# Patient Record
Sex: MALE
[De-identification: names, ages, dates, MRNs, and addresses within clinical notes are randomized per-mention and may not be internally consistent; named-entity substitution may affect disease eponyms.]

---

## 2021-06-11 PROBLEM — Z00.00 ENCOUNTER FOR PREVENTIVE HEALTH EXAMINATION: Status: ACTIVE | Noted: 2021-06-11

## 2021-06-14 ENCOUNTER — APPOINTMENT (OUTPATIENT)
Dept: ORTHOPEDIC SURGERY | Facility: CLINIC | Age: 76
End: 2021-06-14
Payer: COMMERCIAL

## 2021-06-14 VITALS — WEIGHT: 252 LBS | HEIGHT: 74 IN | BODY MASS INDEX: 32.34 KG/M2

## 2021-06-14 DIAGNOSIS — Z78.9 OTHER SPECIFIED HEALTH STATUS: ICD-10-CM

## 2021-06-14 DIAGNOSIS — M19.072 PRIMARY OSTEOARTHRITIS, LEFT ANKLE AND FOOT: ICD-10-CM

## 2021-06-14 DIAGNOSIS — M21.962 UNSPECIFIED ACQUIRED DEFORMITY OF LEFT LOWER LEG: ICD-10-CM

## 2021-06-14 DIAGNOSIS — I63.9 CEREBRAL INFARCTION, UNSPECIFIED: ICD-10-CM

## 2021-06-14 DIAGNOSIS — I10 ESSENTIAL (PRIMARY) HYPERTENSION: ICD-10-CM

## 2021-06-14 PROCEDURE — 73610 X-RAY EXAM OF ANKLE: CPT | Mod: LT

## 2021-06-14 PROCEDURE — 73620 X-RAY EXAM OF FOOT: CPT | Mod: LT

## 2021-06-14 PROCEDURE — 99072 ADDL SUPL MATRL&STAF TM PHE: CPT

## 2021-06-14 PROCEDURE — 99204 OFFICE O/P NEW MOD 45 MIN: CPT

## 2021-06-14 RX ORDER — CAPTOPRIL 100 MG/1
100 TABLET ORAL
Qty: 90 | Refills: 0 | Status: ACTIVE | COMMUNITY
Start: 2021-01-20

## 2021-06-14 RX ORDER — APIXABAN 5 MG/1
5 TABLET, FILM COATED ORAL
Qty: 60 | Refills: 0 | Status: ACTIVE | COMMUNITY
Start: 2021-01-20

## 2021-06-14 RX ORDER — AMLODIPINE BESYLATE 10 MG/1
10 TABLET ORAL
Qty: 30 | Refills: 0 | Status: COMPLETED | COMMUNITY
Start: 2020-02-21

## 2021-06-14 RX ORDER — ATORVASTATIN CALCIUM 80 MG/1
80 TABLET, FILM COATED ORAL
Qty: 30 | Refills: 0 | Status: ACTIVE | COMMUNITY
Start: 2021-01-20

## 2021-06-14 RX ORDER — AMLODIPINE BESYLATE 5 MG/1
5 TABLET ORAL
Qty: 30 | Refills: 0 | Status: ACTIVE | COMMUNITY
Start: 2021-02-18

## 2021-06-14 RX ORDER — HYDROCHLOROTHIAZIDE 25 MG/1
25 TABLET ORAL
Qty: 30 | Refills: 0 | Status: COMPLETED | COMMUNITY
Start: 2021-01-20

## 2021-06-14 RX ORDER — CARVEDILOL 6.25 MG/1
6.25 TABLET, FILM COATED ORAL
Qty: 60 | Refills: 0 | Status: ACTIVE | COMMUNITY
Start: 2021-01-19

## 2021-06-14 NOTE — HISTORY OF PRESENT ILLNESS
[de-identified] : 76 yo gentleman presents for in his left ankle that started over 10 years ago.  He was very active in the past and ran for [employer], the last one over about 30 years ago.  He got progressively worse pain and deformity in the left hindfoot and ankle over the years.  He tried orthotics which did not help.  It continued to progress and he got new orthotics and tried some braces and then started walking with a cane.  He does not take any medication for pain which is not severe but can have pain that 6 out of 10 intermittently and typically is dull.  It is better with rest and worse walking.  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  He states that he is not convinced he had a stroke and had no residual impairments.

## 2021-06-14 NOTE — ASSESSMENT
[FreeTextEntry1] : 75-year-old gentleman with severe deformity and degeneration subtalar and transverse tarsal joints.\par There is also  mild arthritis of the ankle joint but there is good motion.\par I think he likely needs a triple arthrodesis to reduce and fuse his subtalar and transverse tarsal joints.  I think this would give him a good plantigrade foot.  The ankle joint and other joints need to be further assessed with CT and MRI before proceeding with surgery.\par I am referring him also to one of the foot and ankle specialists for further evaluation.\par Nonsurgical treatment would involve bracing using an [place] or Shaggy brace to try to stabilize the ankle but given the degree of deformity and collapse I think it is likely to still progress and may get to the point it is very difficult for him to ambulate.\par Surgery certainly has its risks and the healing will take several months at least.  He should speak to his primary care physician since he is on Eliquis and does have some other medical issues and would need to be cleared for surgery.\par Should follow-up with Dr. Guzman after he has the CT and MRI performed.

## 2021-06-14 NOTE — PHYSICAL EXAM
[Cane] : ambulates with cane [LE] : Sensory: Intact in bilateral lower extremities [DP] : dorsalis pedis 2+ and symmetric bilaterally [Normal RLE] : Right Lower Extremity: No scars, rashes, lesions, ulcers, skin intact [Normal LLE] : Left Lower Extremity: No scars, rashes, lesions, ulcers, skin intact [Normal Touch] : sensation intact for touch [Normal] : Alert and in no acute distress [de-identified] : Left foot and ankle\par There is obvious deformity with l medial subluxation of the talus on the navicular and subluxation at the talonavicular joint with prominence of the medial malleolus.\par Moderate edema.  No ecchymoses or erythema.  Skin is intact.  There is mild generalized tenderness around the hindfoot.\par Ankle motion is good with about 5 degrees dorsiflexion and 20 to 25 degrees plantarflexion.  Limited subtalar motion with pain.\par No significant forefoot deformity.\par Anterior tibial tendon, gastrocsoleus, peroneals and posterior tibial tendon, EHL are all intact 5/5 strength.\par Incision is intact.  Dorsalis pedis pulse 2+.\par Moderately antalgic gait. [de-identified] : Overweight [de-identified] : \par X-rays of the left foot and ankle weightbearing 5 views taken today show narrowing of the tibiotalar joint.  Evidence of prior avulsion deltoid.\par Subluxation talocalcaneal joint with joint space narrowing.  Severe uncovering of the talus on the AP view from subluxation medially.  Severe talonavicular and calcaneocuboid degeneration.

## 2022-03-10 ENCOUNTER — INPATIENT (INPATIENT)
Facility: HOSPITAL | Age: 77
LOS: 28 days | Discharge: NURSING, REHAB OR LONG-TERM CARE | DRG: 299 | End: 2022-04-08
Attending: NEUROLOGICAL SURGERY | Admitting: NEUROLOGICAL SURGERY
Payer: COMMERCIAL

## 2022-03-10 VITALS
DIASTOLIC BLOOD PRESSURE: 101 MMHG | SYSTOLIC BLOOD PRESSURE: 171 MMHG | OXYGEN SATURATION: 96 % | HEART RATE: 74 BPM | RESPIRATION RATE: 16 BRPM

## 2022-03-10 LAB
ALBUMIN SERPL ELPH-MCNC: 4.3 G/DL — SIGNIFICANT CHANGE UP (ref 3.3–5)
ALP SERPL-CCNC: 61 U/L — SIGNIFICANT CHANGE UP (ref 40–120)
ALT FLD-CCNC: 17 U/L — SIGNIFICANT CHANGE UP (ref 10–45)
ANION GAP SERPL CALC-SCNC: 12 MMOL/L — SIGNIFICANT CHANGE UP (ref 5–17)
ANISOCYTOSIS BLD QL: SLIGHT — SIGNIFICANT CHANGE UP
APTT BLD: 31.4 SEC — SIGNIFICANT CHANGE UP (ref 27.5–35.5)
AST SERPL-CCNC: 16 U/L — SIGNIFICANT CHANGE UP (ref 10–40)
BASE EXCESS BLDV CALC-SCNC: 2.7 MMOL/L — SIGNIFICANT CHANGE UP (ref -2–3)
BASOPHILS # BLD AUTO: 0 K/UL — SIGNIFICANT CHANGE UP (ref 0–0.2)
BASOPHILS NFR BLD AUTO: 0 % — SIGNIFICANT CHANGE UP (ref 0–2)
BILIRUB SERPL-MCNC: 0.7 MG/DL — SIGNIFICANT CHANGE UP (ref 0.2–1.2)
BUN SERPL-MCNC: 30 MG/DL — HIGH (ref 7–23)
BURR CELLS BLD QL SMEAR: PRESENT — SIGNIFICANT CHANGE UP
CA-I SERPL-SCNC: 1.19 MMOL/L — SIGNIFICANT CHANGE UP (ref 1.15–1.33)
CALCIUM SERPL-MCNC: 9.8 MG/DL — SIGNIFICANT CHANGE UP (ref 8.4–10.5)
CHLORIDE SERPL-SCNC: 102 MMOL/L — SIGNIFICANT CHANGE UP (ref 96–108)
CO2 BLDV-SCNC: 29.3 MMOL/L — HIGH (ref 22–26)
CO2 SERPL-SCNC: 26 MMOL/L — SIGNIFICANT CHANGE UP (ref 22–31)
CREAT SERPL-MCNC: 1.13 MG/DL — SIGNIFICANT CHANGE UP (ref 0.5–1.3)
EGFR: 67 ML/MIN/1.73M2 — SIGNIFICANT CHANGE UP
EOSINOPHIL # BLD AUTO: 0 K/UL — SIGNIFICANT CHANGE UP (ref 0–0.5)
EOSINOPHIL NFR BLD AUTO: 0 % — SIGNIFICANT CHANGE UP (ref 0–6)
GAS PNL BLDV: 137 MMOL/L — SIGNIFICANT CHANGE UP (ref 136–145)
GAS PNL BLDV: SIGNIFICANT CHANGE UP
GLUCOSE SERPL-MCNC: 185 MG/DL — HIGH (ref 70–99)
HCO3 BLDV-SCNC: 28 MMOL/L — SIGNIFICANT CHANGE UP (ref 22–29)
HCT VFR BLD CALC: 47.4 % — SIGNIFICANT CHANGE UP (ref 39–50)
HGB BLD-MCNC: 16.5 G/DL — SIGNIFICANT CHANGE UP (ref 13–17)
INR BLD: 1.17 — HIGH (ref 0.88–1.16)
LACTATE SERPL-SCNC: 2 MMOL/L — SIGNIFICANT CHANGE UP (ref 0.5–2)
LYMPHOCYTES # BLD AUTO: 0.22 K/UL — LOW (ref 1–3.3)
LYMPHOCYTES # BLD AUTO: 3.5 % — LOW (ref 13–44)
MANUAL SMEAR VERIFICATION: SIGNIFICANT CHANGE UP
MCHC RBC-ENTMCNC: 32 PG — SIGNIFICANT CHANGE UP (ref 27–34)
MCHC RBC-ENTMCNC: 34.8 GM/DL — SIGNIFICANT CHANGE UP (ref 32–36)
MCV RBC AUTO: 92 FL — SIGNIFICANT CHANGE UP (ref 80–100)
MICROCYTES BLD QL: SLIGHT — SIGNIFICANT CHANGE UP
MONOCYTES # BLD AUTO: 0.11 K/UL — SIGNIFICANT CHANGE UP (ref 0–0.9)
MONOCYTES NFR BLD AUTO: 1.7 % — LOW (ref 2–14)
NEUTROPHILS # BLD AUTO: 5.83 K/UL — SIGNIFICANT CHANGE UP (ref 1.8–7.4)
NEUTROPHILS NFR BLD AUTO: 91.3 % — HIGH (ref 43–77)
PCO2 BLDV: 44 MMHG — SIGNIFICANT CHANGE UP (ref 42–55)
PH BLDV: 7.41 — SIGNIFICANT CHANGE UP (ref 7.32–7.43)
PLAT MORPH BLD: NORMAL — SIGNIFICANT CHANGE UP
PLATELET # BLD AUTO: 203 K/UL — SIGNIFICANT CHANGE UP (ref 150–400)
PO2 BLDV: 51 MMHG — HIGH (ref 25–45)
POIKILOCYTOSIS BLD QL AUTO: SLIGHT — SIGNIFICANT CHANGE UP
POLYCHROMASIA BLD QL SMEAR: SLIGHT — SIGNIFICANT CHANGE UP
POTASSIUM BLDV-SCNC: 4 MMOL/L — SIGNIFICANT CHANGE UP (ref 3.5–5.1)
POTASSIUM SERPL-MCNC: 4 MMOL/L — SIGNIFICANT CHANGE UP (ref 3.5–5.3)
POTASSIUM SERPL-SCNC: 4 MMOL/L — SIGNIFICANT CHANGE UP (ref 3.5–5.3)
PROT SERPL-MCNC: 6.8 G/DL — SIGNIFICANT CHANGE UP (ref 6–8.3)
PROTHROM AB SERPL-ACNC: 13.9 SEC — HIGH (ref 10.5–13.4)
RBC # BLD: 5.15 M/UL — SIGNIFICANT CHANGE UP (ref 4.2–5.8)
RBC # FLD: 12.5 % — SIGNIFICANT CHANGE UP (ref 10.3–14.5)
RBC BLD AUTO: ABNORMAL
SAO2 % BLDV: 82.6 % — SIGNIFICANT CHANGE UP (ref 67–88)
SARS-COV-2 RNA SPEC QL NAA+PROBE: NEGATIVE — SIGNIFICANT CHANGE UP
SCHISTOCYTES BLD QL AUTO: SLIGHT — SIGNIFICANT CHANGE UP
SODIUM SERPL-SCNC: 140 MMOL/L — SIGNIFICANT CHANGE UP (ref 135–145)
TROPONIN T SERPL-MCNC: 0.01 NG/ML — SIGNIFICANT CHANGE UP (ref 0–0.01)
VARIANT LYMPHS # BLD: 3.5 % — SIGNIFICANT CHANGE UP (ref 0–6)
WBC # BLD: 6.39 K/UL — SIGNIFICANT CHANGE UP (ref 3.8–10.5)
WBC # FLD AUTO: 6.39 K/UL — SIGNIFICANT CHANGE UP (ref 3.8–10.5)

## 2022-03-10 PROCEDURE — 71045 X-RAY EXAM CHEST 1 VIEW: CPT | Mod: 26

## 2022-03-10 PROCEDURE — 70496 CT ANGIOGRAPHY HEAD: CPT | Mod: 26,MA

## 2022-03-10 PROCEDURE — 99291 CRITICAL CARE FIRST HOUR: CPT

## 2022-03-10 PROCEDURE — 70498 CT ANGIOGRAPHY NECK: CPT | Mod: 26,MA

## 2022-03-10 PROCEDURE — 0042T: CPT

## 2022-03-10 RX ORDER — SODIUM CHLORIDE 9 MG/ML
1000 INJECTION, SOLUTION INTRAVENOUS
Refills: 0 | Status: DISCONTINUED | OUTPATIENT
Start: 2022-03-10 | End: 2022-03-12

## 2022-03-10 RX ORDER — GLUCAGON INJECTION, SOLUTION 0.5 MG/.1ML
1 INJECTION, SOLUTION SUBCUTANEOUS ONCE
Refills: 0 | Status: DISCONTINUED | OUTPATIENT
Start: 2022-03-10 | End: 2022-04-08

## 2022-03-10 RX ORDER — NICARDIPINE HYDROCHLORIDE 30 MG/1
5 CAPSULE, EXTENDED RELEASE ORAL
Qty: 40 | Refills: 0 | Status: DISCONTINUED | OUTPATIENT
Start: 2022-03-10 | End: 2022-03-10

## 2022-03-10 RX ORDER — DEXTROSE 50 % IN WATER 50 %
25 SYRINGE (ML) INTRAVENOUS ONCE
Refills: 0 | Status: DISCONTINUED | OUTPATIENT
Start: 2022-03-10 | End: 2022-03-12

## 2022-03-10 RX ORDER — METOCLOPRAMIDE HCL 10 MG
10 TABLET ORAL ONCE
Refills: 0 | Status: COMPLETED | OUTPATIENT
Start: 2022-03-10 | End: 2022-03-10

## 2022-03-10 RX ORDER — NICARDIPINE HYDROCHLORIDE 30 MG/1
5 CAPSULE, EXTENDED RELEASE ORAL
Qty: 40 | Refills: 0 | Status: DISCONTINUED | OUTPATIENT
Start: 2022-03-10 | End: 2022-03-11

## 2022-03-10 RX ORDER — DEXTROSE 50 % IN WATER 50 %
12.5 SYRINGE (ML) INTRAVENOUS ONCE
Refills: 0 | Status: DISCONTINUED | OUTPATIENT
Start: 2022-03-10 | End: 2022-03-12

## 2022-03-10 RX ORDER — DEXTROSE 50 % IN WATER 50 %
15 SYRINGE (ML) INTRAVENOUS ONCE
Refills: 0 | Status: DISCONTINUED | OUTPATIENT
Start: 2022-03-10 | End: 2022-03-12

## 2022-03-10 RX ORDER — INSULIN LISPRO 100/ML
VIAL (ML) SUBCUTANEOUS EVERY 6 HOURS
Refills: 0 | Status: DISCONTINUED | OUTPATIENT
Start: 2022-03-10 | End: 2022-03-12

## 2022-03-10 RX ORDER — SODIUM CHLORIDE 9 MG/ML
1000 INJECTION INTRAMUSCULAR; INTRAVENOUS; SUBCUTANEOUS
Refills: 0 | Status: DISCONTINUED | OUTPATIENT
Start: 2022-03-10 | End: 2022-03-11

## 2022-03-10 RX ADMIN — NICARDIPINE HYDROCHLORIDE 25 MG/HR: 30 CAPSULE, EXTENDED RELEASE ORAL at 21:51

## 2022-03-10 RX ADMIN — Medication 104 MILLIGRAM(S): at 22:37

## 2022-03-10 RX ADMIN — NICARDIPINE HYDROCHLORIDE 25 MG/HR: 30 CAPSULE, EXTENDED RELEASE ORAL at 22:47

## 2022-03-10 RX ADMIN — SODIUM CHLORIDE 85 MILLILITER(S): 9 INJECTION INTRAMUSCULAR; INTRAVENOUS; SUBCUTANEOUS at 22:46

## 2022-03-10 NOTE — ED PROVIDER NOTE - OBJECTIVE STATEMENT
Pt is a 76M who presents to ED for left sided weakness, left sided facial droop noted today. pt was last seen normal earlier this evening, was found by family member slumped over in his office. Pt brought to ED by EMS, Code Stroke activated.   pt is awake, dysarthric in ED. Seen immediately upon arrival secondary to critical condition.

## 2022-03-10 NOTE — ED PROVIDER NOTE - CRITICAL CARE ATTENDING CONTRIBUTION TO CARE
Pt seen immediately upon arrival secondary to critical condition. Code stroke activated.   IV Cardene placed secondary to elevated BP.

## 2022-03-10 NOTE — CONSULT NOTE ADULT - SUBJECTIVE AND OBJECTIVE BOX
**STROKE CODE CONSULT NOTE**    Last known well time: 1500    HPI: 76y Male with PMHx of HTN, HLD, basilar artery aneurysm, ? cardiac hx (on Eliquis 5) BIBEMS due to being found slumped over in his office. LWK 3pm. 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 Per girlfriend, he was supposed to come straight home afterwards, when she did not see him home she had a colleague check on him in his office, was found slumped over. EMS called at that point, found to have left sided weakness and left facial droop, aphasic. /90, was satting 90% on RA, had x1 episode of emesis on the field, was brought to ED for stroke evaluation. BP at [facility] was 171/108mmHg, NIHSS 20. Stat CT imaging obtained. HCT/CTA head shows partially thrombosed 3.1 x 2.6cm basilar artery aneurysm with posterior displacement of brainstem. NSGY consulted immediately. Pt is not a tpa candidate as pt is out of window, not a thrombectomy candidate due to no LVO. Cardene gtt started in ED for BP management.    Of note, pt was having workup done for basilar artery aneurysm. At some point, was supposed to have intervention done for it, however for unclear reasons they decided to not pursue intervention. Per girlfriend, pt was also scared of procedure. Unclear if serial imaging was obtained and if patient was compliant with his visits. Independent with ADLs at home.     T(C): 36.9 (03-10-22 @ 21:44), Max: 36.9 (03-10-22 @ 21:44)  HR: 80 (03-10-22 @ 22:11) (74 - 80)  BP: 152/71 (03-10-22 @ 22:11) (152/71 - 171/101)  RR: 16 (03-10-22 @ 22:11) (16 - 16)  SpO2: 93% (03-10-22 @ 22:11) (93% - 97%)    PAST MEDICAL & SURGICAL HISTORY:    FAMILY HISTORY:    SOCIAL HISTORY:   Patient lives with girlfriend in apartment  Smoking status: unknown  Drinking: rarely  Drug Use: denies    ROS:   Unable to obtain    MEDICATIONS  (STANDING):  niCARdipine Infusion 5 mG/Hr (25 mL/Hr) IV Continuous <Continuous>    MEDICATIONS  (PRN):    Allergies    No Known Allergies    Intolerances    Vital Signs Last 24 Hrs  T(C): 36.9 (10 Mar 2022 21:44), Max: 36.9 (10 Mar 2022 21:44)  T(F): 98.5 (10 Mar 2022 21:44), Max: 98.5 (10 Mar 2022 21:44)  HR: 80 (10 Mar 2022 22:11) (74 - 80)  BP: 152/71 (10 Mar 2022 22:11) (152/71 - 171/101)  BP(mean): --  RR: 16 (10 Mar 2022 22:11) (16 - 16)  SpO2: 93% (10 Mar 2022 22:11) (93% - 97%)    Physical exam:  Constitutional: Lethargic, emesis on clothes  Eyes: Anicteric sclerae, moist conjunctivae, see below for CNs  Neck: trachea midline  Cardiovascular: Regular rate and rhythm, no murmurs, rubs, or gallops.   Pulmonary: Anterior breath sounds clear bilaterally, no crackles or wheezing.     Neurologic:  -Mental status: Lethargic, oriented to person, place, and time with choices, expressive aphasia, may have component of some mild dysarthria. Follows commands. Remote memory intact (nodded yes when told he has a basilar artery aneurysm)  -Cranial nerves:   II: BTT intact  III, IV, VI: Extraocular movements are intact without nystagmus. Pupils equally round and reactive to light  V:  Unable to assess  VII: left facial droop  VIII: Hearing is grossly intact  XII: Tongue protrudes midline  Motor: Normal bulk and tone. RUE 5/5, RLE 3/5 had some effort against gravity, LUE/LLE 1/5 with trace movement.  Sensation: No withdrawal to noxious in LUE, triple flexion to noxious in b/l LE, withdrawal to noxious in RUE. Likely has left sided neglect.   Coordination: Unable to assess  Reflexes: Downgoing toes bilaterally   Gait: deferred    NIHSS: 20     Fingerstick Blood Glucose: CAPILLARY BLOOD GLUCOSE      POCT Blood Glucose.: 173 mg/dL (10 Mar 2022 21:15)    LABS:                        16.5   6.39  )-----------( 203      ( 10 Mar 2022 21:23 )             47.4     03-10    140  |  102  |  30<H>  ----------------------------<  185<H>  4.0   |  26  |  1.13    Ca    9.8      10 Mar 2022 21:23    TPro  6.8  /  Alb  4.3  /  TBili  0.7  /  DBili  x   /  AST  16  /  ALT  17  /  AlkPhos  61  03-10    PT/INR - ( 10 Mar 2022 21:23 )   PT: 13.9 sec;   INR: 1.17          PTT - ( 10 Mar 2022 21:23 )  PTT:31.4 sec  CARDIAC MARKERS ( 10 Mar 2022 21:23 )  x     / 0.01 ng/mL / x     / x     / x          RADIOLOGY & ADDITIONAL STUDIES:  < from: CT Brain Stroke Protocol (03.10.22 @ 21:35) >  IMPRESSION:  Large well-circumscribed hyperdensity within the posterior fossa with   posterior displacement of the brainstem, consistent with partially   thrombosed basilar artery aneurysm demonstrated on CTA head performed   concurrently.    < from: CT Perfusion w/ Maps w/ IV Cont (03.10.22 @ 21:36) >  IMPRESSION: Elevated Tmax greater than 6 seconds with a volume of 83 mL,   likely artifactual.    < from: CT Angio Head w/ IV Cont (03.10.22 @ 21:38) >  IMPRESSION:  3.1 x 2.6 cm basilar artery aneurysm with fusiform dilation of the more   distal portions of the basilar artery, measuring up to 7 mm in diameter.      < from: CT Angio Neck w/ IV Cont (03.10.22 @ 21:38) >  IMPRESSION:  No stenosis or occlusion.    -----------------------------------------------------------------------------------------------------------------  IV-tPA (Y/N):   N                             Bolus time:    Alteplase Dose Verification w/ RN:  Reason IV-tPA not given: out of window    **STROKE CODE CONSULT NOTE**    Last known well time: 1500    HPI: 76y Male with PMHx of HTN, HLD, basilar artery aneurysm, ? cardiac hx (on Eliquis 5) BIBEMS due to being found slumped over in his office. LWK 3pm. 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 Per girlfriend, he was supposed to come straight home afterwards, when she did not see him home she had a colleague check on him in his office, was found slumped over. EMS called at that point, found to have left sided weakness and left facial droop, aphasic. /90, was satting 90% on RA, had x1 episode of emesis on the field, was brought to ED for stroke evaluation. BP at [facility] was 171/108mmHg, NIHSS 20. Stat CT imaging obtained. HCT/CTA head shows partially thrombosed 3.1 x 2.6cm basilar artery aneurysm with posterior displacement of brainstem. NSGY consulted immediately. Pt is not a tpa candidate as pt is out of window, not a thrombectomy candidate due to no LVO. Cardene gtt started in ED for BP management.    Per girlfriend basilar artery aneurysm is known and was diagnosed in 2020 at [facility] when he suffered from a CVA. At that time, he underwent a "treatment for the aneurysm," which the girlfriend reports was too risky and was aborted. Patient never attended his follow-up appointments due to fear per girlfriend. Independent with ADLs at home.     T(C): 36.9 (03-10-22 @ 21:44), Max: 36.9 (03-10-22 @ 21:44)  HR: 80 (03-10-22 @ 22:11) (74 - 80)  BP: 152/71 (03-10-22 @ 22:11) (152/71 - 171/101)  RR: 16 (03-10-22 @ 22:11) (16 - 16)  SpO2: 93% (03-10-22 @ 22:11) (93% - 97%)    PAST MEDICAL & SURGICAL HISTORY:    FAMILY HISTORY:    SOCIAL HISTORY:   Patient lives with girlfriend in apartment  Smoking status: unknown  Drinking: rarely  Drug Use: denies    ROS:   Unable to obtain    MEDICATIONS  (STANDING):  niCARdipine Infusion 5 mG/Hr (25 mL/Hr) IV Continuous <Continuous>    MEDICATIONS  (PRN):    Allergies    No Known Allergies    Intolerances    Vital Signs Last 24 Hrs  T(C): 36.9 (10 Mar 2022 21:44), Max: 36.9 (10 Mar 2022 21:44)  T(F): 98.5 (10 Mar 2022 21:44), Max: 98.5 (10 Mar 2022 21:44)  HR: 80 (10 Mar 2022 22:11) (74 - 80)  BP: 152/71 (10 Mar 2022 22:11) (152/71 - 171/101)  BP(mean): --  RR: 16 (10 Mar 2022 22:11) (16 - 16)  SpO2: 93% (10 Mar 2022 22:11) (93% - 97%)    Physical exam:  Constitutional: Lethargic, emesis on clothes  Eyes: Anicteric sclerae, moist conjunctivae, see below for CNs  Neck: trachea midline  Cardiovascular: Regular rate and rhythm, no murmurs, rubs, or gallops.   Pulmonary: Anterior breath sounds clear bilaterally, no crackles or wheezing.     Neurologic:  -Mental status: Lethargic, oriented to person, place, and time with choices, expressive aphasia, may have component of some mild dysarthria. Follows commands. Remote memory intact (nodded yes when told he has a basilar artery aneurysm)  -Cranial nerves:   II: BTT intact  III, IV, VI: Extraocular movements are intact without nystagmus. Pupils equally round and reactive to light  V:  Unable to assess  VII: left facial droop  VIII: Hearing is grossly intact  XII: Tongue protrudes midline  Motor: Normal bulk and tone. RUE 5/5, RLE 3/5 had some effort against gravity, LUE/LLE 1/5 with trace movement.  Sensation: No withdrawal to noxious in LUE, triple flexion to noxious in b/l LE, withdrawal to noxious in RUE. Likely has left sided neglect.   Coordination: Unable to assess  Reflexes: Downgoing toes bilaterally   Gait: deferred    NIHSS: 20     Fingerstick Blood Glucose: CAPILLARY BLOOD GLUCOSE      POCT Blood Glucose.: 173 mg/dL (10 Mar 2022 21:15)    LABS:                        16.5   6.39  )-----------( 203      ( 10 Mar 2022 21:23 )             47.4     03-10    140  |  102  |  30<H>  ----------------------------<  185<H>  4.0   |  26  |  1.13    Ca    9.8      10 Mar 2022 21:23    TPro  6.8  /  Alb  4.3  /  TBili  0.7  /  DBili  x   /  AST  16  /  ALT  17  /  AlkPhos  61  03-10    PT/INR - ( 10 Mar 2022 21:23 )   PT: 13.9 sec;   INR: 1.17          PTT - ( 10 Mar 2022 21:23 )  PTT:31.4 sec  CARDIAC MARKERS ( 10 Mar 2022 21:23 )  x     / 0.01 ng/mL / x     / x     / x          RADIOLOGY & ADDITIONAL STUDIES:  < from: CT Brain Stroke Protocol (03.10.22 @ 21:35) >  IMPRESSION:  Large well-circumscribed hyperdensity within the posterior fossa with   posterior displacement of the brainstem, consistent with partially   thrombosed basilar artery aneurysm demonstrated on CTA head performed   concurrently.    < from: CT Perfusion w/ Maps w/ IV Cont (03.10.22 @ 21:36) >  IMPRESSION: Elevated Tmax greater than 6 seconds with a volume of 83 mL,   likely artifactual.    < from: CT Angio Head w/ IV Cont (03.10.22 @ 21:38) >  IMPRESSION:  3.1 x 2.6 cm basilar artery aneurysm with fusiform dilation of the more   distal portions of the basilar artery, measuring up to 7 mm in diameter.      < from: CT Angio Neck w/ IV Cont (03.10.22 @ 21:38) >  IMPRESSION:  No stenosis or occlusion.    -----------------------------------------------------------------------------------------------------------------  IV-tPA (Y/N):   N                             Bolus time:    Alteplase Dose Verification w/ RN:  Reason IV-tPA not given: out of window    **STROKE CODE CONSULT NOTE**    Last known well time: 1500    HPI: 76y Male with PMHx of HTN, HLD, basilar artery aneurysm, ? cardiac hx (on Eliquis 5) BIBEMS due to being found slumped over in his office. LWK 3pm. 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 Per girlfriend, he was supposed to come straight home afterwards, when she did not see him home she had a colleague check on him in his office, was found slumped over. EMS called at that point, found to have left sided weakness and left facial droop, aphasic. /90, was satting 90% on RA, had x1 episode of emesis on the field, was brought to ED for stroke evaluation. BP at [facility] was 171/108mmHg, NIHSS 19. Stat CT imaging obtained. HCT/CTA head shows partially thrombosed 3.1 x 2.6cm basilar artery aneurysm with posterior displacement of brainstem. NSGY consulted immediately. Pt is not a tpa candidate as pt is out of window, not a thrombectomy candidate due to no LVO. Cardene gtt started in ED for BP management.    Per girlfriend basilar artery aneurysm is known and was diagnosed in 2020 at [facility] when he suffered from a CVA. At that time, he underwent a "treatment for the aneurysm," which the girlfriend reports was too risky and was aborted. Patient never attended his follow-up appointments due to fear per girlfriend. Independent with ADLs at home.     T(C): 36.9 (03-10-22 @ 21:44), Max: 36.9 (03-10-22 @ 21:44)  HR: 80 (03-10-22 @ 22:11) (74 - 80)  BP: 152/71 (03-10-22 @ 22:11) (152/71 - 171/101)  RR: 16 (03-10-22 @ 22:11) (16 - 16)  SpO2: 93% (03-10-22 @ 22:11) (93% - 97%)    PAST MEDICAL & SURGICAL HISTORY:    FAMILY HISTORY:    SOCIAL HISTORY:   Patient lives with girlfriend in apartment  Smoking status: unknown  Drinking: rarely  Drug Use: denies    ROS:   Unable to obtain    MEDICATIONS  (STANDING):  niCARdipine Infusion 5 mG/Hr (25 mL/Hr) IV Continuous <Continuous>    MEDICATIONS  (PRN):    Allergies    No Known Allergies    Intolerances    Vital Signs Last 24 Hrs  T(C): 36.9 (10 Mar 2022 21:44), Max: 36.9 (10 Mar 2022 21:44)  T(F): 98.5 (10 Mar 2022 21:44), Max: 98.5 (10 Mar 2022 21:44)  HR: 80 (10 Mar 2022 22:11) (74 - 80)  BP: 152/71 (10 Mar 2022 22:11) (152/71 - 171/101)  BP(mean): --  RR: 16 (10 Mar 2022 22:11) (16 - 16)  SpO2: 93% (10 Mar 2022 22:11) (93% - 97%)    Physical exam:  Constitutional: Lethargic, emesis on clothes  Eyes: Anicteric sclerae, moist conjunctivae, see below for CNs  Neck: trachea midline  Cardiovascular: Regular rate and rhythm, no murmurs, rubs, or gallops.   Pulmonary: Anterior breath sounds clear bilaterally, no crackles or wheezing.     Neurologic:  -Mental status: Lethargic, oriented to person, place, and time with choices, expressive aphasia, may have component of some mild dysarthria. Follows commands. Remote memory intact (nodded yes when told he has a basilar artery aneurysm)  -Cranial nerves:   II: BTT intact  III, IV, VI: Extraocular movements are intact without nystagmus. Pupils equally round and reactive to light  V:  Unable to assess  VII: left facial droop  VIII: Hearing is grossly intact  XII: Tongue protrudes midline  Motor: Normal bulk and tone. RUE 5/5, RLE 3/5 had some effort against gravity, LUE/LLE 1/5 with trace movement.  Sensation: No withdrawal to noxious in LUE, triple flexion to noxious in b/l LE, withdrawal to noxious in RUE. Likely has left sided neglect.   Coordination: Unable to assess  Reflexes: Downgoing toes bilaterally   Gait: deferred    NIHSS: 19     Fingerstick Blood Glucose: CAPILLARY BLOOD GLUCOSE      POCT Blood Glucose.: 173 mg/dL (10 Mar 2022 21:15)    LABS:                        16.5   6.39  )-----------( 203      ( 10 Mar 2022 21:23 )             47.4     03-10    140  |  102  |  30<H>  ----------------------------<  185<H>  4.0   |  26  |  1.13    Ca    9.8      10 Mar 2022 21:23    TPro  6.8  /  Alb  4.3  /  TBili  0.7  /  DBili  x   /  AST  16  /  ALT  17  /  AlkPhos  61  03-10    PT/INR - ( 10 Mar 2022 21:23 )   PT: 13.9 sec;   INR: 1.17          PTT - ( 10 Mar 2022 21:23 )  PTT:31.4 sec  CARDIAC MARKERS ( 10 Mar 2022 21:23 )  x     / 0.01 ng/mL / x     / x     / x          RADIOLOGY & ADDITIONAL STUDIES:  < from: CT Brain Stroke Protocol (03.10.22 @ 21:35) >  IMPRESSION:  Large well-circumscribed hyperdensity within the posterior fossa with   posterior displacement of the brainstem, consistent with partially   thrombosed basilar artery aneurysm demonstrated on CTA head performed   concurrently.    < from: CT Perfusion w/ Maps w/ IV Cont (03.10.22 @ 21:36) >  IMPRESSION: Elevated Tmax greater than 6 seconds with a volume of 83 mL,   likely artifactual.    < from: CT Angio Head w/ IV Cont (03.10.22 @ 21:38) >  IMPRESSION:  3.1 x 2.6 cm basilar artery aneurysm with fusiform dilation of the more   distal portions of the basilar artery, measuring up to 7 mm in diameter.      < from: CT Angio Neck w/ IV Cont (03.10.22 @ 21:38) >  IMPRESSION:  No stenosis or occlusion.    -----------------------------------------------------------------------------------------------------------------  IV-tPA (Y/N):   N                             Bolus time:    Alteplase Dose Verification w/ RN:  Reason IV-tPA not given: out of window

## 2022-03-10 NOTE — H&P ADULT - ATTENDING COMMENTS
Patient seen and examined by me 3/11/2022. He presents with acute onset left side weakness and dysarthria. CTA shows a giant vertebro-basilar aneurysm with partial thrombosis compressing the brainstem. CT does not show hemorrhage. MRI does not show acute stroke. Plan to admit to ICU, 3% saline infusion for Na>140, will need catheter angiogram to determine treatment options.    Vincent Peterson M.D.

## 2022-03-10 NOTE — H&P ADULT - HISTORY OF PRESENT ILLNESS
75y/o M with PMHx sig for HTN, HLD, afib (on eliquis, unknown if last taken today,) CVA in 2020 (with minimal residual right-sided weakness per girlfriend,) last known normal at 3pm when girlfriend spoke to him. 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 Upon arrival to [facility], BP noted to be 171/101. Stroke code was called. CTH without findings of hemorrhage. 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 NIHSS 22. Per patient's girlfriend, the basilar artery aneurysm is known and was diagnosed in 2020 at [facility] when he suffered from a CVA. At that time, he underwent a "treatment for the aneurysm," which the girlfriend reports was too risky and was aborted. Patient never attended his follow-up appointments.  75y/o M with PMHx sig for HTN, HLD, afib (on eliquis, unknown if last taken today,) CVA in 2020 (with minimal residual right-sided weakness per girlfriend,) last known normal at 3pm when girlfriend spoke to him. 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 Upon arrival to [facility], BP noted to be 171/101. Stroke code was called. CTH without findings of hemorrhage. 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 NIHSS 20. Per patient's girlfriend, the basilar artery aneurysm is known and was diagnosed in 2020 at [facility] when he suffered from a CVA. At that time, he underwent a "treatment for the aneurysm," which the girlfriend reports was too risky and was aborted. Patient never attended his follow-up appointments.  75y/o M with PMHx sig for HTN, HLD, afib (on eliquis, unknown if last taken today,) CVA in 2020 (with minimal residual right-sided weakness per girlfriend,) last known normal at 3pm when girlfriend spoke to him. 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 Upon arrival to [facility], BP noted to be 171/101. Stroke code was called. CTH without findings of hemorrhage. 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 NIHSS 20. No TPA was administered as Pt is out of window. Per patient's girlfriend, the basilar artery aneurysm is known and was diagnosed in 2020 at [facility] when he suffered from a CVA. At that time, he underwent a "treatment for the aneurysm," which the girlfriend reports was too risky and was aborted. Patient never attended his follow-up appointments due to fear per girlfriend.

## 2022-03-10 NOTE — H&P ADULT - NSHPPHYSICALEXAM_GEN_ALL_CORE
GENERAL: In moderate distress, well-groomed, well-developed  HEAD:  Atraumatic, Normocephalic  EYES: EOMI, PERRLA, conjunctiva and sclera clear  ENMT: No tonsillar erythema, exudates, or enlargement; Moist mucous membranes, Good dentition, No lesions  NECK: Supple, No JVD, Normal thyroid  NERVOUS SYSTEM: Lethargic but arousable, AAOx3 (with choices,) expressive aphasia, dysarthria.   Motor: RUE 5/5 throughout, RLE HF/KE/KF 3/5, DF/PF 5/5, LUE proximally 2/5, biceps/triceps/HG 4-/5, LLE TF.   CHEST/LUNG: Clear to percussion bilaterally; No rales, rhonchi, wheezing, or rubs  HEART: Irregular rate and rhythm; No murmurs, rubs, or gallops  ABDOMEN: Soft, Nontender, Slightly distended; Bowel sounds present  EXTREMITIES:  2+ Peripheral Pulses, No clubbing, cyanosis, or edema. B/L ankles deformed-appearing  LYMPH: No lymphadenopathy noted  SKIN: No rashes or lesions

## 2022-03-10 NOTE — ED ADULT NURSE NOTE - OBJECTIVE STATEMENT
Pt presented to the ED via EMS as a stroke code. As per pt's significant other, pt's last known well was around 3pm. Pt presented to the ED via EMS as a stroke code. As per pt's significant other, pt's last known well was around 3pm. EMS states that on arrival pt had right facial droop with left sided weakness. Pt has a history of stroke and on eliquis.

## 2022-03-10 NOTE — ED PROVIDER NOTE - CLINICAL SUMMARY MEDICAL DECISION MAKING FREE TEXT BOX
Pt is a 76M with basilar artery aneurysm with partial thrombosis. Code stroke called for patient immediately upon arrival.   Stroke team in ED to evaluate pt.   Neurosurgery contacted and case discussed- in ED to evaluate pt.  Cardene drip placed for BP control.  MRI per neurosurgery team  Admit.

## 2022-03-10 NOTE — H&P ADULT - ASSESSMENT
75y/o M with PMHx sig for HTN, HLD, afib (on eliquis, unknown if last taken today,) CVA in 2020 (with minimal residual right-sided weakness per girlfriend) p/w dysarthria, left facial droop, and left-sided weakness. CTH without findings of hemorrhage. 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 NIHSS 20.     PLAN:  Neuro:  -neuro/vital checks q1h  -obtain MR brain stroke protocol  -stroke core measures    Cardiac:  -SBP goal 100-140  -f/u echo  -h/o afib (f/u EKG), eliquis held    Pulm:  -room air    Renal:  -IVF while NPO  -marte in place    GI:  -NPO for now  -bowel regimen    Endo:  -ISS while NPO  -f/u HgA1C    Heme;  -H/H stable  -home eliquis held    ID:  -no issues, afebrile    Dispo:  ICU status, full code    D/w Dr. Peterson and Dr. Gay

## 2022-03-10 NOTE — STROKE CODE NOTE - NIH STROKE SCALE: 9. BEST LANGUAGE, QM
(3) Mute, global aphasia; no usable speech or auditory comprehension (2) Severe aphasia; all communication is through fragmentary expression; great need for inference, questioning, and guessing by the listener. Range of information that can be exchanged is limited; listener carries burden of communication. Examiner cannot identify materials provided from patient response.

## 2022-03-10 NOTE — CONSULT NOTE ADULT - ASSESSMENT
76y Male with PMHx of HTN, HLD, basilar artery aneurysm, ? cardiac hx (on Eliquis 5) BIBEMS due to being found slumped over in his office. LWK 3pm. Stroke code called, NIHSS 20, HCT/CTA head shows partially thrombosed 3.1 x 2.6cm basilar artery aneurysm with posterior displacement of brainstem. NSGY consulted immediately. Cardene gtt started in ED for BP management.  Pt is not a tpa candidate as pt is out of window, not a thrombectomy candidate due to no LVO.     Recommend:   - maintain blood pressure <140mmHg  - f/u NSGY recs - will require neuro ICU admission   - q1 stroke neuro checks and vitals  - MRI short stroke protocol to assess for strokes   - Please notify stroke team if MRI demonstrates acute stroke    Discussed case with Neurology attending Dr. Gay 76y Male with PMHx of HTN, HLD, basilar artery aneurysm, ? cardiac hx (on Eliquis 5) BIBEMS due to being found slumped over in his office. LWK 3pm. Stroke code called, NIHSS 19, HCT/CTA head shows partially thrombosed 3.1 x 2.6cm basilar artery aneurysm with posterior displacement of brainstem. NSGY consulted immediately. Cardene gtt started in ED for BP management.  Pt is not a tpa candidate as pt is out of window, not a thrombectomy candidate due to no LVO.     Recommend:   - maintain blood pressure <140mmHg  - f/u NSGY recs - will require neuro ICU admission   - q1 stroke neuro checks and vitals  - MRI short stroke protocol to assess for strokes   - Please notify stroke team if MRI demonstrates acute stroke    Discussed case with Neurology attending Dr. Gay

## 2022-03-11 ENCOUNTER — APPOINTMENT (OUTPATIENT)
Dept: NEUROSURGERY | Facility: HOSPITAL | Age: 77
End: 2022-03-11

## 2022-03-11 PROBLEM — Z00.00 ENCOUNTER FOR PREVENTIVE HEALTH EXAMINATION: Noted: 2022-03-11

## 2022-03-11 LAB
A1C WITH ESTIMATED AVERAGE GLUCOSE RESULT: 5.4 % — SIGNIFICANT CHANGE UP (ref 4–5.6)
ANION GAP SERPL CALC-SCNC: 11 MMOL/L — SIGNIFICANT CHANGE UP (ref 5–17)
ANION GAP SERPL CALC-SCNC: 8 MMOL/L — SIGNIFICANT CHANGE UP (ref 5–17)
ANION GAP SERPL CALC-SCNC: 8 MMOL/L — SIGNIFICANT CHANGE UP (ref 5–17)
ANION GAP SERPL CALC-SCNC: 9 MMOL/L — SIGNIFICANT CHANGE UP (ref 5–17)
BASOPHILS # BLD AUTO: 0.02 K/UL — SIGNIFICANT CHANGE UP (ref 0–0.2)
BASOPHILS NFR BLD AUTO: 0.2 % — SIGNIFICANT CHANGE UP (ref 0–2)
BLD GP AB SCN SERPL QL: NEGATIVE — SIGNIFICANT CHANGE UP
BLD GP AB SCN SERPL QL: NEGATIVE — SIGNIFICANT CHANGE UP
BUN SERPL-MCNC: 29 MG/DL — HIGH (ref 7–23)
BUN SERPL-MCNC: 29 MG/DL — HIGH (ref 7–23)
BUN SERPL-MCNC: 30 MG/DL — HIGH (ref 7–23)
BUN SERPL-MCNC: 31 MG/DL — HIGH (ref 7–23)
CALCIUM SERPL-MCNC: 8.1 MG/DL — LOW (ref 8.4–10.5)
CALCIUM SERPL-MCNC: 8.3 MG/DL — LOW (ref 8.4–10.5)
CALCIUM SERPL-MCNC: 8.5 MG/DL — SIGNIFICANT CHANGE UP (ref 8.4–10.5)
CALCIUM SERPL-MCNC: 9.2 MG/DL — SIGNIFICANT CHANGE UP (ref 8.4–10.5)
CHLORIDE SERPL-SCNC: 107 MMOL/L — SIGNIFICANT CHANGE UP (ref 96–108)
CHLORIDE SERPL-SCNC: 111 MMOL/L — HIGH (ref 96–108)
CHLORIDE SERPL-SCNC: 112 MMOL/L — HIGH (ref 96–108)
CHLORIDE SERPL-SCNC: 113 MMOL/L — HIGH (ref 96–108)
CHOLEST SERPL-MCNC: 163 MG/DL — SIGNIFICANT CHANGE UP
CO2 SERPL-SCNC: 23 MMOL/L — SIGNIFICANT CHANGE UP (ref 22–31)
CO2 SERPL-SCNC: 23 MMOL/L — SIGNIFICANT CHANGE UP (ref 22–31)
CO2 SERPL-SCNC: 24 MMOL/L — SIGNIFICANT CHANGE UP (ref 22–31)
CO2 SERPL-SCNC: 25 MMOL/L — SIGNIFICANT CHANGE UP (ref 22–31)
CREAT SERPL-MCNC: 0.96 MG/DL — SIGNIFICANT CHANGE UP (ref 0.5–1.3)
CREAT SERPL-MCNC: 1 MG/DL — SIGNIFICANT CHANGE UP (ref 0.5–1.3)
CREAT SERPL-MCNC: 1.03 MG/DL — SIGNIFICANT CHANGE UP (ref 0.5–1.3)
CREAT SERPL-MCNC: 1.05 MG/DL — SIGNIFICANT CHANGE UP (ref 0.5–1.3)
EGFR: 73 ML/MIN/1.73M2 — SIGNIFICANT CHANGE UP
EGFR: 74 ML/MIN/1.73M2 — SIGNIFICANT CHANGE UP
EGFR: 77 ML/MIN/1.73M2 — SIGNIFICANT CHANGE UP
EGFR: 81 ML/MIN/1.73M2 — SIGNIFICANT CHANGE UP
EOSINOPHIL # BLD AUTO: 0.02 K/UL — SIGNIFICANT CHANGE UP (ref 0–0.5)
EOSINOPHIL NFR BLD AUTO: 0.2 % — SIGNIFICANT CHANGE UP (ref 0–6)
ESTIMATED AVERAGE GLUCOSE: 108 MG/DL — SIGNIFICANT CHANGE UP (ref 68–114)
GLUCOSE SERPL-MCNC: 106 MG/DL — HIGH (ref 70–99)
GLUCOSE SERPL-MCNC: 131 MG/DL — HIGH (ref 70–99)
GLUCOSE SERPL-MCNC: 131 MG/DL — HIGH (ref 70–99)
GLUCOSE SERPL-MCNC: 140 MG/DL — HIGH (ref 70–99)
HCT VFR BLD CALC: 41.3 % — SIGNIFICANT CHANGE UP (ref 39–50)
HCT VFR BLD CALC: 42.9 % — SIGNIFICANT CHANGE UP (ref 39–50)
HDLC SERPL-MCNC: 43 MG/DL — SIGNIFICANT CHANGE UP
HGB BLD-MCNC: 14.2 G/DL — SIGNIFICANT CHANGE UP (ref 13–17)
HGB BLD-MCNC: 15.2 G/DL — SIGNIFICANT CHANGE UP (ref 13–17)
IMM GRANULOCYTES NFR BLD AUTO: 0.3 % — SIGNIFICANT CHANGE UP (ref 0–1.5)
LIPID PNL WITH DIRECT LDL SERPL: 108 MG/DL — HIGH
LYMPHOCYTES # BLD AUTO: 0.83 K/UL — LOW (ref 1–3.3)
LYMPHOCYTES # BLD AUTO: 9.3 % — LOW (ref 13–44)
MAGNESIUM SERPL-MCNC: 2 MG/DL — SIGNIFICANT CHANGE UP (ref 1.6–2.6)
MCHC RBC-ENTMCNC: 32.3 PG — SIGNIFICANT CHANGE UP (ref 27–34)
MCHC RBC-ENTMCNC: 32.5 PG — SIGNIFICANT CHANGE UP (ref 27–34)
MCHC RBC-ENTMCNC: 34.4 GM/DL — SIGNIFICANT CHANGE UP (ref 32–36)
MCHC RBC-ENTMCNC: 35.4 GM/DL — SIGNIFICANT CHANGE UP (ref 32–36)
MCV RBC AUTO: 91.7 FL — SIGNIFICANT CHANGE UP (ref 80–100)
MCV RBC AUTO: 94.1 FL — SIGNIFICANT CHANGE UP (ref 80–100)
MONOCYTES # BLD AUTO: 0.88 K/UL — SIGNIFICANT CHANGE UP (ref 0–0.9)
MONOCYTES NFR BLD AUTO: 9.8 % — SIGNIFICANT CHANGE UP (ref 2–14)
NEUTROPHILS # BLD AUTO: 7.19 K/UL — SIGNIFICANT CHANGE UP (ref 1.8–7.4)
NEUTROPHILS NFR BLD AUTO: 80.2 % — HIGH (ref 43–77)
NON HDL CHOLESTEROL: 120 MG/DL — SIGNIFICANT CHANGE UP
NRBC # BLD: 0 /100 WBCS — SIGNIFICANT CHANGE UP (ref 0–0)
NRBC # BLD: 0 /100 WBCS — SIGNIFICANT CHANGE UP (ref 0–0)
PHOSPHATE SERPL-MCNC: 2.6 MG/DL — SIGNIFICANT CHANGE UP (ref 2.5–4.5)
PLATELET # BLD AUTO: 169 K/UL — SIGNIFICANT CHANGE UP (ref 150–400)
PLATELET # BLD AUTO: 184 K/UL — SIGNIFICANT CHANGE UP (ref 150–400)
POTASSIUM SERPL-MCNC: 3.5 MMOL/L — SIGNIFICANT CHANGE UP (ref 3.5–5.3)
POTASSIUM SERPL-MCNC: 3.8 MMOL/L — SIGNIFICANT CHANGE UP (ref 3.5–5.3)
POTASSIUM SERPL-MCNC: 3.9 MMOL/L — SIGNIFICANT CHANGE UP (ref 3.5–5.3)
POTASSIUM SERPL-MCNC: 3.9 MMOL/L — SIGNIFICANT CHANGE UP (ref 3.5–5.3)
POTASSIUM SERPL-SCNC: 3.5 MMOL/L — SIGNIFICANT CHANGE UP (ref 3.5–5.3)
POTASSIUM SERPL-SCNC: 3.8 MMOL/L — SIGNIFICANT CHANGE UP (ref 3.5–5.3)
POTASSIUM SERPL-SCNC: 3.9 MMOL/L — SIGNIFICANT CHANGE UP (ref 3.5–5.3)
POTASSIUM SERPL-SCNC: 3.9 MMOL/L — SIGNIFICANT CHANGE UP (ref 3.5–5.3)
RBC # BLD: 4.39 M/UL — SIGNIFICANT CHANGE UP (ref 4.2–5.8)
RBC # BLD: 4.68 M/UL — SIGNIFICANT CHANGE UP (ref 4.2–5.8)
RBC # FLD: 12.8 % — SIGNIFICANT CHANGE UP (ref 10.3–14.5)
RBC # FLD: 12.8 % — SIGNIFICANT CHANGE UP (ref 10.3–14.5)
RH IG SCN BLD-IMP: NEGATIVE — SIGNIFICANT CHANGE UP
RH IG SCN BLD-IMP: NEGATIVE — SIGNIFICANT CHANGE UP
SODIUM SERPL-SCNC: 142 MMOL/L — SIGNIFICANT CHANGE UP (ref 135–145)
SODIUM SERPL-SCNC: 143 MMOL/L — SIGNIFICANT CHANGE UP (ref 135–145)
SODIUM SERPL-SCNC: 144 MMOL/L — SIGNIFICANT CHANGE UP (ref 135–145)
SODIUM SERPL-SCNC: 145 MMOL/L — SIGNIFICANT CHANGE UP (ref 135–145)
TRIGL SERPL-MCNC: 58 MG/DL — SIGNIFICANT CHANGE UP
TSH SERPL-MCNC: 3.05 UIU/ML — SIGNIFICANT CHANGE UP (ref 0.27–4.2)
WBC # BLD: 11.4 K/UL — HIGH (ref 3.8–10.5)
WBC # BLD: 8.97 K/UL — SIGNIFICANT CHANGE UP (ref 3.8–10.5)
WBC # FLD AUTO: 11.4 K/UL — HIGH (ref 3.8–10.5)
WBC # FLD AUTO: 8.97 K/UL — SIGNIFICANT CHANGE UP (ref 3.8–10.5)

## 2022-03-11 PROCEDURE — 93970 EXTREMITY STUDY: CPT | Mod: 26

## 2022-03-11 PROCEDURE — 36223 PLACE CATH CAROTID/INOM ART: CPT | Mod: 59,RT

## 2022-03-11 PROCEDURE — 95718 EEG PHYS/QHP 2-12 HR W/VEEG: CPT

## 2022-03-11 PROCEDURE — 36225 PLACE CATH SUBCLAVIAN ART: CPT | Mod: LT

## 2022-03-11 PROCEDURE — 36227 PLACE CATH XTRNL CAROTID: CPT | Mod: LT

## 2022-03-11 PROCEDURE — 99223 1ST HOSP IP/OBS HIGH 75: CPT

## 2022-03-11 PROCEDURE — 99223 1ST HOSP IP/OBS HIGH 75: CPT | Mod: 25,57

## 2022-03-11 PROCEDURE — 99291 CRITICAL CARE FIRST HOUR: CPT

## 2022-03-11 PROCEDURE — 36224 PLACE CATH CAROTD ART: CPT | Mod: LT

## 2022-03-11 PROCEDURE — 70551 MRI BRAIN STEM W/O DYE: CPT | Mod: 26

## 2022-03-11 PROCEDURE — 93306 TTE W/DOPPLER COMPLETE: CPT | Mod: 26

## 2022-03-11 RX ORDER — POTASSIUM CHLORIDE 20 MEQ
20 PACKET (EA) ORAL
Refills: 0 | Status: COMPLETED | OUTPATIENT
Start: 2022-03-11 | End: 2022-03-11

## 2022-03-11 RX ORDER — POLYETHYLENE GLYCOL 3350 17 G/17G
17 POWDER, FOR SOLUTION ORAL DAILY
Refills: 0 | Status: DISCONTINUED | OUTPATIENT
Start: 2022-03-11 | End: 2022-03-12

## 2022-03-11 RX ORDER — SODIUM CHLORIDE 5 G/100ML
500 INJECTION, SOLUTION INTRAVENOUS
Refills: 0 | Status: DISCONTINUED | OUTPATIENT
Start: 2022-03-11 | End: 2022-03-11

## 2022-03-11 RX ORDER — SODIUM CHLORIDE 5 G/100ML
500 INJECTION, SOLUTION INTRAVENOUS
Refills: 0 | Status: DISCONTINUED | OUTPATIENT
Start: 2022-03-11 | End: 2022-03-12

## 2022-03-11 RX ORDER — HYDRALAZINE HCL 50 MG
10 TABLET ORAL EVERY 4 HOURS
Refills: 0 | Status: DISCONTINUED | OUTPATIENT
Start: 2022-03-11 | End: 2022-04-02

## 2022-03-11 RX ORDER — SODIUM CHLORIDE 9 MG/ML
1000 INJECTION INTRAMUSCULAR; INTRAVENOUS; SUBCUTANEOUS
Refills: 0 | Status: DISCONTINUED | OUTPATIENT
Start: 2022-03-11 | End: 2022-03-11

## 2022-03-11 RX ORDER — SODIUM CHLORIDE 9 MG/ML
1000 INJECTION INTRAMUSCULAR; INTRAVENOUS; SUBCUTANEOUS
Refills: 0 | Status: DISCONTINUED | OUTPATIENT
Start: 2022-03-11 | End: 2022-03-12

## 2022-03-11 RX ORDER — ACETAMINOPHEN 500 MG
1000 TABLET ORAL ONCE
Refills: 0 | Status: COMPLETED | OUTPATIENT
Start: 2022-03-11 | End: 2022-03-11

## 2022-03-11 RX ADMIN — NICARDIPINE HYDROCHLORIDE 25 MG/HR: 30 CAPSULE, EXTENDED RELEASE ORAL at 05:51

## 2022-03-11 RX ADMIN — Medication 400 MILLIGRAM(S): at 07:15

## 2022-03-11 RX ADMIN — Medication 85 MILLIMOLE(S): at 08:31

## 2022-03-11 RX ADMIN — Medication 50 MILLIEQUIVALENT(S): at 19:50

## 2022-03-11 RX ADMIN — Medication 2: at 01:27

## 2022-03-11 RX ADMIN — Medication 1000 MILLIGRAM(S): at 08:00

## 2022-03-11 RX ADMIN — Medication 50 MILLIEQUIVALENT(S): at 18:00

## 2022-03-11 NOTE — PROGRESS NOTE ADULT - SUBJECTIVE AND OBJECTIVE BOX
HPI:  75y/o M with PMHx sig for HTN, HLD, afib (on eliquis, unknown if last taken today,) CVA in 2020 (with minimal residual right-sided weakness per girlfriend,) last known normal at 3pm when girlfriend spoke to him. 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 Upon arrival to [facility], BP noted to be 171/101. Stroke code was called. CTH without findings of hemorrhage. 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 NIHSS 20. No TPA was administered as Pt is out of window. Per patient's girlfriend, the basilar artery aneurysm is known and was diagnosed in 2020 at [facility] when he suffered from a CVA. At that time, he underwent a "treatment for the aneurysm," which the girlfriend reports was too risky and was aborted. Patient never attended his follow-up appointments due to fear per girlfriend.  (10 Mar 2022 22:52)    Hospital Course:  3/10: Admitted for further workup of stroke symptoms and basilar artery aneurysm.  3/11: [place] placed overnight. 3% hypertonic saline started. Neuro exam stable.     Vital Signs Last 24 Hrs  T(C): 37.6 (11 Mar 2022 05:07), Max: 37.6 (11 Mar 2022 01:17)  T(F): 99.7 (11 Mar 2022 05:07), Max: 99.7 (11 Mar 2022 01:17)  HR: 75 (11 Mar 2022 05:00) (74 - 91)  BP: 127/61 (11 Mar 2022 05:00) (127/60 - 171/101)  BP(mean): 88 (11 Mar 2022 05:00) (85 - 94)  RR: 16 (11 Mar 2022 05:00) (16 - 17)  SpO2: 97% (11 Mar 2022 05:00) (93% - 98%)    I&O's Summary    10 Mar 2022 07:01  -  11 Mar 2022 05:23  --------------------------------------------------------  IN: 357 mL / OUT: 400 mL / NET: -43 mL      GENERAL: In moderate distress, well-groomed, well-developed  HEAD:  Atraumatic, Normocephalic  EYES: EOMI, PERRLA, conjunctiva and sclera clear  ENMT: No tonsillar erythema, exudates, or enlargement; Moist mucous membranes, Good dentition, No lesions  NECK: Supple, No JVD, Normal thyroid  NERVOUS SYSTEM: Lethargic but arousable, AAOx3 (with choices,) expressive aphasia, dysarthria.   Motor: RUE 5/5 throughout, RLE HF/KE/KF 3/5, DF/PF 5/5, LUE proximally 2/5, biceps/triceps/HG 4-/5, LLE TF.   CHEST/LUNG: Clear to percussion bilaterally; No rales, rhonchi, wheezing, or rubs  HEART: Irregular rate and rhythm; No murmurs, rubs, or gallops  ABDOMEN: Soft, Nontender, Slightly distended; Bowel sounds present  EXTREMITIES:  2+ Peripheral Pulses, No clubbing, cyanosis, or edema. B/L ankles deformed-appearing  LYMPH: No lymphadenopathy noted  SKIN: No rashes or lesions    TUBES/LINES:  [x] Marte  [] Lumbar Drain  [] Wound Drains  [x] Others: allison    DIET:  [x] NPO  [] Mechanical  [] Tube feeds    LABS:                        16.5   6.39  )-----------( 203      ( 10 Mar 2022 21:23 )             47.4     03-10    140  |  102  |  30<H>  ----------------------------<  185<H>  4.0   |  26  |  1.13    Ca    9.8      10 Mar 2022 21:23    TPro  6.8  /  Alb  4.3  /  TBili  0.7  /  DBili  x   /  AST  16  /  ALT  17  /  AlkPhos  61  03-10    PT/INR - ( 10 Mar 2022 21:23 )   PT: 13.9 sec;   INR: 1.17          PTT - ( 10 Mar 2022 21:23 )  PTT:31.4 sec    CARDIAC MARKERS ( 10 Mar 2022 21:23 )  x     / 0.01 ng/mL / x     / x     / x          CAPILLARY BLOOD GLUCOSE      POCT Blood Glucose.: 187 mg/dL (11 Mar 2022 01:24)  POCT Blood Glucose.: 173 mg/dL (10 Mar 2022 21:15)      Drug Levels: [] N/A    CSF Analysis: [] N/A      Allergies    No Known Allergies    Intolerances      MEDICATIONS:  Antibiotics:    Neuro:    Anticoagulation:    OTHER:  bisacodyl Suppository 10 milliGRAM(s) Rectal daily  dextrose 40% Gel 15 Gram(s) Oral once  dextrose 50% Injectable 25 Gram(s) IV Push once  dextrose 50% Injectable 12.5 Gram(s) IV Push once  dextrose 50% Injectable 25 Gram(s) IV Push once  glucagon  Injectable 1 milliGRAM(s) IntraMuscular once  insulin lispro (ADMELOG) corrective regimen sliding scale   SubCutaneous every 6 hours  niCARdipine Infusion 5 mG/Hr IV Continuous <Continuous>    IVF:  dextrose 5%. 1000 milliLiter(s) IV Continuous <Continuous>  dextrose 5%. 1000 milliLiter(s) IV Continuous <Continuous>  sodium chloride 0.9%. 1000 milliLiter(s) IV Continuous <Continuous>  sodium chloride 3%. 500 milliLiter(s) IV Continuous <Continuous>    CULTURES:    RADIOLOGY & ADDITIONAL TESTS:      ASSESSMENT:  75y/o M with PMHx sig for HTN, HLD, afib (on eliquis, unknown if last taken today,) CVA in 2020 (with minimal residual right-sided weakness per girlfriend) p/w dysarthria, left facial droop, and left-sided weakness. CTH without findings of hemorrhage. 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 NIHSS 20. Pt is not a tpa candidate as Pt is out of window.      BASILAR ARTERY ANEURYSM    Handoff    MEWS Score    Basilar artery aneurysm    STROKE    [email or web address]      PLAN:  Neuro:  -neuro/vital checks q1h  -obtain MR brain stroke protocol  -stroke core measures  -angio in AM    Cardiac:  -SBP goal 100-140, cardene  -home norvasc, captopril, carvedilol, and HCTZ held  -f/u echo  -h/o afib (f/u EKG), eliquis held    Pulm:  -room air    Renal:  -IVF while NPO  -marte in place  - Na goal 145-155  - 3% at 50, NS at 50    GI:  -NPO for now  -bowel regimen    Endo:  -ISS while NPO  -f/u HgA1C    Heme;  -H/H stable  -home eliquis held    ID:  -no issues, afebrile    Dispo:  ICU status, full code    D/w Dr. Peterson and Dr. Gay

## 2022-03-11 NOTE — DIETITIAN INITIAL EVALUATION ADULT. - CHIEF COMPLAINT
Medication(s) Requested: Vyvanse 30mg  Last office visit: 10/15/19  Last refill: 10/22/19  Is the patient due for refill of this medication(s): Yes  PDMP review: Criteria met. Forwarded to Physician/AVRIL for signature.    Routed to Dr. Paul for review.        The patient is a 76y Male complaining of code: stroke.

## 2022-03-11 NOTE — PROGRESS NOTE ADULT - SUBJECTIVE AND OBJECTIVE BOX
=================================  NEUROCRITICAL CARE ATTENDING NOTE  =================================    BREANNA MCCORMACK   MRN-[id number]  Summary:  76y/M with HTN, HLD, afib (on eliquis, unknown if last taken today,) CVA in 2020 (with minimal residual right-sided weakness per girlfriend,) last known normal at 3pm when girlfriend spoke to him. 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 Upon arrival to [facility], BP noted to be 171/101. Stroke code was called. CTH without findings of hemorrhage. 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 NIHSS 20. No TPA was administered as Pt is out of window. Per patient's girlfriend, the basilar artery aneurysm is known and was diagnosed in 2020 at [facility] when he suffered from a CVA. At that time, he underwent a "treatment for the aneurysm," which the girlfriend reports was too risky and was aborted. Patient never attended his follow-up appointments due to fear per girlfriend.  (10 Mar 2022 22:52)    COURSE IN THE HOSPITAL:  03/10 admitted to [facility]    Past Medical History:   Allergies:  No Known Allergies  Home meds:   ·	amLODIPine 5 mg oral tablet: 1 tab(s) orally once a day  ·	captopril 100 mg oral tablet:   ·	carvedilol 6.25 mg oral tablet:   ·	Eliquis 5 mg oral tablet:   ·	hydroCHLOROthiazide 25 mg oral tablet:     PHYSICAL EXAMINATION  T(C): 37 (03-11 @ 06:00), Max: 37.6 (03-11 @ 01:17) HR: 83 (03-11 @ 06:00) (74 - 91) BP: 140/61 (03-11 @ 06:00) (127/60 - 171/101) RR: 18 (03-11 @ 06:00) (16 - 18) SpO2: 97% (03-11 @ 06:00) (93% - 98%)  NEUROLOGIC EXAMINATION:  Patient is    GENERAL: not intubated, not in cardiorespiratory distress  EENT:  anicteric  CARDIOVASCULAR: (+) S1 S2, normal rate and regular rhythm  PULMONARY: clear to auscultation bilaterally  ABDOMEN: soft, nontender with normoactive bowel sounds  EXTREMITIES: no edema  SKIN: no rash    LABS:  CAPILLARY BLOOD GLUCOSE 118 187 173                15.2   11.40 )-----------( 184      ( 11 Mar 2022 05:34 )             42.9     142  |  107  |  31<H>  ----------------------------<  131<H>  3.9   |  24  |  0.96    Ca    9.2      11 Mar 2022 05:34  Phos  2.6     03-11  Mg     2.0     03-11    TPro  6.8  /  Alb  4.3  /  TBili  0.7  /  DBili  x   /  AST  16  /  ALT  17  /  AlkPhos  61  03-10    03-10 @ 07:01  -  03-11 @ 07:00  IN: 702 mL / OUT: 475 mL / NET: 227 mL    Bacteriology:  CSF studies:  EEG:  Neuroimaging:  Other imaging:    MEDICATIONS:  ·	bisacodyl Suppository 10 milliGRAM(s) Rectal daily  ·	insulin lispro (ADMELOG) corrective regimen sliding scale   SubCutaneous every 6 hours    IV FLUIDS: 3%  DRIPS:  ·	niCARdipine Infusion 5 mG/Hr IV Continuous <Continuous>  DIET:  Lines:  Drains:    03-10-22 @ 07:01  -  03-11-22 @ 07:00  --------------------------------------------------------  OUT:    Indwelling Catheter - Urethral (mL): 475 mL  Total OUT: 475 mL        Wounds:    CODE STATUS:  Full Code                       GOALS OF CARE:  aggressive                      DISPOSITION:  ICU =================================  NEUROCRITICAL CARE ATTENDING NOTE  =================================    BREANNA MCCORMACK   MRN-[id number]  Summary:  76y/M with HTN, HLD, afib (on eliquis, unknown if last taken today,) CVA in 2020 (with minimal residual right-sided weakness per girlfriend,) last known normal at 3pm when girlfriend spoke to him. 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 Upon arrival to [facility], BP noted to be 171/101. Stroke code was called. CTH without findings of hemorrhage. 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 NIHSS 20. No TPA was administered as Pt is out of window. Per patient's girlfriend, the basilar artery aneurysm is known and was diagnosed in 2020 at [facility] when he suffered from a CVA. At that time, he underwent a "treatment for the aneurysm," which the girlfriend reports was too risky and was aborted. Patient never attended his follow-up appointments due to fear per girlfriend.  (10 Mar 2022 22:52)    COURSE IN THE HOSPITAL:  03/10 admitted to [facility]  03/11 Tmax 38.3    Past Medical History:   Allergies:  No Known Allergies  Home meds:   ·	amLODIPine 5 mg oral tablet: 1 tab(s) orally once a day  ·	captopril 100 mg oral tablet:   ·	carvedilol 6.25 mg oral tablet:   ·	Eliquis 5 mg oral tablet:   ·	hydroCHLOROthiazide 25 mg oral tablet:     PHYSICAL EXAMINATION  T(C): 37 (03-11 @ 06:00), Max: 37.6 (03-11 @ 01:17) HR: 83 (03-11 @ 06:00) (74 - 91) BP: 140/61 (03-11 @ 06:00) (127/60 - 171/101) RR: 18 (03-11 @ 06:00) (16 - 18) SpO2: 97% (03-11 @ 06:00) (93% - 98%)  NEUROLOGIC EXAMINATION:  Patient is awake, alert, oriented, following commands, CARISSA, L facial droop, L UE extensor posturing to noxious; L LE TF, R UE/LE 5/5, expressive aphasia  GENERAL: not intubated, not in cardiorespiratory distress  EENT:  anicteric  CARDIOVASCULAR: (+) S1 S2, normal rate and regular rhythm  PULMONARY: clear to auscultation bilaterally  ABDOMEN: soft, nontender with normoactive bowel sounds  EXTREMITIES: no edema  SKIN: no rash    LABS:  CAPILLARY BLOOD GLUCOSE 118 187 173                15.2   11.40 )-----------( 184      ( 11 Mar 2022 05:34 )             42.9     142  |  107  |  31<H>  ----------------------------<  131<H>  3.9   |  24  |  0.96    Ca    9.2      11 Mar 2022 05:34  Phos  2.6     03-11  Mg     2.0     03-11    TPro  6.8  /  Alb  4.3  /  TBili  0.7  /  DBili  x   /  AST  16  /  ALT  17  /  AlkPhos  61  03-10    03-10 @ 07:01  -  03-11 @ 07:00  IN: 702 mL / OUT: 475 mL / NET: 227 mL    Bacteriology:  CSF studies:  EEG:  Neuroimaging:  MRI no acute infarcts  Other imaging:    MEDICATIONS:  ·	bisacodyl Suppository 10 milliGRAM(s) Rectal daily  ·	insulin lispro (ADMELOG) corrective regimen sliding scale   SubCutaneous every 6 hours    IV FLUIDS: 3%@50cc/hr NS@50  DRIPS:  ·	niCARdipine Infusion 5 mG/Hr IV Continuous <Continuous> - 8mg/hr  DIET: NPO  Lines: Karen  Arredondo  Drains:      CODE STATUS:  Full Code                       GOALS OF CARE:  aggressive                      DISPOSITION:  ICU =================================  NEUROCRITICAL CARE ATTENDING NOTE  =================================    BREANNA MCCORMACK   MRN-[id number]  Summary:  76y/M with HTN, HLD, afib (on eliquis, unknown if last taken today,) CVA in  (with minimal residual right-sided weakness per girlfriend,) last known normal at 3pm when girlfriend spoke to him. 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 Upon arrival to [facility], BP noted to be 171/101. Stroke code was called. CTH without findings of hemorrhage. 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 NIHSS 20. No TPA was administered as Pt is out of window. Per patient's girlfriend, the basilar artery aneurysm is known and was diagnosed in  at [facility] when he suffered from a CVA. At that time, he underwent a "treatment for the aneurysm," which the girlfriend reports was too risky and was aborted. Patient never attended his follow-up appointments due to fear per girlfriend.  (10 Mar 2022 22:52)    COURSE IN THE HOSPITAL:  03/10 admitted to [facility]   Tmax 38.3    Past Medical History:   Allergies:  No Known Allergies  Home meds:   ·	amLODIPine 5 mg oral tablet: 1 tab(s) orally once a day  ·	captopril 100 mg oral tablet:   ·	carvedilol 6.25 mg oral tablet:   ·	Eliquis 5 mg oral tablet:   ·	hydroCHLOROthiazide 25 mg oral tablet:     PHYSICAL EXAMINATION  T(C): 37 ( @ 06:00), Max: 37.6 ( @ 01:17) HR: 83 ( @ 06:00) (74 - 91) BP: 140/61 ( @ 06:00) (127/60 - 171/101) RR: 18 ( @ 06:00) (16 - 18) SpO2: 97% (03-11 @ 06:00) (93% - 98%)  NEUROLOGIC EXAMINATION:  Patient is awake, alert, oriented, following commands, CARISSA, L facial droop, L UE extensor posturing to noxious; L LE TF, R UE/LE 5/5, expressive aphasia  GENERAL: not intubated, not in cardiorespiratory distress  EENT:  anicteric  CARDIOVASCULAR: (+) S1 S2, normal rate and regular rhythm  PULMONARY: clear to auscultation bilaterally  ABDOMEN: soft, nontender with normoactive bowel sounds  EXTREMITIES: no edema  SKIN: no rash    LABS:  CAPILLARY BLOOD GLUCOSE 118 187 173                15.2   11.40 )-----------( 184      ( 11 Mar 2022 05:34 )             42.9     142  |  107  |  31<H>  ----------------------------<  131<H>  3.9   |  24  |  0.96    Ca    9.2      11 Mar 2022 05:34  Phos  2.6       Mg     2.0         TPro  6.8  /  Alb  4.3  /  TBili  0.7  /  DBili  x   /  AST  16  /  ALT  17  /  AlkPhos  61  03-10    03-10 @ 07:01  -   @ 07:00  IN: 702 mL / OUT: 475 mL / NET: 227 mL    Bacteriology:  CSF studies:  EEG:  Neuroimagin/11 MRI no acute infarcts:  3cm giant aneurysm basilar artery, partially thrombosed, marked compression of brainstem, distortion of IV but patent  03/10 CTA:  dolichoectasia of basilar artery, partially thrombosed aneurysm; diffuse idiopathic skeletal hyperostosis  Other imaging:    MEDICATIONS:  ·	bisacodyl Suppository 10 milliGRAM(s) Rectal daily  ·	insulin lispro (ADMELOG) corrective regimen sliding scale   SubCutaneous every 6 hours    IV FLUIDS: 3%@50cc/hr NS@50  DRIPS:  ·	niCARdipine Infusion 5 mG/Hr IV Continuous <Continuous> - 8mg/hr  DIET: NPO  Lines: Karen  Arredondo  Drains:      CODE STATUS:  Full Code                       GOALS OF CARE:  aggressive                      DISPOSITION:  ICU

## 2022-03-11 NOTE — PROGRESS NOTE ADULT - ASSESSMENT
76y/M with      PLAN:   NEURO:  REHAB:  physical therapy evaluation and management    EARLY MOB:      PULM:  Room air, incentive spirometry  CARDIO:  SBP goal 100-150mm Hg  ENDO:  Blood sugar goals 140-180 mg/dL, continue insulin sliding scale  GI:  PPI for GI prophylaxis  DIET:  RENAL:    HEM/ONC:  VTE Prophylaxis:     ID: afebrile, no leukocytosis  ====================   T(F): , Max: 99.7 (03-11-22 @ 01:17)    WBC Count: 11.40 (03-11)  WBC Count: 6.39 (03-10)    ====================  Social: will update family    ATTENDING ATTESTATION:  I was physically present for the key portions of the evaluation and management (E/M) service provided.  I agree with the above history, physical and plan, which I have reviewed and edited where appropriate.    Patient at high risk for neurological deterioration or death due to:  ICU delirium, aspiration PNA, DVT / PE.  Critical care time:  I have personally provided 45 minutes of critical care time, excluding time spent on separate procedures.      Plan discussed with RN, house staff. 76y/M with      PLAN:   NEURO: neurochecs q1h, PRN pain meds with acetaminophen  angio today  will f/u with radiology  EEG this afternoon after angio  REHAB:  physical therapy evaluation and management    EARLY MOB:  bedrest for now, HOB up    PULM:  Room air, incentive spirometry  CARDIO:  SBP goal 100-140mm Hg, cardene drip as needed; holding carvedilol for 1st degree AV block, get EKG and echo with BS  ENDO:  Blood sugar goals 140-180 mg/dL, continue insulin sliding scale  GI:  bowel regimen - add miralax  DIET: NPO   RENAL:  continue 3% and NS, Na goal 140-145, repeat BMP this afternoon  HEM/ONC: Hb stable INR 1.17  VTE Prophylaxis: SCDs, no DVT chemoprophylaxis for now as patient is high risk for bleed (giant aneurysm); baseline LE Doppler for DVT suspected on admission (h/o CVA)  ID: febrile, leukocytosis; panculture if respikes  Social: Rafiqmichael Forrester  is HCP     ATTENDING ATTESTATION:  I was physically present for the key portions of the evaluation and management (E/M) service provided.  I agree with the above history, physical and plan, which I have reviewed and edited where appropriate.    Patient at high risk for neurological deterioration or death due to:  ICU delirium, aspiration PNA, DVT / PE.  Critical care time:  I have personally provided 45 minutes of critical care time, excluding time spent on separate procedures.      Plan discussed with RN, house staff. 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, hemiplegia  diffuse idiopathic skeletal hyperostosis  Hypertension dyslipidemia  h/o CVA 2020, minimal R sided weakness  Atrial fibrillation with controlled ventricular rate    PLAN:   NEURO: neurochecs q1h, PRN pain meds with acetaminophen  angio today  will f/u with radiology  EEG this afternoon after angio  REHAB:  physical therapy evaluation and management    EARLY MOB:  bedrest for now, HOB up    PULM:  Room air, incentive spirometry  CARDIO:  SBP goal 100-140mm Hg, cardene drip as needed; holding carvedilol for 1st degree AV block, get EKG and echo with BS  ENDO:  Blood sugar goals 140-180 mg/dL, continue insulin sliding scale  GI:  bowel regimen - add miralax  DIET: NPO   RENAL:  continue 3% and NS, Na goal 140-145, repeat BMP this afternoon  HEM/ONC: Hb stable INR 1.17  VTE Prophylaxis: SCDs, no DVT chemoprophylaxis for now as patient is high risk for bleed (giant aneurysm); baseline LE Doppler for DVT suspected on admission (h/o CVA)  ID: febrile, leukocytosis; panculture if respikes  Social: Rafiq Forrester  is HCP     ATTENDING ATTESTATION:  I was physically present for the key portions of the evaluation and management (E/M) service provided.  I agree with the above history, physical and plan, which I have reviewed and edited where appropriate.    Patient at high risk for neurological deterioration or death due to:  ICU delirium, aspiration PNA, DVT / PE.  Critical care time:  I have personally provided 45 minutes of critical care time, excluding time spent on separate procedures.      Plan discussed with RN, house staff.

## 2022-03-11 NOTE — CONSULT NOTE ADULT - SUBJECTIVE AND OBJECTIVE BOX
Patient is a 76y old  Male who presents with a chief complaint of CVA, left facial, left-sided weakness (11 Mar 2022 05:21)       HPI:  75y/o M with PMHx sig for HTN, HLD, afib (on eliquis, unknown if last taken today,) CVA in 2020 (with minimal residual right-sided weakness per girlfriend,) last known normal at 3pm when girlfriend spoke to him. 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 Upon arrival to [facility], BP noted to be 171/101. Stroke code was called. CTH without findings of hemorrhage. 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 NIHSS 20. No TPA was administered as Pt is out of window. Per patient's girlfriend, the basilar artery aneurysm is known and was diagnosed in 2020 at [facility] when he suffered from a CVA. At that time, he underwent a "treatment for the aneurysm," which the girlfriend reports was too risky and was aborted. Patient never attended his follow-up appointments due to fear per girlfriend.  (10 Mar 2022 22:52)      PAST MEDICAL & SURGICAL HISTORY:      MEDICATIONS  (STANDING):  bisacodyl Suppository 10 milliGRAM(s) Rectal daily  dextrose 40% Gel 15 Gram(s) Oral once  dextrose 5%. 1000 milliLiter(s) (50 mL/Hr) IV Continuous <Continuous>  dextrose 5%. 1000 milliLiter(s) (100 mL/Hr) IV Continuous <Continuous>  dextrose 50% Injectable 25 Gram(s) IV Push once  dextrose 50% Injectable 12.5 Gram(s) IV Push once  dextrose 50% Injectable 25 Gram(s) IV Push once  glucagon  Injectable 1 milliGRAM(s) IntraMuscular once  insulin lispro (ADMELOG) corrective regimen sliding scale   SubCutaneous every 6 hours  niCARdipine Infusion 5 mG/Hr (25 mL/Hr) IV Continuous <Continuous>  sodium chloride 0.9%. 1000 milliLiter(s) (50 mL/Hr) IV Continuous <Continuous>  sodium chloride 3%. 500 milliLiter(s) (50 mL/Hr) IV Continuous <Continuous>    MEDICATIONS  (PRN):    FAMILY HISTORY:      CBC Full  -  ( 11 Mar 2022 05:34 )  WBC Count : 11.40 K/uL  RBC Count : 4.68 M/uL  Hemoglobin : 15.2 g/dL  Hematocrit : 42.9 %  Platelet Count - Automated : 184 K/uL  Mean Cell Volume : 91.7 fl  Mean Cell Hemoglobin : 32.5 pg  Mean Cell Hemoglobin Concentration : 35.4 gm/dL  Auto Neutrophil # : x  Auto Lymphocyte # : x  Auto Monocyte # : x  Auto Eosinophil # : x  Auto Basophil # : x  Auto Neutrophil % : x  Auto Lymphocyte % : x  Auto Monocyte % : x  Auto Eosinophil % : x  Auto Basophil % : x      03-11    142  |  107  |  31<H>  ----------------------------<  131<H>  3.9   |  24  |  0.96    Ca    9.2      11 Mar 2022 05:34  Phos  2.6     03-11  Mg     2.0     03-11    TPro  6.8  /  Alb  4.3  /  TBili  0.7  /  DBili  x   /  AST  16  /  ALT  17  /  AlkPhos  61  03-10            Radiology:    < from: Xray Chest 1 View- PORTABLE-Urgent (03.10.22 @ 22:27) >  ACC: [id number] EXAM:  XR CHEST PORTABLE URGENT 1V                          PROCEDURE DATE:  03/10/2022          INTERPRETATION:  Clinical history/reason for exam: Stroke code.    Frontal chest.    No comparison.    Findings/  impression: Cardiomegaly, thoracic aortic calcification. Hiatal hernia.   Left basilar focal atelectasis.      < from: CT Brain Stroke Protocol (03.10.22 @ 21:35) >  ACC: [id number] EXAM:  CT BRAIN STROKE PROTOCOL                          PROCEDURE DATE:  03/10/2022          INTERPRETATION:  PROCEDURE: CT head without intravenous contrast    INDICATION: Stroke code. Right facial droop, left upper and lower   extremity weakness, global aphasia.    TECHNIQUE:  Serial axial images were obtained from the skull base to the   vertex without the use of intravenous contrast. Coronal and sagittal   reconstructions were created. RAPID artificial intelligence was utilized   for the preliminary evaluation of intracranial hemorrhage.    COMPARISON EXAMINATION: None available.    FINDINGS:  VENTRICLES AND SULCI: Partial effacement of the fourth ventricle with   slight left to right shift. The ventricles and sulci are normal in   appearance and commensurate with the patient's age. No hydrocephalus.  EXTRA-AXIAL: A well-circumscribed hyperdensity within the posterior fossa   measures 2.9 x 3.0 x 3.0 cm with compression and posterior displacement   of the brainstem, consistent with large partially thrombosed basilar   artery aneurysm demonstrated on CTA performed concurrently.  INTRA-AXIAL: No space-occupying intraparenchymal mass or hemorrhage. No   acute transcortical infarction identified. Patchy periventricular and   subcortical white matter hypodensities, likely microvascular disease.  VISUALIZED ORBITS: Grossly unremarkable.  VISUALIZED SINUSES: No air-fluid levels. Left maxillary sinonasal polyp   versus retention cyst.  VISUALIZED MASTOIDS: Well aerated.  CALVARIUM: No fracture identified. Hyperostosis frontalis internus.    IMPRESSION:    Large well-circumscribed hyperdensity within the posterior fossa with   posterior displacement of the brainstem, consistent with partially   thrombosed basilar artery aneurysm demonstrated on CTA head performed   concurrently.                Vital Signs Last 24 Hrs  T(C): 38.5 (11 Mar 2022 07:00), Max: 38.5 (11 Mar 2022 07:00)  T(F): 101.3 (11 Mar 2022 07:00), Max: 101.3 (11 Mar 2022 07:00)  HR: 78 (11 Mar 2022 08:00) (74 - 91)  BP: 131/63 (11 Mar 2022 07:00) (127/60 - 171/101)  BP(mean): 91 (11 Mar 2022 07:00) (85 - 94)  RR: 18 (11 Mar 2022 08:00) (16 - 18)  SpO2: 97% (11 Mar 2022 08:00) (93% - 98%)        REVIEW OF SYSTEMS:    CONSTITUTIONAL: No fever, weight loss, or fatigue  EYES: No eye pain, visual disturbances, or discharge  ENMT:  No difficulty hearing, tinnitus, vertigo; No sinus or throat pain  NECK: No pain or stiffness  BREASTS: No pain, masses, or nipple discharge  RESPIRATORY: No cough, wheezing, chills or hemoptysis; No shortness of breath  CARDIOVASCULAR: No chest pain, palpitations, dizziness, or leg swelling  GASTROINTESTINAL: No abdominal or epigastric pain. No nausea, vomiting, or hematemesis; No diarrhea or constipation. No melena or hematochezia.  GENITOURINARY: No dysuria, frequency, hematuria, or incontinence  NEUROLOGICAL: per HPI  SKIN: No itching, burning, rashes, or lesions   LYMPH NODES: No enlarged glands  ENDOCRINE: No heat or cold intolerance; No hair loss  MUSCULOSKELETAL: No joint pain or swelling; No muscle, back, or extremity pain  PSYCHIATRIC: No depression, anxiety, mood swings, or difficulty sleeping  HEME/LYMPH: No easy bruising, or bleeding gums  ALLERGY AND IMMUNOLOGIC: No hives or eczema  VASCULAR: no swelling, erythema,           Physical Exam:  in NSICU, obese 75 yo  gentleman lying in semi Jules's position, awake, aphasic/ dysarthric    Head: normocephalic, atraumatic    Eyes: PERRLA, EOMI, no nystagmus, sclera anicteric    ENT: nasal discharge, uvula midline, no oropharyngeal erythema/exudate    Neck: supple, negative JVD, negative carotid bruits, no thyromegaly    Chest: CTA bilaterally, neg wheeze/ rhonchi/ rales/ crackles/ egophany    Cardiovascular: irregularly, irregular rate and rhythm, neg murmurs/rubs/gallops    Abdomen: soft, obese,, non tender to palpation in all 4 quadrants, negative rebound/guarding, normal bowel sounds    Extremities: WWP, neg cyanosis/clubbing/edema, negative calf tenderness to palpation, negative Alexandru's sign      Neurologic Exam:    Alert and oriented x 3 with written choices, expressive aphasia, follows commands    Cranial Nerves:     II:                         pupils equal, round and reactive to light, visual fields intact to BTT  III/ IV/VI:              extraocular movements intact, neg nystagmus, neg ptosis  V:                        facial sensation intact, V1-3 normal  VII:                      left droop, normal eye closure   VIII:                     hearing intact to finger rub bilaterally  XI:                       dec Left shoulder shrug  XII:                      right tongue deviation    Motor Exam:    Right UE:             > 4/5 throughout                             pronator drift: neg    Left UE:                 :  3/5                             wrist extensors/ flexors:  3-/5                             biceps:    3-/5                             triceps:   3-/5                             deltoid:  2+/5                               Right LE:             > 4/5    Left LE:               2/5               Sensation:           intact to light touch x 4 extremities                                                  DTR:                  biceps/brachioradialis: equal                                                     patella/ankle: equal                                                                                 neg Babinski                          Gait:  not tested              PM&R Impression:    1) partially thrombosed basilar artery aneurysm with brainstem compression  2) expressive aphasia, left UE paresis/ LE plegia      Recommendations/ Plan :    1) Physical / Occupational therapy focusing on therapeutic exercises, bed mobility/transfer out of bed evaluation, when medically cleared- progressive ambulation with assistive devices prn.    2) Anticipated Disposition Plan/Recs:  acute rehab placement

## 2022-03-11 NOTE — DIETITIAN INITIAL EVALUATION ADULT. - OTHER INFO
76M with PMHx sig for HTN, HLD, afib (on eliquis, unknown if last taken today,) CVA in 2020 (with minimal residual right-sided weakness per girlfriend) p/w dysarthria, left facial droop, and left-sided weakness. CTH without findings of hemorrhage. 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 NIHSS 20. Pt is not a tpa candidate as pt is out of window. Transferred to NSICU for further assessment/ monitoring.    RD attempted to conduct assessment, but pt noted to be too lethargic. Currently NPO at this time. No noted n/v/d/c. No abd distention noted. Skin WDL, marichuy score 13. Non pitting edema BL legs. No pain noted. Unable to assess UBW nor appetite PTA. NKFA per EMR. Edu deferred. Please see nutr recs below.

## 2022-03-11 NOTE — DIETITIAN INITIAL EVALUATION ADULT. - OTHER CALCULATIONS
IBW used for calculations as pt >120% of IBW (133%). Needs adjusted for advanced age. **Adjust fluids per team.

## 2022-03-11 NOTE — PROGRESS NOTE ADULT - SUBJECTIVE AND OBJECTIVE BOX
NEUROSURGERY POST OP NOTE:    POD# 0 S/P diagnostic cerebral angiogram    S: Angiogram showing extremely tortuous and elongated aortic arch. Dolichoectatic vertebrobasilar system. Giant partially thrombosed vertebrobasilar aneurysm. Doing well post op, exam stable. VSS. Denies any complaints      T(C): 37.5 (03-11-22 @ 17:08), Max: 38.5 (03-11-22 @ 07:00)  HR: 65 (03-11-22 @ 17:08) (65 - 91)  BP: 118/56 (03-11-22 @ 17:08) (118/56 - 171/101)  RR: 16 (03-11-22 @ 17:08) (16 - 18)  SpO2: 96% (03-11-22 @ 17:08) (93% - 98%)      03-10-22 @ 07:01  -  03-11-22 @ 07:00  --------------------------------------------------------  IN: 862 mL / OUT: 575 mL / NET: 287 mL    03-11-22 @ 07:01  -  03-11-22 @ 17:49  --------------------------------------------------------  IN: 949.8 mL / OUT: 435 mL / NET: 514.8 mL        bisacodyl Suppository 10 milliGRAM(s) Rectal daily  dextrose 40% Gel 15 Gram(s) Oral once  dextrose 5%. 1000 milliLiter(s) IV Continuous <Continuous>  dextrose 5%. 1000 milliLiter(s) IV Continuous <Continuous>  dextrose 50% Injectable 25 Gram(s) IV Push once  dextrose 50% Injectable 12.5 Gram(s) IV Push once  dextrose 50% Injectable 25 Gram(s) IV Push once  glucagon  Injectable 1 milliGRAM(s) IntraMuscular once  insulin lispro (ADMELOG) corrective regimen sliding scale   SubCutaneous every 6 hours  polyethylene glycol 3350 17 Gram(s) Oral daily  potassium chloride  20 mEq/100 mL IVPB 20 milliEquivalent(s) IV Intermittent every 2 hours  sodium chloride 0.9%. 1000 milliLiter(s) IV Continuous <Continuous>  sodium chloride 3%. 500 milliLiter(s) IV Continuous <Continuous>      RADIOLOGY:     Exam:  General: NAD, pt is comfortably sitting up in bed, on room air  HEENT: CN II-XII grossly intact, PERRL 3mm briskly reactive, EOMI b/l, face symmetric, tongue midline, neck FROM  Cardiovascular: RRR, normal S1 and S2   Respiratory: lungs CTAB, no wheezing, rhonchi, or crackles   GI: normoactive BS to auscultation, abd soft, NTND   Neuro: A&Ox3 with choices, aphasic but attempting to mouth words no dysmetria, no pronator drift. Follows commands.  MORROW x4 spontaneously, R side spontaneous, RU and RL antigravity, ADRIAN is extensor posturing, LL triple flexion. SILT throughout   Extremities: distal pulses 2+ x4  Wound/incision: R groin dressing C/D/I, no hematoma      Assessment: 75y/o M with PMHx sig for HTN, HLD, afib (on eliquis, unknown if last taken today,) CVA in 2020 (with minimal residual right-sided weakness per girlfriend) p/w dysarthria, left facial droop, and left-sided weakness. CTH without findings of hemorrhage. 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 NIHSS 20. Pt is not a tpa candidate as Pt is out of window. Patient now s/p diagnostic angio 3/11    PLAN:  Neuro:  -neuro/vital checks q1h  -MR head complete 3/11 no acute infarcts  -stroke core measures  -EEG placed 3/11, f/u read  -Remove groin dressing in AM, flat x 6 H    Cardiac:  -SBP goal 100-140, off cardene gtt  -home norvasc, captopril, carvedilol, and HCTZ held  -echo 3/11- mild LVH, EF 65-70%  -h/o afib (f/u EKG), eliquis held    Pulm:  -room air    Renal:  -IVF while NPO  - marte in place, d/c once not flat  - Na goal 140-145  - 3% at 50, NS at 50    GI:  -NPO for now- ADAT  -bowel regimen    Endo:  -ISS   -A1C 5.4    Heme;  -H/H stable  -SCDs  -home eliquis held  -LE dopplers 3/11 negative    ID:  -no issues, afebrile    Dispo:  ICU status, full code    D/w Dr. Peterson and Dr. Gay      Assessment:  Present when checked    []  GCS  E   V  M     Heart Failure: []Acute, [] acute on chronic , []chronic  Heart Failure:  [] Diastolic (HFpEF), [] Systolic (HFrEF), []Combined (HFpEF and HFrEF), [] RHF, [] Pulm HTN, [] Other    [] LALO, [] ATN, [] AIN, [] other  [] CKD1, [] CKD2, [] CKD 3, [] CKD 4, [] CKD 5, []ESRD    Encephalopathy: [] Metabolic, [] Hepatic, [] toxic, [] Neurological, [] Other    Abnormal Nurtitional Status: [] malnurtition (see nutrition note), [ ]underweight: BMI < 19, [] morbid obesity: BMI >40, [] Cachexia    [] Sepsis  [] hypovolemic shock,[] cardiogenic shock, [] hemorrhagic shock, [] neuogenic shock  [] Acute Respiratory Failure  []Cerebral edema, [] Brain compression/ herniation,   [] Functional quadriplegia  [] Acute blood loss anemia

## 2022-03-12 LAB
ANION GAP SERPL CALC-SCNC: 10 MMOL/L — SIGNIFICANT CHANGE UP (ref 5–17)
ANION GAP SERPL CALC-SCNC: 9 MMOL/L — SIGNIFICANT CHANGE UP (ref 5–17)
APPEARANCE UR: CLEAR — SIGNIFICANT CHANGE UP
BACTERIA # UR AUTO: ABNORMAL /HPF
BILIRUB UR-MCNC: NEGATIVE — SIGNIFICANT CHANGE UP
BUN SERPL-MCNC: 30 MG/DL — HIGH (ref 7–23)
BUN SERPL-MCNC: 31 MG/DL — HIGH (ref 7–23)
CALCIUM SERPL-MCNC: 8.3 MG/DL — LOW (ref 8.4–10.5)
CALCIUM SERPL-MCNC: 8.6 MG/DL — SIGNIFICANT CHANGE UP (ref 8.4–10.5)
CHLORIDE SERPL-SCNC: 115 MMOL/L — HIGH (ref 96–108)
CHLORIDE SERPL-SCNC: 117 MMOL/L — HIGH (ref 96–108)
CO2 SERPL-SCNC: 20 MMOL/L — LOW (ref 22–31)
CO2 SERPL-SCNC: 22 MMOL/L — SIGNIFICANT CHANGE UP (ref 22–31)
COLOR SPEC: YELLOW — SIGNIFICANT CHANGE UP
CREAT SERPL-MCNC: 0.98 MG/DL — SIGNIFICANT CHANGE UP (ref 0.5–1.3)
CREAT SERPL-MCNC: 1.03 MG/DL — SIGNIFICANT CHANGE UP (ref 0.5–1.3)
DIFF PNL FLD: ABNORMAL
EGFR: 74 ML/MIN/1.73M2 — SIGNIFICANT CHANGE UP
EGFR: 79 ML/MIN/1.73M2 — SIGNIFICANT CHANGE UP
EPI CELLS # UR: SIGNIFICANT CHANGE UP /HPF (ref 0–5)
GLUCOSE SERPL-MCNC: 109 MG/DL — HIGH (ref 70–99)
GLUCOSE SERPL-MCNC: 115 MG/DL — HIGH (ref 70–99)
GLUCOSE UR QL: NEGATIVE — SIGNIFICANT CHANGE UP
HCT VFR BLD CALC: 41.4 % — SIGNIFICANT CHANGE UP (ref 39–50)
HGB BLD-MCNC: 13.9 G/DL — SIGNIFICANT CHANGE UP (ref 13–17)
KETONES UR-MCNC: 15 MG/DL
LEUKOCYTE ESTERASE UR-ACNC: NEGATIVE — SIGNIFICANT CHANGE UP
MAGNESIUM SERPL-MCNC: 2 MG/DL — SIGNIFICANT CHANGE UP (ref 1.6–2.6)
MCHC RBC-ENTMCNC: 31.9 PG — SIGNIFICANT CHANGE UP (ref 27–34)
MCHC RBC-ENTMCNC: 33.6 GM/DL — SIGNIFICANT CHANGE UP (ref 32–36)
MCV RBC AUTO: 95 FL — SIGNIFICANT CHANGE UP (ref 80–100)
NITRITE UR-MCNC: NEGATIVE — SIGNIFICANT CHANGE UP
NRBC # BLD: 0 /100 WBCS — SIGNIFICANT CHANGE UP (ref 0–0)
PH UR: 6 — SIGNIFICANT CHANGE UP (ref 5–8)
PHOSPHATE SERPL-MCNC: 2.5 MG/DL — SIGNIFICANT CHANGE UP (ref 2.5–4.5)
PLATELET # BLD AUTO: 161 K/UL — SIGNIFICANT CHANGE UP (ref 150–400)
POTASSIUM SERPL-MCNC: 3.9 MMOL/L — SIGNIFICANT CHANGE UP (ref 3.5–5.3)
POTASSIUM SERPL-MCNC: 4 MMOL/L — SIGNIFICANT CHANGE UP (ref 3.5–5.3)
POTASSIUM SERPL-SCNC: 3.9 MMOL/L — SIGNIFICANT CHANGE UP (ref 3.5–5.3)
POTASSIUM SERPL-SCNC: 4 MMOL/L — SIGNIFICANT CHANGE UP (ref 3.5–5.3)
PROT UR-MCNC: NEGATIVE MG/DL — SIGNIFICANT CHANGE UP
RBC # BLD: 4.36 M/UL — SIGNIFICANT CHANGE UP (ref 4.2–5.8)
RBC # FLD: 12.8 % — SIGNIFICANT CHANGE UP (ref 10.3–14.5)
RBC CASTS # UR COMP ASSIST: ABNORMAL /HPF
SODIUM SERPL-SCNC: 146 MMOL/L — HIGH (ref 135–145)
SODIUM SERPL-SCNC: 147 MMOL/L — HIGH (ref 135–145)
SP GR SPEC: 1.02 — SIGNIFICANT CHANGE UP (ref 1–1.03)
UROBILINOGEN FLD QL: 1 E.U./DL — SIGNIFICANT CHANGE UP
WBC # BLD: 6.54 K/UL — SIGNIFICANT CHANGE UP (ref 3.8–10.5)
WBC # FLD AUTO: 6.54 K/UL — SIGNIFICANT CHANGE UP (ref 3.8–10.5)
WBC UR QL: ABNORMAL /HPF

## 2022-03-12 PROCEDURE — 36000 PLACE NEEDLE IN VEIN: CPT | Mod: 59

## 2022-03-12 PROCEDURE — 95720 EEG PHY/QHP EA INCR W/VEEG: CPT

## 2022-03-12 PROCEDURE — 71045 X-RAY EXAM CHEST 1 VIEW: CPT | Mod: 26

## 2022-03-12 PROCEDURE — 99233 SBSQ HOSP IP/OBS HIGH 50: CPT | Mod: 25

## 2022-03-12 PROCEDURE — 43752 NASAL/OROGASTRIC W/TUBE PLMT: CPT

## 2022-03-12 PROCEDURE — 99291 CRITICAL CARE FIRST HOUR: CPT

## 2022-03-12 PROCEDURE — 36620 INSERTION CATHETER ARTERY: CPT

## 2022-03-12 RX ORDER — CAPTOPRIL 12.5 MG/1
100 TABLET ORAL DAILY
Refills: 0 | Status: DISCONTINUED | OUTPATIENT
Start: 2022-03-12 | End: 2022-03-14

## 2022-03-12 RX ORDER — POLYETHYLENE GLYCOL 3350 17 G/17G
17 POWDER, FOR SOLUTION ORAL DAILY
Refills: 0 | Status: DISCONTINUED | OUTPATIENT
Start: 2022-03-12 | End: 2022-03-15

## 2022-03-12 RX ORDER — CARVEDILOL PHOSPHATE 80 MG/1
6.25 CAPSULE, EXTENDED RELEASE ORAL EVERY 12 HOURS
Refills: 0 | Status: DISCONTINUED | OUTPATIENT
Start: 2022-03-12 | End: 2022-04-08

## 2022-03-12 RX ORDER — POTASSIUM CHLORIDE 20 MEQ
10 PACKET (EA) ORAL
Refills: 0 | Status: COMPLETED | OUTPATIENT
Start: 2022-03-12 | End: 2022-03-12

## 2022-03-12 RX ORDER — AMLODIPINE BESYLATE 2.5 MG/1
5 TABLET ORAL DAILY
Refills: 0 | Status: DISCONTINUED | OUTPATIENT
Start: 2022-03-12 | End: 2022-03-20

## 2022-03-12 RX ORDER — ACETAMINOPHEN 500 MG
650 TABLET ORAL EVERY 6 HOURS
Refills: 0 | Status: DISCONTINUED | OUTPATIENT
Start: 2022-03-12 | End: 2022-04-08

## 2022-03-12 RX ORDER — SODIUM CHLORIDE 9 MG/ML
1000 INJECTION INTRAMUSCULAR; INTRAVENOUS; SUBCUTANEOUS
Refills: 0 | Status: DISCONTINUED | OUTPATIENT
Start: 2022-03-12 | End: 2022-03-15

## 2022-03-12 RX ORDER — SENNA PLUS 8.6 MG/1
2 TABLET ORAL AT BEDTIME
Refills: 0 | Status: DISCONTINUED | OUTPATIENT
Start: 2022-03-12 | End: 2022-04-02

## 2022-03-12 RX ORDER — APIXABAN 2.5 MG/1
5 TABLET, FILM COATED ORAL EVERY 12 HOURS
Refills: 0 | Status: DISCONTINUED | OUTPATIENT
Start: 2022-03-12 | End: 2022-03-12

## 2022-03-12 RX ORDER — APIXABAN 2.5 MG/1
5 TABLET, FILM COATED ORAL EVERY 12 HOURS
Refills: 0 | Status: DISCONTINUED | OUTPATIENT
Start: 2022-03-12 | End: 2022-03-20

## 2022-03-12 RX ADMIN — SENNA PLUS 2 TABLET(S): 8.6 TABLET ORAL at 22:10

## 2022-03-12 RX ADMIN — APIXABAN 5 MILLIGRAM(S): 2.5 TABLET, FILM COATED ORAL at 18:50

## 2022-03-12 RX ADMIN — Medication 100 MILLIEQUIVALENT(S): at 08:58

## 2022-03-12 RX ADMIN — CARVEDILOL PHOSPHATE 6.25 MILLIGRAM(S): 80 CAPSULE, EXTENDED RELEASE ORAL at 18:50

## 2022-03-12 RX ADMIN — Medication 10 MILLIGRAM(S): at 22:10

## 2022-03-12 RX ADMIN — Medication 10 MILLIGRAM(S): at 13:56

## 2022-03-12 RX ADMIN — Medication 100 MILLIEQUIVALENT(S): at 07:47

## 2022-03-12 NOTE — SPEECH LANGUAGE PATHOLOGY EVALUATION - SLP PERTINENT HISTORY OF CURRENT PROBLEM
PMHx significant for HTN, AFib (on Eliquis), CVA in 2020 with minimal residual R sided weakness. Presented with dysarthria, L facial droop and L sided weakness. CTH without findings of hemorrhage. 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 Out of window for tPA. now s/p diagnostic angio with findings of partially thrombosed basilar artery aneurysm 3/11/22

## 2022-03-12 NOTE — SWALLOW BEDSIDE ASSESSMENT ADULT - SLP GENERAL OBSERVATIONS
Received sleeping in bed, awoke to noxious stimuli, being monitored by vEEG. Using gestures to communicate with attempts to mouth words; no voicing appreciated. Formal speech and language evaluation to follow.

## 2022-03-12 NOTE — SPEECH LANGUAGE PATHOLOGY EVALUATION - SLP DIAGNOSIS
Pt presents with severe non-fluent aphasia. Receptive language for at least simple syntax is preserved. Pt follows simple commands, accurately responds to yes/no questions, and points to pictured items. However, pt is essentially non-verbal. He spontaneously communicates mostly via written expression and gestures. Although no spontaneous verbalizations were appreciated, voicing was appreciated, in imitation, using multi-modal cueing. Visual feedback was especially helpful. Written expression for at least simple familiar words remains intact. Assessment was terminated early d/t worsening lethargy. Recommend additional testing prior to establishing goals. Pt would benefit from intensive skilled SLP tx to improve functional communication.

## 2022-03-12 NOTE — EEG REPORT - NS EEG TEXT BOX
Day 1: 3/11/2022 @ 5:18:30 PM to next morning @ 07:00 am  Background:  , with predominantly .frequencies.  Intermittent polymorphic generalized delta activity  Symmetry:    Posterior Dominant Rhythm:  8-9 hz .  Organization: .  Voltage:    Variability: . 		Reactivity: .  N2 sleep: .  Spontaneous Activity:    Periodic/rhythmic activity:  .  Events:  No electrographic seizures or significant clinical events.  Provocations:  Hyperventilation and Photic stimulation: .    Daily Summary:    Mild diffuse nonspecific cerebral dysfunction.  No epileptiform abnormalities were recorded.      Sanjay Mccormick MD  Attending Neurologist, [facility]

## 2022-03-12 NOTE — SPEECH LANGUAGE PATHOLOGY EVALUATION - COMMENTS
Responded to WH- questions (orientation/biographical) via writing. Oriented to self, locations given phonemic cues and binary choices, month, and name and relation of wife. Initially wrote "1922" for year; rewritten given verbal cues. Pt spontaneously wrote target responses to WH- orientation/biographical information. Pt is a good candidate for a low-tech communication board. I provided pt with an alphabet board, picture board, white board, and yes/no board to assist with communication. During initial portion of assessment, pt was non-verbal. He would communicate via gestures, writing, and, although rarely, via mouthing of words.   Although significantly reduced in vocal intensity, voicing was eventually elicited using multi-modal stimulation including the following: tactile cues, audio, video-visual, visual feedback. Vocal intensity increased using "Shahid Shahid Shahid" application as a form of visual feedback. Complexity of target stimuli gradually increased from imitation of vowels, to serial tasks (counting), to single words (first name only). Voicing was inconsistent. Pt benefited from frequent coaxing/verbal encouragement as he attempted to use gestures/written expression to communicate. No spontaneous vocalizations were appreciated during assessment.

## 2022-03-12 NOTE — PROGRESS NOTE ADULT - SUBJECTIVE AND OBJECTIVE BOX
=================================  NEUROCRITICAL CARE ATTENDING NOTE  =================================    BREANNA MCCORMACK   MRN-[id number]  Summary:  76y/M with HTN, HLD, afib (on eliquis, unknown if last taken today,) CVA in  (with minimal residual right-sided weakness per girlfriend,) last known normal at 3pm when girlfriend spoke to him. 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 Upon arrival to [facility], BP noted to be 171/101. Stroke code was called. CTH without findings of hemorrhage. 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 NIHSS 20. No TPA was administered as Pt is out of window. Per patient's girlfriend, the basilar artery aneurysm is known and was diagnosed in  at [facility] when he suffered from a CVA. At that time, he underwent a "treatment for the aneurysm," which the girlfriend reports was too risky and was aborted. Patient never attended his follow-up appointments due to fear per girlfriend.  (10 Mar 2022 22:52)    COURSE IN THE HOSPITAL:  03/10 admitted to [facility]   Tmax 38.3; s/p angio showing extremely tortuous and elongated aortic arch with dolichoectatic vertebrobasilar system, giant partially thrombosed vertebrobasilar aneurysm       Past Medical History:   Allergies:  No Known Allergies  Home meds:   ·	amLODIPine 5 mg oral tablet: 1 tab(s) orally once a day  ·	captopril 100 mg oral tablet:   ·	carvedilol 6.25 mg oral tablet:   ·	Eliquis 5 mg oral tablet:   ·	hydroCHLOROthiazide 25 mg oral tablet:     PHYSICAL EXAMINATION  T(C): 37.4 ( @ 05:23), Max: 37.9 ( @ 09:00) HR: 63 ( @ 06:00) (60 - 78) BP: 118/56 ( @ 17:08) (118/56 - 118/56) RR: 36 ( @ 06:00) (16 - 37) SpO2: 98% ( @ 06:00) (93% - 98%)  NEUROLOGIC EXAMINATION:  Patient is awake, alert, oriented, following commands, CARISSA, L facial droop, L UE extensor posturing to noxious; L LE TF, R UE/LE 5/5, expressive aphasia  GENERAL: not intubated, not in cardiorespiratory distress  EENT:  anicteric  CARDIOVASCULAR: (+) S1 S2, normal rate and regular rhythm  PULMONARY: clear to auscultation bilaterally  ABDOMEN: soft, nontender with normoactive bowel sounds  EXTREMITIES: no edema  SKIN: no rash    LABS:   CAPILLARY BLOOD GLUCOSE 111 95 116 135     (8.97)  13.9   6.54  )-----------( 161      ( 12 Mar 2022 04:31 )             41.4     146<H>  |  115<H>  |  30<H>  ----------------------------<  109<H>  3.9   |  22  |  1.03    Ca    8.3<L>      12 Mar 2022 04:31  Phos  2.5       Mg     2.0         TPro  6.8  /  Alb  4.3  /  TBili  0.7  /  DBili  x   /  AST  16  /  ALT  17  /  AlkPhos  61  03-10    03-11 @ 07:01  -   @ 07:00  IN: 2699.8 mL / OUT: 1485 mL / NET: 1214.8 mL    Bacteriology:  CSF studies:  EEG:  Neuroimagin/11 MRI no acute infarcts:  3cm giant aneurysm basilar artery, partially thrombosed, marked compression of brainstem, distortion of IV but patent  03/10 CTA:  dolichoectasia of basilar artery, partially thrombosed aneurysm; diffuse idiopathic skeletal hyperostosis  Other imaging:    MEDICATIONS:     ·	bisacodyl Suppository 10 Rectal daily  ·	polyethylene glycol 3350 17 Oral daily  ·	insulin lispro (ADMELOG) corrective regimen sliding scale  SubCutaneous every 6 hours  ·	sodium chloride 3%. 500 IV Continuous <Continuous>  ·	hydrALAZINE Injectable 10 IV Push every 4 hours PRN    IV FLUIDS: 3%@50cc/hr NS@50  DRIPS:  ·	niCARdipine Infusion 5 mG/Hr IV Continuous <Continuous> - 8mg/hr  DIET: NPO  Lines: Pal[place]  Arredondo  Drains:      CODE STATUS:  Full Code                       GOALS OF CARE:  aggressive                      DISPOSITION:  ICU =================================  NEUROCRITICAL CARE ATTENDING NOTE  =================================    BREANNA MCCORMACK   MRN-[id number]  Summary:  76y/M with HTN, HLD, afib (on eliquis, unknown if last taken today,) CVA in  (with minimal residual right-sided weakness per girlfriend,) last known normal at 3pm when girlfriend spoke to him. 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 Upon arrival to [facility], BP noted to be 171/101. Stroke code was called. CTH without findings of hemorrhage. 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 NIHSS 20. No TPA was administered as Pt is out of window. Per patient's girlfriend, the basilar artery aneurysm is known and was diagnosed in  at [facility] when he suffered from a CVA. At that time, he underwent a "treatment for the aneurysm," which the girlfriend reports was too risky and was aborted. Patient never attended his follow-up appointments due to fear per girlfriend.  (10 Mar 2022 22:52)    COURSE IN THE HOSPITAL:  03/10 admitted to [facility]   Tmax 38.3; s/p angio showing extremely tortuous and elongated aortic arch with dolichoectatic vertebrobasilar system, giant partially thrombosed vertebrobasilar aneurysm   tachypneic overnight     Past Medical History:   Allergies:  No Known Allergies  Home meds:   ·	amLODIPine 5 mg oral tablet: 1 tab(s) orally once a day  ·	captopril 100 mg oral tablet:   ·	carvedilol 6.25 mg oral tablet:   ·	Eliquis 5 mg oral tablet:   ·	hydroCHLOROthiazide 25 mg oral tablet:     PHYSICAL EXAMINATION  T(C): 37.4 ( @ 05:23), Max: 37.9 ( @ 09:00) HR: 63 ( @ 06:00) (60 - 78) BP: 118/56 ( @ 17:08) (118/56 - 118/56) -159 RR: 36 ( @ 06:00) (16 - 37) SpO2: 98% ( @ 06:00) (93% - 98%)  NEUROLOGIC EXAMINATION:  Patient is awake, alert, oriented x3, following commands, CARISSA, L facial droop, L UE 1/5 L LE TF, R UE/LE 5/5, expressive aphasia, able to write  GENERAL: not intubated, not in cardiorespiratory distress  EENT:  anicteric  CARDIOVASCULAR: (+) S1 S2, normal rate and regular rhythm  PULMONARY: clear to auscultation bilaterally  ABDOMEN: soft, nontender with normoactive bowel sounds  EXTREMITIES: no edema, no groin hematoma, pulses good  SKIN: no rash    LABS:   CAPILLARY BLOOD GLUCOSE 111 95 116 135     (8.97)  13.9   6.54  )-----------( 161      ( 12 Mar 2022 04:31 )             41.4     146<H>  |  115<H>  |  30<H>  ----------------------------<  109<H>  3.9   |  22  |  1.03    Ca    8.3<L>      12 Mar 2022 04:31  Phos  2.5       Mg     2.0         TPro  6.8  /  Alb  4.3  /  TBili  0.7  /  DBili  x   /  AST  16  /  ALT  17  /  AlkPhos  61  03-10    03-11 @ 07:01  -   @ 07:00  IN: 2699.8 mL / OUT: 1485 mL / NET: 1214.8 mL    Bacteriology:  CSF studies:  EEG:  Neuroimagin/11 MRI no acute infarcts:  3cm giant aneurysm basilar artery, partially thrombosed, marked compression of brainstem, distortion of IV but patent  03/10 CTA:  dolichoectasia of basilar artery, partially thrombosed aneurysm; diffuse idiopathic skeletal hyperostosis  Other imaging:    MEDICATIONS:     ·	bisacodyl Suppository 10 Rectal daily  ·	polyethylene glycol 3350 17 Oral daily  ·	insulin lispro (ADMELOG) corrective regimen sliding scale  SubCutaneous every 6 hours  ·	sodium chloride 3%. 500 IV Continuous <Continuous>  ·	hydrALAZINE Injectable 10 IV Push every 4 hours PRN    IV FLUIDS: 3%@25cc/hr NS@50  DRIPS:  ·	niCARdipine Infusion 5 mG/Hr IV Continuous <Continuous> - OFF  DIET: NPO  Lines: Karen  Drains:      CODE STATUS:  Full Code                       GOALS OF CARE:  aggressive                      DISPOSITION:  ICU

## 2022-03-13 LAB
ANION GAP SERPL CALC-SCNC: 9 MMOL/L — SIGNIFICANT CHANGE UP (ref 5–17)
BASE EXCESS BLDA CALC-SCNC: -1.4 MMOL/L — SIGNIFICANT CHANGE UP (ref -2–3)
BASE EXCESS BLDA CALC-SCNC: -1.7 MMOL/L — SIGNIFICANT CHANGE UP (ref -2–3)
BASE EXCESS BLDA CALC-SCNC: -1.9 MMOL/L — SIGNIFICANT CHANGE UP (ref -2–3)
BUN SERPL-MCNC: 32 MG/DL — HIGH (ref 7–23)
CALCIUM SERPL-MCNC: 8.5 MG/DL — SIGNIFICANT CHANGE UP (ref 8.4–10.5)
CHLORIDE SERPL-SCNC: 115 MMOL/L — HIGH (ref 96–108)
CO2 BLDA-SCNC: 23 MMOL/L — SIGNIFICANT CHANGE UP (ref 19–24)
CO2 BLDA-SCNC: 23 MMOL/L — SIGNIFICANT CHANGE UP (ref 19–24)
CO2 BLDA-SCNC: 24 MMOL/L — SIGNIFICANT CHANGE UP (ref 19–24)
CO2 SERPL-SCNC: 21 MMOL/L — LOW (ref 22–31)
CREAT SERPL-MCNC: 0.94 MG/DL — SIGNIFICANT CHANGE UP (ref 0.5–1.3)
EGFR: 83 ML/MIN/1.73M2 — SIGNIFICANT CHANGE UP
GLUCOSE SERPL-MCNC: 144 MG/DL — HIGH (ref 70–99)
HCO3 BLDA-SCNC: 22 MMOL/L — SIGNIFICANT CHANGE UP (ref 21–28)
HCO3 BLDA-SCNC: 22 MMOL/L — SIGNIFICANT CHANGE UP (ref 21–28)
HCO3 BLDA-SCNC: 23 MMOL/L — SIGNIFICANT CHANGE UP (ref 21–28)
HCT VFR BLD CALC: 41 % — SIGNIFICANT CHANGE UP (ref 39–50)
HGB BLD-MCNC: 13.5 G/DL — SIGNIFICANT CHANGE UP (ref 13–17)
MAGNESIUM SERPL-MCNC: 2 MG/DL — SIGNIFICANT CHANGE UP (ref 1.6–2.6)
MCHC RBC-ENTMCNC: 31.1 PG — SIGNIFICANT CHANGE UP (ref 27–34)
MCHC RBC-ENTMCNC: 32.9 GM/DL — SIGNIFICANT CHANGE UP (ref 32–36)
MCV RBC AUTO: 94.5 FL — SIGNIFICANT CHANGE UP (ref 80–100)
MRSA PCR RESULT.: NEGATIVE — SIGNIFICANT CHANGE UP
NRBC # BLD: 0 /100 WBCS — SIGNIFICANT CHANGE UP (ref 0–0)
PCO2 BLDA: 33 MMHG — LOW (ref 35–48)
PCO2 BLDA: 35 MMHG — SIGNIFICANT CHANGE UP (ref 35–48)
PCO2 BLDA: 36 MMHG — SIGNIFICANT CHANGE UP (ref 35–48)
PH BLDA: 7.41 — SIGNIFICANT CHANGE UP (ref 7.35–7.45)
PH BLDA: 7.41 — SIGNIFICANT CHANGE UP (ref 7.35–7.45)
PH BLDA: 7.43 — SIGNIFICANT CHANGE UP (ref 7.35–7.45)
PHOSPHATE SERPL-MCNC: 2.3 MG/DL — LOW (ref 2.5–4.5)
PLATELET # BLD AUTO: 144 K/UL — LOW (ref 150–400)
PO2 BLDA: 150 MMHG — HIGH (ref 83–108)
PO2 BLDA: 156 MMHG — HIGH (ref 83–108)
PO2 BLDA: 59 MMHG — LOW (ref 83–108)
POTASSIUM SERPL-MCNC: 3.9 MMOL/L — SIGNIFICANT CHANGE UP (ref 3.5–5.3)
POTASSIUM SERPL-SCNC: 3.9 MMOL/L — SIGNIFICANT CHANGE UP (ref 3.5–5.3)
RBC # BLD: 4.34 M/UL — SIGNIFICANT CHANGE UP (ref 4.2–5.8)
RBC # FLD: 12.4 % — SIGNIFICANT CHANGE UP (ref 10.3–14.5)
S AUREUS DNA NOSE QL NAA+PROBE: POSITIVE
SAO2 % BLDA: 92.9 % — LOW (ref 94–98)
SAO2 % BLDA: 99.1 % — HIGH (ref 94–98)
SAO2 % BLDA: 99.5 % — HIGH (ref 94–98)
SODIUM SERPL-SCNC: 145 MMOL/L — SIGNIFICANT CHANGE UP (ref 135–145)
WBC # BLD: 7.78 K/UL — SIGNIFICANT CHANGE UP (ref 3.8–10.5)
WBC # FLD AUTO: 7.78 K/UL — SIGNIFICANT CHANGE UP (ref 3.8–10.5)

## 2022-03-13 PROCEDURE — 71045 X-RAY EXAM CHEST 1 VIEW: CPT | Mod: 26,76

## 2022-03-13 PROCEDURE — 99291 CRITICAL CARE FIRST HOUR: CPT

## 2022-03-13 PROCEDURE — 95720 EEG PHY/QHP EA INCR W/VEEG: CPT

## 2022-03-13 RX ORDER — SODIUM,POTASSIUM PHOSPHATES 278-250MG
1 POWDER IN PACKET (EA) ORAL ONCE
Refills: 0 | Status: COMPLETED | OUTPATIENT
Start: 2022-03-13 | End: 2022-03-13

## 2022-03-13 RX ORDER — ACETYLCYSTEINE 200 MG/ML
4 VIAL (ML) MISCELLANEOUS EVERY 4 HOURS
Refills: 0 | Status: DISCONTINUED | OUTPATIENT
Start: 2022-03-13 | End: 2022-03-30

## 2022-03-13 RX ORDER — IPRATROPIUM/ALBUTEROL SULFATE 18-103MCG
3 AEROSOL WITH ADAPTER (GRAM) INHALATION EVERY 4 HOURS
Refills: 0 | Status: DISCONTINUED | OUTPATIENT
Start: 2022-03-13 | End: 2022-03-30

## 2022-03-13 RX ORDER — ACETYLCYSTEINE 200 MG/ML
4 VIAL (ML) MISCELLANEOUS EVERY 6 HOURS
Refills: 0 | Status: DISCONTINUED | OUTPATIENT
Start: 2022-03-13 | End: 2022-03-13

## 2022-03-13 RX ORDER — VANCOMYCIN HCL 1 G
1750 VIAL (EA) INTRAVENOUS ONCE
Refills: 0 | Status: COMPLETED | OUTPATIENT
Start: 2022-03-13 | End: 2022-03-13

## 2022-03-13 RX ORDER — IPRATROPIUM/ALBUTEROL SULFATE 18-103MCG
3 AEROSOL WITH ADAPTER (GRAM) INHALATION EVERY 6 HOURS
Refills: 0 | Status: DISCONTINUED | OUTPATIENT
Start: 2022-03-13 | End: 2022-03-13

## 2022-03-13 RX ORDER — PIPERACILLIN AND TAZOBACTAM 4; .5 G/20ML; G/20ML
3.38 INJECTION, POWDER, LYOPHILIZED, FOR SOLUTION INTRAVENOUS EVERY 6 HOURS
Refills: 0 | Status: DISCONTINUED | OUTPATIENT
Start: 2022-03-13 | End: 2022-03-14

## 2022-03-13 RX ORDER — VANCOMYCIN HCL 1 G
VIAL (EA) INTRAVENOUS
Refills: 0 | Status: DISCONTINUED | OUTPATIENT
Start: 2022-03-13 | End: 2022-03-14

## 2022-03-13 RX ORDER — VANCOMYCIN HCL 1 G
1750 VIAL (EA) INTRAVENOUS EVERY 12 HOURS
Refills: 0 | Status: DISCONTINUED | OUTPATIENT
Start: 2022-03-13 | End: 2022-03-14

## 2022-03-13 RX ORDER — PIPERACILLIN AND TAZOBACTAM 4; .5 G/20ML; G/20ML
3.38 INJECTION, POWDER, LYOPHILIZED, FOR SOLUTION INTRAVENOUS ONCE
Refills: 0 | Status: COMPLETED | OUTPATIENT
Start: 2022-03-13 | End: 2022-03-13

## 2022-03-13 RX ADMIN — PIPERACILLIN AND TAZOBACTAM 200 GRAM(S): 4; .5 INJECTION, POWDER, LYOPHILIZED, FOR SOLUTION INTRAVENOUS at 16:09

## 2022-03-13 RX ADMIN — PIPERACILLIN AND TAZOBACTAM 200 GRAM(S): 4; .5 INJECTION, POWDER, LYOPHILIZED, FOR SOLUTION INTRAVENOUS at 22:32

## 2022-03-13 RX ADMIN — Medication 250 MILLIGRAM(S): at 22:32

## 2022-03-13 RX ADMIN — SENNA PLUS 2 TABLET(S): 8.6 TABLET ORAL at 22:32

## 2022-03-13 RX ADMIN — Medication 4 MILLILITER(S): at 11:09

## 2022-03-13 RX ADMIN — Medication 3 MILLILITER(S): at 15:39

## 2022-03-13 RX ADMIN — Medication 250 MILLIGRAM(S): at 11:26

## 2022-03-13 RX ADMIN — APIXABAN 5 MILLIGRAM(S): 2.5 TABLET, FILM COATED ORAL at 06:28

## 2022-03-13 RX ADMIN — Medication 1 PACKET(S): at 22:32

## 2022-03-13 RX ADMIN — Medication 4 MILLILITER(S): at 15:39

## 2022-03-13 RX ADMIN — Medication 4 MILLILITER(S): at 18:13

## 2022-03-13 RX ADMIN — PIPERACILLIN AND TAZOBACTAM 200 GRAM(S): 4; .5 INJECTION, POWDER, LYOPHILIZED, FOR SOLUTION INTRAVENOUS at 09:18

## 2022-03-13 RX ADMIN — SODIUM CHLORIDE 100 MILLILITER(S): 9 INJECTION INTRAMUSCULAR; INTRAVENOUS; SUBCUTANEOUS at 23:42

## 2022-03-13 RX ADMIN — AMLODIPINE BESYLATE 5 MILLIGRAM(S): 2.5 TABLET ORAL at 06:28

## 2022-03-13 RX ADMIN — Medication 3 MILLILITER(S): at 18:13

## 2022-03-13 RX ADMIN — CAPTOPRIL 100 MILLIGRAM(S): 12.5 TABLET ORAL at 06:29

## 2022-03-13 RX ADMIN — Medication 3 MILLILITER(S): at 21:13

## 2022-03-13 RX ADMIN — CARVEDILOL PHOSPHATE 6.25 MILLIGRAM(S): 80 CAPSULE, EXTENDED RELEASE ORAL at 18:01

## 2022-03-13 RX ADMIN — Medication 3 MILLILITER(S): at 11:09

## 2022-03-13 RX ADMIN — CARVEDILOL PHOSPHATE 6.25 MILLIGRAM(S): 80 CAPSULE, EXTENDED RELEASE ORAL at 06:29

## 2022-03-13 RX ADMIN — Medication 4 MILLILITER(S): at 21:14

## 2022-03-13 RX ADMIN — Medication 650 MILLIGRAM(S): at 03:38

## 2022-03-13 RX ADMIN — APIXABAN 5 MILLIGRAM(S): 2.5 TABLET, FILM COATED ORAL at 18:01

## 2022-03-13 NOTE — PROGRESS NOTE ADULT - THIS PATIENT HAS THE FOLLOWING CONDITION(S)/DIAGNOSES ON THIS ADMISSION:
Problem: Knowledge Deficit  Goal: Patient/family/caregiver demonstrates understanding of disease process, treatment plan, medications, and discharge instructions  Complete learning assessment and assess knowledge base    Interventions:  - Provide teaching at level of understanding  - Provide teaching via preferred learning methods  Outcome: Progressing None

## 2022-03-13 NOTE — PROGRESS NOTE ADULT - SUBJECTIVE AND OBJECTIVE BOX
=================================  NEUROCRITICAL CARE ATTENDING NOTE  =================================    BREANNA MCCORMACK   MRN-[id number]  Summary:  76y/M with HTN, HLD, afib (on eliquis, unknown if last taken today,) CVA in  (with minimal residual right-sided weakness per girlfriend,) last known normal at 3pm when girlfriend spoke to him. 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 Upon arrival to [facility], BP noted to be 171/101. Stroke code was called. CTH without findings of hemorrhage. 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 NIHSS 20. No TPA was administered as Pt is out of window. Per patient's girlfriend, the basilar artery aneurysm is known and was diagnosed in  at [facility] when he suffered from a CVA. At that time, he underwent a "treatment for the aneurysm," which the girlfriend reports was too risky and was aborted. Patient never attended his follow-up appointments due to fear per girlfriend.  (10 Mar 2022 22:52)    COURSE IN THE HOSPITAL:  03/10 admitted to [facility]   Tmax 38.3; s/p angio showing extremely tortuous and elongated aortic arch with dolichoectatic vertebrobasilar system, giant partially thrombosed vertebrobasilar aneurysm   Tmax 37.9 tachypneic overnight; apixaban restarted   Tmax 38.3    Past Medical History:   Allergies:  No Known Allergies  Home meds:   ·	amLODIPine 5 mg oral tablet: 1 tab(s) orally once a day  ·	captopril 100 mg oral tablet:   ·	carvedilol 6.25 mg oral tablet:   ·	Eliquis 5 mg oral tablet:   ·	hydroCHLOROthiazide 25 mg oral tablet:     PHYSICAL EXAMINATION  T(C): 36.7 ( @ 05:26), Max: 38.3 (03-12 @ 18:00) HR: 53 ( @ 06:00) (46 - 67) BP: 148/67 ( @ 06:00) (142/67 - 169/74) RR: 34 ( @ 06:00) (15 - 36) SpO2: 90% ( @ 06:00) (89% - 100%)  NEUROLOGIC EXAMINATION:  Patient is awake, alert, oriented x3, following commands, CARISSA, L facial droop, L UE 1/5 L LE TF, R UE/LE 5/5, expressive aphasia, able to write  GENERAL: not intubated, not in cardiorespiratory distress  EENT:  anicteric  CARDIOVASCULAR: (+) S1 S2, normal rate and regular rhythm  PULMONARY: clear to auscultation bilaterally  ABDOMEN: soft, nontender with normoactive bowel sounds  EXTREMITIES: no edema, no groin hematoma, pulses good  SKIN: no rash    LABS:              (6.54)  13.5 (13.9)  7.78  )-----------( 144      ( 13 Mar 2022 04:37 )             41.0   (147)  145  |  115<H>  |  32<H>  ----------------------------<  144<H>  3.9   |  21<L>  |  0.94    Ca    8.5      13 Mar 2022 04:37  Phos  2.3       Mg     2.0     12 @ 06:01  -   @ 07:00  IN: 2860 mL / OUT: 1265 mL / NET: 1595 mL     Bacteriology:  CSF studies:  EEG:  Neuroimagin/11 MRI no acute infarcts:  3cm giant aneurysm basilar artery, partially thrombosed, marked compression of brainstem, distortion of IV but patent  03/10 CTA:  dolichoectasia of basilar artery, partially thrombosed aneurysm; diffuse idiopathic skeletal hyperostosis  Other imaging:    MEDICATIONS:     ·	apixaban 5 Enteral Tube every 12 hours  ·	amLODIPine   Tablet 5 milliGRAM(s) Oral daily  ·	captopril 100 milliGRAM(s) Oral daily  ·	carvedilol 6.25 milliGRAM(s) Oral every 12 hours  ·	acetylcysteine 20%  Inhalation 4 Inhalation every 6 hours  ·	albuterol/ipratropium for Nebulization 3 Nebulizer every 6 hours  ·	polyethylene glycol 3350 17 Oral daily  ·	senna 2 Oral at bedtime  ·	acetaminophen    Suspension .. 650 Oral every 6 hours PRN  ·	hydrALAZINE Injectable 10 IV Push every 4 hours PRN    IV FLUIDS: 3%@25cc/hr NS@50  DRIPS:  ·	niCARdipine Infusion 5 mG/Hr IV Continuous <Continuous> - OFF  DIET: NPO  Lines: Karen  Drains:      CODE STATUS:  Full Code                       GOALS OF CARE:  aggressive                      DISPOSITION:  ICU =================================  NEUROCRITICAL CARE ATTENDING NOTE  =================================    BREANNA MCCORMACK   MRN-[id number]  Summary:  76y/M with HTN, HLD, afib (on eliquis, unknown if last taken today,) CVA in  (with minimal residual right-sided weakness per girlfriend,) last known normal at 3pm when girlfriend spoke to him. 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 Upon arrival to [facility], BP noted to be 171/101. Stroke code was called. CTH without findings of hemorrhage. 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 NIHSS 20. No TPA was administered as Pt is out of window. Per patient's girlfriend, the basilar artery aneurysm is known and was diagnosed in  at [facility] when he suffered from a CVA. At that time, he underwent a "treatment for the aneurysm," which the girlfriend reports was too risky and was aborted. Patient never attended his follow-up appointments due to fear per girlfriend.  (10 Mar 2022 22:52)    COURSE IN THE HOSPITAL:  03/10 admitted to [facility]   Tmax 38.3; s/p angio showing extremely tortuous and elongated aortic arch with dolichoectatic vertebrobasilar system, giant partially thrombosed vertebrobasilar aneurysm   Tmax 37.9 tachypneic overnight; apixaban restarted; NGT inserted, tube feeds started at night   Tmax 38.3, desaturation to 88% this morning - now requiring 5L/min, tube feeds held    Past Medical History:   Allergies:  No Known Allergies  Home meds:   ·	amLODIPine 5 mg oral tablet: 1 tab(s) orally once a day  ·	captopril 100 mg oral tablet:   ·	carvedilol 6.25 mg oral tablet:   ·	Eliquis 5 mg oral tablet:   ·	hydroCHLOROthiazide 25 mg oral tablet:     PHYSICAL EXAMINATION  T(C): 36.7 ( @ 05:26), Max: 38.3 ( @ 18:00) HR: 53 ( @ 06:00) (46 - 67) BP: 148/67 ( @ 06:00) (142/67 - 169/74) RR: 34 ( @ 06:00) (15 - 36) SpO2: 90% ( @ 06:00) (89% - 100%)  NEUROLOGIC EXAMINATION:  Patient is awake, alert, oriented x3, following commands, CARISSA, L facial droop, L UE 1/5 L LE withdrawal, R UE/LE 5/5, able to write, able to say some words  GENERAL: not intubated, not in cardiorespiratory distress  EENT:  anicteric  CARDIOVASCULAR: (+) S1 S2, normal rate and regular rhythm  PULMONARY: decreased breath sounds bases, L worse than the R  ABDOMEN: soft, nontender with normoactive bowel sounds  EXTREMITIES: no edema, no groin hematoma, pulses good  SKIN: no rash    LABS:              (6.54)  13.5 (13.9)  7.78  )-----------( 144  (161)    ( 13 Mar 2022 04:37 )             41.0   (147)  145  |  115<H>  |  32<H>  ----------------------------<  144<H>  3.9   |  21<L>  |  0.94    Ca    8.5      13 Mar 2022 04:37  Phos  2.3       Mg     2.0      @ 06:01  -   @ 07:00  IN: 2860 mL / OUT: 1265 mL / NET: 1595 mL     Bacteriology:  CSF studies:  EEG:  Neuroimagin/11 MRI no acute infarcts:  3cm giant aneurysm basilar artery, partially thrombosed, marked compression of brainstem, distortion of IV but patent  03/10 CTA:  dolichoectasia of basilar artery, partially thrombosed aneurysm; diffuse idiopathic skeletal hyperostosis  Other imaging:    MEDICATIONS:     ·	apixaban 5 Enteral Tube every 12 hours  ·	amLODIPine   Tablet 5 milliGRAM(s) Oral daily  ·	captopril 100 milliGRAM(s) Oral daily  ·	carvedilol 6.25 milliGRAM(s) Oral every 12 hours  ·	acetylcysteine 20%  Inhalation 4 Inhalation every 6 hours  ·	albuterol/ipratropium for Nebulization 3 Nebulizer every 6 hours  ·	polyethylene glycol 3350 17 Oral daily  ·	senna 2 Oral at bedtime  ·	acetaminophen    Suspension .. 650 Oral every 6 hours PRN  ·	hydrALAZINE Injectable 10 IV Push every 4 hours PRN    IV FLUIDS: NS@100cc/hr  DRIPS:  DIET: NPO  Lines: [place]  Drains:      CODE STATUS:  Full Code                       GOALS OF CARE:  aggressive                      DISPOSITION:  ICU

## 2022-03-13 NOTE — PROVIDER CONTACT NOTE (CHANGE IN STATUS NOTIFICATION) - ACTION/TREATMENT ORDERED:
SUBJECTIVE:  Patient presents for post op incision check.  Doing ok, a little sore.  Taking Ibu 800mg PRN.  Pt thinks she over did it yesterday.  Having issues with constipation but able to have a BM.  Has pain in bladder when it is full but once she urinates this resolves.  She does not have any dysuria, frequency or urgency.  She is not taking stool softener.    OBJECTIVE:  Clean, dry with very minimal erythema at edges.  Dermabond intact.  Some mild bruising.      ASSESSMENT:  Wound healing well, status post op.   Bladder sx consistent with normal post op healing, do not suspect UTI.      PLAN:  Call if problems, or signs of infection. Discussed expectant post op course and s/s of issues, when to call. Pathology benign, pt aware MD will go over in more detail at her visit.   Start stool softener.  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  Will need to be cleared by MD to be able to RTW at 6 weeks post op.      
Provider notified
Provider at bedside, PCXR, Pulm toilet ordered; feedings held
continue NPO until re-eval by speech/swallow

## 2022-03-13 NOTE — PROVIDER CONTACT NOTE (OTHER) - ASSESSMENT
AOx4 via writing and yes/no questions. Pt resting comfortably in bed in no acute distress. Denies chest pain, shortness of breath.    Lab review Na 135. K3.9. Phos 2.3. AOx4 via writing and yes/no questions. Pt resting comfortably in bed in no acute distress. Denies chest pain, shortness of breath.    Lab review Na 135. K3.9. Phos 2.3.  EKG review: pt in 1st degree AV block with PVCs since admission.

## 2022-03-13 NOTE — EEG REPORT - NS EEG TEXT BOX
Day 2: 3/12/2022 @ 07:00 AM to next morning @ 07:00 am  Background:  , with predominantly .frequencies.  Intermittent polymorphic generalized delta activity  Symmetry:    Posterior Dominant Rhythm:  8-9 hz .  Organization: .  Voltage:    Variability: . 		Reactivity: .  N2 sleep: .  Spontaneous Activity:      Wickets in left and right temporal regions (normal EEG variant)  Periodic/rhythmic activity:  .  Events:  No electrographic seizures or significant clinical events.  Provocations:  Hyperventilation and Photic stimulation: .    Daily Summary:    Mild diffuse nonspecific cerebral dysfunction.  No epileptiform abnormalities were recorded.      Sanjay Mccormick MD  Attending Neurologist, [facility]

## 2022-03-13 NOTE — PROGRESS NOTE ADULT - SUBJECTIVE AND OBJECTIVE BOX
HPI:  75y/o M with PMHx sig for HTN, HLD, afib (on eliquis, unknown if last taken today,) CVA in  (with minimal residual right-sided weakness per girlfriend,) last known normal at 3pm when girlfriend spoke to him. 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 Upon arrival to [facility], BP noted to be 171/101. Stroke code was called. CTH without findings of hemorrhage. 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 NIHSS 20. No TPA was administered as Pt is out of window. Per patient's girlfriend, the basilar artery aneurysm is known and was diagnosed in  at [facility] when he suffered from a CVA. At that time, he underwent a "treatment for the aneurysm," which the girlfriend reports was too risky and was aborted. Patient never attended his follow-up appointments due to fear per girlfriend.  (10 Mar 2022 22:52)        Hospital Course:   3/10: Admitted for further workup of stroke symptoms and basilar artery aneurysm.  3/11: [place] placed overnight. 3% hypertonic saline started. Neuro exam stable. Started on vEEG for aphasia  3/12: GM overnight. Neuro exam stable. 3% d/c. home eliquis 5 bid. failed s/s. started on TF via NGT, spiked fever 101, f/u panculture   3/13: GM overnight, neuro stable, veeg negative         Vital Signs Last 24 Hrs  T(C): 37.8 (13 Mar 2022 01:11), Max: 38.3 (12 Mar 2022 18:00)  T(F): 100 (13 Mar 2022 01:11), Max: 101 (12 Mar 2022 18:00)  HR: 58 (13 Mar 2022 01:00) (46 - 67)  BP: 149/68 (13 Mar 2022 01:00) (146/65 - 169/74)  BP(mean): 98 (13 Mar 2022 01:00) (93 - 110)  RR: 26 (13 Mar 2022 01:00) (15 - 37)  SpO2: 94% (13 Mar 2022 01:00) (93% - 100%)    I&O's Detail    11 Mar 2022 07:  -  12 Mar 2022 07:00  --------------------------------------------------------  IN:    IV PiggyBack: 699.8 mL    NiCARdipine: 100 mL    sodium chloride 0.9%: 600 mL    sodium chloride 0.9%: 400 mL    sodium chloride 3%: 700 mL    sodium chloride 3%: 200 mL  Total IN: 2699.8 mL    OUT:    Indwelling Catheter - Urethral (mL): 1575 mL  Total OUT: 1575 mL    Total NET: 1124.8 mL      12 Mar 2022 06:01  -  13 Mar 2022 01:28  --------------------------------------------------------  IN:    Enteral Tube Flush: 100 mL    IV PiggyBack: 200 mL    Jevity 1.2: 160 mL    sodium chloride 0.9%: 75 mL    sodium chloride 0.9%: 1500 mL    sodium chloride 3%: 25 mL  Total IN: 2060 mL    OUT:    Indwelling Catheter - Urethral (mL): 240 mL    Intermittent Catheterization - Urethral (mL): 500 mL  Total OUT: 740 mL    Total NET: 1320 mL        I&O's Summary    11 Mar 2022 07:  -  12 Mar 2022 07:00  --------------------------------------------------------  IN: 2699.8 mL / OUT: 1575 mL / NET: 1124.8 mL    12 Mar 2022 06:  -  13 Mar 2022 01:28  --------------------------------------------------------  IN: 2060 mL / OUT: 740 mL / NET: 1320 mL        PHYSICAL EXAM:  General: NAD, pt is comfortably sitting up in bed, on room air  HEENT: CN II-XII grossly intact, PERRL 3mm briskly reactive, EOMI b/l, face symmetric, tongue midline, neck FROM  Cardiovascular: RRR, normal S1 and S2   Respiratory: lungs CTAB, no wheezing, rhonchi, or crackles   GI: normoactive BS to auscultation, abd soft, NTND   Neuro: A&Ox3 with choices, expressive aphasia, dysarthric but attempting to mouth words. Follows commands.  Motor: RUE 5/5 throughout, RLE HF 3/5, KE/KF 4-/5, DF/PF 5/5, LUE proximally 2/5 o/w 0/5 and withdrawing briskly, LLE TF. SILT throughout   Extremities: PT/DP pulses 2+ and symmetric  Wound/incision: R groin dressing C/D/I, no hematoma      TUBES/LINES:  [] CVC  [] A-line  [] Lumbar Drain  [] Ventriculostomy  [] EMERY  [] Arredondo  [] NGT   [] Other    DIET:  [] NPO  [] Mechanical  [] Tube feeds    LABS:                        13.9   6.54  )-----------( 161      ( 12 Mar 2022 04:31 )             41.4     03-12    147<H>  |  117<H>  |  31<H>  ----------------------------<  115<H>  4.0   |  20<L>  |  0.98    Ca    8.6      12 Mar 2022 14:06  Phos  2.5     03-12  Mg     2.0     03-12        Urinalysis Basic - ( 12 Mar 2022 18:36 )    Color: Yellow / Appearance: Clear / S.025 / pH: x  Gluc: x / Ketone: 15 mg/dL  / Bili: Negative / Urobili: 1.0 E.U./dL   Blood: x / Protein: NEGATIVE mg/dL / Nitrite: NEGATIVE   Leuk Esterase: NEGATIVE / RBC: 5-10 /HPF / WBC 5-10 /HPF   Sq Epi: x / Non Sq Epi: 0-5 /HPF / Bacteria: Many /HPF          CAPILLARY BLOOD GLUCOSE      POCT Blood Glucose.: 111 mg/dL (12 Mar 2022 05:20)      Drug Levels: [] N/A    CSF Analysis: [] N/A      Allergies    No Known Allergies    Intolerances        Home Medications:  amLODIPine 5 mg oral tablet: 1 tab(s) orally once a day (10 Mar 2022 23:03)  captopril 100 mg oral tablet:  (10 Mar 2022 23:03)  carvedilol 6.25 mg oral tablet:  (10 Mar 2022 23:03)  Eliquis 5 mg oral tablet:  (10 Mar 2022 23:03)  hydroCHLOROthiazide 25 mg oral tablet:  (10 Mar 2022 23:03)      MEDICATIONS:  Antibiotics:    Neuro:  acetaminophen    Suspension .. 650 milliGRAM(s) Oral every 6 hours PRN    Anticoagulation:  apixaban 5 milliGRAM(s) Enteral Tube every 12 hours    OTHER:  amLODIPine   Tablet 5 milliGRAM(s) Oral daily  captopril 100 milliGRAM(s) Oral daily  carvedilol 6.25 milliGRAM(s) Oral every 12 hours  glucagon  Injectable 1 milliGRAM(s) IntraMuscular once  hydrALAZINE Injectable 10 milliGRAM(s) IV Push every 4 hours PRN  polyethylene glycol 3350 17 Gram(s) Oral daily  senna 2 Tablet(s) Oral at bedtime    IVF:  sodium chloride 0.9%. 1000 milliLiter(s) IV Continuous <Continuous>    CULTURES:    RADIOLOGY & ADDITIONAL TESTS:      ASSESSMENT:  75y/o M with PMHx sig for HTN, HLD, afib (on eliquis, unknown if last taken today,) CVA in  (with minimal residual right-sided weakness per girlfriend) p/w dysarthria, left facial droop, and left-sided weakness. CTH without findings of hemorrhage. 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 NIHSS 20. Pt is not a tpa candidate as Pt is out of window. Patient now s/p diagnostic angio with findings of partially thrombosed basilar artery aneurysm 3/11/22    PLAN:  Neuro:  -neuro/vital checks q1h  -MR head complete 3/11 no acute infarcts  -stroke core measures  -EEG negative    Cardiac:  -SBP goal 100-160  -cont home norvasc, captopril, carvedilol (home HCTZ held currently)  -echo 3/11- mild LVH, EF 65-70%  -home dose Eliquis dose resumed (afib)    Pulm:  -2L NC prn  -satting well     Renal:  - IVF until TF at goal  - Na goal 140-145    GI:  -TF via NGT  -bowel regimen    Endo:  -ISS   -A1C 5.4    Heme;  -H/H stable  -SCDs  -cont home eliquis 5 bid per neurology  -LE dopplers 3/11 negative    ID:  -no issues, afebrile    Dispo:  ICU status, full code, dispo pending  Family updated with plan    D/w Dr. Peterson and Dr. Gay      Assessment:  Present when checked    []  GCS  E   V  M     Heart Failure: []Acute, [] acute on chronic , []chronic  Heart Failure:  [] Diastolic (HFpEF), [] Systolic (HFrEF), []Combined (HFpEF and HFrEF), [] RHF, [] Pulm HTN, [] Other    [] LALO, [] ATN, [] AIN, [] other  [] CKD1, [] CKD2, [] CKD 3, [] CKD 4, [] CKD 5, []ESRD    Encephalopathy: [] Metabolic, [] Hepatic, [] toxic, [] Neurological, [] Other    Abnormal Nurtitional Status: [] malnurtition (see nutrition note), [ ]underweight: BMI < 19, [] morbid obesity: BMI >40, [] Cachexia    [] Sepsis  [] hypovolemic shock,[] cardiogenic shock, [] hemorrhagic shock, [] neuogenic shock  [] Acute Respiratory Failure  []Cerebral edema, [] Brain compression/ herniation,   [] Functional quadriplegia  [] Acute blood loss anemia

## 2022-03-13 NOTE — PROVIDER CONTACT NOTE (CHANGE IN STATUS NOTIFICATION) - SITUATION
Pt noted for resting tachypnea to rate of 36
Pt diet ordered for 'adv as aidan' noted to choke on 1 ice chip
Pt on 2 LPM NC, previously good sats, now desatting to 87-88% despite nasophyrangeal, orophyrangeal suction.

## 2022-03-13 NOTE — PROGRESS NOTE ADULT - ASSESSMENT
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, hemiplegia  diffuse idiopathic skeletal hyperostosis  Hypertension dyslipidemia  h/o CVA 2020, minimal R sided weakness  Atrial fibrillation with controlled ventricular rate    PLAN:   NEURO: neurochecks q1h, PRN pain meds with acetaminophen  angio done - f/u final plans neurointerventional   f/u official 24h EEG read, if NEG d/c  REHAB:  physical therapy evaluation and management    EARLY MOB:  OOB to chair     PULM:  PRN O2 support to keep sats >/=92%, incentive spirometry  CARDIO:  SBP goal 100-160mm Hg, holding carvedilol for 1st degree AV block  ENDO:  Blood sugar goals 140-180 mg/dL, d/c insulin sliding scale  GI:  bowel regimen - add miralax  DIET: NPO, for now; speech and swallow, possibly NGT if agreeable  RENAL:  continue NS, Na goal 140-145, repeat BMP this afternoon; off 3%  HEM/ONC: Hb stable INR 1.17  VTE Prophylaxis: SCDs, no DVT chemoprophylaxis for now as patient is high risk for bleed (giant aneurysm); baseline LE Doppler for DVT suspected on admission (h/o CVA)  ID: afebrile, leukocytosis  Social: Rafiq Forrester  is HCP; discussion re: feeding tube    ATTENDING ATTESTATION:  I was physically present for the key portions of the evaluation and management (E/M) service provided.  I agree with the above history, physical and plan, which I have reviewed and edited where appropriate.    Patient at high risk for neurological deterioration or death due to:  ICU delirium, aspiration PNA, DVT / PE.  Critical care time:  I have personally provided 45 minutes of critical care time, excluding time spent on separate procedures.      Plan discussed with RN, house staff. 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, hemiplegia  diffuse idiopathic skeletal hyperostosis  Hypertension dyslipidemia  h/o CVA 2020, minimal R sided weakness  Atrial fibrillation with controlled ventricular rate    PLAN:   NEURO: neurochecks q1h, PRN pain meds with acetaminophen, no opiates / benzos  angio done - f/u final plans neurointerventional   f/u official 24h EEG read, if NEG d/c  REHAB:  physical therapy evaluation and management    EARLY MOB:  HOB up    PULM:  start HFNC 50/50; incentive spirometry; increase nebs to q4h, add pulmicort, CXR ABG daily, ABG now  CARDIO:  SBP goal 100-160mm Hg, cont carvedilol, amlodipine    ENDO:  Blood sugar goals 140-180 mg/dL  GI:  bowel regimen miralax  DIET: keep NPO, tube feeds held for possible aspiration  RENAL:  continue NS @100c/hr, Na goal 145-150; daily BMP  HEM/ONC: Hb stable INR 1.17  VTE Prophylaxis: SCDs, restarted on Eliquis  ID: afebrile, leukocytosis; piperacillin/tazobactam vancomycin, vanc trough prior to 4th dose; microbiologic work-up, UA NEG, CXR no new infiltrates; pending sputum and blood  Social: Rafiq Forrester  is HCP; discussed yesterday with wife and patient who are both agreeable to NGT insertion    ATTENDING ATTESTATION:  I was physically present for the key portions of the evaluation and management (E/M) service provided.  I agree with the above history, physical and plan, which I have reviewed and edited where appropriate.    Patient at high risk for neurological deterioration or death due to:  ICU delirium, aspiration PNA, DVT / PE.  Critical care time:  I have personally provided 45 minutes of critical care time, excluding time spent on separate procedures.      Plan discussed with RN, house staff.

## 2022-03-13 NOTE — PROVIDER CONTACT NOTE (OTHER) - SITUATION
Pt in 1st degree AV block with PVCs on cardiac monitor. Pt in 1st degree AV block with PVCs on cardiac monitor. On report, informed pt SB to NSR.

## 2022-03-13 NOTE — PROVIDER CONTACT NOTE (CHANGE IN STATUS NOTIFICATION) - ASSESSMENT
Pt continues to gurgle and have raspy respirations
Pulse ox WDL
Rhonchus respirations, inability to expectorate

## 2022-03-14 ENCOUNTER — TRANSCRIPTION ENCOUNTER (OUTPATIENT)
Age: 77
End: 2022-03-14

## 2022-03-14 LAB
ANION GAP SERPL CALC-SCNC: 7 MMOL/L — SIGNIFICANT CHANGE UP (ref 5–17)
BUN SERPL-MCNC: 32 MG/DL — HIGH (ref 7–23)
CALCIUM SERPL-MCNC: 8 MG/DL — LOW (ref 8.4–10.5)
CHLORIDE SERPL-SCNC: 113 MMOL/L — HIGH (ref 96–108)
CO2 SERPL-SCNC: 22 MMOL/L — SIGNIFICANT CHANGE UP (ref 22–31)
CREAT SERPL-MCNC: 0.9 MG/DL — SIGNIFICANT CHANGE UP (ref 0.5–1.3)
EGFR: 87 ML/MIN/1.73M2 — SIGNIFICANT CHANGE UP
GLUCOSE SERPL-MCNC: 128 MG/DL — HIGH (ref 70–99)
HCT VFR BLD CALC: 39 % — SIGNIFICANT CHANGE UP (ref 39–50)
HGB BLD-MCNC: 13.3 G/DL — SIGNIFICANT CHANGE UP (ref 13–17)
MAGNESIUM SERPL-MCNC: 1.9 MG/DL — SIGNIFICANT CHANGE UP (ref 1.6–2.6)
MCHC RBC-ENTMCNC: 32 PG — SIGNIFICANT CHANGE UP (ref 27–34)
MCHC RBC-ENTMCNC: 34.1 GM/DL — SIGNIFICANT CHANGE UP (ref 32–36)
MCV RBC AUTO: 94 FL — SIGNIFICANT CHANGE UP (ref 80–100)
NRBC # BLD: 0 /100 WBCS — SIGNIFICANT CHANGE UP (ref 0–0)
PHOSPHATE SERPL-MCNC: 2.8 MG/DL — SIGNIFICANT CHANGE UP (ref 2.5–4.5)
PLATELET # BLD AUTO: 151 K/UL — SIGNIFICANT CHANGE UP (ref 150–400)
POTASSIUM SERPL-MCNC: 3.6 MMOL/L — SIGNIFICANT CHANGE UP (ref 3.5–5.3)
POTASSIUM SERPL-SCNC: 3.6 MMOL/L — SIGNIFICANT CHANGE UP (ref 3.5–5.3)
RBC # BLD: 4.15 M/UL — LOW (ref 4.2–5.8)
RBC # FLD: 12.3 % — SIGNIFICANT CHANGE UP (ref 10.3–14.5)
SODIUM SERPL-SCNC: 142 MMOL/L — SIGNIFICANT CHANGE UP (ref 135–145)
WBC # BLD: 8.31 K/UL — SIGNIFICANT CHANGE UP (ref 3.8–10.5)
WBC # FLD AUTO: 8.31 K/UL — SIGNIFICANT CHANGE UP (ref 3.8–10.5)

## 2022-03-14 PROCEDURE — 92614 LARYNGOSCOPIC SENSORY VID: CPT

## 2022-03-14 PROCEDURE — 71045 X-RAY EXAM CHEST 1 VIEW: CPT | Mod: 26,76

## 2022-03-14 PROCEDURE — 99291 CRITICAL CARE FIRST HOUR: CPT

## 2022-03-14 PROCEDURE — 43752 NASAL/OROGASTRIC W/TUBE PLMT: CPT

## 2022-03-14 RX ORDER — SODIUM,POTASSIUM PHOSPHATES 278-250MG
1 POWDER IN PACKET (EA) ORAL ONCE
Refills: 0 | Status: COMPLETED | OUTPATIENT
Start: 2022-03-14 | End: 2022-03-14

## 2022-03-14 RX ORDER — PIPERACILLIN AND TAZOBACTAM 4; .5 G/20ML; G/20ML
3.38 INJECTION, POWDER, LYOPHILIZED, FOR SOLUTION INTRAVENOUS EVERY 6 HOURS
Refills: 0 | Status: DISCONTINUED | OUTPATIENT
Start: 2022-03-14 | End: 2022-03-14

## 2022-03-14 RX ORDER — LIPASE/PROTEASE/AMYLASE 16-48-48K
1 CAPSULE,DELAYED RELEASE (ENTERIC COATED) ORAL ONCE
Refills: 0 | Status: DISCONTINUED | OUTPATIENT
Start: 2022-03-14 | End: 2022-03-14

## 2022-03-14 RX ORDER — LISINOPRIL 2.5 MG/1
20 TABLET ORAL EVERY 24 HOURS
Refills: 0 | Status: DISCONTINUED | OUTPATIENT
Start: 2022-03-14 | End: 2022-04-08

## 2022-03-14 RX ORDER — LIPASE/PROTEASE/AMYLASE 16-48-48K
1 CAPSULE,DELAYED RELEASE (ENTERIC COATED) ORAL ONCE
Refills: 0 | Status: COMPLETED | OUTPATIENT
Start: 2022-03-14 | End: 2022-03-14

## 2022-03-14 RX ORDER — POTASSIUM CHLORIDE 20 MEQ
40 PACKET (EA) ORAL ONCE
Refills: 0 | Status: COMPLETED | OUTPATIENT
Start: 2022-03-14 | End: 2022-03-14

## 2022-03-14 RX ORDER — DEXAMETHASONE 0.5 MG/5ML
4 ELIXIR ORAL EVERY 6 HOURS
Refills: 0 | Status: COMPLETED | OUTPATIENT
Start: 2022-03-14 | End: 2022-03-15

## 2022-03-14 RX ORDER — VANCOMYCIN HCL 1 G
1750 VIAL (EA) INTRAVENOUS EVERY 12 HOURS
Refills: 0 | Status: DISCONTINUED | OUTPATIENT
Start: 2022-03-14 | End: 2022-03-14

## 2022-03-14 RX ORDER — DEXAMETHASONE 0.5 MG/5ML
ELIXIR ORAL
Refills: 0 | Status: COMPLETED | OUTPATIENT
Start: 2022-03-14 | End: 2022-03-17

## 2022-03-14 RX ORDER — SODIUM BICARBONATE 1 MEQ/ML
30 SYRINGE (ML) INTRAVENOUS ONCE
Refills: 0 | Status: DISCONTINUED | OUTPATIENT
Start: 2022-03-14 | End: 2022-03-14

## 2022-03-14 RX ORDER — PANTOPRAZOLE SODIUM 20 MG/1
40 TABLET, DELAYED RELEASE ORAL DAILY
Refills: 0 | Status: COMPLETED | OUTPATIENT
Start: 2022-03-14 | End: 2022-03-17

## 2022-03-14 RX ORDER — PIPERACILLIN AND TAZOBACTAM 4; .5 G/20ML; G/20ML
3.38 INJECTION, POWDER, LYOPHILIZED, FOR SOLUTION INTRAVENOUS EVERY 6 HOURS
Refills: 0 | Status: COMPLETED | OUTPATIENT
Start: 2022-03-14 | End: 2022-03-17

## 2022-03-14 RX ADMIN — PIPERACILLIN AND TAZOBACTAM 200 GRAM(S): 4; .5 INJECTION, POWDER, LYOPHILIZED, FOR SOLUTION INTRAVENOUS at 17:55

## 2022-03-14 RX ADMIN — Medication 4 MILLILITER(S): at 09:43

## 2022-03-14 RX ADMIN — CARVEDILOL PHOSPHATE 6.25 MILLIGRAM(S): 80 CAPSULE, EXTENDED RELEASE ORAL at 05:35

## 2022-03-14 RX ADMIN — CARVEDILOL PHOSPHATE 6.25 MILLIGRAM(S): 80 CAPSULE, EXTENDED RELEASE ORAL at 17:58

## 2022-03-14 RX ADMIN — Medication 4 MILLILITER(S): at 13:08

## 2022-03-14 RX ADMIN — Medication 3 MILLILITER(S): at 22:12

## 2022-03-14 RX ADMIN — SENNA PLUS 2 TABLET(S): 8.6 TABLET ORAL at 21:51

## 2022-03-14 RX ADMIN — APIXABAN 5 MILLIGRAM(S): 2.5 TABLET, FILM COATED ORAL at 05:35

## 2022-03-14 RX ADMIN — PIPERACILLIN AND TAZOBACTAM 200 GRAM(S): 4; .5 INJECTION, POWDER, LYOPHILIZED, FOR SOLUTION INTRAVENOUS at 21:51

## 2022-03-14 RX ADMIN — Medication 3 MILLILITER(S): at 01:06

## 2022-03-14 RX ADMIN — Medication 4 MILLILITER(S): at 01:05

## 2022-03-14 RX ADMIN — PIPERACILLIN AND TAZOBACTAM 200 GRAM(S): 4; .5 INJECTION, POWDER, LYOPHILIZED, FOR SOLUTION INTRAVENOUS at 10:27

## 2022-03-14 RX ADMIN — Medication 3 MILLILITER(S): at 13:08

## 2022-03-14 RX ADMIN — Medication 40 MILLIEQUIVALENT(S): at 10:26

## 2022-03-14 RX ADMIN — Medication 3 MILLILITER(S): at 05:39

## 2022-03-14 RX ADMIN — Medication 4 MILLILITER(S): at 22:35

## 2022-03-14 RX ADMIN — SODIUM CHLORIDE 100 MILLILITER(S): 9 INJECTION INTRAMUSCULAR; INTRAVENOUS; SUBCUTANEOUS at 23:16

## 2022-03-14 RX ADMIN — AMLODIPINE BESYLATE 5 MILLIGRAM(S): 2.5 TABLET ORAL at 05:35

## 2022-03-14 RX ADMIN — PIPERACILLIN AND TAZOBACTAM 200 GRAM(S): 4; .5 INJECTION, POWDER, LYOPHILIZED, FOR SOLUTION INTRAVENOUS at 03:35

## 2022-03-14 RX ADMIN — LISINOPRIL 20 MILLIGRAM(S): 2.5 TABLET ORAL at 17:58

## 2022-03-14 RX ADMIN — Medication 10 MILLIGRAM(S): at 16:25

## 2022-03-14 RX ADMIN — Medication 4 MILLILITER(S): at 05:39

## 2022-03-14 RX ADMIN — Medication 1 TABLET(S): at 10:26

## 2022-03-14 RX ADMIN — POLYETHYLENE GLYCOL 3350 17 GRAM(S): 17 POWDER, FOR SOLUTION ORAL at 17:59

## 2022-03-14 RX ADMIN — CAPTOPRIL 100 MILLIGRAM(S): 12.5 TABLET ORAL at 05:35

## 2022-03-14 RX ADMIN — APIXABAN 5 MILLIGRAM(S): 2.5 TABLET, FILM COATED ORAL at 17:59

## 2022-03-14 RX ADMIN — Medication 3 MILLILITER(S): at 17:09

## 2022-03-14 RX ADMIN — Medication 3 MILLILITER(S): at 09:43

## 2022-03-14 NOTE — DISCHARGE NOTE PROVIDER - NSDCCPCAREPLAN_GEN_ALL_CORE_FT
PRINCIPAL DISCHARGE DIAGNOSIS  Diagnosis: Basilar artery aneurysm  Assessment and Plan of Treatment:       SECONDARY DISCHARGE DIAGNOSES  Diagnosis: Longstanding persistent atrial fibrillation  Assessment and Plan of Treatment:     Diagnosis: HTN (hypertension)  Assessment and Plan of Treatment:     Diagnosis: Hyperlipidemia  Assessment and Plan of Treatment:     Diagnosis: Acidosis  Assessment and Plan of Treatment:     Diagnosis: Leukocytosis  Assessment and Plan of Treatment:     Diagnosis: Dysphagia  Assessment and Plan of Treatment:     Diagnosis: Hypocalcemia  Assessment and Plan of Treatment:     Diagnosis: Hypophosphatemia  Assessment and Plan of Treatment:     Diagnosis: Acute respiratory failure with hypoxia  Assessment and Plan of Treatment:     Diagnosis: Brain stem compression  Assessment and Plan of Treatment:     Diagnosis: History of CVA (cerebrovascular accident)  Assessment and Plan of Treatment:

## 2022-03-14 NOTE — DISCHARGE NOTE PROVIDER - NSDCFUADDINST_GEN_ALL_CORE_FT
Neurosurgery follow up appointment date/time:  - are staples/sutures in place?  - what day should staples/sutures be removed (POD 10-14)?  - please call the office to confirm appointment:     Wound Care:  - can patient shower?  - does dressing need to be changed/removed?    Devices:  - does patient need collar or brace?  - does collar/brace need to be worn at all times or just when OOB?    Drains/Lines:  - PICC in place? ID follow up? (Paper Rx for: antibiotics, heparin flush, weekly lab draws)  - EMERY in place? Management?  - marte in place? Management/Urology follow up?  - Tracheostomy?  - PEG tube?      Activity:  - fatigue is common after surgery, rest if you feel tired   - no bending, lifting, twisting or heavy lifting   - walking is recommended, ambulate as tolerated  - you may shower when you get home, keep your incision dry  - no bathing   - no driving within 24 hours of anesthesia or while taking prescription pain medications   - keep hydrated, drink plenty of water     Inpatient consults:  - final recommendations  - you will need follow up with....    Please also follow up with your primary care doctor.     Pain Expectations:  - pain after surgery is expected  - please take pain meds as prescribed     Medications:  - changes to home meds (ex. AED's)?  - new meds?  - pain meds?  - when can antiplatelets or anticoagulants be restarted?  - were adverse affects of meds discussed with patients?   - pain medications can cause constipation, you should eat a high fiber diet and may take a stool softener while on pain meds   - Avoid taking Advil (ibuprofen), Motrin (naproxen), or Aspirin for pain as they can cause bleeding     Call the office or come to ED if:  - wound has drainage or bleeding, increased redness or pain at incision site, neurological change, fever (>101), chills, night sweats, syncope, nausea/vomiting      Playback:  - see [facility] for a copy of your discharge paperwork     WITHIN 24 HOURS OF DISCHARGE, PLEASE CONTACT NEURO PA  WITH ANY QUESTIONS OR CONCERNS: [phone]   OTHERWISE, PLEASE CALL THE OFFICE WITH ANY QUESTIONS OR CONCERNS: [phone] Neurosurgery follow up:  - Follow up in the office with Dr. Peterson   - please call the office to confirm appointment: [phone]    Wound Care:  - you may shower   - groin site steri strips off, wound uncovered     Devices:    Drains/Lines:  - PICC in place? ID follow up? (Paper Rx for: antibiotics, heparin flush, weekly lab draws)  - EMERY in place? Management?  - marte in place? Management/Urology follow up?  - Tracheostomy?  - PEG tube?      Activity:  - fatigue is common after surgery, rest if you feel tired   - no bending, lifting, twisting or heavy lifting   - walking is recommended, ambulate as tolerated  - you may shower when you get home, keep your incision dry  - no bathing   - no driving within 24 hours of anesthesia or while taking prescription pain medications   - keep hydrated, drink plenty of water     Inpatient consults:  - ENT     Please also follow up with your primary care doctor.     Pain Expectations:  - pain after surgery is expected  - please take pain meds as prescribed     Medications:  - changes to home meds (ex. AED's)?  - new meds?  - pain meds?  - when can antiplatelets or anticoagulants be restarted?  - were adverse affects of meds discussed with patients?   - pain medications can cause constipation, you should eat a high fiber diet and may take a stool softener while on pain meds   - Avoid taking Advil (ibuprofen), Motrin (naproxen), or Aspirin for pain as they can cause bleeding     Call the office or come to ED if:  - wound has drainage or bleeding, increased redness or pain at incision site, neurological change, fever (>101), chills, night sweats, syncope, nausea/vomiting      Playback:  - see [facility] for a copy of your discharge paperwork     WITHIN 24 HOURS OF DISCHARGE, PLEASE CONTACT NEURO PA  WITH ANY QUESTIONS OR CONCERNS: [phone]   OTHERWISE, PLEASE CALL THE OFFICE WITH ANY QUESTIONS OR CONCERNS: [phone] Neurosurgery follow up:   - Follow up in the office with Dr. Peterson and Dr. Murillo   - please call the office to confirm appointment: [phone]    Wound Care:  - you may shower     Activity:  - fatigue is common after surgery, rest if you feel tired   - no bending, lifting, twisting or heavy lifting   - walking is recommended, ambulate as tolerated  - you may shower when you get home, keep your incision dry  - no bathing   - no driving within 24 hours of anesthesia or while taking prescription pain medications   - keep hydrated, drink plenty of water     Inpatient consults:  Hospitalist: Hellen follow up for CT chest in 6-12 mo   ENT consulted: Nasal packing in R nare absorbable, follow up for possible vocal cord injection  Behavioral Health consulted: [facility] f/u with a care group (listed above)   General Surgery consulted: PEG management     Please also follow up with your primary care doctor.     Pain Expectations:  - pain after surgery is expected  - please take pain meds as prescribed     Medications:  - Continue home antihypertensives. We switched your captopril to lisinopril (same class of medications) and stopped your hydrochlorothiazide. Follow up with your primary care for BP check and to restart this medication.   - Continue antibiotics for UTI - Ciprofloxacin and Amoxicillin for 3 days   - pain medications can cause constipation, you should eat a high fiber diet and may take a stool softener while on pain meds   - Avoid taking Advil (ibuprofen), Motrin (naproxen), or Aspirin for pain as they can cause bleeding     Call the office or come to ED if:  - wound has drainage or bleeding, increased redness or pain at incision site, neurological change, fever (>101), chills, night sweats, syncope, nausea/vomiting      Playback:  - see [employer] [facility] for a copy of your discharge paperwork     WITHIN 24 HOURS OF DISCHARGE, PLEASE CONTACT NEURO PA  WITH ANY QUESTIONS OR CONCERNS: [phone]   OTHERWISE, PLEASE CALL THE OFFICE WITH ANY QUESTIONS OR CONCERNS: [phone] Neurosurgery follow up:   - Follow up in the office with Dr. Peterson 4/18 @ 12:30 PM  - Follow up with Dr. Murillo   - please call the office to confirm appointment: [phone]    Wound Care:  - you may shower     Activity:  - fatigue is common after surgery, rest if you feel tired   - no bending, lifting, twisting or heavy lifting   - walking is recommended, ambulate as tolerated  - you may shower when you get home, keep your incision dry  - no bathing   - no driving within 24 hours of anesthesia or while taking prescription pain medications   - keep hydrated, drink plenty of water     Inpatient consults:  Hospitalist: Hellen follow up for CT chest in 6-12 mo   ENT consulted: Nasal packing in R nare absorbable, follow up for possible vocal cord injection  Behavioral Health consulted: [facility] f/u with a care group (listed above)   General Surgery consulted: PEG management     Please also follow up with your primary care doctor.     Pain Expectations:  - pain after surgery is expected  - please take pain meds as prescribed     Medications:  - Continue home antihypertensives. We switched your captopril to lisinopril (same class of medications) and stopped your hydrochlorothiazide. Follow up with your primary care for BP check and to restart this medication.   - Continue antibiotics for UTI - Ciprofloxacin and Amoxicillin for 3 days   - pain medications can cause constipation, you should eat a high fiber diet and may take a stool softener while on pain meds   - Avoid taking Advil (ibuprofen), Motrin (naproxen), or Aspirin for pain as they can cause bleeding     Call the office or come to ED if:  - wound has drainage or bleeding, increased redness or pain at incision site, neurological change, fever (>101), chills, night sweats, syncope, nausea/vomiting      Playback:  - see [employer] [facility] for a copy of your discharge paperwork     WITHIN 24 HOURS OF DISCHARGE, PLEASE CONTACT NEURO PA  WITH ANY QUESTIONS OR CONCERNS: [phone]   OTHERWISE, PLEASE CALL THE OFFICE WITH ANY QUESTIONS OR CONCERNS: [phone]

## 2022-03-14 NOTE — PROGRESS NOTE ADULT - ASSESSMENT
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, hemiplegia  diffuse idiopathic skeletal hyperostosis  Hypertension dyslipidemia  h/o CVA 2020, minimal R sided weakness  Atrial fibrillation with controlled ventricular rate    PLAN:   NEURO: neurochecks q1h, PRN pain meds with acetaminophen, no opiates / benzos  angio done - f/u final plans neurointerventional   f/u official 24h EEG read, if NEG d/c  REHAB:  physical therapy evaluation and management    EARLY MOB:  HOB up    PULM:  start HFNC 50/50; incentive spirometry; increase nebs to q4h, add pulmicort, CXR ABG daily, ABG now  CARDIO:  SBP goal 100-160mm Hg, cont carvedilol, amlodipine    ENDO:  Blood sugar goals 140-180 mg/dL  GI:  bowel regimen miralax  DIET: keep NPO, tube feeds held for possible aspiration  RENAL:  continue NS @100c/hr, Na goal 145-150; daily BMP  HEM/ONC: Hb stable INR 1.17  VTE Prophylaxis: SCDs, restarted on Eliquis  ID: afebrile, leukocytosis; piperacillin/tazobactam vancomycin, vanc trough tonight; microbiologic work-up, UA NEG, CXR no new infiltrates; pending sputum and blood  Social: Rafiq Forrester  is HCP; discussed yesterday with wife and patient who are both agreeable to NGT insertion    ATTENDING ATTESTATION:  I was physically present for the key portions of the evaluation and management (E/M) service provided.  I agree with the above history, physical and plan, which I have reviewed and edited where appropriate.    Patient at high risk for neurological deterioration or death due to:  ICU delirium, aspiration PNA, DVT / PE.  Critical care time:  I have personally provided 45 minutes of critical care time, excluding time spent on separate procedures.      Plan discussed with RN, house staff. 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, hemiplegia  diffuse idiopathic skeletal hyperostosis  Hypertension dyslipidemia  h/o CVA 2020, minimal R sided weakness  Atrial fibrillation with controlled ventricular rate    PLAN:   NEURO: neurochecks q2h, VS q1h, PRN pain meds with acetaminophen, no opiates / benzos  angio done - f/u final plans neurointerventional   ENT consult   REHAB:  physical therapy evaluation and management    EARLY MOB:  HOB up    PULM:  wean HFNC 30/30; incentive spirometry; nebs to q4h, pulmicort, CXR   CARDIO:  SBP goal 100-160mm Hg, cont carvedilol, amlodipine, switch captopril to lisinopril   ENDO:  Blood sugar goals 140-180 mg/dL  GI:  bowel regimen miralax  DIET: keep NPO, formal speech and swallow; if fails then start tube feeds once off HFNC  RENAL:  continue NS @100c/hr, Na goal 145-150; daily BMP  HEM/ONC: Hb stable INR 1.17  VTE Prophylaxis: SCDs, Eliquis  ID: afebrile, leukocytosis; piperacillin/tazobactam (last day 03/17) d/c vancomycin  Social: Rafiq Forrester  is HCP; discussed yesterday with wife and patient who are both agreeable to NGT insertion    ATTENDING ATTESTATION:  I was physically present for the key portions of the evaluation and management (E/M) service provided.  I agree with the above history, physical and plan, which I have reviewed and edited where appropriate.    Patient at high risk for neurological deterioration or death due to:  ICU delirium, aspiration PNA, DVT / PE.  Critical care time:  I have personally provided 45 minutes of critical care time, excluding time spent on separate procedures.      Plan discussed with RN, house staff.

## 2022-03-14 NOTE — SWALLOW BEDSIDE ASSESSMENT ADULT - SWALLOW EVAL: RECOMMENDED DIET
NPO with consideration for Short-term enteral feeding route per physician discretion pending reassessment.
continue NPO w NGT, oral hydration via moist swabs

## 2022-03-14 NOTE — SWALLOW BEDSIDE ASSESSMENT ADULT - COMMENTS
Wife at bedside. Reported occasional difficulties with swallowing s/p CVA-prior to this admission.
Received sleeping, awakened to a sleepy state when name was called. Using thumbs up to indicate agreement & understanding.

## 2022-03-14 NOTE — PROGRESS NOTE ADULT - SUBJECTIVE AND OBJECTIVE BOX
=================================  NEUROCRITICAL CARE ATTENDING NOTE  =================================    BREANNA MCCORMACK   MRN-[id number]  Summary:  76y/M with HTN, HLD, afib (on eliquis, unknown if last taken today,) CVA in  (with minimal residual right-sided weakness per girlfriend,) last known normal at 3pm when girlfriend spoke to him. 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 Upon arrival to [facility], BP noted to be 171/101. Stroke code was called. CTH without findings of hemorrhage. 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 NIHSS 20. No TPA was administered as Pt is out of window. Per patient's girlfriend, the basilar artery aneurysm is known and was diagnosed in  at [facility] when he suffered from a CVA. At that time, he underwent a "treatment for the aneurysm," which the girlfriend reports was too risky and was aborted. Patient never attended his follow-up appointments due to fear per girlfriend.  (10 Mar 2022 22:52)    COURSE IN THE HOSPITAL:  03/10 admitted to [facility]   Tmax 38.3; s/p angio showing extremely tortuous and elongated aortic arch with dolichoectatic vertebrobasilar system, giant partially thrombosed vertebrobasilar aneurysm   Tmax 37.9 tachypneic overnight; apixaban restarted; NGT inserted, tube feeds started at night   Tmax 38.3, desaturation to 88% this morning - now requiring 5L/min, tube feeds held; started on HFNC, improved   Tmax 37.8.  No significant events overnight.     Past Medical History:   Allergies:  No Known Allergies  Home meds:   ·	amLODIPine 5 mg oral tablet: 1 tab(s) orally once a day  ·	captopril 100 mg oral tablet:   ·	carvedilol 6.25 mg oral tablet:   ·	Eliquis 5 mg oral tablet:   ·	hydroCHLOROthiazide 25 mg oral tablet:     PHYSICAL EXAMINATION  T(C): 37.3 ( @ 05:09), Max: 37.8 ( @ 21:30) HR: 52 ( @ 06:00) (49 - 62) BP: 143/65 ( @ 06:00) (136/64 - 165/77) RR: 16 ( @ 06:00) (16 - 34) SpO2: 100% ( @ 06:00) (90% - 100%)   NEUROLOGIC EXAMINATION:  Patient is awake, alert, oriented x3, following commands, CARISSA, L facial droop, L UE 1/5 L LE withdrawal, R UE/LE 5/5, able to write, able to say some words  GENERAL: not intubated, not in cardiorespiratory distress  EENT:  anicteric  CARDIOVASCULAR: (+) S1 S2, bradycardic rate and regular rhythm  PULMONARY: decreased breath sounds bases, L worse than the R  ABDOMEN: soft, nontender with normoactive bowel sounds  EXTREMITIES: no edema, no groin hematoma, pulses good  SKIN: no rash    LABS:   CAPILLARY BLOOD GLUCOSE 109                         13.3   8.31  )-----------( 151      ( 14 Mar 2022 05:26 )             39.0     142  |  113<H>  |  32<H>  ----------------------------<  128<H>  3.6   |  22  |  0.90    Ca    8.0<L>      14 Mar 2022 05:26  Phos  2.8       Mg     1.9      @ 06:01  -  - @ 07:00  IN: 2860 mL / OUT: 1265 mL / NET: 1595 mL      Bacteriology:  CSF studies:  EEG:  Neuroimagin/11 MRI no acute infarcts:  3cm giant aneurysm basilar artery, partially thrombosed, marked compression of brainstem, distortion of IV but patent  03/10 CTA:  dolichoectasia of basilar artery, partially thrombosed aneurysm; diffuse idiopathic skeletal hyperostosis  Other imaging:    MEDICATIONS:     ·	apixaban 5 Enteral Tube every 12 hours  ·	piperacillin/tazobactam IVPB.. 3.375 IV Intermittent every 6 hours  ·	vancomycin  IVPB 1750 IV Intermittent every 12 hours  ·	amLODIPine   Tablet 5 milliGRAM(s) Oral daily  ·	captopril 100 milliGRAM(s) Oral daily  ·	carvedilol 6.25 milliGRAM(s) Oral every 12 hours  ·	acetylcysteine 20%  Inhalation 4 Inhalation every 4 hours  ·	albuterol/ipratropium for Nebulization 3 Nebulizer every 4 hours  ·	polyethylene glycol 3350 17 Oral daily  ·	senna 2 Oral at bedtime  ·	acetaminophen    Suspension .. 650 Oral every 6 hours PRN  ·	hydrALAZINE Injectable 10 IV Push every 4 hours PRN    IV FLUIDS: NS@100cc/hr  DRIPS:  DIET: NPO  Lines: [place]  Drains:      CODE STATUS:  Full Code                       GOALS OF CARE:  aggressive                      DISPOSITION:  ICU =================================  NEUROCRITICAL CARE ATTENDING NOTE  =================================    BREANNA MCCORMACK   MRN-[id number]  Summary:  76y/M with HTN, HLD, afib (on eliquis, unknown if last taken today,) CVA in  (with minimal residual right-sided weakness per girlfriend,) last known normal at 3pm when girlfriend spoke to him. 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 Upon arrival to [facility], BP noted to be 171/101. Stroke code was called. CTH without findings of hemorrhage. 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 NIHSS 20. No TPA was administered as Pt is out of window. Per patient's girlfriend, the basilar artery aneurysm is known and was diagnosed in  at [facility] when he suffered from a CVA. At that time, he underwent a "treatment for the aneurysm," which the girlfriend reports was too risky and was aborted. Patient never attended his follow-up appointments due to fear per girlfriend.  (10 Mar 2022 22:52)    COURSE IN THE HOSPITAL:  03/10 admitted to [facility]   Tmax 38.3; s/p angio showing extremely tortuous and elongated aortic arch with dolichoectatic vertebrobasilar system, giant partially thrombosed vertebrobasilar aneurysm   Tmax 37.9 tachypneic overnight; apixaban restarted; NGT inserted, tube feeds started at night   Tmax 38.3, desaturation to 88% this morning - now requiring 5L/min, tube feeds held; started on HFNC, improved   Tmax 37.8.  No significant events overnight.     Past Medical History:   Allergies:  No Known Allergies  Home meds:   ·	amLODIPine 5 mg oral tablet: 1 tab(s) orally once a day  ·	captopril 100 mg oral tablet:   ·	carvedilol 6.25 mg oral tablet:   ·	Eliquis 5 mg oral tablet:   ·	hydroCHLOROthiazide 25 mg oral tablet:     PHYSICAL EXAMINATION  T(C): 37.3 ( @ 05:09), Max: 37.8 ( @ 21:30) HR: 52 ( @ 06:00) (49 - 62) BP: 143/65 ( @ 06:00) (136/64 - 165/77) RR: 16 ( @ 06:00) (16 - 34) SpO2: 100% ( @ 06:00) (90% - 100%)   NEUROLOGIC EXAMINATION:  Patient is awake, alert, oriented x3, following commands, CARISSA, L facial droop, L UE extensor posturing L LE TF, R UE/LE 5/5, able to write, able to say some words "I want water"  GENERAL: HFNC -40/40  EENT:  anicteric  CARDIOVASCULAR: (+) S1 S2, bradycardic rate and regular rhythm  PULMONARY: clear to auscultation  ABDOMEN: soft, nontender with normoactive bowel sounds  EXTREMITIES: no edema, no groin hematoma, pulses good  SKIN: no rash    LABS:   CAPILLARY BLOOD GLUCOSE 109                         13.3   8.31  )-----------( 151      ( 14 Mar 2022 05:26 )             39.0     142  |  113<H>  |  32<H>  ----------------------------<  128<H>  3.6   |  22  |  0.90    Ca    8.0<L>      14 Mar 2022 05:26  Phos  2.8       Mg     1.9      @ 06:01  -  -13 @ 07:00  IN: 2860 mL / OUT: 1265 mL / NET: 1595 mL    HbA1C = 5.4 ()  LDL = 108 ()   HDL = 43 ()  TG = 58 ()  TSH = 3.050 ()    Bacteriology:   Blood CS NG1D  CSF studies:  EEG:  Neuroimagin/11 MRI no acute infarcts:  3cm giant aneurysm basilar artery, partially thrombosed, marked compression of brainstem, distortion of IV but patent  03/10 CTA:  dolichoectasia of basilar artery, partially thrombosed aneurysm; diffuse idiopathic skeletal hyperostosis  Other imaging:    MEDICATIONS:     ·	apixaban 5 Enteral Tube every 12 hours  ·	piperacillin/tazobactam IVPB.. 3.375 IV Intermittent every 6 hours  ·	vancomycin  IVPB 1750 IV Intermittent every 12 hours  ·	amLODIPine   Tablet 5 milliGRAM(s) Oral daily  ·	captopril 100 milliGRAM(s) Oral daily  ·	carvedilol 6.25 milliGRAM(s) Oral every 12 hours  ·	acetylcysteine 20%  Inhalation 4 Inhalation every 4 hours  ·	albuterol/ipratropium for Nebulization 3 Nebulizer every 4 hours  ·	polyethylene glycol 3350 17 Oral daily  ·	senna 2 Oral at bedtime  ·	acetaminophen    Suspension .. 650 Oral every 6 hours PRN  ·	hydrALAZINE Injectable 10 IV Push every 4 hours PRN    IV FLUIDS: NS@100cc/hr  DRIPS:  DIET: NPO  Lines: [place]  Drains:      CODE STATUS:  Full Code                       GOALS OF CARE:  aggressive                      DISPOSITION:  ICU

## 2022-03-14 NOTE — SWALLOW BEDSIDE ASSESSMENT ADULT - PHARYNGEAL PHASE
Hyolaryngeal excursion palpated on water trials but not with puree indicating no swallow trigger. No throat clear or cough, could not elicit voice, however only minimal trials presented & Pt has absent volitional cough, placing him at high risk of silent aspiration. Oral suction provided after trial of applesauce to remove retained bolus from posterior oral cavity.

## 2022-03-14 NOTE — DISCHARGE NOTE PROVIDER - PROVIDER TOKENS
PROVIDER:[TOKEN:[[id number]]] PROVIDER:[TOKEN:[[id number]]],PROVIDER:[TOKEN:[[id number]]] PROVIDER:[TOKEN:[[id number]],ESTABLISHEDPATIENT:[T]],PROVIDER:[TOKEN:[[id number]],ESTABLISHEDPATIENT:[T]],PROVIDER:[TOKEN:[[id number]],ESTABLISHEDPATIENT:[T]],PROVIDER:[TOKEN:[[id number]],ESTABLISHEDPATIENT:[T]]

## 2022-03-14 NOTE — DISCHARGE NOTE PROVIDER - NSDCFUADDAPPT_GEN_ALL_CORE_FT
Please follow up with Dr. Peterson  Please follow up with Dr. Peterson     ENT  PCP  Please follow up with Dr. Peterson     ENT - follow up outpatient for possible outpatient vocal fold injection    Follow up with General Surgery or GI outpatient for PEG management    Please follow up with your primary care doctor outpatient  Please follow up with Dr. Peterson (Neurosurgeon) and Dr. Murillo (Neuroendovascular surgeon)    Please follow up with ENT - follow up outpatient for possible outpatient vocal fold injection.     Follow up with General Surgery or GI outpatient for PEG management.     Please follow up with your primary care doctor outpatient.     Make appointment to followup with one of the following within 1 week of discharge:    •	[facility]  o	www.[email or web address]  o	Geared towards an aging population, with the understanding that their clients will need increasingly comprehensive services.  Must be over 55.    o	[address] [place]  [address]  [phone]  o	Medicare, Medicaid, AARP, Aetna, [employer], AmeriChoice, [facility], [employer], Cigna, [facility], BCBS, [place], [employer], HealthFirst, HIP, Optum, [place], United, [employer]Options  •	[facility]  o	[email or web address]  o	[address] [place] [address]  (corner of [address] and [facility])  Telephone: [phone]  o	Medicare, Medicaid, most private insurance  •	[facility]  o	[email or web address]  o	Two locations  §	[address]  ([address])  [place] [address]  [phone]  §	[address] [place], [place] [address] [phone]  o	Medicare, Medicaid, most private insurance

## 2022-03-14 NOTE — DISCHARGE NOTE PROVIDER - HOSPITAL COURSE
HPI:  77y/o M with PMHx sig for HTN, HLD, afib (on eliquis, unknown if last taken today,) CVA in 2020 (with minimal residual right-sided weakness per girlfriend,) last known normal at 3pm when girlfriend spoke to him. 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 Upon arrival to [facility], BP noted to be 171/101. Stroke code was called. CTH without findings of hemorrhage. 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 NIHSS 20. No TPA was administered as Pt is out of window. Per patient's girlfriend, the basilar artery aneurysm is known and was diagnosed in 2020 at [facility] when he suffered from a CVA. At that time, he underwent a "treatment for the aneurysm," which the girlfriend reports was too risky and was aborted. Patient never attended his follow-up appointments due to fear per girlfriend.    Hospital Course:  3/10: Admitted for further workup of stroke symptoms and basilar artery aneurysm.  3/11: Karen placed overnight. 3% hypertonic saline started. Neuro exam stable. Started on vEEG for aphasia  3/12: GM overnight. Neuro exam stable. 3% d/c. home eliquis 5 bid. failed s/s. started on TF via NGT, spiked fever 101, f/u panculture   3/13: GM overnight, neuro stable, veeg negative. Vanc/zosyn started for empiric PNA.   3/14: GM o/n neuro stable. on HFNC.     Patient evaluated by PT/OT who recommended:  Patient is going home? rehab? hospice? Facility Name:     Hospital course c/b:     Exam on day of discharge:    Checklist:   - Obtained follow up appointment from NP  - Reviewed final recommendations of inpatient consults  - review discharge planning on provider handoff  - post op imaging completed  - Neurologically stable for discharge  - Vitals stable for discharge   - Afebrile for discharge  - WBC is stable  - Sodium level is normal  - Pain is adequately controlled  - Pt has PICC/walker/brace/collar        HPI:  77y/o M with PMHx sig for HTN, HLD, afib (on eliquis, unknown if last taken today,) CVA in 2020 (with minimal residual right-sided weakness per girlfriend,) last known normal at 3pm when girlfriend spoke to him. 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 Upon arrival to [facility], BP noted to be 171/101. Stroke code was called. CTH without findings of hemorrhage. 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 NIHSS 20. No TPA was administered as Pt is out of window. Per patient's girlfriend, the basilar artery aneurysm is known and was diagnosed in 2020 at [facility] when he suffered from a CVA. At that time, he underwent a "treatment for the aneurysm," which the girlfriend reports was too risky and was aborted. Patient never attended his follow-up appointments due to fear per girlfriend.    Hospital Course:  3/10: Admitted for further workup of stroke symptoms and basilar artery aneurysm.  3/11: Karen placed overnight. 3% hypertonic saline started. Neuro exam stable. Started on vEEG for aphasia  3/12: GM overnight. Neuro exam stable. 3% d/c. home eliquis 5 bid. failed s/s. started on TF via NGT, spiked fever 101, f/u panculture   3/13: GM overnight, neuro stable, veeg negative. Vanc/zosyn started for empiric PNA.   3/14: GM o/n neuro stable. on HFNC. ENT plan for laryngoscope today with . failed s/s, pend repeat eval. dc'd captopril, started lisinopril. dc'd vanc. Laryngoscopy: No masses visualized, hypomobility of L sided vocal cords, dysarthric, hypophonic, risk for aspiration, good cough reflex.Dr. Onofre to discuss with Dr. Peterson regarding a possible vocal cord injection.   3/15: GM overnight, neuro stable, on HFNC 30/30 overnight, transitioned to 4LNC  3/16: GM overnight, neuro stable, tolerating NC and satting well    3/17: POD6 dx angio. GM o/n neuro stable. trickle feeds via NGT  3/18: POD7 GM o/n, saturating well in RA. tolerating TF   03/19: POD8 GM o/n. gen surg consulted for PEG placement.  03/20: POD9 GM o/n. Transferred back to ICU for heparin gtt in preparation for PEG placement Wed.   3/21: POD10 GM o/n, neuro exam stable. Transferred to ICU in preparation for hep gtt to start tomorrow, plan for PEG on Wednesday. F/u am coags.   3/22: POD11. GM o/n neuro stable. on heparin gtt ptt goal 40-60    Patient evaluated by PT/OT who recommended:  Patient is going home? rehab? hospice? Facility Name:     Hospital course c/b: dysphagia     Exam on day of discharge:    Checklist:   - Obtained follow up appointment from NP  - Reviewed final recommendations of inpatient consults  - review discharge planning on provider handoff  - post op imaging completed  - Neurologically stable for discharge  - Vitals stable for discharge   - Afebrile for discharge  - WBC is stable  - Sodium level is normal  - Pain is adequately controlled  - Pt has PICC/walker/brace/collar        HPI:  75y/o M with PMHx sig for HTN, HLD, afib (on eliquis, unknown if last taken today,) CVA in 2020 (with minimal residual right-sided weakness per girlfriend,) last known normal at 3pm when girlfriend spoke to him. 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 Upon arrival to [facility], BP noted to be 171/101. Stroke code was called. CTH without findings of hemorrhage. 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 NIHSS 20. No TPA was administered as Pt is out of window. Per patient's girlfriend, the basilar artery aneurysm is known and was diagnosed in 2020 at [facility] when he suffered from a CVA. At that time, he underwent a "treatment for the aneurysm," which the girlfriend reports was too risky and was aborted. Patient never attended his follow-up appointments due to fear per girlfriend.    Hospital Course:  3/10: Admitted for further workup of stroke symptoms and basilar artery aneurysm.  3/11: Karen placed overnight. 3% hypertonic saline started. Neuro exam stable. Started on vEEG for aphasia  3/12: GM overnight. Neuro exam stable. 3% d/c. home eliquis 5 bid. failed s/s. started on TF via NGT, spiked fever 101, f/u panculture   3/13: GM overnight, neuro stable, veeg negative. Vanc/zosyn started for empiric PNA.   3/14: GM o/n neuro stable. on HFNC. ENT plan for laryngoscope today with . failed s/s, pend repeat eval. dc'd captopril, started lisinopril. dc'd vanc. Laryngoscopy: No masses visualized, hypomobility of L sided vocal cords, dysarthric, hypophonic, risk for aspiration, good cough reflex.Dr. Onofre to discuss with Dr. Peterson regarding a possible vocal cord injection.   3/15: GM overnight, neuro stable, on HFNC 30/30 overnight, transitioned to 4LNC  3/16: GM overnight, neuro stable, tolerating NC and satting well    3/17: POD6 dx angio. GM o/n neuro stable. trickle feeds via NGT  3/18: POD7 GM o/n, saturating well in RA. tolerating TF   03/19: POD8 GM o/n. gen surg consulted for PEG placement.  03/20: POD9 GM o/n. Transferred back to ICU for heparin gtt in preparation for PEG placement Wed.   3/21: POD10 GM o/n, neuro exam stable. Transferred to ICU in preparation for hep gtt to start tomorrow, plan for PEG on Wednesday. F/u am coags.   3/22: POD11. GM o/n neuro stable. on heparin gtt ptt goal 40-60  3/23: POD12. o/n new R facial droop, CTH stable. heparin gtt d/c. preop PEG today  3/24: POD13, POD1 PEG placement, GM overnight, neuro stable, +BM  3/25: POD14, GM o/n, neuro stable, eliquis restarted      Patient evaluated by PT/OT who recommended:  Patient is going home? rehab? hospice? Facility Name:     Hospital course c/b: dysphagia (s/p PEG)     Exam on day of discharge:    Checklist:   - Obtained follow up appointment from NP  - Reviewed final recommendations of inpatient consults  - review discharge planning on provider handoff  - post op imaging completed  - Neurologically stable for discharge  - Vitals stable for discharge   - Afebrile for discharge  - WBC is stable  - Sodium level is normal  - Pain is adequately controlled  - Pt has PICC/walker/brace/collar        HPI:  75y/o M with PMHx sig for HTN, HLD, afib (on eliquis, unknown if last taken today,) CVA in 2020 (with minimal residual right-sided weakness per girlfriend,) last known normal at 3pm when girlfriend spoke to him. 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 Upon arrival to [facility], BP noted to be 171/101. Stroke code was called. CTH without findings of hemorrhage. 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 NIHSS 20. No TPA was administered as Pt is out of window. Per patient's girlfriend, the basilar artery aneurysm is known and was diagnosed in 2020 at [facility] when he suffered from a CVA. At that time, he underwent a "treatment for the aneurysm," which the girlfriend reports was too risky and was aborted. Patient never attended his follow-up appointments due to fear per girlfriend.    Hospital Course:  3/10: Admitted for further workup of stroke symptoms and basilar artery aneurysm.  3/11: Karen placed overnight. 3% hypertonic saline started. Neuro exam stable. Started on vEEG for aphasia  3/12: GM overnight. Neuro exam stable. 3% d/c. home eliquis 5 bid. failed s/s. started on TF via NGT, spiked fever 101, f/u panculture   3/13: GM overnight, neuro stable, veeg negative. Vanc/zosyn started for empiric PNA.   3/14: GM o/n neuro stable. on HFNC. ENT plan for laryngoscope today with . failed s/s, pend repeat eval. dc'd captopril, started lisinopril. dc'd vanc. Laryngoscopy: No masses visualized, hypomobility of L sided vocal cords, dysarthric, hypophonic, risk for aspiration, good cough reflex.Dr. Onofre to discuss with Dr. Peterson regarding a possible vocal cord injection.   3/15: GM overnight, neuro stable, on HFNC 30/30 overnight, transitioned to 4LNC  3/16: GM overnight, neuro stable, tolerating NC and satting well    3/17: POD6 dx angio. GM o/n neuro stable. trickle feeds via NGT  3/18: POD7 GM o/n, saturating well in RA. tolerating TF   03/19: POD8 GM o/n. gen surg consulted for PEG placement.  03/20: POD9 GM o/n. Transferred back to ICU for heparin gtt in preparation for PEG placement Wed.   3/21: POD10 GM o/n, neuro exam stable. Transferred to ICU in preparation for hep gtt to start tomorrow, plan for PEG on Wednesday. F/u am coags.   3/22: POD11. GM o/n neuro stable. on heparin gtt ptt goal 40-60  3/23: POD12. o/n new R facial droop, CTH stable. heparin gtt d/c. preop PEG today  3/24: POD13, POD1 PEG placement, GM overnight, neuro stable, +BM  3/25: POD14, GM o/n, neuro stable, eliquis restarted  3/26: POD 15. GM overnight. exam stable. on abx until 3/26 for UTI. On eliquis for afib.   3/27: POD 16. o/n episode of epistaxis, aftrin administered. exam stable. abx completed for UTI. pending rehab. ENT consulted for epistaxis, recommended nasal saline and bactroban.  3/28: POD17. GM o/n, neuro stable.      Patient evaluated by PT/OT who recommended:  Patient is going home? rehab? hospice? Facility Name:     Hospital course c/b: dysphagia (s/p PEG), UTI (completed course of abx)      Exam on day of discharge:    Checklist:   - Obtained follow up appointment from NP  - Reviewed final recommendations of inpatient consults  - review discharge planning on provider handoff  - post op imaging completed  - Neurologically stable for discharge  - Vitals stable for discharge   - Afebrile for discharge  - WBC is stable  - Sodium level is normal  - Pain is adequately controlled  - Pt has PICC/walker/brace/collar        HPI:  75y/o M with PMHx sig for HTN, HLD, afib (on eliquis, unknown if last taken today,) CVA in 2020 (with minimal residual right-sided weakness per girlfriend,) last known normal at 3pm when girlfriend spoke to him. 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 Upon arrival to [facility], BP noted to be 171/101. Stroke code was called. CTH without findings of hemorrhage. 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 NIHSS 20. No TPA was administered as Pt is out of window. Per patient's girlfriend, the basilar artery aneurysm is known and was diagnosed in 2020 at [facility] when he suffered from a CVA. At that time, he underwent a "treatment for the aneurysm," which the girlfriend reports was too risky and was aborted. Patient never attended his follow-up appointments due to fear per girlfriend.    Hospital Course:  3/10: Admitted for further workup of stroke symptoms and basilar artery aneurysm.  3/11: Karen placed overnight. 3% hypertonic saline started. Neuro exam stable. Started on vEEG for aphasia  3/12: GM overnight. Neuro exam stable. 3% d/c. home eliquis 5 bid. failed s/s. started on TF via NGT, spiked fever 101, f/u panculture   3/13: GM overnight, neuro stable, veeg negative. Vanc/zosyn started for empiric PNA.   3/14: GM o/n neuro stable. on HFNC. ENT plan for laryngoscope today with . failed s/s, pend repeat eval. dc'd captopril, started lisinopril. dc'd vanc. Laryngoscopy: No masses visualized, hypomobility of L sided vocal cords, dysarthric, hypophonic, risk for aspiration, good cough reflex.Dr. Onofre to discuss with Dr. Peterson regarding a possible vocal cord injection.   3/15: GM overnight, neuro stable, on HFNC 30/30 overnight, transitioned to 4LNC  3/16: GM overnight, neuro stable, tolerating NC and satting well    3/17: POD6 dx angio. GM o/n neuro stable. trickle feeds via NGT  3/18: POD7 GM o/n, saturating well in RA. tolerating TF   03/19: POD8 GM o/n. gen surg consulted for PEG placement.  03/20: POD9 GM o/n. Transferred back to ICU for heparin gtt in preparation for PEG placement Wed.   3/21: POD10 GM o/n, neuro exam stable. Transferred to ICU in preparation for hep gtt to start tomorrow, plan for PEG on Wednesday. F/u am coags.   3/22: POD11. GM o/n neuro stable. on heparin gtt ptt goal 40-60  3/23: POD12. o/n new R facial droop, CTH stable. heparin gtt d/c. preop PEG today  3/24: POD13, POD1 PEG placement, GM overnight, neuro stable, +BM  3/25: POD14, GM o/n, neuro stable, eliquis restarted  3/26: POD 15. GM overnight. exam stable. on abx until 3/26 for UTI. On eliquis for afib.   3/27: POD 16. o/n episode of epistaxis, aftrin administered. exam stable. abx completed for UTI. pending rehab. ENT consulted for epistaxis, recommended nasal saline and bactroban.  3/28: POD17. GM o/n, neuro stable.  3/29: POD18. GM o/n, neuro stable   3/30: POD18. GM o/n. neuro stable. upgraded to ICU for intractable epistaxis. ENT placed nasal dressings, bleeding controlled. Ancef while drains in place. Desatted to 80s, on 8L O2 via face tent. intubation held as per girlfriend, will continue to monitor respiratory status  3/31: POD19 GM overnight. Neuro exam stable. Juan episode of epistaxis o/n.    Patient evaluated by PT/OT who recommended:  Patient is going home? rehab? hospice? Facility Name:     Hospital course c/b: dysphagia (s/p PEG), UTI (completed course of abx), epistaxis      Exam on day of discharge:    Checklist:   - Obtained follow up appointment from NP  - Reviewed final recommendations of inpatient consults  - review discharge planning on provider handoff  - post op imaging completed  - Neurologically stable for discharge  - Vitals stable for discharge   - Afebrile for discharge  - WBC is stable  - Sodium level is normal  - Pain is adequately controlled  - Pt has PICC/walker/brace/collar        HPI:  75y/o M with PMHx sig for HTN, HLD, afib (on eliquis, unknown if last taken today,) CVA in 2020 (with minimal residual right-sided weakness per girlfriend,) last known normal at 3pm when girlfriend spoke to him. 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 Upon arrival to [facility], BP noted to be 171/101. Stroke code was called. CTH without findings of hemorrhage. 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 NIHSS 20. No TPA was administered as Pt is out of window. Per patient's girlfriend, the basilar artery aneurysm is known and was diagnosed in 2020 at [facility] when he suffered from a CVA. At that time, he underwent a "treatment for the aneurysm," which the girlfriend reports was too risky and was aborted. Patient never attended his follow-up appointments due to fear per girlfriend.    Hospital Course:  3/10: Admitted for further workup of stroke symptoms and basilar artery aneurysm.  3/11: Karen placed overnight. 3% hypertonic saline started. Neuro exam stable. Started on vEEG for aphasia  3/12: GM overnight. Neuro exam stable. 3% d/c. home eliquis 5 bid. failed s/s. started on TF via NGT, spiked fever 101, f/u panculture   3/13: GM overnight, neuro stable, veeg negative. Vanc/zosyn started for empiric PNA.   3/14: GM o/n neuro stable. on HFNC. ENT plan for laryngoscope today with . failed s/s, pend repeat eval. dc'd captopril, started lisinopril. dc'd vanc. Laryngoscopy: No masses visualized, hypomobility of L sided vocal cords, dysarthric, hypophonic, risk for aspiration, good cough reflex.Dr. Onofre to discuss with Dr. Peterson regarding a possible vocal cord injection.   3/15: GM overnight, neuro stable, on HFNC 30/30 overnight, transitioned to 4LNC  3/16: GM overnight, neuro stable, tolerating NC and satting well    3/17: POD6 dx angio. GM o/n neuro stable. trickle feeds via NGT  3/18: POD7 GM o/n, saturating well in RA. tolerating TF   03/19: POD8 GM o/n. gen surg consulted for PEG placement.  03/20: POD9 GM o/n. Transferred back to ICU for heparin gtt in preparation for PEG placement Wed.   3/21: POD10 GM o/n, neuro exam stable. Transferred to ICU in preparation for hep gtt to start tomorrow, plan for PEG on Wednesday. F/u am coags.   3/22: POD11. GM o/n neuro stable. on heparin gtt ptt goal 40-60  3/23: POD12. o/n new R facial droop, CTH stable. heparin gtt d/c. preop PEG today  3/24: POD13, POD1 PEG placement, GM overnight, neuro stable, +BM  3/25: POD14, GM o/n, neuro stable, eliquis restarted  3/26: POD 15. GM overnight. exam stable. on abx until 3/26 for UTI. On eliquis for afib.   3/27: POD 16. o/n episode of epistaxis, aftrin administered. exam stable. abx completed for UTI. pending rehab. ENT consulted for epistaxis, recommended nasal saline and bactroban.  3/28: POD17. GM o/n, neuro stable.  3/29: POD18. GM o/n, neuro stable   3/30: POD18. GM o/n. neuro stable. upgraded to ICU for intractable epistaxis. ENT placed nasal dressings, bleeding controlled. Ancef while drains in place. Desatted to 80s, on 8L O2 via face tent. intubation held as per girlfriend, will continue to monitor respiratory status  3/31: POD19 GM overnight. Neuro exam stable. Juan episode of epistaxis o/n.  4/1: POD20 GM overnight. Neruo exam stable. nasal packing removed. pt SDU status  4/2: POD 21. GM. Plan to resume eliquis today.   4/3: POD 22. GM o/n. on hep gtt held at 7 am, possible resume eliquis today if epistaxis controlled. pending rehab   4/4: POD 23, GM, hep gtt held, eliquis started       Patient evaluated by PT/OT who recommended: Acute Rehab   Patient is going home? rehab? hospice? Facility Name:     Hospital course c/b: dysphagia (s/p PEG), UTI (completed course of abx), epistaxis      Exam on day of discharge:    Checklist:   - Obtained follow up appointment from NP  - Reviewed final recommendations of inpatient consults  - review discharge planning on provider handoff  - post op imaging completed  - Neurologically stable for discharge  - Vitals stable for discharge   - Afebrile for discharge  - WBC is stable  - Sodium level is normal  - Pain is adequately controlled  - Pt has PICC/walker/brace/collar        HPI:  75y/o M with PMHx sig for HTN, HLD, afib (on eliquis, unknown if last taken today,) CVA in 2020 (with minimal residual right-sided weakness per girlfriend,) last known normal at 3pm when girlfriend spoke to him. 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 Upon arrival to [facility], BP noted to be 171/101. Stroke code was called. CTH without findings of hemorrhage. 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 NIHSS 20. No TPA was administered as Pt is out of window. Per patient's girlfriend, the basilar artery aneurysm is known and was diagnosed in 2020 at [facility] when he suffered from a CVA. At that time, he underwent a "treatment for the aneurysm," which the girlfriend reports was too risky and was aborted. Patient never attended his follow-up appointments due to fear per girlfriend.    Hospital Course:  3/10: Admitted for further workup of stroke symptoms and basilar artery aneurysm.  3/11: Karen placed overnight. 3% hypertonic saline started. Neuro exam stable. Started on vEEG for aphasia  3/12: GM overnight. Neuro exam stable. 3% d/c. home eliquis 5 bid. failed s/s. started on TF via NGT, spiked fever 101, f/u panculture   3/13: GM overnight, neuro stable, veeg negative. Vanc/zosyn started for empiric PNA.   3/14: GM o/n neuro stable. on HFNC. ENT plan for laryngoscope today with . failed s/s, pend repeat eval. dc'd captopril, started lisinopril. dc'd vanc. Laryngoscopy: No masses visualized, hypomobility of L sided vocal cords, dysarthric, hypophonic, risk for aspiration, good cough reflex.Dr. Onofre to discuss with Dr. Peterson regarding a possible vocal cord injection.   3/15: GM overnight, neuro stable, on HFNC 30/30 overnight, transitioned to 4LNC  3/16: GM overnight, neuro stable, tolerating NC and satting well    3/17: POD6 dx angio. GM o/n neuro stable. trickle feeds via NGT  3/18: POD7 GM o/n, saturating well in RA. tolerating TF   03/19: POD8 GM o/n. gen surg consulted for PEG placement.  03/20: POD9 GM o/n. Transferred back to ICU for heparin gtt in preparation for PEG placement Wed.   3/21: POD10 GM o/n, neuro exam stable. Transferred to ICU in preparation for hep gtt to start tomorrow, plan for PEG on Wednesday. F/u am coags.   3/22: POD11. GM o/n neuro stable. on heparin gtt ptt goal 40-60  3/23: POD12. o/n new R facial droop, CTH stable. heparin gtt d/c. preop PEG today  3/24: POD13, POD1 PEG placement, GM overnight, neuro stable, +BM  3/25: POD14, GM o/n, neuro stable, eliquis restarted  3/26: POD 15. GM overnight. exam stable. on abx until 3/26 for UTI. On eliquis for afib.   3/27: POD 16. o/n episode of epistaxis, aftrin administered. exam stable. abx completed for UTI. pending rehab. ENT consulted for epistaxis, recommended nasal saline and bactroban.  3/28: POD17. GM o/n, neuro stable.  3/29: POD18. GM o/n, neuro stable   3/30: POD18. GM o/n. neuro stable. upgraded to ICU for intractable epistaxis. ENT placed nasal dressings, bleeding controlled. Ancef while drains in place. Desatted to 80s, on 8L O2 via face tent. intubation held as per girlfriend, will continue to monitor respiratory status  3/31: POD19 GM overnight. Neuro exam stable. Juan episode of epistaxis o/n.  4/1: POD20 GM overnight. Neruo exam stable. nasal packing removed. pt SDU status  4/2: POD 21. GM. Plan to resume eliquis today.   4/3: POD 22. GM o/n. on hep gtt held at 7 am, possible resume eliquis today if epistaxis controlled. pending rehab   4/4: POD 23, GM, hep gtt held, eliquis started   4/5: POD 24, GM overnight, pending dispo to rehab     Patient evaluated by PT/OT who recommended: Subacute Rehab   Patient is going to [facility] course c/b: dysphagia (s/p PEG), UTI (completed course of abx), epistaxis      Exam on day of discharge:  General: NAD, pt is comfortably sitting up in bed, on room air  HEENT: CN II-XII grossly intact, PERRL 3mm briskly reactive, EOMI b/l, face symmetric, tongue midline, neck FROM  Cardiovascular: RRR, normal S1 and S2   Respiratory: lungs CTAB, no wheezing, rhonchi, or crackles   GI: normoactive BS to auscultation, abd soft, NTND   Neuro: A&Ox3 with choices, expressive aphasia, dysarthric but attempting to mouth words. Follows commands.  Motor: RUE 5/5 throughout, RLE 2/5 throughout LUE wiggles fingers o/w 0/5 and withdrawing briskly, LLE WD. SILT throughout   Extremities: PT/DP pulses 1+ and symmetric  Wound/incision: R groin C/D/I, no hematoma    Patient is neuro stable, vitals stable,     Checklist:   - Obtained follow up appointment from NP  - Reviewed final recommendations of inpatient consults  - review discharge planning on provider handoff  - post op imaging completed  - Neurologically stable for discharge  - Vitals stable for discharge   - Afebrile for discharge  - WBC is stable  - Sodium level is normal  - Pain is adequately controlled  - Pt has PICC/walker/brace/collar        HPI:  75y/o M with PMHx sig for HTN, HLD, afib (on eliquis, unknown if last taken today,) CVA in 2020 (with minimal residual right-sided weakness per girlfriend,) last known normal at 3pm when girlfriend spoke to him. 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 Upon arrival to [facility], BP noted to be 171/101. Stroke code was called. CTH without findings of hemorrhage. 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 NIHSS 20. No TPA was administered as Pt is out of window. Per patient's girlfriend, the basilar artery aneurysm is known and was diagnosed in 2020 at [facility] when he suffered from a CVA. At that time, he underwent a "treatment for the aneurysm," which the girlfriend reports was too risky and was aborted. Patient never attended his follow-up appointments due to fear per girlfriend.    Hospital Course:  3/10: Admitted for further workup of stroke symptoms and basilar artery aneurysm.  3/11: Karen placed overnight. 3% hypertonic saline started. Neuro exam stable. Started on vEEG for aphasia  3/12: GM overnight. Neuro exam stable. 3% d/c. home eliquis 5 bid. failed s/s. started on TF via NGT, spiked fever 101, f/u panculture   3/13: GM overnight, neuro stable, veeg negative. Vanc/zosyn started for empiric PNA.   3/14: GM o/n neuro stable. on HFNC. ENT plan for laryngoscope today with . failed s/s, pend repeat eval. dc'd captopril, started lisinopril. dc'd vanc. Laryngoscopy: No masses visualized, hypomobility of L sided vocal cords, dysarthric, hypophonic, risk for aspiration, good cough reflex.Dr. Onofre to discuss with Dr. Peterson regarding a possible vocal cord injection.   3/15: GM overnight, neuro stable, on HFNC 30/30 overnight, transitioned to 4LNC  3/16: GM overnight, neuro stable, tolerating NC and satting well    3/17: POD6 dx angio. GM o/n neuro stable. trickle feeds via NGT  3/18: POD7 GM o/n, saturating well in RA. tolerating TF   03/19: POD8 GM o/n. gen surg consulted for PEG placement.  03/20: POD9 GM o/n. Transferred back to ICU for heparin gtt in preparation for PEG placement Wed.   3/21: POD10 GM o/n, neuro exam stable. Transferred to ICU in preparation for hep gtt to start tomorrow, plan for PEG on Wednesday. F/u am coags.   3/22: POD11. GM o/n neuro stable. on heparin gtt ptt goal 40-60  3/23: POD12. o/n new R facial droop, CTH stable. heparin gtt d/c. preop PEG today  3/24: POD13, POD1 PEG placement, GM overnight, neuro stable, +BM  3/25: POD14, GM o/n, neuro stable, eliquis restarted  3/26: POD 15. GM overnight. exam stable. on abx until 3/26 for UTI. On eliquis for afib.   3/27: POD 16. o/n episode of epistaxis, aftrin administered. exam stable. abx completed for UTI. pending rehab. ENT consulted for epistaxis, recommended nasal saline and bactroban.  3/28: POD17. GM o/n, neuro stable.  3/29: POD18. GM o/n, neuro stable   3/30: POD18. GM o/n. neuro stable. upgraded to ICU for intractable epistaxis. ENT placed nasal dressings, bleeding controlled. Ancef while drains in place. Desatted to 80s, on 8L O2 via face tent. intubation held as per girlfriend, will continue to monitor respiratory status  3/31: POD19 GM overnight. Neuro exam stable. Juan episode of epistaxis o/n.  4/1: POD20 GM overnight. Neruo exam stable. nasal packing removed. pt SDU status  4/2: POD 21. GM. Plan to resume eliquis today.   4/3: POD 22. GM o/n. on hep gtt held at 7 am, possible resume eliquis today if epistaxis controlled. pending rehab   4/4: POD 23, GM, hep gtt held, eliquis started   4/5: POD 24, GM overnight, pending dispo to rehab   4/6: POD 25. GM o/n. afebrile. no growth on cultures. pending rehab     Patient evaluated by PT/OT who recommended: Subacute Rehab   Patient is going to     Hospital course c/b: dysphagia (s/p PEG), UTI (completed course of abx), epistaxis (surgiflow in place in R nare)     Exam on day of discharge:  General: NAD, pt is comfortably sitting up in bed, on room air  HEENT: CN II-XII grossly intact, PERRL 3mm briskly reactive, EOMI b/l, face symmetric, tongue midline, neck FROM  Cardiovascular: RRR, normal S1 and S2   Respiratory: lungs CTAB, no wheezing, rhonchi, or crackles   GI: normoactive BS to auscultation, abd soft, NTND   Neuro: A&Ox3 with choices, expressive aphasia, dysarthric but attempting to mouth words. Follows commands.  Motor: RUE 5/5 throughout, RLE 2/5 throughout LUE wiggles fingers o/w 0/5 and withdrawing briskly, LLE WD. SILT throughout   Extremities: PT/DP pulses 1+ and symmetric  Wound/incision: R groin C/D/I, no hematoma    Patient is neuro stable, vitals stable,     Checklist:   - Obtained follow up appointment from NP  - Reviewed final recommendations of inpatient consults  - review discharge planning on provider handoff  - post op imaging completed  - Neurologically stable for discharge  - Vitals stable for discharge   - Afebrile for discharge  - WBC is stable  - Sodium level is normal  - Pain is adequately controlled  - Pt has PICC/walker/brace/collar        HPI:  75y/o M with PMHx sig for HTN, HLD, afib (on eliquis, unknown if last taken today,) CVA in 2020 (with minimal residual right-sided weakness per girlfriend,) last known normal at 3pm when girlfriend spoke to him. 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 Upon arrival to [facility], BP noted to be 171/101. Stroke code was called. CTH without findings of hemorrhage. 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 NIHSS 20. No TPA was administered as Pt is out of window. Per patient's girlfriend, the basilar artery aneurysm is known and was diagnosed in 2020 at [facility] when he suffered from a CVA. At that time, he underwent a "treatment for the aneurysm," which the girlfriend reports was too risky and was aborted. Patient never attended his follow-up appointments due to fear per girlfriend.    Hospital Course:  3/10: Admitted for further workup of stroke symptoms and basilar artery aneurysm.  3/11: Karen placed overnight. 3% hypertonic saline started. Neuro exam stable. Started on vEEG for aphasia  3/12: GM overnight. Neuro exam stable. 3% d/c. home eliquis 5 bid. failed s/s. started on TF via NGT, spiked fever 101, f/u panculture   3/13: GM overnight, neuro stable, veeg negative. Vanc/zosyn started for empiric PNA.   3/14: GM o/n neuro stable. on HFNC. ENT plan for laryngoscope today with . failed s/s, pend repeat eval. dc'd captopril, started lisinopril. dc'd vanc. Laryngoscopy: No masses visualized, hypomobility of L sided vocal cords, dysarthric, hypophonic, risk for aspiration, good cough reflex.Dr. Onofre to discuss with Dr. Peterson regarding a possible vocal cord injection.   3/15: GM overnight, neuro stable, on HFNC 30/30 overnight, transitioned to 4LNC  3/16: GM overnight, neuro stable, tolerating NC and satting well    3/17: POD6 dx angio. GM o/n neuro stable. trickle feeds via NGT  3/18: POD7 GM o/n, saturating well in RA. tolerating TF   03/19: POD8 GM o/n. gen surg consulted for PEG placement.  03/20: POD9 GM o/n. Transferred back to ICU for heparin gtt in preparation for PEG placement Wed.   3/21: POD10 GM o/n, neuro exam stable. Transferred to ICU in preparation for hep gtt to start tomorrow, plan for PEG on Wednesday. F/u am coags.   3/22: POD11. GM o/n neuro stable. on heparin gtt ptt goal 40-60  3/23: POD12. o/n new R facial droop, CTH stable. heparin gtt d/c. preop PEG today  3/24: POD13, POD1 PEG placement, GM overnight, neuro stable, +BM  3/25: POD14, GM o/n, neuro stable, eliquis restarted  3/26: POD 15. GM overnight. exam stable. on abx until 3/26 for UTI. On eliquis for afib.   3/27: POD 16. o/n episode of epistaxis, aftrin administered. exam stable. abx completed for UTI. pending rehab. ENT consulted for epistaxis, recommended nasal saline and bactroban.  3/28: POD17. GM o/n, neuro stable.  3/29: POD18. GM o/n, neuro stable   3/30: POD18. GM o/n. neuro stable. upgraded to ICU for intractable epistaxis. ENT placed nasal dressings, bleeding controlled. Ancef while drains in place. Desatted to 80s, on 8L O2 via face tent. intubation held as per girlfriend, will continue to monitor respiratory status  3/31: POD19 GM overnight. Neuro exam stable. Juan episode of epistaxis o/n.  4/1: POD20 GM overnight. Neruo exam stable. nasal packing removed. pt SDU status  4/2: POD 21. GM. Plan to resume eliquis today.   4/3: POD 22. GM o/n. on hep gtt held at 7 am, possible resume eliquis today if epistaxis controlled. pending rehab   4/4: POD 23, GM, hep gtt held, eliquis started   4/5: POD 24, GM overnight, pending dispo to rehab   4/6: POD 25. GM o/n. afebrile. no growth on cultures. pending rehab   4/7: POD 26. Desat to 87. on face tent 40%, PE protocol (-), UA+, pending UCx. Febrile to 100.5 x 1. No further fevers. Stable on face tent throughout day. Noted to have slight drop in BP to high-90s while sleeping - given 500ml LR x 1. Aggressive chest PT started to assist with O2 weaning.  4/8: POD27. GM o/n, neuro stable, on face tent    Patient evaluated by PT/OT who recommended: Subacute Rehab - [facility] course c/b: dysphagia (s/p PEG), UTI (completed course of abx), epistaxis (surgiflow in place in R nare)     Exam on day of discharge:  General: NAD, pt is comfortably sitting up in bed, on room air  HEENT: CN II-XII grossly intact, PERRL 3mm briskly reactive, EOMI b/l, face symmetric, tongue midline, neck FROM  Cardiovascular: RRR, normal S1 and S2   Respiratory: lungs CTAB, no wheezing, rhonchi, or crackles   GI: normoactive BS to auscultation, abd soft, NTND   Neuro: A&Ox3 with choices, expressive aphasia, dysarthric but attempting to mouth words. Follows commands.  Motor: RUE 5/5 throughout, RLE 2/5 throughout LUE wiggles fingers o/w 0/5 and withdrawing briskly, LLE WD. SILT throughout   Extremities: PT/DP pulses 1+ and symmetric  Wound/incision: R groin C/D/I, no hematoma    Patient is neuro stable, vitals stable, WBC/Na WNL. Pain controlled. Reccs from inpatient consultants below     Hospitalist: Recc follow up for CT chest in 6-12 mo   ENT consulted: Nasal packing in R nare absorbable, follow up for possible vocal cord injection  Behavioral Health consulted: Recc f/u with a care group   General Surgery consulted: PEG management   Cardiology Consulted: No follow up required, continue home antihypertensives   Neurology consulted: NTD     Checklist:   - Obtained follow up appointment from NP

## 2022-03-14 NOTE — DISCHARGE NOTE PROVIDER - NSDCCPTREATMENT_GEN_ALL_CORE_FT
PRINCIPAL PROCEDURE  Procedure: Angiogram, carotid and cerebral, bilateral  Findings and Treatment:       SECONDARY PROCEDURE  Procedure: EGD, with PEG  Findings and Treatment:

## 2022-03-14 NOTE — SWALLOW BEDSIDE ASSESSMENT ADULT - SWALLOW EVAL: DIAGNOSIS
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 Suspect deficits are confounded by poor secretion management. Pt appears to be at high risk for aspiration and its negative sequela. Recommend continued NPO with consideration for Short-term enteral feeding route per physician discretion pending reassessment.
Severe oropharyngeal dysphagia characterized by anterior loss with liquids & absent swallow trigger w puree.

## 2022-03-14 NOTE — CONSULT NOTE ADULT - SUBJECTIVE AND OBJECTIVE BOX
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 BP on admission was 171/101. CTH without hemorrhage. CTA with known 3cm basilar artery aneurysm that was partially thrombosed and with brainstem compression. This was initially identified in 2020 after prior CVA, for which patient has residual right sided weakness from that event. Prior treatment of aneurysm aborted due to risk. MRI head with no acute infarcts. On home Eliquis dose. NGT tube placed for tube feeds. Was not candidate for TPA.     ENT consulted for dysphonia. History limited by patient's inability to effectively communicate.     ALLERGIES  No Known Allergies    PAST MEDICAL & SURGICAL HISTORY:    MEDICATIONS  (STANDING):  acetylcysteine 20%  Inhalation 4 milliLiter(s) Inhalation every 4 hours  albuterol/ipratropium for Nebulization 3 milliLiter(s) Nebulizer every 4 hours  amLODIPine   Tablet 5 milliGRAM(s) Oral daily  apixaban 5 milliGRAM(s) Enteral Tube every 12 hours  carvedilol 6.25 milliGRAM(s) Oral every 12 hours  glucagon  Injectable 1 milliGRAM(s) IntraMuscular once  lisinopril 20 milliGRAM(s) Oral every 24 hours  piperacillin/tazobactam IVPB.. 3.375 Gram(s) IV Intermittent every 6 hours  polyethylene glycol 3350 17 Gram(s) Oral daily  senna 2 Tablet(s) Oral at bedtime  sodium chloride 0.9%. 1000 milliLiter(s) (100 mL/Hr) IV Continuous <Continuous>    MEDICATIONS  (PRN):  acetaminophen    Suspension .. 650 milliGRAM(s) Oral every 6 hours PRN Temp greater or equal to 38C (100.4F), Mild Pain (1 - 3)  hydrALAZINE Injectable 10 milliGRAM(s) IV Push every 4 hours PRN SBP>160      SOCIAL HISTORY:  Tobacco History:  ETOH Use:   Drug Use:     FAMILY HISTORY:    REVIEW OF SYSTEMS:     LABS:  CBC-                        13.3   8.31  )-----------( 151      ( 14 Mar 2022 05:26 )             39.0       03-14    142  |  113<H>  |  32<H>  ----------------------------<  128<H>  3.6   |  22  |  0.90    Ca    8.0<L>      14 Mar 2022 05:26  Phos  2.8     03-14  Mg     1.9     03-14      Coagulation Studies-    Endocrine Panel-  --  --  8.0 mg/dL  --  --  8.5 mg/dL      Vital Signs Last 24 Hrs  T(C): 37.4 (14 Mar 2022 13:58), Max: 37.8 (13 Mar 2022 21:30)  T(F): 99.3 (14 Mar 2022 13:58), Max: 100 (13 Mar 2022 21:30)  HR: 55 (14 Mar 2022 14:00) (49 - 62)  BP: 133/61 (14 Mar 2022 14:00) (129/60 - 165/77)  BP(mean): 88 (14 Mar 2022 14:00) (87 - 111)  RR: 18 (14 Mar 2022 14:00) (16 - 29)  SpO2: 100% (14 Mar 2022 14:00) (99% - 100%)    FOCUSED PHYSICAL EXAM:  Constitutional: Well-developed, well-nourished.      Neuro: AAOx3, speech/language intact  Head:  normocephalic, atraumatic, left facial droop  Voice: Breathy, L, K, P sounds dysarthric. Maximum phonatory effort 2-3 seconds.   Ears: Clear externally.  Nose: Clear anteriorly, NGT present.  OC/OP:  Mucosa moist, no masses or bleeding.    LARYNGOSCOPY EXAM:   Verbal consent was obtained from patient prior to procedure.  Indication: Dysphonia  Anesthesia: Afrin and Cetacaine spray were applied to the nasal cavities.    Flexible laryngoscopy was performed and revealed the following:    Nasopharynx had no mass or exudate.    Base of tongue was symmetric and not enlarged.    Vallecula was clear    Epiglottis, both aryepiglottic folds and both false vocal folds were normal in appearance.    Arytenoids both without edema and erythema,     Paresis vs paralysis of L arytenoid and L true vocal cord    Glottic gap on adduction    Penetration of small sputum into glottic inlet    True vocal folds without lesions.     Post cricoid area was clear    Interarytenoid edema was absent    Epiglottis sensate to touch with adequate cough reflex  The patient tolerated the procedure well.    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 Laryngoscopy shows hypomobile L vocal folds with glottic gap. Patient may benefit from a vocal cord medialization procedure to improve breath support, cough, and phonation. However, we recommend patient recover for several days prior to undergoing this procedure.    - ENT will discuss vocal cord injection with neurosurgery team  - High risk for aspiration  - SLP following, appreciate recs  - Patient is intubatable  - Airway is patent  - Care per NeuroSICU      Thank you for the consult, please page ENT at [phone] with any questions/concerns.

## 2022-03-14 NOTE — SWALLOW BEDSIDE ASSESSMENT ADULT - SPECIFY REASON(S)
Assess for safety of PO intake
Per RN, pt passed dysphagia screen yesterday but was made NPO by night shift RN

## 2022-03-14 NOTE — DISCHARGE NOTE PROVIDER - CARE PROVIDER_API CALL
Vincent Peterson)  Neurosurgery  [address], [place] [facility]  Phone: [phone]  Fax: [phone]  Follow Up Time:    Vincent Peterson)  Neurosurgery  [address], [address] [place] [address]  Phone: [phone]  Fax: [phone]  Follow Up Time:     Lida Knowles)  Surgery  [address], [place] [address]  Phone: [phone]  Fax: [phone]  Follow Up Time:    Vincent Peterson)  Neurosurgery  [address], [address] [place] [address]  Phone: [phone]  Fax: [phone]  Established Patient  Follow Up Time:     Lida Knowles)  Surgery  [address], [place] [address]  Phone: [phone]  Fax: [phone]  Established Patient  Follow Up Time:     LUCITA ANDRADE  Otolaryngology  Phone: [phone]  Fax: [phone]  Established Patient  Follow Up Time:     Maicol Murillo)  Neurology; Vascular Neurology  [address], [address] [place] [address]  Phone: [phone]  Fax: [phone]  Established Patient  Follow Up Time:

## 2022-03-14 NOTE — PROGRESS NOTE ADULT - SUBJECTIVE AND OBJECTIVE BOX
HPI:  75y/o M with PMHx sig for HTN, HLD, afib (on eliquis, unknown if last taken today,) CVA in  (with minimal residual right-sided weakness per girlfriend,) last known normal at 3pm when girlfriend spoke to him. 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 Upon arrival to [facility], BP noted to be 171/101. Stroke code was called. CTH without findings of hemorrhage. 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 NIHSS 20. No TPA was administered as Pt is out of window. Per patient's girlfriend, the basilar artery aneurysm is known and was diagnosed in  at [facility] when he suffered from a CVA. At that time, he underwent a "treatment for the aneurysm," which the girlfriend reports was too risky and was aborted. Patient never attended his follow-up appointments due to fear per girlfriend.  (10 Mar 2022 22:52)    OVERNIGHT EVENTS: GM    Hospital Course:   3/10: Admitted for further workup of stroke symptoms and basilar artery aneurysm.  3/11: Karen placed overnight. 3% hypertonic saline started. Neuro exam stable. Started on vEEG for aphasia  3/12: GM overnight. Neuro exam stable. 3% d/c. home eliquis 5 bid. failed s/s. started on TF via NGT, spiked fever 101, f/u panculture   3/13: GM overnight, neuro stable, veeg negative. Vanc/zosyn started for empiric PNA.   3/14: GM o/n neuro stable. on HFNC.     Vital Signs Last 24 Hrs  T(C): 37.8 (13 Mar 2022 21:30), Max: 37.8 (13 Mar 2022 01:11)  T(F): 100 (13 Mar 2022 21:30), Max: 100 (13 Mar 2022 01:11)  HR: 55 (14 Mar 2022 00:00) (49 - 62)  BP: 155/71 (14 Mar 2022 00:00) (136/64 - 165/77)  BP(mean): 102 (14 Mar 2022 00:00) (89 - 111)  RR: 23 (14 Mar 2022 00:00) (20 - 34)  SpO2: 100% (14 Mar 2022 00:00) (89% - 100%)    I&O's Summary    12 Mar 2022 06:01  -  13 Mar 2022 07:00  --------------------------------------------------------  IN: 2860 mL / OUT: 1265 mL / NET: 1595 mL    13 Mar 2022 07:01  -  14 Mar 2022 00:25  --------------------------------------------------------  IN: 2610 mL / OUT: 800 mL / NET: 1810 mL        PHYSICAL EXAM:  GEN: laying in bed, appears well, NAD  NEURO: AOx3. FC, OE spont, speech intact, +L facial droop. CNII-XII intact. PERRL, EOMI. RUE/RLE 5/5. LUE 1/5 in fingers, 0/5 proximally. LLE w/d to noxious.  CV: RRR +S1/S2  PULM: CTAB  GI: Abd soft, NT/ND  EXT: ext warm, dry, nontender    TUBES/LINES:  [] Arredondo  [] Lumbar Drain  [] Wound Drains  [] Others      DIET:  [x] NPO  [] Mechanical  [] Tube feeds    LABS:                        13.5   7.78  )-----------( 144      ( 13 Mar 2022 04:37 )             41.0     03-13    145  |  115<H>  |  32<H>  ----------------------------<  144<H>  3.9   |  21<L>  |  0.94    Ca    8.5      13 Mar 2022 04:37  Phos  2.3     03-13  Mg     2.0     03-13        Urinalysis Basic - ( 12 Mar 2022 18:36 )    Color: Yellow / Appearance: Clear / S.025 / pH: x  Gluc: x / Ketone: 15 mg/dL  / Bili: Negative / Urobili: 1.0 E.U./dL   Blood: x / Protein: NEGATIVE mg/dL / Nitrite: NEGATIVE   Leuk Esterase: NEGATIVE / RBC: 5-10 /HPF / WBC 5-10 /HPF   Sq Epi: x / Non Sq Epi: 0-5 /HPF / Bacteria: Many /HPF          CAPILLARY BLOOD GLUCOSE          Drug Levels: [] N/A    CSF Analysis: [] N/A      Allergies    No Known Allergies    Intolerances      MEDICATIONS:  Antibiotics:  piperacillin/tazobactam IVPB.. 3.375 Gram(s) IV Intermittent every 6 hours  vancomycin  IVPB      vancomycin  IVPB 1750 milliGRAM(s) IV Intermittent every 12 hours    Neuro:  acetaminophen    Suspension .. 650 milliGRAM(s) Oral every 6 hours PRN    Anticoagulation:  apixaban 5 milliGRAM(s) Enteral Tube every 12 hours    OTHER:  acetylcysteine 20%  Inhalation 4 milliLiter(s) Inhalation every 4 hours  albuterol/ipratropium for Nebulization 3 milliLiter(s) Nebulizer every 4 hours  amLODIPine   Tablet 5 milliGRAM(s) Oral daily  captopril 100 milliGRAM(s) Oral daily  carvedilol 6.25 milliGRAM(s) Oral every 12 hours  glucagon  Injectable 1 milliGRAM(s) IntraMuscular once  hydrALAZINE Injectable 10 milliGRAM(s) IV Push every 4 hours PRN  polyethylene glycol 3350 17 Gram(s) Oral daily  senna 2 Tablet(s) Oral at bedtime    IVF:  sodium chloride 0.9%. 1000 milliLiter(s) IV Continuous <Continuous>    CULTURES:  Culture Results:   No growth at 1 day. ( @ 18:54)    RADIOLOGY & ADDITIONAL TESTS:      ASSESSMENT:  75y/o M with PMHx sig for HTN, HLD, afib (on eliquis, unknown if last taken today,) CVA in  (with minimal residual right-sided weakness per girlfriend) p/w dysarthria, left facial droop, and left-sided weakness. CTH without findings of hemorrhage. 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 NIHSS 20. Pt is not a tpa candidate as Pt is out of window. Patient now s/p diagnostic angio with findings of partially thrombosed basilar artery aneurysm 3/11/22      BASILAR ARTERY ANEURYSM    Handoff    MEWS Score    Brain aneurysm    Brain aneurysm    Basilar artery aneurysm    Angiogram, carotid and cerebral, bilateral    STROKE    [email or web address]        PLAN:  Neuro:  -neuro/vital checks q1h  -MR head complete 3/11 no acute infarcts  -stroke core measures  -EEG negative, discontinued   -plan for flow diverter stent?     Cardiac:  -SBP goal 100-160  -cont home norvasc, captopril, carvedilol (home HCTZ held currently)  -echo 3/11- mild LVH, EF 65-70%  -home dose Eliquis dose resumed (afib)    Pulm:  - HFNC     Renal:  - IVF until TF at goal  - Na goal 140-145    GI:  -TF via NGT -  (will hold at this time until respiratory status improved)   -bowel regimen    Endo:  -ISS   -A1C 5.4    Heme;  -H/H stable  -SCDs  -cont home eliquis 5 bid per neurology  -LE dopplers 3/11 negative    ID:  -vanc/zosyn started empirically for PNA   -f/u pan cx and sputum cx   - Vanc trough 3/14 10pm    Dispo:  ICU status, full code, dispo pending  Family updated with plan    Assessment and plan discussed with Dr. Peterson and Dr. Gay      Assessment:  Present when checked    []  GCS  E   V  M     Heart Failure: []Acute, [] acute on chronic , []chronic  Heart Failure:  [] Diastolic (HFpEF), [] Systolic (HFrEF), []Combined (HFpEF and HFrEF), [] RHF, [] Pulm HTN, [] Other    [] LALO, [] ATN, [] AIN, [] other  [] CKD1, [] CKD2, [] CKD 3, [] CKD 4, [] CKD 5, []ESRD    Encephalopathy: [] Metabolic, [] Hepatic, [] toxic, [] Neurological, [] Other    Abnormal Nurtitional Status: [] malnurtition (see nutrition note), [ ]underweight: BMI < 19, [] morbid obesity: BMI >40, [] Cachexia    [] Sepsis  [] hypovolemic shock,[] cardiogenic shock, [] hemorrhagic shock, [] neuogenic shock  [] Acute Respiratory Failure  []Cerebral edema, [] Brain compression/ herniation,   [] Functional quadriplegia  [] Acute blood loss anemia

## 2022-03-14 NOTE — DISCHARGE NOTE PROVIDER - NSDCMRMEDTOKEN_GEN_ALL_CORE_FT
amLODIPine 5 mg oral tablet: 1 tab(s) orally once a day  captopril 100 mg oral tablet: 1  orally 3 times a day  carvedilol 6.25 mg oral tablet: orally 2 times a day  Eliquis 5 mg oral tablet:   hydroCHLOROthiazide 25 mg oral tablet:   Lipitor 80 mg oral tablet: 1 tab(s) orally once a day   acetaminophen 160 mg/5 mL oral suspension: 650 milliliter(s) orally every 6 hours, As Needed - 3)  amLODIPine 5 mg oral tablet: 1 tab(s) orally once a day  captopril 100 mg oral tablet: 1  orally 3 times a day  carvedilol 6.25 mg oral tablet: orally 2 times a day  Eliquis 5 mg oral tablet: 5 milligram(s) orally 2 times a day  hydroCHLOROthiazide 25 mg oral tablet:   Lipitor 80 mg oral tablet: 1 tab(s) orally once a day  polyethylene glycol 3350 oral powder for reconstitution: 17 gram(s) orally every 12 hours  sodium chloride 0.65% nasal spray: 1 spray(s) nasal every 6 hours   acetaminophen 160 mg/5 mL oral suspension: 650 milliliter(s) orally every 6 hours, As Needed - 3)  amLODIPine 5 mg oral tablet: 1 tab(s) orally once a day  amoxicillin 500 mg oral capsule: 1 cap(s) orally every 8 hours  carvedilol 6.25 mg oral tablet: orally 2 times a day  ciprofloxacin 500 mg oral tablet: 1 tab(s) orally every 12 hours  Eliquis 5 mg oral tablet: 5 milligram(s) orally 2 times a day  ipratropium-albuterol 0.5 mg-2.5 mg/3 mL inhalation solution: 3 milliliter(s) inhaled every 6 hours  Lipitor 80 mg oral tablet: 1 tab(s) orally once a day  lisinopril 20 mg oral tablet: 1 tab(s) orally every 24 hours  polyethylene glycol 3350 oral powder for reconstitution: 17 gram(s) orally every 12 hours  sodium chloride 0.65% nasal spray: 1 spray(s) nasal every 6 hours

## 2022-03-14 NOTE — SWALLOW BEDSIDE ASSESSMENT ADULT - SLP PERTINENT HISTORY OF CURRENT PROBLEM
PMHx significant for HTN, AFib (on Eliquis), CVA in 2020 with minimal residual R sided weakness. Presented with dysarthria, L facial droop and L sided weakness. CTH without findings of hemorrhage. 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 Out of window for tPA. now s/p diagnostic angio with findings of partially thrombosed basilar artery aneurysm 3/11/22
77 yo M w h/o CVA (2020 w mild residual R-sided weakness) adm dysarthria, L facial droop & weakness. Known basilar artery aneurysm w brainstem compression

## 2022-03-14 NOTE — SWALLOW BEDSIDE ASSESSMENT ADULT - MUCOSAL QUALITY
Slightly dry with trace-mild amount of ropey secretions on tongue and teeth. Oral care provided by SLP.

## 2022-03-14 NOTE — SWALLOW BEDSIDE ASSESSMENT ADULT - NS SPL SWALLOW CLINIC TRIAL FT
+Wet breath sounds at baseline requiring oropharyngeal secretions prior to PO trials. Inconsistent/delayed lip closure around spoon. When seal was not appreciated, liquid was poured into oral cavity. Delayed mouth closure (?reduced bolus awareness) which resulted in uncontrolled posterior loss of liquids into the pharynx. When bolus was contained in oral cavity, prolonged bolus holding was observed. Verbal prompts were inconsistently effective in eliciting A-P transit. Laryngeal movement appeared ?delayed/absent at times. +WET non-productive cough with thin liquids x2/3 and wet breath sounds with mildly thick liquids all suggestive of aspiration. Oropharyngeal suctioning provided via RR catheter which retrieved copious thick brown tinged secretions.
This svc will follow over the next 1-3 days to determine if there is change in swallow function & provide further guidance on short- and long-term feeding & nutrition management.

## 2022-03-14 NOTE — PATIENT PROFILE ADULT - FALL HARM RISK - HARM RISK INTERVENTIONS

## 2022-03-14 NOTE — DISCHARGE NOTE PROVIDER - CARE PROVIDERS DIRECT ADDRESSES
,[email or web address] [email or web address]

## 2022-03-15 LAB
ANION GAP SERPL CALC-SCNC: 9 MMOL/L — SIGNIFICANT CHANGE UP (ref 5–17)
BASE EXCESS BLDA CALC-SCNC: -0.5 MMOL/L — SIGNIFICANT CHANGE UP (ref -2–3)
BUN SERPL-MCNC: 29 MG/DL — HIGH (ref 7–23)
CALCIUM SERPL-MCNC: 8.2 MG/DL — LOW (ref 8.4–10.5)
CHLORIDE SERPL-SCNC: 114 MMOL/L — HIGH (ref 96–108)
CO2 BLDA-SCNC: 24 MMOL/L — SIGNIFICANT CHANGE UP (ref 19–24)
CO2 SERPL-SCNC: 19 MMOL/L — LOW (ref 22–31)
CREAT SERPL-MCNC: 0.86 MG/DL — SIGNIFICANT CHANGE UP (ref 0.5–1.3)
EGFR: 89 ML/MIN/1.73M2 — SIGNIFICANT CHANGE UP
GLUCOSE SERPL-MCNC: 113 MG/DL — HIGH (ref 70–99)
HCO3 BLDA-SCNC: 23 MMOL/L — SIGNIFICANT CHANGE UP (ref 21–28)
HCT VFR BLD CALC: 38.9 % — LOW (ref 39–50)
HGB BLD-MCNC: 13.2 G/DL — SIGNIFICANT CHANGE UP (ref 13–17)
MAGNESIUM SERPL-MCNC: 2 MG/DL — SIGNIFICANT CHANGE UP (ref 1.6–2.6)
MCHC RBC-ENTMCNC: 31.4 PG — SIGNIFICANT CHANGE UP (ref 27–34)
MCHC RBC-ENTMCNC: 33.9 GM/DL — SIGNIFICANT CHANGE UP (ref 32–36)
MCV RBC AUTO: 92.6 FL — SIGNIFICANT CHANGE UP (ref 80–100)
NRBC # BLD: 0 /100 WBCS — SIGNIFICANT CHANGE UP (ref 0–0)
PCO2 BLDA: 34 MMHG — LOW (ref 35–48)
PH BLDA: 7.44 — SIGNIFICANT CHANGE UP (ref 7.35–7.45)
PHOSPHATE SERPL-MCNC: 3.1 MG/DL — SIGNIFICANT CHANGE UP (ref 2.5–4.5)
PLATELET # BLD AUTO: 143 K/UL — LOW (ref 150–400)
PO2 BLDA: 151 MMHG — HIGH (ref 83–108)
POTASSIUM SERPL-MCNC: 3.9 MMOL/L — SIGNIFICANT CHANGE UP (ref 3.5–5.3)
POTASSIUM SERPL-SCNC: 3.9 MMOL/L — SIGNIFICANT CHANGE UP (ref 3.5–5.3)
RBC # BLD: 4.2 M/UL — SIGNIFICANT CHANGE UP (ref 4.2–5.8)
RBC # FLD: 12.3 % — SIGNIFICANT CHANGE UP (ref 10.3–14.5)
SAO2 % BLDA: 98.7 % — HIGH (ref 94–98)
SODIUM SERPL-SCNC: 142 MMOL/L — SIGNIFICANT CHANGE UP (ref 135–145)
WBC # BLD: 7.44 K/UL — SIGNIFICANT CHANGE UP (ref 3.8–10.5)
WBC # FLD AUTO: 7.44 K/UL — SIGNIFICANT CHANGE UP (ref 3.8–10.5)

## 2022-03-15 PROCEDURE — 71045 X-RAY EXAM CHEST 1 VIEW: CPT | Mod: 26

## 2022-03-15 PROCEDURE — 99233 SBSQ HOSP IP/OBS HIGH 50: CPT

## 2022-03-15 RX ORDER — SODIUM CHLORIDE 9 MG/ML
1000 INJECTION, SOLUTION INTRAVENOUS
Refills: 0 | Status: DISCONTINUED | OUTPATIENT
Start: 2022-03-15 | End: 2022-03-16

## 2022-03-15 RX ORDER — POLYETHYLENE GLYCOL 3350 17 G/17G
17 POWDER, FOR SOLUTION ORAL EVERY 12 HOURS
Refills: 0 | Status: DISCONTINUED | OUTPATIENT
Start: 2022-03-15 | End: 2022-04-08

## 2022-03-15 RX ORDER — POTASSIUM CHLORIDE 20 MEQ
20 PACKET (EA) ORAL ONCE
Refills: 0 | Status: COMPLETED | OUTPATIENT
Start: 2022-03-15 | End: 2022-03-15

## 2022-03-15 RX ORDER — LACTULOSE 10 G/15ML
10 SOLUTION ORAL ONCE
Refills: 0 | Status: DISCONTINUED | OUTPATIENT
Start: 2022-03-15 | End: 2022-03-18

## 2022-03-15 RX ORDER — DEXAMETHASONE 0.5 MG/5ML
1 ELIXIR ORAL EVERY 6 HOURS
Refills: 0 | Status: COMPLETED | OUTPATIENT
Start: 2022-03-17 | End: 2022-03-17

## 2022-03-15 RX ORDER — CHLORHEXIDINE GLUCONATE 213 G/1000ML
1 SOLUTION TOPICAL
Refills: 0 | Status: DISCONTINUED | OUTPATIENT
Start: 2022-03-15 | End: 2022-04-01

## 2022-03-15 RX ORDER — DEXAMETHASONE 0.5 MG/5ML
2 ELIXIR ORAL EVERY 6 HOURS
Refills: 0 | Status: COMPLETED | OUTPATIENT
Start: 2022-03-16 | End: 2022-03-16

## 2022-03-15 RX ADMIN — Medication 3 MILLILITER(S): at 01:09

## 2022-03-15 RX ADMIN — PANTOPRAZOLE SODIUM 40 MILLIGRAM(S): 20 TABLET, DELAYED RELEASE ORAL at 12:22

## 2022-03-15 RX ADMIN — Medication 4 MILLILITER(S): at 13:09

## 2022-03-15 RX ADMIN — APIXABAN 5 MILLIGRAM(S): 2.5 TABLET, FILM COATED ORAL at 06:18

## 2022-03-15 RX ADMIN — Medication 3 MILLILITER(S): at 13:09

## 2022-03-15 RX ADMIN — Medication 4 MILLILITER(S): at 22:17

## 2022-03-15 RX ADMIN — Medication 3 MILLILITER(S): at 17:26

## 2022-03-15 RX ADMIN — Medication 4 MILLILITER(S): at 17:26

## 2022-03-15 RX ADMIN — APIXABAN 5 MILLIGRAM(S): 2.5 TABLET, FILM COATED ORAL at 18:28

## 2022-03-15 RX ADMIN — Medication 4 MILLILITER(S): at 01:09

## 2022-03-15 RX ADMIN — Medication 4 MILLIGRAM(S): at 06:18

## 2022-03-15 RX ADMIN — Medication 3 MILLILITER(S): at 22:17

## 2022-03-15 RX ADMIN — Medication 4 MILLILITER(S): at 09:38

## 2022-03-15 RX ADMIN — Medication 20 MILLIEQUIVALENT(S): at 07:16

## 2022-03-15 RX ADMIN — Medication 3 MILLILITER(S): at 09:37

## 2022-03-15 RX ADMIN — Medication 4 MILLILITER(S): at 04:56

## 2022-03-15 RX ADMIN — CARVEDILOL PHOSPHATE 6.25 MILLIGRAM(S): 80 CAPSULE, EXTENDED RELEASE ORAL at 06:18

## 2022-03-15 RX ADMIN — Medication 4 MILLIGRAM(S): at 18:28

## 2022-03-15 RX ADMIN — CARVEDILOL PHOSPHATE 6.25 MILLIGRAM(S): 80 CAPSULE, EXTENDED RELEASE ORAL at 18:27

## 2022-03-15 RX ADMIN — AMLODIPINE BESYLATE 5 MILLIGRAM(S): 2.5 TABLET ORAL at 06:18

## 2022-03-15 RX ADMIN — Medication 3 MILLILITER(S): at 04:56

## 2022-03-15 RX ADMIN — Medication 4 MILLIGRAM(S): at 00:17

## 2022-03-15 RX ADMIN — Medication 2 MILLIGRAM(S): at 23:22

## 2022-03-15 RX ADMIN — Medication 4 MILLIGRAM(S): at 12:22

## 2022-03-15 RX ADMIN — LISINOPRIL 20 MILLIGRAM(S): 2.5 TABLET ORAL at 18:27

## 2022-03-15 RX ADMIN — PIPERACILLIN AND TAZOBACTAM 200 GRAM(S): 4; .5 INJECTION, POWDER, LYOPHILIZED, FOR SOLUTION INTRAVENOUS at 05:17

## 2022-03-15 RX ADMIN — PIPERACILLIN AND TAZOBACTAM 200 GRAM(S): 4; .5 INJECTION, POWDER, LYOPHILIZED, FOR SOLUTION INTRAVENOUS at 21:44

## 2022-03-15 RX ADMIN — SENNA PLUS 2 TABLET(S): 8.6 TABLET ORAL at 21:44

## 2022-03-15 RX ADMIN — PIPERACILLIN AND TAZOBACTAM 200 GRAM(S): 4; .5 INJECTION, POWDER, LYOPHILIZED, FOR SOLUTION INTRAVENOUS at 18:27

## 2022-03-15 RX ADMIN — PIPERACILLIN AND TAZOBACTAM 200 GRAM(S): 4; .5 INJECTION, POWDER, LYOPHILIZED, FOR SOLUTION INTRAVENOUS at 10:21

## 2022-03-15 NOTE — PROGRESS NOTE ADULT - ASSESSMENT
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, hemiplegia  Diffuse idiopathic skeletal hyperostosis  Hypertension dyslipidemia  h/o CVA 2020, minimal R sided weakness  Atrial fibrillation with controlled ventricular rate    PLAN:   NEURO: neurochecks q2h, VS q1h, PRN pain meds with acetaminophen, no opiates / benzos  angio done - f/u final plans neurointerventional   ENT consult   REHAB:  physical therapy evaluation and management    EARLY MOB:  HOB up    PULM:  wean HFNC 30/30; incentive spirometry; nebs to q4h, pulmicort, CXR   CARDIO:  SBP goal 100-160mm Hg, cont carvedilol, amlodipine, switch captopril to lisinopril   ENDO:  Blood sugar goals 140-180 mg/dL  GI:  bowel regimen miralax  DIET: keep NPO, formal speech and swallow; if fails then start tube feeds once off HFNC  RENAL:  continue NS @100c/hr, Na goal 145-150; daily BMP  HEM/ONC: Hb stable INR 1.17  VTE Prophylaxis: SCDs, Eliquis  ID: afebrile, leukocytosis; piperacillin/tazobactam (last day 03/17) off vancomycin  Social: Rafiq Forrester  is HCP; discussed yesterday with wife and patient who are both agreeable to NGT insertion    Patient at high risk for neurological deterioration or death due to:  ICU delirium, aspiration PNA, DVT / PE.  Critical care time:  I have personally provided 45 minutes of critical care time, excluding time spent on separate procedures.      Plan discussed with RN, house staff. 76y/M with:  Gaant basilar artery aneurysm with brainstem compression, hemiplegia  Diffuse idiopathic skeletal hyperostosis  Hypertension dyslipidemia  h/o CVA 2020, minimal R sided weakness  Atrial fibrillation with controlled ventricular rate      PLAN:   NEURO: Neurochecks Q2h, VS q1h, PRN pain meds with acetaminophen, no opiates / benzos  Angio done - f/u final plans neurointerventional   Decadron   REHAB: Physical therapy evaluation and management EARLY MOB:  HOB up    PULM: Wean HFNC 30/30  Continue incentive spirometry; chest PT Q4 hours, nebs to q4h, pulmicort, CXR   POCUS 3/15    CARDIO:  SBP goal 100-160mm Hg, cont carvedilol, amlodipine, lisinopril     ENDO:  Blood sugar goals 140-180 mg/dL    GI: Bowel regimen miralax bid  DIET: Keep NPO, formal speech and swallow pending; if fails then start tube feeds once off HFNC and stable resp status  Last enteric intake was 3/13.    RENAL: Continue LR @100c/hr, Na goal 140-150; daily BMP    HEM/ONC: Hb stable INR 1.17  VTE Prophylaxis: SCDs, Eliquis    ID: Afebrile, leukocytosis; piperacillin/tazobactam (last day 03/17) off vancomycin  Social: Rafiqmichael Forrester  is HCP; discussed yesterday with wife and patient who are both agreeable to NGT insertion    Patient at high risk for neurological deterioration or death due to:  ICU delirium, aspiration PNA, DVT / PE.  Critical care time:  I have personally provided 45 minutes of critical care time, excluding time spent on separate procedures.      Plan discussed with RN, house staff.

## 2022-03-15 NOTE — CHART NOTE - NSCHARTNOTEFT_GEN_A_CORE
Admitting Diagnosis:   Patient is a 76y old  Male who presents with a chief complaint of CVA, left facial, left-sided weakness (15 Mar 2022 06:40)    PAST MEDICAL & SURGICAL HISTORY:  HTN, HLD, afib (on eliquis, unknown if last taken today,) CVA in 2020    Current Nutrition Order:  NPO diet     PO Intake: Good (%) [   ]  Fair (50-75%) [   ] Poor (<25%) [   ]- N/A    GI Issues:   WDL, last BM 3/9- minimal nutrition intake  No n/v/d noted  No abd distention    Pain:  No pain noted at this time    Skin Integrity:  Surgical incision, marichuy score 13  +2 pitting edema BL hands, +3 pitting edema BL ankles  No pressure ulcers noted    Labs:   03-15    142  |  114<H>  |  29<H>  ----------------------------<  113<H>  3.9   |  19<L>  |  0.86    Ca    8.2<L>      15 Mar 2022 05:45  Phos  3.1     03-15  Mg     2.0     03-15    CAPILLARY BLOOD GLUCOSE    POCT Blood Glucose.: 172 mg/dL (14 Mar 2022 19:30)    Medications:  MEDICATIONS  (STANDING):  acetylcysteine 20%  Inhalation 4 milliLiter(s) Inhalation every 4 hours  albuterol/ipratropium for Nebulization 3 milliLiter(s) Nebulizer every 4 hours  amLODIPine   Tablet 5 milliGRAM(s) Oral daily  apixaban 5 milliGRAM(s) Enteral Tube every 12 hours  carvedilol 6.25 milliGRAM(s) Oral every 12 hours  dexAMETHasone  Injectable   IV Push   dexAMETHasone  Injectable 4 milliGRAM(s) IV Push every 6 hours  glucagon  Injectable 1 milliGRAM(s) IntraMuscular once  lactated ringers. 1000 milliLiter(s) (100 mL/Hr) IV Continuous <Continuous>  lisinopril 20 milliGRAM(s) Oral every 24 hours  pantoprazole  Injectable 40 milliGRAM(s) IV Push daily  piperacillin/tazobactam IVPB.. 3.375 Gram(s) IV Intermittent every 6 hours  polyethylene glycol 3350 17 Gram(s) Oral every 12 hours  senna 2 Tablet(s) Oral at bedtime    MEDICATIONS  (PRN):  acetaminophen    Suspension .. 650 milliGRAM(s) Oral every 6 hours PRN Temp greater or equal to 38C (100.4F), Mild Pain (1 - 3)  bisacodyl Suppository 10 milliGRAM(s) Rectal daily PRN Constipation  hydrALAZINE Injectable 10 milliGRAM(s) IV Push every 4 hours PRN SBP>160  lactulose Syrup 10 Gram(s) Enteral Tube once PRN constipation    Admitting Anthropometrics:  · Height for BMI (FEET)	6 Feet  · Height for BMI (INCHES)	3 Inch(s)  · Height for BMI (CENTIMETERS)	190.5 Centimeter(s)  · Weight for BMI (lbs)	260 lb  · Weight for BMI (kg)	117.9 kg  · Body Mass Index	32.4  · Ideal Body Weight (lbs)	196  · Ideal Body Weight (kg)	88.9    Weight:  3/10 260lbs  3/14 176lbs (?accuracy- pt does not appear to be this weight)    Weight Change:   Please obtain new weight when feasible and trend atleast biweekly.     Nutrition Focused Physical Exam: Completed [   ]  Not Pertinent [ x  ]    Estimated energy needs:   IBW used for calculations as pt >120% of IBW (133%). Needs adjusted for advanced age. **Adjust fluids per team.  Kcal (20-25 kcal/kg): 5277-8347 kcal  Protein (1.0-1.2 g/kg pro): 88-106g pro  Fluids (30-35 ml/kg): 0410-7062 ml    Subjective:   76M with PMHx sig for HTN, HLD, afib (on eliquis, unknown if last taken today,) CVA in 2020 (with minimal residual right-sided weakness per girlfriend) p/w dysarthria, left facial droop, and left-sided weakness. CTH without findings of hemorrhage. 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 NIHSS 20. Pt is not a tpa candidate as pt is out of window. Transferred to NSICU for further assessment/ monitoring. S/p diagnostic angio with findings of partially thrombosed basilar artery aneurysm 3/11/22. SLP eval 3/12 and follow up 3/14 both with recs for NPO with NGT placement for initiation of EN. Pt to start on trickle feeds once NGT placement if confirmed per disc with team during rounds.     On assessment, pt resting in bed noted to be too lethargic to participate. Currently remains NPO- pt with minimal PO intake x5 days. Plan for NGT placement and initiation of trickle feeds- please see EN recs below, disc with team. No n/v/d noted. Last BM 3/9 PTA- currently on bowel regimen. Edu deferred. Please see nutr recs below. RD tof luz mariamarina.     Previous Nutrition Diagnosis: Inadequate energy intake RT inability to meet est needs on current diet AEB NPO, meeting 0% of est needs.     Active [ x  ]  Resolved [   ]    If resolved, new PES:     Goal/Expected Outcome 1. Diet will advance within 24-48hrs 2. Consistently meet >75% est needs.    Recommendations:  1. NPO  >>Advance to DASH/ TLC diet with SLPs recommendation for consistency when medically feasible  2. If team wish to place NGT and initiate EN, recommend; Jevity 1.2 @ 70 ml/hr x24hrs via NGT providing 1680 ml TV, 2016 kcal, 93g pro, 1356 ml free water.   >>Start at 10 ml/hr and advance 10 ml q6hrs until goal rate is met  >>FWF per team  >>Maintain aspiration precautions at all times  3. Pain and bowel regimen per team   4. Cont to monitor lytes and replete prn   5. RD diet edu prn    Education: Deferred    Risk Level: High [ x  ] Moderate [   ] Low [   ]

## 2022-03-15 NOTE — PROGRESS NOTE ADULT - SUBJECTIVE AND OBJECTIVE BOX
=================================  NEUROCRITICAL CARE ATTENDING NOTE  =================================    BREANNA MCCORMACK   MRN-[id number]  Summary:  76y/M with HTN, HLD, afib (on eliquis, unknown if last taken today,) CVA in  (with minimal residual right-sided weakness per girlfriend,) last known normal at 3pm when girlfriend spoke to him. 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 Upon arrival to [facility], BP noted to be 171/101. Stroke code was called. CTH without findings of hemorrhage. 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 NIHSS 20. No TPA was administered as Pt is out of window. Per patient's girlfriend, the basilar artery aneurysm is known and was diagnosed in  at [facility] when he suffered from a CVA. At that time, he underwent a "treatment for the aneurysm," which the girlfriend reports was too risky and was aborted. Patient never attended his follow-up appointments due to fear per girlfriend.  (10 Mar 2022 22:52)    COURSE IN THE HOSPITAL:  03/10 admitted to [facility]   Tmax 38.3; s/p angio showing extremely tortuous and elongated aortic arch with dolichoectatic vertebrobasilar system, giant partially thrombosed vertebrobasilar aneurysm   Tmax 37.9 tachypneic overnight; apixaban restarted; NGT inserted, tube feeds started at night   Tmax 38.3, desaturation to 88% this morning - now requiring 5L/min, tube feeds held; started on HFNC, improved   Tmax 37.8.  No significant events overnight.     Past Medical History:   Allergies:  No Known Allergies  Home meds:   ·	amLODIPine 5 mg oral tablet: 1 tab(s) orally once a day  ·	captopril 100 mg oral tablet:   ·	carvedilol 6.25 mg oral tablet:   ·	Eliquis 5 mg oral tablet:   ·	hydroCHLOROthiazide 25 mg oral tablet:       ICU Vital Signs Last 24 Hrs  T(C): 37.7 (15 Mar 2022 05:09), Max: 38.1 (14 Mar 2022 17:30)  T(F): 99.9 (15 Mar 2022 05:09), Max: 100.5 (14 Mar 2022 17:30)  HR: 57 (15 Mar 2022 06:03) (52 - 65)  BP: 152/72 (15 Mar 2022 06:00) (129/60 - 168/77)  BP(mean): 103 (15 Mar 2022 06:00) (87 - 110)  ABP: 158/68 (15 Mar 2022 06:03) (142/65 - 164/73)  ABP(mean): 102 (15 Mar 2022 06:03) (73 - 109)  RR: 18 (15 Mar 2022 06:03) (14 - 23)  SpO2: 96% (15 Mar 2022 06:03) (95% - 100%)      22 @ 07:01  -  22 @ 07:00  --------------------------------------------------------  IN: 3360 mL / OUT: 1300 mL / NET: 2060 mL    22 @ 07:01  -  -15-22 @ 06:42  --------------------------------------------------------  IN: 2525 mL / OUT: 1625 mL / NET: 900 mL        PHYSICAL EXAMINATION  NEUROLOGIC EXAMINATION:  Patient is awake, alert, oriented x3, following commands, CARISSA, L facial droop, L UE extensor posturing L LE TF, R UE/LE 5/5, able to write, able to say some words   GENERAL: [facility] -30/30  EENT:  anicteric  CARDIOVASCULAR: (+) S1 S2, bradycardic rate and regular rhythm  PULMONARY: clear to auscultation  ABDOMEN: soft, nontender with normoactive bowel sounds  EXTREMITIES: no edema, no groin hematoma, pulses good  SKIN: no rash      MEDICATIONS:   acetaminophen    Suspension .. 650 milliGRAM(s) Oral every 6 hours PRN  acetylcysteine 20%  Inhalation 4 milliLiter(s) Inhalation every 4 hours  albuterol/ipratropium for Nebulization 3 milliLiter(s) Nebulizer every 4 hours  amLODIPine   Tablet 5 milliGRAM(s) Oral daily  apixaban 5 milliGRAM(s) Enteral Tube every 12 hours  bisacodyl Suppository 10 milliGRAM(s) Rectal daily PRN  carvedilol 6.25 milliGRAM(s) Oral every 12 hours  dexAMETHasone  Injectable   IV Push   dexAMETHasone  Injectable 4 milliGRAM(s) IV Push every 6 hours  glucagon  Injectable 1 milliGRAM(s) IntraMuscular once  hydrALAZINE Injectable 10 milliGRAM(s) IV Push every 4 hours PRN  lactulose Syrup 10 Gram(s) Enteral Tube once PRN  lisinopril 20 milliGRAM(s) Oral every 24 hours  pantoprazole  Injectable 40 milliGRAM(s) IV Push daily  piperacillin/tazobactam IVPB.. 3.375 Gram(s) IV Intermittent every 6 hours  polyethylene glycol 3350 17 Gram(s) Oral daily  potassium chloride   Powder 20 milliEquivalent(s) Oral once  senna 2 Tablet(s) Oral at bedtime  sodium chloride 0.9%. 1000 milliLiter(s) (100 mL/Hr) IV Continuous <Continuous>    LABS:                         13.2   7.44  )-----------( 143      ( 15 Mar 2022 05:45 )             38.9     03-15    142  |  114<H>  |  29<H>  ----------------------------<  113<H>  3.9   |  19<L>  |  0.86    Ca    8.2<L>      15 Mar 2022 05:45  Phos  3.1     03-15  Mg     2.0     03-15      ABG - ( 13 Mar 2022 16:06 )  pH, Arterial: 7.41  pH, Blood: x     /  pCO2: 35    /  pO2: 150   / HCO3: 22    / Base Excess: -1.9  /  SaO2: 99.5          HbA1C = 5.4 ()  LDL = 108 ()   HDL = 43 ()  TG = 58 ()  TSH = 3.050 ()    Bacteriology:   Blood CS NGTD  CSF studies:  EEG:  Neuroimagin/11 MRI no acute infarcts:  3cm giant aneurysm basilar artery, partially thrombosed, marked compression of brainstem, distortion of IV but patent  03/10 CTA:  dolichoectasia of basilar artery, partially thrombosed aneurysm; diffuse idiopathic skeletal hyperostosis  Other imaging:      IV FLUIDS: NS@100cc/hr  DRIPS:  DIET: NPO  Lines: Karen  Drains:      CODE STATUS:  Full Code                       GOALS OF CARE:  aggressive                      DISPOSITION:  ICU =================================  NEUROCRITICAL CARE ATTENDING NOTE  =================================    BREANNA MCCORMACK   MRN-[id number]  Summary:  76y/M with HTN, HLD, afib (on eliquis, unknown if last taken today,) CVA in  (with minimal residual right-sided weakness per girlfriend,) last known normal at 3pm when girlfriend spoke to him. 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 Upon arrival to [facility], BP noted to be 171/101. Stroke code was called. CTH without findings of hemorrhage. 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 NIHSS 20. No TPA was administered as Pt is out of window. Per patient's girlfriend, the basilar artery aneurysm is known and was diagnosed in  at [facility] when he suffered from a CVA. At that time, he underwent a "treatment for the aneurysm," which the girlfriend reports was too risky and was aborted. Patient never attended his follow-up appointments due to fear per girlfriend.  (10 Mar 2022 22:52)    COURSE IN THE HOSPITAL:  03/10 admitted to [facility]   Tmax 38.3; s/p angio showing extremely tortuous and elongated aortic arch with dolichoectatic vertebrobasilar system, giant partially thrombosed vertebrobasilar aneurysm   Tmax 37.9 tachypneic overnight; apixaban restarted; NGT inserted, tube feeds started at night   Tmax 38.3, desaturation to 88% this morning - now requiring 5L/min, tube feeds held; started on HFNC, improved   Tmax 37.8.  No significant events overnight.     Past Medical History:   Allergies:  No Known Allergies  Home meds:   ·	amLODIPine 5 mg oral tablet: 1 tab(s) orally once a day  ·	captopril 100 mg oral tablet:   ·	carvedilol 6.25 mg oral tablet:   ·	Eliquis 5 mg oral tablet:   ·	hydroCHLOROthiazide 25 mg oral tablet:     ICU Vital Signs Last 24 Hrs  T(C): 37.7 (15 Mar 2022 05:09), Max: 38.1 (14 Mar 2022 17:30)  T(F): 99.9 (15 Mar 2022 05:09), Max: 100.5 (14 Mar 2022 17:30)  HR: 57 (15 Mar 2022 06:03) (52 - 65)  BP: 152/72 (15 Mar 2022 06:00) (129/60 - 168/77)  BP(mean): 103 (15 Mar 2022 06:00) (87 - 110)  ABP: 158/68 (15 Mar 2022 06:03) (142/65 - 164/73)  ABP(mean): 102 (15 Mar 2022 06:03) (73 - 109)  RR: 18 (15 Mar 2022 06:03) (14 - 23)  SpO2: 96% (15 Mar 2022 06:03) (95% - 100%)      22 @ 07:01  -  22 @ 07:00  --------------------------------------------------------  IN: 3360 mL / OUT: 1300 mL / NET: 2060 mL    22 @ 07:01  -  -15-22 @ 06:42  --------------------------------------------------------  IN: 2525 mL / OUT: 1625 mL / NET: 900 mL      PHYSICAL EXAMINATION  NEUROLOGIC EXAMINATION:  Patient is awake, alert, oriented x3, following commands, CARISSA, L facial droop, L UE extensor posturing L LE TF, R UE/LE 5/5, able to write, able to say some words   GENERAL: [facility] -30/30  EENT:  anicteric  CARDIOVASCULAR: (+) S1 S2, bradycardic rate and regular rhythm  PULMONARY: clear to auscultation  ABDOMEN: soft, nontender with normoactive bowel sounds  EXTREMITIES: no edema, no groin hematoma, pulses good  SKIN: no rash      MEDICATIONS:   acetaminophen    Suspension .. 650 milliGRAM(s) Oral every 6 hours PRN  acetylcysteine 20%  Inhalation 4 milliLiter(s) Inhalation every 4 hours  albuterol/ipratropium for Nebulization 3 milliLiter(s) Nebulizer every 4 hours  amLODIPine   Tablet 5 milliGRAM(s) Oral daily  apixaban 5 milliGRAM(s) Enteral Tube every 12 hours  bisacodyl Suppository 10 milliGRAM(s) Rectal daily PRN  carvedilol 6.25 milliGRAM(s) Oral every 12 hours  dexAMETHasone  Injectable   IV Push   dexAMETHasone  Injectable 4 milliGRAM(s) IV Push every 6 hours  glucagon  Injectable 1 milliGRAM(s) IntraMuscular once  hydrALAZINE Injectable 10 milliGRAM(s) IV Push every 4 hours PRN  lactulose Syrup 10 Gram(s) Enteral Tube once PRN  lisinopril 20 milliGRAM(s) Oral every 24 hours  pantoprazole  Injectable 40 milliGRAM(s) IV Push daily  piperacillin/tazobactam IVPB.. 3.375 Gram(s) IV Intermittent every 6 hours  polyethylene glycol 3350 17 Gram(s) Oral daily  potassium chloride   Powder 20 milliEquivalent(s) Oral once  senna 2 Tablet(s) Oral at bedtime  sodium chloride 0.9%. 1000 milliLiter(s) (100 mL/Hr) IV Continuous <Continuous>    LABS:                         13.2   7.44  )-----------( 143      ( 15 Mar 2022 05:45 )             38.9     03-15    142  |  114<H>  |  29<H>  ----------------------------<  113<H>  3.9   |  19<L>  |  0.86    Ca    8.2<L>      15 Mar 2022 05:45  Phos  3.1     03-15  Mg     2.0     03-15      ABG - ( 13 Mar 2022 16:06 )  pH, Arterial: 7.41  pH, Blood: x     /  pCO2: 35    /  pO2: 150   / HCO3: 22    / Base Excess: -1.9  /  SaO2: 99.5          HbA1C = 5.4 ()  LDL = 108 ()   HDL = 43 ()  TG = 58 ()  TSH = 3.050 ()    Bacteriology:   Blood CS NGTD  CSF studies:  EEG:  Neuroimagin/11 MRI no acute infarcts:  3cm giant aneurysm basilar artery, partially thrombosed, marked compression of brainstem, distortion of IV but patent  03/10 CTA:  dolichoectasia of basilar artery, partially thrombosed aneurysm; diffuse idiopathic skeletal hyperostosis  Other imaging:      IV FLUIDS: NS@100cc/hr  DRIPS:  DIET: NPO  Lines: Karen  Drains:      CODE STATUS:  Full Code                       GOALS OF CARE:  aggressive                      DISPOSITION:  ICU

## 2022-03-15 NOTE — PROGRESS NOTE ADULT - SUBJECTIVE AND OBJECTIVE BOX
HPI:  77y/o M with PMHx sig for HTN, HLD, afib (on eliquis, unknown if last taken today,) CVA in 2020 (with minimal residual right-sided weakness per girlfriend,) last known normal at 3pm when girlfriend spoke to him. 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 Upon arrival to [facility], BP noted to be 171/101. Stroke code was called. CTH without findings of hemorrhage. 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 NIHSS 20. No TPA was administered as Pt is out of window. Per patient's girlfriend, the basilar artery aneurysm is known and was diagnosed in 2020 at [facility] when he suffered from a CVA. At that time, he underwent a "treatment for the aneurysm," which the girlfriend reports was too risky and was aborted. Patient never attended his follow-up appointments due to fear per girlfriend.  (10 Mar 2022 22:52)      Hospital Course:   3/10: Admitted for further workup of stroke symptoms and basilar artery aneurysm.  3/11: Creswell placed overnight. 3% hypertonic saline started. Neuro exam stable. Started on vEEG for aphasia  3/12: GM overnight. Neuro exam stable. 3% d/c. home eliquis 5 bid. failed s/s. started on TF via NGT, spiked fever 101, f/u panculture   3/13: GM overnight, neuro stable, veeg negative. Vanc/zosyn started for empiric PNA.   3/14: GM o/n neuro stable. on HFNC. ENT plan for laryngoscope today with . failed s/s, pend repeat eval. dc'd captopril, started lisinopril. dc'd vanc. Laryngoscopy: No masses visualized, hypomobility of L sided vocal cords, dysarthric, hypophonic, risk for aspiration, good cough reflex.Dr. Onofre to discuss with Dr. Peterson regarding a possible vocal cord injection.   3/15: GM overnight, neuro stable, on HFNC 30/30        Vital Signs Last 24 Hrs  T(C): 37.8 (14 Mar 2022 21:00), Max: 38.1 (14 Mar 2022 17:30)  T(F): 100.1 (14 Mar 2022 21:00), Max: 100.5 (14 Mar 2022 17:30)  HR: 58 (15 Mar 2022 01:00) (50 - 65)  BP: 154/70 (15 Mar 2022 01:00) (129/60 - 165/70)  BP(mean): 101 (15 Mar 2022 01:00) (87 - 103)  RR: 14 (15 Mar 2022 01:00) (14 - 25)  SpO2: 100% (15 Mar 2022 01:00) (96% - 100%)    I&O's Detail    13 Mar 2022 07:01  -  14 Mar 2022 07:00  --------------------------------------------------------  IN:    Enteral Tube Flush: 160 mL    IV PiggyBack: 1400 mL    sodium chloride 0.9%: 1800 mL  Total IN: 3360 mL    OUT:    Incontinent per Condom Catheter (mL): 1300 mL  Total OUT: 1300 mL    Total NET: 2060 mL      14 Mar 2022 07:01  -  15 Mar 2022 01:29  --------------------------------------------------------  IN:    IV PiggyBack: 225 mL    sodium chloride 0.9%: 1900 mL  Total IN: 2125 mL    OUT:    Incontinent per Condom Catheter (mL): 1275 mL    Intermittent Catheterization - Urethral (mL): 350 mL  Total OUT: 1625 mL    Total NET: 500 mL        I&O's Summary    13 Mar 2022 07:01  -  14 Mar 2022 07:00  --------------------------------------------------------  IN: 3360 mL / OUT: 1300 mL / NET: 2060 mL    14 Mar 2022 07:01  -  15 Mar 2022 01:29  --------------------------------------------------------  IN: 2125 mL / OUT: 1625 mL / NET: 500 mL        PHYSICAL EXAM:  GEN: laying in bed, appears well, NAD  NEURO: AOx3. FC, OE spont, hypophonic intact, +L facial droop. CNII-XII intact. PERRL, EOMI. RUE/RLE 5/5. LUE 1/5 in fingers, 0/5 proximally. LLE w/d to noxious.  CV: RRR +S1/S2  PULM: CTAB  GI: Abd soft, NT/ND  EXT: ext warm, dry, nontender      TUBES/LINES:  [] CVC  [] A-line  [] Lumbar Drain  [] Ventriculostomy  [] EMERY  [] Arredondo  [] NGT   [] Other    DIET:  [] NPO  [] Mechanical  [] Tube feeds    LABS:                        13.3   8.31  )-----------( 151      ( 14 Mar 2022 05:26 )             39.0     03-14    142  |  113<H>  |  32<H>  ----------------------------<  128<H>  3.6   |  22  |  0.90    Ca    8.0<L>      14 Mar 2022 05:26  Phos  2.8     03-14  Mg     1.9     03-14              CAPILLARY BLOOD GLUCOSE      POCT Blood Glucose.: 172 mg/dL (14 Mar 2022 19:30)  POCT Blood Glucose.: 184 mg/dL (14 Mar 2022 11:19)  POCT Blood Glucose.: 109 mg/dL (14 Mar 2022 05:23)      Drug Levels: [] N/A    CSF Analysis: [] N/A      Allergies    No Known Allergies    Intolerances        Home Medications:  amLODIPine 5 mg oral tablet: 1 tab(s) orally once a day (14 Mar 2022 11:26)  captopril 100 mg oral tablet: 1  orally 3 times a day (14 Mar 2022 11:26)  carvedilol 6.25 mg oral tablet: orally 2 times a day (14 Mar 2022 11:26)  Eliquis 5 mg oral tablet:  (14 Mar 2022 11:26)  hydroCHLOROthiazide 25 mg oral tablet:  (14 Mar 2022 11:26)  Lipitor 80 mg oral tablet: 1 tab(s) orally once a day (14 Mar 2022 11:26)      MEDICATIONS:  Antibiotics:  piperacillin/tazobactam IVPB.. 3.375 Gram(s) IV Intermittent every 6 hours    Neuro:  acetaminophen    Suspension .. 650 milliGRAM(s) Oral every 6 hours PRN    Anticoagulation:  apixaban 5 milliGRAM(s) Enteral Tube every 12 hours    OTHER:  acetylcysteine 20%  Inhalation 4 milliLiter(s) Inhalation every 4 hours  albuterol/ipratropium for Nebulization 3 milliLiter(s) Nebulizer every 4 hours  amLODIPine   Tablet 5 milliGRAM(s) Oral daily  carvedilol 6.25 milliGRAM(s) Oral every 12 hours  dexAMETHasone  Injectable   IV Push   dexAMETHasone  Injectable 4 milliGRAM(s) IV Push every 6 hours  glucagon  Injectable 1 milliGRAM(s) IntraMuscular once  hydrALAZINE Injectable 10 milliGRAM(s) IV Push every 4 hours PRN  lisinopril 20 milliGRAM(s) Oral every 24 hours  pantoprazole  Injectable 40 milliGRAM(s) IV Push daily  polyethylene glycol 3350 17 Gram(s) Oral daily  senna 2 Tablet(s) Oral at bedtime    IVF:  sodium chloride 0.9%. 1000 milliLiter(s) IV Continuous <Continuous>    CULTURES:  Culture Results:   No growth at 2 days. (03-12 @ 18:54)    RADIOLOGY & ADDITIONAL TESTS:      ASSESSMENT:  77y/o M with PMHx sig for HTN, HLD, afib (on eliquis, unknown if last taken today,) CVA in 2020 (with minimal residual right-sided weakness per girlfriend) p/w dysarthria, left facial droop, and left-sided weakness. CTH without findings of hemorrhage. 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 NIHSS 20. Pt is not a tpa candidate as Pt is out of window. Patient now s/p diagnostic angio with findings of partially thrombosed basilar artery aneurysm 3/11/22.    PLAN:  Neuro:  -neuro/vital checks q1h  -MR head complete 3/11 no acute infarcts  -stroke core measures  -EEG negative, discontinued   -plan for flow diverter stent?   -3 day decadron taper (3/14-3/16)    Cardiac:  -SBP goal 100-160  -cont home norvasc, captopril, carvedilol (home HCTZ held currently)  -echo 3/11- mild LVH, EF 65-70%  -home dose Eliquis dose resumed (afib)    Pulm:  - HFNC decreased to 30/30     ENT:   - ENT for laryngoscope today with Dr. Onofre  - f/u recs   - Laryngoscopy: No masses visualized, hypomobility of L sided vocal cords, dysarthric, hypophonic, risk for aspiration, good cough reflex.Dr. Onofre to discuss with Dr. Peterson regarding a possible vocal cord injection.     Renal:  - IVF until TF at goal  - Na goal 140-145    GI:  -TF via NGT -  (will hold at this time until respiratory status improved)   -bowel regimen    Endo:  -ISS   -A1C 5.4    Heme;  -H/H stable  -SCDs  -cont home eliquis 5 bid per neurology  -LE dopplers 3/11 negative    ID:  -zosyn started empirically for PNA until 3/17, off vanc d/t neg MRSA swab   -f/u pan cx 3/12; NGTD    Dispo:  ICU status, full code, dispo pending  Family updated with plan    Assessment and plan discussed with Dr. Peterson and Dr. Gay    Assessment:  Present when checked    []  GCS  E   V  M     Heart Failure: []Acute, [] acute on chronic , []chronic  Heart Failure:  [] Diastolic (HFpEF), [] Systolic (HFrEF), []Combined (HFpEF and HFrEF), [] RHF, [] Pulm HTN, [] Other    [] LALO, [] ATN, [] AIN, [] other  [] CKD1, [] CKD2, [] CKD 3, [] CKD 4, [] CKD 5, []ESRD    Encephalopathy: [] Metabolic, [] Hepatic, [] toxic, [] Neurological, [] Other    Abnormal Nurtitional Status: [] malnurtition (see nutrition note), [ ]underweight: BMI < 19, [] morbid obesity: BMI >40, [] Cachexia    [] Sepsis  [] hypovolemic shock,[] cardiogenic shock, [] hemorrhagic shock, [] neuogenic shock  [] Acute Respiratory Failure  []Cerebral edema, [] Brain compression/ herniation,   [] Functional quadriplegia  [] Acute blood loss anemia

## 2022-03-16 LAB
ANION GAP SERPL CALC-SCNC: 10 MMOL/L — SIGNIFICANT CHANGE UP (ref 5–17)
BUN SERPL-MCNC: 30 MG/DL — HIGH (ref 7–23)
CALCIUM SERPL-MCNC: 8.1 MG/DL — LOW (ref 8.4–10.5)
CHLORIDE SERPL-SCNC: 110 MMOL/L — HIGH (ref 96–108)
CO2 SERPL-SCNC: 21 MMOL/L — LOW (ref 22–31)
CREAT SERPL-MCNC: 0.85 MG/DL — SIGNIFICANT CHANGE UP (ref 0.5–1.3)
EGFR: 89 ML/MIN/1.73M2 — SIGNIFICANT CHANGE UP
GLUCOSE SERPL-MCNC: 122 MG/DL — HIGH (ref 70–99)
HCT VFR BLD CALC: 36.1 % — LOW (ref 39–50)
HGB BLD-MCNC: 12.7 G/DL — LOW (ref 13–17)
MAGNESIUM SERPL-MCNC: 2 MG/DL — SIGNIFICANT CHANGE UP (ref 1.6–2.6)
MCHC RBC-ENTMCNC: 32.2 PG — SIGNIFICANT CHANGE UP (ref 27–34)
MCHC RBC-ENTMCNC: 35.2 GM/DL — SIGNIFICANT CHANGE UP (ref 32–36)
MCV RBC AUTO: 91.6 FL — SIGNIFICANT CHANGE UP (ref 80–100)
NRBC # BLD: 0 /100 WBCS — SIGNIFICANT CHANGE UP (ref 0–0)
PHOSPHATE SERPL-MCNC: 2.8 MG/DL — SIGNIFICANT CHANGE UP (ref 2.5–4.5)
PLATELET # BLD AUTO: 142 K/UL — LOW (ref 150–400)
POTASSIUM SERPL-MCNC: 4 MMOL/L — SIGNIFICANT CHANGE UP (ref 3.5–5.3)
POTASSIUM SERPL-SCNC: 4 MMOL/L — SIGNIFICANT CHANGE UP (ref 3.5–5.3)
RBC # BLD: 3.94 M/UL — LOW (ref 4.2–5.8)
RBC # FLD: 12.1 % — SIGNIFICANT CHANGE UP (ref 10.3–14.5)
SODIUM SERPL-SCNC: 141 MMOL/L — SIGNIFICANT CHANGE UP (ref 135–145)
WBC # BLD: 5.89 K/UL — SIGNIFICANT CHANGE UP (ref 3.8–10.5)
WBC # FLD AUTO: 5.89 K/UL — SIGNIFICANT CHANGE UP (ref 3.8–10.5)

## 2022-03-16 PROCEDURE — 99233 SBSQ HOSP IP/OBS HIGH 50: CPT

## 2022-03-16 PROCEDURE — 71045 X-RAY EXAM CHEST 1 VIEW: CPT | Mod: 26

## 2022-03-16 PROCEDURE — 71045 X-RAY EXAM CHEST 1 VIEW: CPT | Mod: 26,77

## 2022-03-16 RX ORDER — SODIUM CHLORIDE 9 MG/ML
1000 INJECTION, SOLUTION INTRAVENOUS
Refills: 0 | Status: DISCONTINUED | OUTPATIENT
Start: 2022-03-16 | End: 2022-03-17

## 2022-03-16 RX ORDER — SODIUM CHLORIDE 9 MG/ML
1000 INJECTION, SOLUTION INTRAVENOUS
Refills: 0 | Status: DISCONTINUED | OUTPATIENT
Start: 2022-03-16 | End: 2022-03-16

## 2022-03-16 RX ADMIN — Medication 2 MILLIGRAM(S): at 11:40

## 2022-03-16 RX ADMIN — Medication 2 MILLIGRAM(S): at 17:18

## 2022-03-16 RX ADMIN — Medication 10 MILLIGRAM(S): at 12:07

## 2022-03-16 RX ADMIN — PIPERACILLIN AND TAZOBACTAM 200 GRAM(S): 4; .5 INJECTION, POWDER, LYOPHILIZED, FOR SOLUTION INTRAVENOUS at 21:01

## 2022-03-16 RX ADMIN — Medication 3 MILLILITER(S): at 01:33

## 2022-03-16 RX ADMIN — Medication 4 MILLILITER(S): at 11:44

## 2022-03-16 RX ADMIN — Medication 3 MILLILITER(S): at 19:48

## 2022-03-16 RX ADMIN — LISINOPRIL 20 MILLIGRAM(S): 2.5 TABLET ORAL at 17:18

## 2022-03-16 RX ADMIN — CARVEDILOL PHOSPHATE 6.25 MILLIGRAM(S): 80 CAPSULE, EXTENDED RELEASE ORAL at 07:23

## 2022-03-16 RX ADMIN — POLYETHYLENE GLYCOL 3350 17 GRAM(S): 17 POWDER, FOR SOLUTION ORAL at 17:19

## 2022-03-16 RX ADMIN — Medication 1 MILLIGRAM(S): at 23:00

## 2022-03-16 RX ADMIN — Medication 3 MILLILITER(S): at 05:54

## 2022-03-16 RX ADMIN — PANTOPRAZOLE SODIUM 40 MILLIGRAM(S): 20 TABLET, DELAYED RELEASE ORAL at 11:41

## 2022-03-16 RX ADMIN — APIXABAN 5 MILLIGRAM(S): 2.5 TABLET, FILM COATED ORAL at 06:48

## 2022-03-16 RX ADMIN — Medication 4 MILLILITER(S): at 01:32

## 2022-03-16 RX ADMIN — Medication 10 MILLIGRAM(S): at 11:41

## 2022-03-16 RX ADMIN — POLYETHYLENE GLYCOL 3350 17 GRAM(S): 17 POWDER, FOR SOLUTION ORAL at 06:48

## 2022-03-16 RX ADMIN — PIPERACILLIN AND TAZOBACTAM 200 GRAM(S): 4; .5 INJECTION, POWDER, LYOPHILIZED, FOR SOLUTION INTRAVENOUS at 17:18

## 2022-03-16 RX ADMIN — AMLODIPINE BESYLATE 5 MILLIGRAM(S): 2.5 TABLET ORAL at 06:48

## 2022-03-16 RX ADMIN — Medication 4 MILLILITER(S): at 21:01

## 2022-03-16 RX ADMIN — Medication 2 MILLIGRAM(S): at 06:48

## 2022-03-16 RX ADMIN — CHLORHEXIDINE GLUCONATE 1 APPLICATION(S): 213 SOLUTION TOPICAL at 06:00

## 2022-03-16 RX ADMIN — APIXABAN 5 MILLIGRAM(S): 2.5 TABLET, FILM COATED ORAL at 17:18

## 2022-03-16 RX ADMIN — Medication 4 MILLILITER(S): at 05:54

## 2022-03-16 RX ADMIN — Medication 650 MILLIGRAM(S): at 19:46

## 2022-03-16 RX ADMIN — PIPERACILLIN AND TAZOBACTAM 200 GRAM(S): 4; .5 INJECTION, POWDER, LYOPHILIZED, FOR SOLUTION INTRAVENOUS at 04:07

## 2022-03-16 RX ADMIN — Medication 3 MILLILITER(S): at 16:05

## 2022-03-16 RX ADMIN — PIPERACILLIN AND TAZOBACTAM 200 GRAM(S): 4; .5 INJECTION, POWDER, LYOPHILIZED, FOR SOLUTION INTRAVENOUS at 11:40

## 2022-03-16 RX ADMIN — Medication 3 MILLILITER(S): at 11:42

## 2022-03-16 RX ADMIN — SENNA PLUS 2 TABLET(S): 8.6 TABLET ORAL at 21:01

## 2022-03-16 RX ADMIN — Medication 4 MILLILITER(S): at 16:05

## 2022-03-16 RX ADMIN — Medication 3 MILLILITER(S): at 21:01

## 2022-03-16 RX ADMIN — Medication 4 MILLILITER(S): at 19:48

## 2022-03-16 RX ADMIN — Medication 10 MILLIGRAM(S): at 20:02

## 2022-03-16 NOTE — PHYSICAL THERAPY INITIAL EVALUATION ADULT - FOLLOWS COMMANDS/ANSWERS QUESTIONS, REHAB EVAL
vocal cord impairment, able to orate ~25% of time with only 1 word answers, ble to answer y/n questions verbally./100% of the time

## 2022-03-16 NOTE — PHYSICAL THERAPY INITIAL EVALUATION ADULT - IMPAIRMENTS CONTRIBUTING IMPAIRED BED MOBILITY, REHAB EVAL
impaired balance/impaired motor control/impaired postural control/decreased sensation/decreased strength

## 2022-03-16 NOTE — PHYSICAL THERAPY INITIAL EVALUATION ADULT - ACTIVE RANGE OF MOTION EXAMINATION, REHAB EVAL
pt unable to perform LUE and LLE AROM/Right UE Active ROM was WNL (within normal limits)/RLE Active ROM was WNL (within normal limits)

## 2022-03-16 NOTE — OCCUPATIONAL THERAPY INITIAL EVALUATION ADULT - ADDITIONAL COMMENTS
patient providing limited PLOF secondary to vocal cord impairments. Reports living in an apt building (3 floor??) with his wife. Patient reports being independent with ADL and (?) IADLs.

## 2022-03-16 NOTE — PROGRESS NOTE ADULT - ASSESSMENT
per Neurosurgery    77 y/o M with PMHx sig for HTN, HLD, afib (on eliquis, unknown if last taken today,) CVA in 2020 (with minimal residual right-sided weakness per girlfriend) p/w dysarthria, left facial droop, and left-sided weakness. CTH without findings of hemorrhage. 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 NIHSS 20. Pt is not a tpa candidate as Pt is out of window. Patient now s/p diagnostic angio with findings of partially thrombosed basilar artery aneurysm 3/11/22.    PLAN:  Neuro:  -neuro q2/vital checks q1  -MR head complete 3/11 no acute infarcts  -stroke core measures  -EEG negative, discontinued   -plan for flow diverter stent?   -3 day decadron taper (3/14-3/16)    Cardiac:  -SBP goal 100-160  -cont home norvasc, captopril, carvedilol (home HCTZ held currently)  -echo 3/11- mild LVH, EF 65-70%  -home dose Eliquis dose resumed (afib)    Pulm:  - HFNC decreased to 30/30     ENT:   - ENT for laryngoscope today with Dr. Onofre  - f/u recs   - Laryngoscopy: No masses visualized, hypomobility of L sided vocal cords, dysarthric, hypophonic, risk for aspiration, good cough reflex.Dr. Onofre to discuss with Dr. Peterson regarding a possible vocal cord injection.     Renal:  - LR @100   - Na goal 140-145    GI:  -TF via NGT -  (will hold at this time until respiratory status improved)   -bowel regimen    Endo:  -ISS   -A1C 5.4    Heme;  -H/H stable  -SCDs  -cont home eliquis 5 bid per neurology  -LE dopplers 3/11 negative    ID:  -zosyn started empirically for PNA until 3/17, off vanc d/t neg MRSA swab   -f/u pan cx 3/12; NGTD    Dispo:  ICU status, full code, dispo pending  Family updated with plan

## 2022-03-16 NOTE — PHYSICAL THERAPY INITIAL EVALUATION ADULT - ORIENTATION, REHAB EVAL
pt able to correctly identify month and year, unable to correctly convey date/person/place/situation

## 2022-03-16 NOTE — PHYSICAL THERAPY INITIAL EVALUATION ADULT - THERAPY FREQUENCY, PT EVAL
Patient educated on frequency of inpatient physical therapy at [facility], patient verbalized understanding./3-5x/week

## 2022-03-16 NOTE — PROGRESS NOTE ADULT - SUBJECTIVE AND OBJECTIVE BOX
HPI:  77y/o M with PMHx sig for HTN, HLD, afib (on eliquis, unknown if last taken today,) CVA in 2020 (with minimal residual right-sided weakness per girlfriend,) last known normal at 3pm when girlfriend spoke to him. 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 Upon arrival to [facility], BP noted to be 171/101. Stroke code was called. CTH without findings of hemorrhage. 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 NIHSS 20. No TPA was administered as Pt is out of window. Per patient's girlfriend, the basilar artery aneurysm is known and was diagnosed in 2020 at [facility] when he suffered from a CVA. At that time, he underwent a "treatment for the aneurysm," which the girlfriend reports was too risky and was aborted. Patient never attended his follow-up appointments due to fear per girlfriend.  (10 Mar 2022 22:52)      Hospital Course:   3/10: Admitted for further workup of stroke symptoms and basilar artery aneurysm.  3/11: [place] placed overnight. 3% hypertonic saline started. Neuro exam stable. Started on vEEG for aphasia  3/12: GM overnight. Neuro exam stable. 3% d/c. home eliquis 5 bid. failed s/s. started on TF via NGT, spiked fever 101, f/u panculture   3/13: GM overnight, neuro stable, veeg negative. Vanc/zosyn started for empiric PNA.   3/14: GM o/n neuro stable. on HFNC. ENT plan for laryngoscope today with . failed s/s, pend repeat eval. dc'd captopril, started lisinopril. dc'd vanc. Laryngoscopy: No masses visualized, hypomobility of L sided vocal cords, dysarthric, hypophonic, risk for aspiration, good cough reflex.Dr. Onofre to discuss with Dr. Peterson regarding a possible vocal cord injection.   3/15: GM overnight, neuro stable, on HFNC 30/30 overnight, transitioned to 4LNC  3/16: GM overnight, neuro stable, tolerating NC and satting well          Vital Signs Last 24 Hrs  T(C): 37.3 (16 Mar 2022 01:26), Max: 37.7 (15 Mar 2022 05:09)  T(F): 99.2 (16 Mar 2022 01:26), Max: 99.9 (15 Mar 2022 05:09)  HR: 63 (16 Mar 2022 02:00) (48 - 66)  BP: 159/72 (16 Mar 2022 01:00) (133/61 - 168/77)  BP(mean): 104 (16 Mar 2022 01:00) (86 - 110)  RR: 16 (16 Mar 2022 02:00) (12 - 26)  SpO2: 99% (16 Mar 2022 02:00) (95% - 100%)    I&O's Detail    14 Mar 2022 07:01  -  15 Mar 2022 07:00  --------------------------------------------------------  IN:    IV PiggyBack: 325 mL    sodium chloride 0.9%: 2400 mL  Total IN: 2725 mL    OUT:    Incontinent per Condom Catheter (mL): 1275 mL    Intermittent Catheterization - Urethral (mL): 800 mL  Total OUT: 2075 mL    Total NET: 650 mL      15 Mar 2022 07:01  -  16 Mar 2022 03:23  --------------------------------------------------------  IN:    Enteral Tube Flush: 50 mL    IV PiggyBack: 200 mL    Lactated Ringers: 1800 mL    sodium chloride 0.9%: 200 mL  Total IN: 2250 mL    OUT:    Intermittent Catheterization - Urethral (mL): 1675 mL  Total OUT: 1675 mL    Total NET: 575 mL        I&O's Summary    14 Mar 2022 07:01  -  15 Mar 2022 07:00  --------------------------------------------------------  IN: 2725 mL / OUT: 2075 mL / NET: 650 mL    15 Mar 2022 07:01  -  16 Mar 2022 03:23  --------------------------------------------------------  IN: 2250 mL / OUT: 1675 mL / NET: 575 mL        PHYSICAL EXAM:  GEN: laying in bed, appears well, NAD  NEURO: AOx3. FC, OE spont, hypophonic intact, +L facial droop. CNII-XII intact. PERRL, EOMI. RUE/RLE 5/5. LUE 1/5 in fingers, 0/5 proximally. LLE w/d to noxious.  CV: RRR +S1/S2  PULM: CTAB  GI: Abd soft, NT/ND  EXT: ext warm, dry, nontender      TUBES/LINES:  [] CVC  [] A-line  [] Lumbar Drain  [] Ventriculostomy  [] EMERY  [] Arredondo  [] NGT   [] Other    DIET:  [] NPO  [] Mechanical  [] Tube feeds    LABS:                        13.2   7.44  )-----------( 143      ( 15 Mar 2022 05:45 )             38.9     03-15    142  |  114<H>  |  29<H>  ----------------------------<  113<H>  3.9   |  19<L>  |  0.86    Ca    8.2<L>      15 Mar 2022 05:45  Phos  3.1     03-15  Mg     2.0     03-15              CAPILLARY BLOOD GLUCOSE          Drug Levels: [] N/A    CSF Analysis: [] N/A      Allergies    No Known Allergies    Intolerances        Home Medications:  amLODIPine 5 mg oral tablet: 1 tab(s) orally once a day (14 Mar 2022 11:26)  captopril 100 mg oral tablet: 1  orally 3 times a day (14 Mar 2022 11:26)  carvedilol 6.25 mg oral tablet: orally 2 times a day (14 Mar 2022 11:26)  Eliquis 5 mg oral tablet:  (14 Mar 2022 11:26)  hydroCHLOROthiazide 25 mg oral tablet:  (14 Mar 2022 11:26)  Lipitor 80 mg oral tablet: 1 tab(s) orally once a day (14 Mar 2022 11:26)      MEDICATIONS:  Antibiotics:  piperacillin/tazobactam IVPB.. 3.375 Gram(s) IV Intermittent every 6 hours    Neuro:  acetaminophen    Suspension .. 650 milliGRAM(s) Oral every 6 hours PRN    Anticoagulation:  apixaban 5 milliGRAM(s) Enteral Tube every 12 hours    OTHER:  acetylcysteine 20%  Inhalation 4 milliLiter(s) Inhalation every 4 hours  albuterol/ipratropium for Nebulization 3 milliLiter(s) Nebulizer every 4 hours  amLODIPine   Tablet 5 milliGRAM(s) Oral daily  bisacodyl Suppository 10 milliGRAM(s) Rectal daily PRN  carvedilol 6.25 milliGRAM(s) Oral every 12 hours  chlorhexidine 2% Cloths 1 Application(s) Topical <User Schedule>  dexAMETHasone  Injectable   IV Push   dexAMETHasone  Injectable 2 milliGRAM(s) IV Push every 6 hours  glucagon  Injectable 1 milliGRAM(s) IntraMuscular once  hydrALAZINE Injectable 10 milliGRAM(s) IV Push every 4 hours PRN  lactulose Syrup 10 Gram(s) Enteral Tube once PRN  lisinopril 20 milliGRAM(s) Oral every 24 hours  pantoprazole  Injectable 40 milliGRAM(s) IV Push daily  polyethylene glycol 3350 17 Gram(s) Oral every 12 hours  senna 2 Tablet(s) Oral at bedtime    IVF:  lactated ringers. 1000 milliLiter(s) IV Continuous <Continuous>    CULTURES:  Culture Results:   No growth at 3 days. (03-12 @ 18:54)    RADIOLOGY & ADDITIONAL TESTS:      ASSESSMENT:  77y/o M with PMHx sig for HTN, HLD, afib (on eliquis, unknown if last taken today,) CVA in 2020 (with minimal residual right-sided weakness per girlfriend) p/w dysarthria, left facial droop, and left-sided weakness. CTH without findings of hemorrhage. 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 NIHSS 20. Pt is not a tpa candidate as Pt is out of window. Patient now s/p diagnostic angio with findings of partially thrombosed basilar artery aneurysm 3/11/22.    PLAN:  Neuro:  -neuro q2/vital checks q1  -MR head complete 3/11 no acute infarcts  -stroke core measures  -EEG negative, discontinued   -plan for flow diverter stent?   -3 day decadron taper (3/14-3/16)    Cardiac:  -SBP goal 100-160  -cont home norvasc, captopril, carvedilol (home HCTZ held currently)  -echo 3/11- mild LVH, EF 65-70%  -home dose Eliquis dose resumed (afib)    Pulm:  - HFNC decreased to 30/30     ENT:   - ENT for laryngoscope today with Dr. Onofre  - f/u recs   - Laryngoscopy: No masses visualized, hypomobility of L sided vocal cords, dysarthric, hypophonic, risk for aspiration, good cough reflex.Dr. Onofre to discuss with Dr. Peterson regarding a possible vocal cord injection.     Renal:  - LR @100   - Na goal 140-145    GI:  -TF via NGT -  (will hold at this time until respiratory status improved)   -bowel regimen    Endo:  -ISS   -A1C 5.4    Heme;  -H/H stable  -SCDs  -cont home eliquis 5 bid per neurology  -LE dopplers 3/11 negative    ID:  -zosyn started empirically for PNA until 3/17, off vanc d/t neg MRSA swab   -f/u pan cx 3/12; NGTD    Dispo:  ICU status, full code, dispo pending  Family updated with plan    Assessment and plan discussed with Dr. Peterson and Dr. Gay    Assessment:  Present when checked    []  GCS  E   V  M     Heart Failure: []Acute, [] acute on chronic , []chronic  Heart Failure:  [] Diastolic (HFpEF), [] Systolic (HFrEF), []Combined (HFpEF and HFrEF), [] RHF, [] Pulm HTN, [] Other    [] LALO, [] ATN, [] AIN, [] other  [] CKD1, [] CKD2, [] CKD 3, [] CKD 4, [] CKD 5, []ESRD    Encephalopathy: [] Metabolic, [] Hepatic, [] toxic, [] Neurological, [] Other    Abnormal Nurtitional Status: [] malnurtition (see nutrition note), [ ]underweight: BMI < 19, [] morbid obesity: BMI >40, [] Cachexia    [] Sepsis  [] hypovolemic shock,[] cardiogenic shock, [] hemorrhagic shock, [] neuogenic shock  [] Acute Respiratory Failure  []Cerebral edema, [] Brain compression/ herniation,   [] Functional quadriplegia  [] Acute blood loss anemia

## 2022-03-16 NOTE — PHYSICAL THERAPY INITIAL EVALUATION ADULT - ADDITIONAL COMMENTS
Pt poor historian 2/2 vocal cord impairment, able to orate ~25% of time with only 1 word answers. Able to answer y/n questions verbally. Reports living in apartment (possibly on 3rd floor) with wife. Reports previously using cane to ambulate in the community.

## 2022-03-16 NOTE — OCCUPATIONAL THERAPY INITIAL EVALUATION ADULT - DIAGNOSIS, OT EVAL
OT deficits include L UE/ L LLE hemiparesis, impaired balance, and decreased activity tolerance affecting ability to complete ADL, functional transfers and mob.

## 2022-03-16 NOTE — PHYSICAL THERAPY INITIAL EVALUATION ADULT - PERTINENT HX OF CURRENT PROBLEM, REHAB EVAL
75y/o M with PMHx sig for HTN, HLD, afib (on eliquis, unknown if last taken today,) CVA in 2020 p/w dysarthria, left facial droop, and left-sided weakness. CTH without findings of hemorrhage. 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 NIHSS 20. Pt is not a tpa candidate as Pt is out of window. Patient now s/p diagnostic angio with findings of partially thrombosed basilar artery aneurysm 3/11/22.

## 2022-03-16 NOTE — PROGRESS NOTE ADULT - SUBJECTIVE AND OBJECTIVE BOX
Physical Medicine and Rehabilitation Progress Note:    Patient is a 76y old  Male who presents with a chief complaint of CVA, left facial, left-sided weakness (16 Mar 2022 06:49)      HPI:  77y/o M with PMHx sig for HTN, HLD, afib (on eliquis, unknown if last taken today,) CVA in 2020 (with minimal residual right-sided weakness per girlfriend,) last known normal at 3pm when girlfriend spoke to him. 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 Upon arrival to [facility], BP noted to be 171/101. Stroke code was called. CTH without findings of hemorrhage. 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 NIHSS 20. No TPA was administered as Pt is out of window. Per patient's girlfriend, the basilar artery aneurysm is known and was diagnosed in 2020 at [facility] when he suffered from a CVA. At that time, he underwent a "treatment for the aneurysm," which the girlfriend reports was too risky and was aborted. Patient never attended his follow-up appointments due to fear per girlfriend.  (10 Mar 2022 22:52)                            12.7   5.89  )-----------( 142      ( 16 Mar 2022 05:17 )             36.1       03-16    141  |  110<H>  |  30<H>  ----------------------------<  122<H>  4.0   |  21<L>  |  0.85    Ca    8.1<L>      16 Mar 2022 05:17  Phos  2.8     03-16  Mg     2.0     03-16      Vital Signs Last 24 Hrs  T(C): 36.4 (16 Mar 2022 09:24), Max: 37.7 (15 Mar 2022 17:36)  T(F): 97.5 (16 Mar 2022 09:24), Max: 99.8 (15 Mar 2022 17:36)  HR: 51 (16 Mar 2022 13:00) (45 - 63)  BP: 149/70 (16 Mar 2022 13:00) (140/64 - 176/79)  BP(mean): 100 (16 Mar 2022 13:00) (92 - 117)  RR: 20 (16 Mar 2022 13:00) (8 - 26)  SpO2: 96% (16 Mar 2022 13:00) (95% - 100%)    MEDICATIONS  (STANDING):  acetylcysteine 20%  Inhalation 4 milliLiter(s) Inhalation every 4 hours  albuterol/ipratropium for Nebulization 3 milliLiter(s) Nebulizer every 4 hours  amLODIPine   Tablet 5 milliGRAM(s) Oral daily  apixaban 5 milliGRAM(s) Enteral Tube every 12 hours  bisacodyl Suppository 10 milliGRAM(s) Rectal daily  carvedilol 6.25 milliGRAM(s) Oral every 12 hours  chlorhexidine 2% Cloths 1 Application(s) Topical <User Schedule>  dexAMETHasone  Injectable   IV Push   dexAMETHasone  Injectable 2 milliGRAM(s) IV Push every 6 hours  glucagon  Injectable 1 milliGRAM(s) IntraMuscular once  lactated ringers. 1000 milliLiter(s) (75 mL/Hr) IV Continuous <Continuous>  lisinopril 20 milliGRAM(s) Oral every 24 hours  pantoprazole  Injectable 40 milliGRAM(s) IV Push daily  piperacillin/tazobactam IVPB.. 3.375 Gram(s) IV Intermittent every 6 hours  polyethylene glycol 3350 17 Gram(s) Oral every 12 hours  senna 2 Tablet(s) Oral at bedtime    MEDICATIONS  (PRN):  acetaminophen    Suspension .. 650 milliGRAM(s) Oral every 6 hours PRN Temp greater or equal to 38C (100.4F), Mild Pain (1 - 3)  hydrALAZINE Injectable 10 milliGRAM(s) IV Push every 4 hours PRN SBP>160  lactulose Syrup 10 Gram(s) Enteral Tube once PRN constipation    Currently Undergoing Physical/ Occupational Therapy at bedside.    PT/OT Functional Status Assessment:       Previous Level of Function:     · Ambulation Skills	needs device  · Transfer Skills	needs device  · ADL Skills	needs device  · Work/Leisure Activity	needs device  · Additional Comments	Pt poor historian 2/2 vocal cord impairment, able to orate ~25% of time with only 1 word answers. Able to answer y/n questions verbally. Reports living in apartment (possibly on 3rd floor) with wife. Reports previously using cane to ambulate in the community.    Cognitive Status Examination:   · Orientation	person; place; situation; pt able to correctly identify month and year, unable to correctly convey date  · Level of Consciousness	alert  · Follows Commands and Answers Questions	100% of the time; vocal cord impairment, able to orate ~25% of time with only 1 word answers, ble to answer y/n questions verbally.  · Personal Safety and Judgment	intact    Peripheral Neurovascular:     Neurovascular Assessment:   · LUE	warm; no discoloration; no tingling  · RUE	warm; no discoloration; no numbness; no tingling  · LLE	warm; redness at L medial heel with edema present, pt reports it is a prior condition, reports no pain with palpation  · RLE	warm; no discoloration; no numbness; no tingling    Range of Motion Exam:   · Active Range of Motion Examination	Right UE Active ROM was WNL (within normal limits); RLE Active ROM was WNL (within normal limits); pt unable to perform LUE and LLE AROM  · Passive Range of Motion Examination	no Passive ROM deficits were identified    Manual Muscle Testing:   · Manual Muscle Testing Results	MMT performed: (R)UE and (R)LE 5/5 for all major pivots; (L)shoulder flexion 0/5, (L)elbow flexion 0/5, (L)elbow extension 0/5, (L)wrist extension N/T, (L)wrist flexion N/T; (L)hip flexion 0/5, (L)knee extension 0/5, (L)knee flexion 0/5, (L)ankle DF 0/5, (L)chet PF 0/5    Bed Mobility: Rolling/Turning:     · Level of [place]	maximum assist (25% patients effort)  · Physical Assist/Nonphysical Assist	2 person assist; verbal cues; nonverbal cues (demo/gestures)    Bed Mobility: Sit to Supine:     · Level of [place]	maximum assist (25% patients effort)  · Physical Assist/Nonphysical Assist	2 person assist; verbal cues; nonverbal cues (demo/gestures)    Bed Mobility: Supine to Sit:     · Level of [place]	maximum assist (25% patients effort)  · Physical Assist/Nonphysical Assist	2 person assist; verbal cues; nonverbal cues (demo/gestures)    Bed Mobility Analysis:     · Bed Mobility Limitations	decreased ability to use legs for bridging/pushing; decreased ability to use arms for pushing/pulling; impaired ability to control trunk for mobility  · Impairments Contributing to Impaired Bed Mobility	impaired postural control; impaired motor control; impaired balance; decreased strength; decreased sensation    Transfer: Sit to Stand:     · Level of [place]	NT to adhere to BP parameters <140 (SBP)    Balance Skills Assessment:     · Sitting Balance: Static	max A x 1  · Sitting Balance: Dynamic	max A x 1    Sensory Examination:   Sensory Examination:    Grossly Intact:   · Gross Sensory Examination	Grossly Intact; Right UE; Right LE      Light Touch Sensation:   · Left UE	absent sensation  LUE: 2-point discrimination test impaired, pt unable to indicate when LUE is being touched     · Sensory Tests	(L) hand  0/5, (R) hand  5/5. CN Testing: B/L Frontalis intact; B/L buccinator intact; smile impaired bilaterally with R facial droop; tongue protrusion at midline; B/L eyes open/close intact; Vision H-Test: bilateral tracking and smooth pursuit intact; Convergence/Divergence: intact; Vision Quadrant Test: intact bilaterally.    Clinical Impressions:   · Criteria for Skilled Therapeutic Interventions	impairments found; functional limitations in following categories; rehab potential; anticipated discharge recommendation  · Impairments Found (describe specific impairments)	gait, locomotion, and balance; posture; gross motor; neuromotor development and sensory integration; muscle strength  · Functional Limitations in Following Categories (describe specific limitations)	self-care; home management; community/leisure  · Risk Reduction/Prevention (Describe Specific Areas of risk reduction/prevention)	risk factors  · Risk Areas	fall  · Rehab Potential	good, to achieve stated therapy goals  · Therapy Frequency	3-5x/week; Patient educated on frequency of inpatient physical therapy at [facility], patient verbalized understanding.        PM&R Impression: as above    Current Disposition Plan Recommendations:    acute rehab placement

## 2022-03-16 NOTE — OCCUPATIONAL THERAPY INITIAL EVALUATION ADULT - IMPAIRMENTS CONTRIBUTING IMPAIRED BED MOBILITY, REHAB EVAL
impaired balance/impaired motor control/abnormal muscle tone/impaired postural control/decreased ROM/decreased strength

## 2022-03-16 NOTE — PHYSICAL THERAPY INITIAL EVALUATION ADULT - NEUROVASCULAR ASSESSMENT LLE
redness at L medial heel with edema present, pt reports it is a prior condition, reports no pain with palpation/warm

## 2022-03-16 NOTE — PHYSICAL THERAPY INITIAL EVALUATION ADULT - MANUAL MUSCLE TESTING RESULTS, REHAB EVAL
MMT performed: (R)UE and (R)LE 5/5 for all major pivots; (L)shoulder flexion 0/5, (L)elbow flexion 0/5, (L)elbow extension 0/5, (L)wrist extension N/T, (L)wrist flexion N/T; (L)hip flexion 0/5, (L)knee extension 0/5, (L)knee flexion 0/5, (L)ankle DF 0/5, (L)chet PF 0/5

## 2022-03-16 NOTE — PHYSICAL THERAPY INITIAL EVALUATION ADULT - IMPAIRMENTS FOUND, PT EVAL
gait, locomotion, and balance/gross motor/muscle strength/neuromotor development and sensory integration/posture

## 2022-03-16 NOTE — OCCUPATIONAL THERAPY INITIAL EVALUATION ADULT - GENERAL OBSERVATIONS, REHAB EVAL
Patient received semi-supine, +tele, +a-line, +NGT (feed held by SHINE Altman), +L infusing IV, +b/l SCDs, in NAD and agreeable to OT session. Patient received semi-supine, +tele, +a-line, +NGT (feed held by SHINE Altman), +L infusing IV, +b/l SCDs, in NAD and agreeable to OT session. MRS=5.

## 2022-03-16 NOTE — PROGRESS NOTE ADULT - SUBJECTIVE AND OBJECTIVE BOX
=================================  NEUROCRITICAL CARE ATTENDING NOTE  =================================    BREANNA MCCORMACK   MRN-[id number]  Summary:  76y/M with HTN, HLD, afib (on eliquis, unknown if last taken today,) CVA in  (with minimal residual right-sided weakness per girlfriend,) last known normal at 3pm when girlfriend spoke to him. EMS found patient in his office at [employer] (formerly [employer]) (he is a neuroscience professor,) with dysarthria, left facial droop, and left-sided weakness. Upon arrival to [facility], BP noted to be 171/101. Stroke code was called. CTH without findings of hemorrhage. 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 NIHSS 20. No TPA was administered as Pt is out of window. Per patient's girlfriend, the basilar artery aneurysm is known and was diagnosed in  at [facility] when he suffered from a CVA. At that time, he underwent a "treatment for the aneurysm," which the girlfriend reports was too risky and was aborted. Patient never attended his follow-up appointments due to fear per girlfriend.  (10 Mar 2022 22:52)    COURSE IN THE HOSPITAL:  03/10 admitted to [facility]   Tmax 38.3; s/p angio showing extremely tortuous and elongated aortic arch with dolichoectatic vertebrobasilar system, giant partially thrombosed vertebrobasilar aneurysm   Tmax 37.9 tachypneic overnight; apixaban restarted; NGT inserted, tube feeds started at night   Tmax 38.3, desaturation to 88% this morning - now requiring 5L/min, tube feeds held; started on HFNC, improved   Tmax 37.8.  No significant events overnight.     Past Medical History:   Allergies:  No Known Allergies  Home meds:   ·	amLODIPine 5 mg oral tablet: 1 tab(s) orally once a day  ·	captopril 100 mg oral tablet:   ·	carvedilol 6.25 mg oral tablet:   ·	Eliquis 5 mg oral tablet:   ·	hydroCHLOROthiazide 25 mg oral tablet:       ICU Vital Signs Last 24 Hrs  T(C): 37.2 (16 Mar 2022 05:33), Max: 37.7 (15 Mar 2022 17:36)  T(F): 99 (16 Mar 2022 05:33), Max: 99.8 (15 Mar 2022 17:36)  HR: 50 (16 Mar 2022 04:00) (48 - 66)  BP: 140/64 (16 Mar 2022 04:00) (133/61 - 162/70)  BP(mean): 92 (16 Mar 2022 04:00) (86 - 109)  ABP: 137/60 (16 Mar 2022 04:00) (115/99 - 165/76)  ABP(mean): 89 (16 Mar 2022 04:00) (89 - 111)  RR: 16 (16 Mar 2022 04:00) (12 - 26)  SpO2: 96% (16 Mar 2022 04:00) (95% - 100%)      22 @ 07:01  -  03-15-22 @ 07:00  --------------------------------------------------------  IN: 2725 mL / OUT: 2075 mL / NET: 650 mL    03-15-22 @ 07:01  -  22 @ 06:51  --------------------------------------------------------  IN: 2450 mL / OUT: 1525 mL / NET: 925 mL      PHYSICAL EXAMINATION  NEUROLOGIC EXAMINATION:  Patient is awake, alert, oriented x3, following commands, CARISSA, L facial droop, LUE extensor posturing , able to wiggle fingers, L LE TF, RUE/LE 5/5, able to write, able to say some words   GENERAL:   EENT: Anicteric  CARDIOVASCULAR: (+) S1 S2, bradycardic rate and regular rhythm  PULMONARY: Mildly diminshed breath sounds  ABDOMEN: Soft, nontender with normoactive bowel sounds  EXTREMITIES: No edema, no groin hematoma, pulses good  SKIN: No rash      MEDICATIONS:  acetaminophen    Suspension .. 650 milliGRAM(s) Oral every 6 hours PRN  acetylcysteine 20%  Inhalation 4 milliLiter(s) Inhalation every 4 hours  albuterol/ipratropium for Nebulization 3 milliLiter(s) Nebulizer every 4 hours  amLODIPine   Tablet 5 milliGRAM(s) Oral daily  apixaban 5 milliGRAM(s) Enteral Tube every 12 hours  bisacodyl Suppository 10 milliGRAM(s) Rectal daily PRN  carvedilol 6.25 milliGRAM(s) Oral every 12 hours  chlorhexidine 2% Cloths 1 Application(s) Topical <User Schedule>  dexAMETHasone  Injectable   IV Push   dexAMETHasone  Injectable 2 milliGRAM(s) IV Push every 6 hours  glucagon  Injectable 1 milliGRAM(s) IntraMuscular once  hydrALAZINE Injectable 10 milliGRAM(s) IV Push every 4 hours PRN  lactated ringers. 1000 milliLiter(s) (100 mL/Hr) IV Continuous <Continuous>  lactulose Syrup 10 Gram(s) Enteral Tube once PRN  lisinopril 20 milliGRAM(s) Oral every 24 hours  pantoprazole  Injectable 40 milliGRAM(s) IV Push daily  piperacillin/tazobactam IVPB.. 3.375 Gram(s) IV Intermittent every 6 hours  polyethylene glycol 3350 17 Gram(s) Oral every 12 hours  senna 2 Tablet(s) Oral at bedtime    LABS:                     12.7   5.89  )-----------( 142      ( 16 Mar 2022 05:17 )             36.1         141  |  110<H>  |  30<H>  ----------------------------<  122<H>  4.0   |  21<L>  |  0.85    Ca    8.1<L>      16 Mar 2022 05:17  Phos  2.8     -  Mg     2.0           ABG - ( 15 Mar 2022 14:28 )  pH, Arterial: 7.44  pH, Blood: x     /  pCO2: 34    /  pO2: 151   / HCO3: 23    / Base Excess: -0.5  /  SaO2: 98.7        HbA1C = 5.4 ()  LDL = 108 ()   HDL = 43 ()  TG = 58 ()  TSH = 3.050 ()    Bacteriology:   Blood Cx NGTD  CSF studies:  EEG:  Neuroimagin/11 MRI no acute infarcts:  3cm giant aneurysm basilar artery, partially thrombosed, marked compression of brainstem, distortion of IV but patent  03/10 CTA:  dolichoectasia of basilar artery, partially thrombosed aneurysm; diffuse idiopathic skeletal hyperostosis  Other imaging:      IV FLUIDS: LR@100cc/hr  DRIPS:  DIET: NPO  Lines: [place]  Drains:      CODE STATUS:  Full Code                       GOALS OF CARE:  Aggressive                      DISPOSITION:  ICU

## 2022-03-16 NOTE — PROGRESS NOTE ADULT - ASSESSMENT
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 Currently on room air  Continue incentive spirometry; chest PT Q4 hours, nebs to q4h, pulmicort, CXR   Monitor daily POCUS  ENT following for vocal cord injection    CARDIO: SBP goal 100-160mm Hg, cont carvedilol, amlodipine, lisinopril     ENDO: Blood sugar goals 140-180 mg/dL    GI: Bowel regimen miralax bid  DIET: Start TFs, repeat speech and swallow pending.    RENAL: Continue LR @100c/hr, Na goal 140-150; daily BMP    HEM/ONC: Hb stable INR 1.17  VTE Prophylaxis: SCDs, Eliquis    ID: Afebrile, leukocytosis; piperacillin/tazobactam (last day 03/17) off vancomycin  Social: Rafiq Forrester  is HCP; discussed yesterday with wife and patient who are both agreeable to NGT insertion    Plan discussed with RN, house staff. 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 Completed.  REHAB: Physical therapy evaluation and management EARLY MOB:  HOB up    PULM: Off HFNC. Currently on room air  Continue incentive spirometry; chest PT Q4 hours, nebs to q4h, pulmicort, CXR   Monitor daily POCUS  ENT following for vocal cord injection    CARDIO: SBP goal 100-160mm Hg.Cont carvedilol, amlodipine, lisinopril   Holding  HCTZ    ENDO: Blood sugar goals 140-180 mg/dL    GI: Bowel regimen miralax bid  DIET: Start TFs, repeat speech and swallow pending.   LBM: 3/9.     RENAL: Continue LR @ 75/hr, Na goal 140-150; daily BMP    HEM/ONC: Hb stable INR 1.17  VTE Prophylaxis: SCDs, Eliquis    ID: Afebrile, leukocytosis; piperacillin/tazobactam (last day 03/17) off vancomycin  Social: Rafiq Forrester  is HCP; discussed yesterday with wife and patient who are both agreeable to NGT insertion    Plan discussed with RN, house staff.

## 2022-03-16 NOTE — OCCUPATIONAL THERAPY INITIAL EVALUATION ADULT - MODALITIES TREATMENT COMMENTS
Cranial Nerves II - XII: II: PERRLA; visual fields are full to confrontation III, IV, VI: EOMI, no nystagmus appreciated; V: facial sensation intact to light touch V1-V3 b/l; VII: no ptosis,  R facial droop, symmetric eyebrow raise and closure; VIII: hearing intact to finger rub b/l  IX, X: uvula is midline, soft palate rises symmetrically; XI: head turning, unable to compelete cervical rotation to the R side (?) chronic, able to complete ~20 degrees of cervical rotation to the L side; shoulder shrug intact b/l; XII: tongue protrusion midline

## 2022-03-16 NOTE — OCCUPATIONAL THERAPY INITIAL EVALUATION ADULT - PLANNED THERAPY INTERVENTIONS, OT EVAL
ADL retraining/balance training/bed mobility training/fine motor coordination training/prosthetic fitting/training/ROM/strengthening/transfer training

## 2022-03-16 NOTE — OCCUPATIONAL THERAPY INITIAL EVALUATION ADULT - NS ASR FOLLOW COMMAND OT EVAL
vocal cord impairment, able to answer yes/no questions verbally. able to verbal answer with 1 word, ~25% of the time./100% of the time/able to follow single-step instructions

## 2022-03-16 NOTE — PHYSICAL THERAPY INITIAL EVALUATION ADULT - SENSORY TESTS
(L) hand  0/5, (R) hand  5/5. CN Testing: B/L Frontalis intact; B/L buccinator intact; smile impaired bilaterally with R facial droop; tongue protrusion at midline; B/L eyes open/close intact; Vision H-Test: bilateral tracking and smooth pursuit intact; Convergence/Divergence: intact; Vision Quadrant Test: intact bilaterally.

## 2022-03-16 NOTE — OCCUPATIONAL THERAPY INITIAL EVALUATION ADULT - ORIENTATION, REHAB EVAL
patient able to accurately state month and year with verbal choices, unable to accurately state date (cognition vs. vocal cord impairment)/person/place

## 2022-03-16 NOTE — OCCUPATIONAL THERAPY INITIAL EVALUATION ADULT - PERTINENT HX OF CURRENT PROBLEM, REHAB EVAL
77y/o M with PMHx sig for HTN, HLD, afib (on eliquis, unknown if last taken today,) CVA in 2020 p/w dysarthria, left facial droop, and left-sided weakness. CTH without findings of hemorrhage. 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 NIHSS 20. Pt is not a tpa candidate as Pt is out of window. Patient now s/p diagnostic angio with findings of partially thrombosed basilar artery aneurysm 3/11/22.

## 2022-03-16 NOTE — PHYSICAL THERAPY INITIAL EVALUATION ADULT - GENERAL OBSERVATIONS, REHAB EVAL
PT IE Completed. Chart reviewed. Pt received semi-supine, +NGT (clamped by SHINE Altman), +L IV, +L A line, +tele, +B/L SCDs. SHINE Altman cleared pt for PT.

## 2022-03-16 NOTE — PHYSICAL THERAPY INITIAL EVALUATION ADULT - LIGHT TOUCH SENSATION, LUE, REHAB EVAL
LUE: 2-point discrimination test impaired, pt unable to indicate when LUE is being touched/absent sensation

## 2022-03-17 ENCOUNTER — NON-APPOINTMENT (OUTPATIENT)
Age: 77
End: 2022-03-17

## 2022-03-17 LAB
ANION GAP SERPL CALC-SCNC: 8 MMOL/L — SIGNIFICANT CHANGE UP (ref 5–17)
BUN SERPL-MCNC: 30 MG/DL — HIGH (ref 7–23)
CALCIUM SERPL-MCNC: 8.5 MG/DL — SIGNIFICANT CHANGE UP (ref 8.4–10.5)
CHLORIDE SERPL-SCNC: 108 MMOL/L — SIGNIFICANT CHANGE UP (ref 96–108)
CO2 SERPL-SCNC: 22 MMOL/L — SIGNIFICANT CHANGE UP (ref 22–31)
CREAT SERPL-MCNC: 0.87 MG/DL — SIGNIFICANT CHANGE UP (ref 0.5–1.3)
CULTURE RESULTS: SIGNIFICANT CHANGE UP
EGFR: 88 ML/MIN/1.73M2 — SIGNIFICANT CHANGE UP
GLUCOSE SERPL-MCNC: 135 MG/DL — HIGH (ref 70–99)
HCT VFR BLD CALC: 37.9 % — LOW (ref 39–50)
HGB BLD-MCNC: 13.3 G/DL — SIGNIFICANT CHANGE UP (ref 13–17)
MAGNESIUM SERPL-MCNC: 2 MG/DL — SIGNIFICANT CHANGE UP (ref 1.6–2.6)
MCHC RBC-ENTMCNC: 31.8 PG — SIGNIFICANT CHANGE UP (ref 27–34)
MCHC RBC-ENTMCNC: 35.1 GM/DL — SIGNIFICANT CHANGE UP (ref 32–36)
MCV RBC AUTO: 90.7 FL — SIGNIFICANT CHANGE UP (ref 80–100)
NRBC # BLD: 0 /100 WBCS — SIGNIFICANT CHANGE UP (ref 0–0)
PHOSPHATE SERPL-MCNC: 2.8 MG/DL — SIGNIFICANT CHANGE UP (ref 2.5–4.5)
PLATELET # BLD AUTO: 171 K/UL — SIGNIFICANT CHANGE UP (ref 150–400)
POTASSIUM SERPL-MCNC: 3.8 MMOL/L — SIGNIFICANT CHANGE UP (ref 3.5–5.3)
POTASSIUM SERPL-SCNC: 3.8 MMOL/L — SIGNIFICANT CHANGE UP (ref 3.5–5.3)
RBC # BLD: 4.18 M/UL — LOW (ref 4.2–5.8)
RBC # FLD: 12.1 % — SIGNIFICANT CHANGE UP (ref 10.3–14.5)
SARS-COV-2 RNA SPEC QL NAA+PROBE: SIGNIFICANT CHANGE UP
SODIUM SERPL-SCNC: 138 MMOL/L — SIGNIFICANT CHANGE UP (ref 135–145)
SPECIMEN SOURCE: SIGNIFICANT CHANGE UP
WBC # BLD: 5.74 K/UL — SIGNIFICANT CHANGE UP (ref 3.8–10.5)
WBC # FLD AUTO: 5.74 K/UL — SIGNIFICANT CHANGE UP (ref 3.8–10.5)

## 2022-03-17 PROCEDURE — 99233 SBSQ HOSP IP/OBS HIGH 50: CPT

## 2022-03-17 PROCEDURE — 71045 X-RAY EXAM CHEST 1 VIEW: CPT | Mod: 26

## 2022-03-17 PROCEDURE — 99232 SBSQ HOSP IP/OBS MODERATE 35: CPT

## 2022-03-17 RX ORDER — POTASSIUM CHLORIDE 20 MEQ
20 PACKET (EA) ORAL ONCE
Refills: 0 | Status: COMPLETED | OUTPATIENT
Start: 2022-03-17 | End: 2022-03-17

## 2022-03-17 RX ADMIN — APIXABAN 5 MILLIGRAM(S): 2.5 TABLET, FILM COATED ORAL at 05:01

## 2022-03-17 RX ADMIN — Medication 10 MILLIGRAM(S): at 23:02

## 2022-03-17 RX ADMIN — Medication 10 MILLIGRAM(S): at 11:32

## 2022-03-17 RX ADMIN — Medication 3 MILLILITER(S): at 13:02

## 2022-03-17 RX ADMIN — Medication 3 MILLILITER(S): at 13:57

## 2022-03-17 RX ADMIN — Medication 20 MILLIEQUIVALENT(S): at 07:54

## 2022-03-17 RX ADMIN — Medication 4 MILLILITER(S): at 17:13

## 2022-03-17 RX ADMIN — POLYETHYLENE GLYCOL 3350 17 GRAM(S): 17 POWDER, FOR SOLUTION ORAL at 05:02

## 2022-03-17 RX ADMIN — APIXABAN 5 MILLIGRAM(S): 2.5 TABLET, FILM COATED ORAL at 17:51

## 2022-03-17 RX ADMIN — LISINOPRIL 20 MILLIGRAM(S): 2.5 TABLET ORAL at 17:50

## 2022-03-17 RX ADMIN — Medication 1 MILLIGRAM(S): at 17:51

## 2022-03-17 RX ADMIN — Medication 1 MILLIGRAM(S): at 05:01

## 2022-03-17 RX ADMIN — Medication 10 MILLIGRAM(S): at 11:53

## 2022-03-17 RX ADMIN — SENNA PLUS 2 TABLET(S): 8.6 TABLET ORAL at 21:42

## 2022-03-17 RX ADMIN — PANTOPRAZOLE SODIUM 40 MILLIGRAM(S): 20 TABLET, DELAYED RELEASE ORAL at 11:31

## 2022-03-17 RX ADMIN — Medication 4 MILLILITER(S): at 02:18

## 2022-03-17 RX ADMIN — Medication 4 MILLILITER(S): at 06:32

## 2022-03-17 RX ADMIN — POLYETHYLENE GLYCOL 3350 17 GRAM(S): 17 POWDER, FOR SOLUTION ORAL at 17:52

## 2022-03-17 RX ADMIN — Medication 3 MILLILITER(S): at 02:19

## 2022-03-17 RX ADMIN — Medication 4 MILLILITER(S): at 13:03

## 2022-03-17 RX ADMIN — AMLODIPINE BESYLATE 5 MILLIGRAM(S): 2.5 TABLET ORAL at 05:02

## 2022-03-17 RX ADMIN — Medication 4 MILLILITER(S): at 21:42

## 2022-03-17 RX ADMIN — Medication 3 MILLILITER(S): at 06:32

## 2022-03-17 RX ADMIN — PIPERACILLIN AND TAZOBACTAM 200 GRAM(S): 4; .5 INJECTION, POWDER, LYOPHILIZED, FOR SOLUTION INTRAVENOUS at 04:30

## 2022-03-17 RX ADMIN — CHLORHEXIDINE GLUCONATE 1 APPLICATION(S): 213 SOLUTION TOPICAL at 05:02

## 2022-03-17 RX ADMIN — Medication 1 MILLIGRAM(S): at 11:32

## 2022-03-17 RX ADMIN — Medication 3 MILLILITER(S): at 21:42

## 2022-03-17 NOTE — PROGRESS NOTE ADULT - ASSESSMENT
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 Completed.  REHAB: Physical therapy evaluation and management EARLY MOB:  HOB up    PULM: Off HFNC. Currently on room air  Continue incentive spirometry; chest PT Q4 hours, nebs to q4h, pulmicort, CXR   Monitor daily POCUS  ENT following for vocal cord injection    CARDIO: SBP goal 100-160mm Hg.Cont carvedilol, amlodipine, lisinopril   Holding  HCTZ    ENDO: Blood sugar goals 140-180 mg/dL    GI: Bowel regimen miralax bid  DIET: Start TFs, repeat speech and swallow pending.   LBM: 3/9.     RENAL: Continue LR @ 50/hr, Na goal 140-150; daily BMP    HEM/ONC: Hb stable INR 1.17  VTE Prophylaxis: SCDs, Eliquis    ID: Afebrile, leukocytosis; piperacillin/tazobactam (last day 03/17) off vancomycin  Social: Rafiqmichael Forerster  is HCP; discussed yesterday with wife and patient who are both agreeable to NGT insertion    Held discussion with patient's significant other and patient on 3/16 regarding PEG. They have not as yet made a decision.     Plan discussed with RN, house staff. 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 No neurosurgical intervention being offered.   Decadron taper.  REHAB: Physical therapy evaluation and management EARLY MOB:  HOB up    PULM: Off HFNC. Currently on room air  Continue incentive spirometry; chest PT Q4 hours, nebs to q4h, pulmicort  ENT following for vocal cord injection    CARDIO: SBP goal 100-160mm Hg.Cont carvedilol, amlodipine, lisinopril   Holding  HCTZ    ENDO: Blood sugar goals 140-180 mg/dL    GI: Bowel regimen miralax bid  DIET: Advance TFs, repeat speech and swallow pending. Nutrition  LBM: 3/9. Discussed PEG with s/o. Contemplative.      RENAL: IVL  Na goal 140-150; daily BMP    HEM/ONC: Hb stable INR 1.17  VTE Prophylaxis: SCDs, Eliquis    ID: Afebrile, leukocytosis; piperacillin/tazobactam (last day 03/17) off vancomycin  Social: Rafiq Forrester  is HCP; discussed yesterday with wife and patient who are both agreeable to NGT insertion    Held discussion with patient's significant other and patient on 3/16 regarding PEG. They have not as yet made a decision.     Plan discussed with RN, house staff. 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 No neurosurgical intervention being offered.   Decadron taper.  REHAB: Physical therapy evaluation and management EARLY MOB:  HOB up    PULM: Off HFNC. Currently on room air  Continue incentive spirometry; chest PT Q4 hours, nebs to q4h, pulmicort  ENT following for vocal cord injection    CARDIO: Sinus bradycardia. Monitor.  SBP goal 100-160mm Hg. Continue carvedilol, amlodipine, lisinopril   Holding  HCTZ  TTE: LVH. EF 65-70%    ENDO: Blood sugar goals 140-180 mg/dL    GI: Bowel regimen miralax bid  DIET: Advance TFs, repeat speech and swallow pending. Nutrition consult  LBM: 3/9. Discussed PEG with s/o. Contemplative.      RENAL: IVL  Na goal 140-150; daily BMP    HEM/ONC: Hb stable INR 1.17  VTE Prophylaxis: SCDs, Eliquis    ID: Afebrile, leukocytosis; piperacillin/tazobactam (last day 03/17) off vancomycin  Social: Rafiqmichael Forrester  is HCP; discussed feeding with wife and patient who are both agreeable to NGT insertion.  Held discussion with patient's significant other and patient on 3/16 regarding PEG. They have not as yet made a decision. Will address again on 3/17.      Plan discussed with RN, house staff.

## 2022-03-17 NOTE — PROGRESS NOTE ADULT - SUBJECTIVE AND OBJECTIVE BOX
HPI:  75y/o M with PMHx sig for HTN, HLD, afib (on eliquis, unknown if last taken today,) CVA in 2020 (with minimal residual right-sided weakness per girlfriend,) last known normal at 3pm when girlfriend spoke to him. 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 Upon arrival to [facility], BP noted to be 171/101. Stroke code was called. CTH without findings of hemorrhage. 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 NIHSS 20. No TPA was administered as Pt is out of window. Per patient's girlfriend, the basilar artery aneurysm is known and was diagnosed in 2020 at [facility] when he suffered from a CVA. At that time, he underwent a "treatment for the aneurysm," which the girlfriend reports was too risky and was aborted. Patient never attended his follow-up appointments due to fear per girlfriend.  (10 Mar 2022 22:52)    OVERNIGHT EVENTS: GM    Hospital Course:   3/10: Admitted for further workup of stroke symptoms and basilar artery aneurysm.  3/11: Karen placed overnight. 3% hypertonic saline started. Neuro exam stable. Started on vEEG for aphasia  3/12: GM overnight. Neuro exam stable. 3% d/c. home eliquis 5 bid. failed s/s. started on TF via NGT, spiked fever 101, f/u panculture   3/13: GM overnight, neuro stable, veeg negative. Vanc/zosyn started for empiric PNA.   3/14: GM o/n neuro stable. on HFNC. ENT plan for laryngoscope today with . failed s/s, pend repeat eval. dc'd captopril, started lisinopril. dc'd vanc. Laryngoscopy: No masses visualized, hypomobility of L sided vocal cords, dysarthric, hypophonic, risk for aspiration, good cough reflex.Dr. Onofre to discuss with Dr. ePterson regarding a possible vocal cord injection.   3/15: GM overnight, neuro stable, on HFNC 30/30 overnight, transitioned to 4LNC  3/16: GM overnight, neuro stable, tolerating NC and satting well    3/17: POD6 dx angio. GM o/n neuro stable. trickle feeds via NGT    Vital Signs Last 24 Hrs  T(C): 37.4 (17 Mar 2022 00:53), Max: 37.4 (17 Mar 2022 00:53)  T(F): 99.4 (17 Mar 2022 00:53), Max: 99.4 (17 Mar 2022 00:53)  HR: 47 (17 Mar 2022 01:00) (45 - 63)  BP: 157/71 (17 Mar 2022 01:00) (139/67 - 176/79)  BP(mean): 102 (17 Mar 2022 01:00) (92 - 120)  RR: 19 (17 Mar 2022 01:00) (8 - 23)  SpO2: 96% (17 Mar 2022 01:00) (93% - 99%)    I&O's Summary    15 Mar 2022 07:01  -  16 Mar 2022 07:00  --------------------------------------------------------  IN: 2750 mL / OUT: 2025 mL / NET: 725 mL    16 Mar 2022 07:01  -  17 Mar 2022 01:54  --------------------------------------------------------  IN: 1655 mL / OUT: 902 mL / NET: 753 mL        PHYSICAL EXAM:  GEN: laying in bed, appears well, NAD  NEURO: AOx3. FC, OE spont, hypophonic, +L facial. PERRL, EOMI. RUE/RLE 5/5. LUE 2/5 HG, 0/5 proximally. LLE w/d to noxious.  CV: RRR +S1/S2  PULM: CTAB  GI: Abd soft, NT/ND  EXT: ext warm, dry, nontender    TUBES/LINES:  [] Arredondo  [] Lumbar Drain  [] Wound Drains  [] Others      DIET:  [] NPO  [] Mechanical  [x] Tube feeds    LABS:                        12.7   5.89  )-----------( 142      ( 16 Mar 2022 05:17 )             36.1     03-16    141  |  110<H>  |  30<H>  ----------------------------<  122<H>  4.0   |  21<L>  |  0.85    Ca    8.1<L>      16 Mar 2022 05:17  Phos  2.8     03-16  Mg     2.0     03-16              CAPILLARY BLOOD GLUCOSE          Drug Levels: [] N/A    CSF Analysis: [] N/A      Allergies    No Known Allergies    Intolerances      MEDICATIONS:  Antibiotics:  piperacillin/tazobactam IVPB.. 3.375 Gram(s) IV Intermittent every 6 hours    Neuro:  acetaminophen    Suspension .. 650 milliGRAM(s) Oral every 6 hours PRN    Anticoagulation:  apixaban 5 milliGRAM(s) Enteral Tube every 12 hours    OTHER:  acetylcysteine 20%  Inhalation 4 milliLiter(s) Inhalation every 4 hours  albuterol/ipratropium for Nebulization 3 milliLiter(s) Nebulizer every 4 hours  amLODIPine   Tablet 5 milliGRAM(s) Oral daily  bisacodyl Suppository 10 milliGRAM(s) Rectal daily  carvedilol 6.25 milliGRAM(s) Oral every 12 hours  chlorhexidine 2% Cloths 1 Application(s) Topical <User Schedule>  dexAMETHasone  Injectable 1 milliGRAM(s) IV Push every 6 hours  dexAMETHasone  Injectable   IV Push   glucagon  Injectable 1 milliGRAM(s) IntraMuscular once  hydrALAZINE Injectable 10 milliGRAM(s) IV Push every 4 hours PRN  lactulose Syrup 10 Gram(s) Enteral Tube once PRN  lisinopril 20 milliGRAM(s) Oral every 24 hours  pantoprazole  Injectable 40 milliGRAM(s) IV Push daily  polyethylene glycol 3350 17 Gram(s) Oral every 12 hours  senna 2 Tablet(s) Oral at bedtime    IVF:  lactated ringers. 1000 milliLiter(s) IV Continuous <Continuous>    CULTURES:  Culture Results:   No growth at 4 days. (03-12 @ 18:54)    RADIOLOGY & ADDITIONAL TESTS:      ASSESSMENT:  75y/o M with PMHx sig for HTN, HLD, afib (on eliquis, unknown if last taken today,) CVA in 2020 (with minimal residual right-sided weakness per girlfriend) p/w dysarthria, left facial droop, and left-sided weakness. CTH without findings of hemorrhage. 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 NIHSS 20. Pt is not a tpa candidate as Pt is out of window. Patient now s/p diagnostic angio with findings of partially thrombosed basilar artery aneurysm 3/11/22. course c/b resp insufficiency weaned off HFNC     BASILAR ARTERY ANEURYSM    Handoff    MEWS Score    Brain aneurysm    Brain aneurysm    Basilar artery aneurysm    Angiogram, carotid and cerebral, bilateral    STROKE    [email or web address]        PLAN:  Neuro:  -neuro q4/vital checks q1  -MR head complete 3/11 no acute infarcts  -stroke core measures  -EEG negative, discontinued   - no plan for further itnervention    Cardiac:  -SBP goal 100-160  - bradycardic down to 40s, monitor hemodynamically   -cont home norvasc, captopril, carvedilol (home HCTZ held currently)  -echo 3/11- mild LVH, EF 65-70%  -home dose Eliquis dose resumed (afib)    Pulm:  - RA  - daily CXRs    ENT:   - Laryngoscopy: No masses visualized, hypomobility of L sided vocal cords, dysarthric, hypophonic, risk for aspiration, good cough reflex.Dr. Onofre to discuss with Dr. Peterson regarding a possible vocal cord injection.     Renal:  - LR @50   - Na goal 135-145    GI:  -TF via NGT - trickle feeds only, max 20cc/hr   -bowel regimen    Endo:  -ISS   -A1C 5.4    Heme;  -H/H stable  -SCDs  -cont home eliquis 5 bid per neurology  -LE dopplers 3/11 negative    ID:  -zosyn started empirically for PNA until 3/17  -f/u pan cx 3/12; NGTD    Dispo:  ICU status, full code, pending AR  Family updated with plan  Assessment and plan discussed with Dr. Peterson and Dr. Rubi      Assessment:  Present when checked    []  GCS  E   V  M     Heart Failure: []Acute, [] acute on chronic , []chronic  Heart Failure:  [] Diastolic (HFpEF), [] Systolic (HFrEF), []Combined (HFpEF and HFrEF), [] RHF, [] Pulm HTN, [] Other    [] LALO, [] ATN, [] AIN, [] other  [] CKD1, [] CKD2, [] CKD 3, [] CKD 4, [] CKD 5, []ESRD    Encephalopathy: [] Metabolic, [] Hepatic, [] toxic, [] Neurological, [] Other    Abnormal Nurtitional Status: [] malnurtition (see nutrition note), [ ]underweight: BMI < 19, [] morbid obesity: BMI >40, [] Cachexia    [] Sepsis  [] hypovolemic shock,[] cardiogenic shock, [] hemorrhagic shock, [] neuogenic shock  [] Acute Respiratory Failure  []Cerebral edema, [] Brain compression/ herniation,   [] Functional quadriplegia  [] Acute blood loss anemia

## 2022-03-17 NOTE — PROGRESS NOTE ADULT - SUBJECTIVE AND OBJECTIVE BOX
=================================  NEUROCRITICAL CARE ATTENDING NOTE  =================================    BREANNA MCCORMACK   MRN-[id number]  Summary:  76y/M with HTN, HLD, afib (on eliquis, unknown if last taken today,) CVA in  (with minimal residual right-sided weakness per girlfriend,) last known normal at 3pm when girlfriend spoke to him. 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 Upon arrival to [facility], BP noted to be 171/101. Stroke code was called. CTH without findings of hemorrhage. 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 NIHSS 20. No TPA was administered as Pt is out of window. Per patient's girlfriend, the basilar artery aneurysm is known and was diagnosed in  at [facility] when he suffered from a CVA. At that time, he underwent a "treatment for the aneurysm," which the girlfriend reports was too risky and was aborted. Patient never attended his follow-up appointments due to fear per girlfriend.  (10 Mar 2022 22:52)    COURSE IN THE HOSPITAL:  03/10 admitted to [facility]   Tmax 38.3; s/p angio showing extremely tortuous and elongated aortic arch with dolichoectatic vertebrobasilar system, giant partially thrombosed vertebrobasilar aneurysm   Tmax 37.9 tachypneic overnight; apixaban restarted; NGT inserted, tube feeds started at night   Tmax 38.3, desaturation to 88% this morning - now requiring 5L/min, tube feeds held; started on HFNC, improved   Tmax 37.8.  No significant events overnight.     Past Medical History:   Allergies:  No Known Allergies  Home meds:   ·	amLODIPine 5 mg oral tablet: 1 tab(s) orally once a day  ·	captopril 100 mg oral tablet:   ·	carvedilol 6.25 mg oral tablet:   ·	Eliquis 5 mg oral tablet:   ·	hydroCHLOROthiazide 25 mg oral tablet:     ICU Vital Signs Last 24 Hrs  T(C): 37 (17 Mar 2022 05:53), Max: 37.4 (17 Mar 2022 00:53)  T(F): 98.6 (17 Mar 2022 05:53), Max: 99.4 (17 Mar 2022 00:53)  HR: 50 (17 Mar 2022 07:00) (45 - 62)  BP: 142/72 (17 Mar 2022 07:00) (137/69 - 176/79)  BP(mean): 102 (17 Mar 2022 07:00) (96 - 120)  ABP: 149/66 (17 Mar 2022 07:00) (148/64 - 179/82)  ABP(mean): 97 (17 Mar 2022 07:00) (96 - 119)  RR: 15 (17 Mar 2022 07:00) (8 - 23)  SpO2: 94% (17 Mar 2022 07:00) (93% - 99%)      22 @ 07:01  -  22 @ 07:00  --------------------------------------------------------  IN: 2165 mL / OUT: 1277 mL / NET: 888 mL    22 @ 07:01  -  22 @ 07:52  --------------------------------------------------------  IN: 70 mL / OUT: 0 mL / NET: 70 mL      PHYSICAL EXAMINATION  NEUROLOGIC EXAMINATION:  Patient is awake, alert, oriented x3, following commands, CARISSA, L facial droop, LUE extensor posturing , able to wiggle fingers, L LE TF, RUE/LE 5/5, able to write, able to say some words   GENERAL:   EENT: Anicteric  CARDIOVASCULAR: (+) S1 S2, bradycardic rate and regular rhythm  PULMONARY: Mildly diminshed breath sounds  ABDOMEN: Soft, nontender with normoactive bowel sounds  EXTREMITIES: No edema, no groin hematoma, pulses good  SKIN: No rash    MEDICATIONS:   acetaminophen    Suspension .. 650 milliGRAM(s) Oral every 6 hours PRN  acetylcysteine 20%  Inhalation 4 milliLiter(s) Inhalation every 4 hours  albuterol/ipratropium for Nebulization 3 milliLiter(s) Nebulizer every 4 hours  amLODIPine   Tablet 5 milliGRAM(s) Oral daily  apixaban 5 milliGRAM(s) Enteral Tube every 12 hours  bisacodyl Suppository 10 milliGRAM(s) Rectal daily  carvedilol 6.25 milliGRAM(s) Oral every 12 hours  chlorhexidine 2% Cloths 1 Application(s) Topical <User Schedule>  dexAMETHasone  Injectable   IV Push   dexAMETHasone  Injectable 1 milliGRAM(s) IV Push every 6 hours  glucagon  Injectable 1 milliGRAM(s) IntraMuscular once  hydrALAZINE Injectable 10 milliGRAM(s) IV Push every 4 hours PRN  lactated ringers. 1000 milliLiter(s) (50 mL/Hr) IV Continuous <Continuous>  lactulose Syrup 10 Gram(s) Enteral Tube once PRN  lisinopril 20 milliGRAM(s) Oral every 24 hours  pantoprazole  Injectable 40 milliGRAM(s) IV Push daily  polyethylene glycol 3350 17 Gram(s) Oral every 12 hours  potassium chloride   Powder 20 milliEquivalent(s) Oral once  senna 2 Tablet(s) Oral at bedtime    LABS:                      13.3   5.74  )-----------( 171      ( 17 Mar 2022 05:34 )             37.9         138  |  108  |  30<H>  ----------------------------<  135<H>  3.8   |  22  |  0.87    Ca    8.5      17 Mar 2022 05:34  Phos  2.8       Mg     2.0           ABG - ( 15 Mar 2022 14:28 )  pH, Arterial: 7.44  pH, Blood: x     /  pCO2: 34    /  pO2: 151   / HCO3: 23    / Base Excess: -0.5  /  SaO2: 98.7        HbA1C = 5.4 ()  LDL = 108 ()   HDL = 43 ()  TG = 58 ()  TSH = 3.050 ()    Bacteriology:   Blood Cx NGTD  CSF studies:  EEG:  Neuroimagin/11 MRI no acute infarcts:  3cm giant aneurysm basilar artery, partially thrombosed, marked compression of brainstem, distortion of IV but patent  03/10 CTA:  dolichoectasia of basilar artery, partially thrombosed aneurysm; diffuse idiopathic skeletal hyperostosis  Other imaging:    IV FLUIDS:   DRIPS:   DIET: NPO  Lines: Karen  Drains:      CODE STATUS:  Full Code                       GOALS OF CARE:  Aggressive                      DISPOSITION:  ICU =================================  NEUROCRITICAL CARE ATTENDING NOTE  =================================    BREANNA MCCORMACK   MRN-[id number]  Summary:  76y/M with HTN, HLD, afib (on eliquis, unknown if last taken today,) CVA in  (with minimal residual right-sided weakness per girlfriend,) last known normal at 3pm when girlfriend spoke to him. 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 Upon arrival to [facility], BP noted to be 171/101. Stroke code was called. CTH without findings of hemorrhage. 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 NIHSS 20. No TPA was administered as Pt is out of window. Per patient's girlfriend, the basilar artery aneurysm is known and was diagnosed in  at [facility] when he suffered from a CVA. At that time, he underwent a "treatment for the aneurysm," which the girlfriend reports was too risky and was aborted. Patient never attended his follow-up appointments due to fear per girlfriend.  (10 Mar 2022 22:52)    COURSE IN THE HOSPITAL:  03/10 admitted to [facility]   Tmax 38.3; s/p angio showing extremely tortuous and elongated aortic arch with dolichoectatic vertebrobasilar system, giant partially thrombosed vertebrobasilar aneurysm   Tmax 37.9 tachypneic overnight; apixaban restarted; NGT inserted, tube feeds started at night   Tmax 38.3, desaturation to 88% this morning - now requiring 5L/min, tube feeds held; started on HFNC, improved   Tmax 37.8.  No significant events overnight.   3/16: Had discussion with patient and significant other regarding PEG. They understand the importance of it but are still contemplating.     Past Medical History:   Allergies:  No Known Allergies  Home meds:   ·	amLODIPine 5 mg oral tablet: 1 tab(s) orally once a day  ·	captopril 100 mg oral tablet:   ·	carvedilol 6.25 mg oral tablet:   ·	Eliquis 5 mg oral tablet:   ·	hydroCHLOROthiazide 25 mg oral tablet:     ICU Vital Signs Last 24 Hrs  T(C): 37 (17 Mar 2022 05:53), Max: 37.4 (17 Mar 2022 00:53)  T(F): 98.6 (17 Mar 2022 05:53), Max: 99.4 (17 Mar 2022 00:53)  HR: 50 (17 Mar 2022 07:00) (45 - 62)  BP: 142/72 (17 Mar 2022 07:00) (137/69 - 176/79)  BP(mean): 102 (17 Mar 2022 07:00) (96 - 120)  ABP: 149/66 (17 Mar 2022 07:00) (148/64 - 179/82)  ABP(mean): 97 (17 Mar 2022 07:00) (96 - 119)  RR: 15 (17 Mar 2022 07:00) (8 - 23)  SpO2: 94% (17 Mar 2022 07:00) (93% - 99%)      22 @ 07:01  -  22 @ 07:00  --------------------------------------------------------  IN: 2165 mL / OUT: 1277 mL / NET: 888 mL    22 @ 07:01  -  22 @ 07:52  --------------------------------------------------------  IN: 70 mL / OUT: 0 mL / NET: 70 mL      PHYSICAL EXAMINATION  NEUROLOGIC EXAMINATION:  Patient is awake, alert, oriented x3, following commands, CARISSA, L facial droop, LUE extensor posturing , able to wiggle fingers, L LE TF, RUE/LE 5/5, able to write, able to say some words   GENERAL:   EENT: Anicteric  CARDIOVASCULAR: (+) S1 S2, bradycardic rate and regular rhythm  PULMONARY: Mildly diminshed breath sounds  ABDOMEN: Soft, nontender with normoactive bowel sounds  EXTREMITIES: No edema, no groin hematoma, pulses good  SKIN: No rash    MEDICATIONS:   acetaminophen    Suspension .. 650 milliGRAM(s) Oral every 6 hours PRN  acetylcysteine 20%  Inhalation 4 milliLiter(s) Inhalation every 4 hours  albuterol/ipratropium for Nebulization 3 milliLiter(s) Nebulizer every 4 hours  amLODIPine   Tablet 5 milliGRAM(s) Oral daily  apixaban 5 milliGRAM(s) Enteral Tube every 12 hours  bisacodyl Suppository 10 milliGRAM(s) Rectal daily  carvedilol 6.25 milliGRAM(s) Oral every 12 hours  chlorhexidine 2% Cloths 1 Application(s) Topical <User Schedule>  dexAMETHasone  Injectable   IV Push   dexAMETHasone  Injectable 1 milliGRAM(s) IV Push every 6 hours  glucagon  Injectable 1 milliGRAM(s) IntraMuscular once  hydrALAZINE Injectable 10 milliGRAM(s) IV Push every 4 hours PRN  lactated ringers. 1000 milliLiter(s) (50 mL/Hr) IV Continuous <Continuous>  lactulose Syrup 10 Gram(s) Enteral Tube once PRN  lisinopril 20 milliGRAM(s) Oral every 24 hours  pantoprazole  Injectable 40 milliGRAM(s) IV Push daily  polyethylene glycol 3350 17 Gram(s) Oral every 12 hours  potassium chloride   Powder 20 milliEquivalent(s) Oral once  senna 2 Tablet(s) Oral at bedtime    LABS:                      13.3   5.74  )-----------( 171      ( 17 Mar 2022 05:34 )             37.9     -    138  |  108  |  30<H>  ----------------------------<  135<H>  3.8   |  22  |  0.87    Ca    8.5      17 Mar 2022 05:34  Phos  2.8     03-17  Mg     2.0     -      ABG - ( 15 Mar 2022 14:28 )  pH, Arterial: 7.44  pH, Blood: x     /  pCO2: 34    /  pO2: 151   / HCO3: 23    / Base Excess: -0.5  /  SaO2: 98.7        HbA1C = 5.4 ()  LDL = 108 ()   HDL = 43 ()  TG = 58 ()  TSH = 3.050 ()    Bacteriology:   Blood Cx NGTD  CSF studies:  EEG:  Neuroimagin/11 MRI no acute infarcts:  3cm giant aneurysm basilar artery, partially thrombosed, marked compression of brainstem, distortion of IV but patent  03/10 CTA:  dolichoectasia of basilar artery, partially thrombosed aneurysm; diffuse idiopathic skeletal hyperostosis  Other imaging:    IV FLUIDS:   DRIPS:   DIET: NPO  Lines: [place]  Drains:      CODE STATUS:  Full Code                       GOALS OF CARE:  Aggressive                      DISPOSITION:  ICU =================================  NEUROCRITICAL CARE ATTENDING NOTE  =================================    BREANNA MCCORMACK   MRN-[id number]  Summary:  76y/M with HTN, HLD, afib (on eliquis, unknown if last taken today,) CVA in  (with minimal residual right-sided weakness per girlfriend,) last known normal at 3pm when girlfriend spoke to him. 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 Upon arrival to [facility], BP noted to be 171/101. Stroke code was called. CTH without findings of hemorrhage. 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 NIHSS 20. No TPA was administered as Pt is out of window. Per patient's girlfriend, the basilar artery aneurysm is known and was diagnosed in  at [facility] when he suffered from a CVA. At that time, he underwent a "treatment for the aneurysm," which the girlfriend reports was too risky and was aborted. Patient never attended his follow-up appointments due to fear per girlfriend.  (10 Mar 2022 22:52)    COURSE IN THE HOSPITAL:  03/10 admitted to [facility]   Tmax 38.3; s/p angio showing extremely tortuous and elongated aortic arch with dolichoectatic vertebrobasilar system, giant partially thrombosed vertebrobasilar aneurysm   Tmax 37.9 tachypneic overnight; apixaban restarted; NGT inserted, tube feeds started at night   Tmax 38.3, desaturation to 88% this morning - now requiring 5L/min, tube feeds held; started on HFNC, improved   Tmax 37.8.  No significant events overnight.   3/16: Had discussion with patient and significant other regarding PEG. They understand the importance of it but are still contemplating.   3/17: Afebrile. Remains on room air    Past Medical History:   Allergies:  No Known Allergies  Home meds:   ·	amLODIPine 5 mg oral tablet: 1 tab(s) orally once a day  ·	captopril 100 mg oral tablet:   ·	carvedilol 6.25 mg oral tablet:   ·	Eliquis 5 mg oral tablet:   ·	hydroCHLOROthiazide 25 mg oral tablet:     ROS:   All systems reviewed. Able only to nod or shake head.   Denies all complaints. No pain, CP, SOB, N/V.   Wants ice chips    ICU Vital Signs Last 24 Hrs  T(C): 37 (17 Mar 2022 05:53), Max: 37.4 (17 Mar 2022 00:53)  T(F): 98.6 (17 Mar 2022 05:53), Max: 99.4 (17 Mar 2022 00:53)  HR: 50 (17 Mar 2022 07:00) (45 - 62)  BP: 142/72 (17 Mar 2022 07:00) (137/69 - 176/79)  BP(mean): 102 (17 Mar 2022 07:00) (96 - 120)  ABP: 149/66 (17 Mar 2022 07:00) (148/64 - 179/82)  ABP(mean): 97 (17 Mar 2022 07:00) (96 - 119)  RR: 15 (17 Mar 2022 07:00) (8 - 23)  SpO2: 94% (17 Mar 2022 07:00) (93% - 99%)      22 @ 07:01  -  22 @ 07:00  --------------------------------------------------------  IN: 2165 mL / OUT: 1277 mL / NET: 888 mL    22 @ 07:01  -  22 @ 07:52  --------------------------------------------------------  IN: 70 mL / OUT: 0 mL / NET: 70 mL      PHYSICAL EXAMINATION  NEUROLOGIC EXAMINATION:  Patient is awake, alert, oriented x3, following commands, CARISSA, L facial droop, LUE extensor posturing , able to wiggle fingers, L LE TF, RUE/LE 5/5, able to write, able to say some words   GENERAL:   EENT: Anicteric  CARDIOVASCULAR: (+) S1 S2, bradycardic rate and regular rhythm  PULMONARY: Mildly diminshed breath sounds  ABDOMEN: Soft, nontender with normoactive bowel sounds  EXTREMITIES: No edema, no groin hematoma, pulses good  SKIN: No rash    MEDICATIONS:   acetaminophen    Suspension .. 650 milliGRAM(s) Oral every 6 hours PRN  acetylcysteine 20%  Inhalation 4 milliLiter(s) Inhalation every 4 hours  albuterol/ipratropium for Nebulization 3 milliLiter(s) Nebulizer every 4 hours  amLODIPine   Tablet 5 milliGRAM(s) Oral daily  apixaban 5 milliGRAM(s) Enteral Tube every 12 hours  bisacodyl Suppository 10 milliGRAM(s) Rectal daily  carvedilol 6.25 milliGRAM(s) Oral every 12 hours  chlorhexidine 2% Cloths 1 Application(s) Topical <User Schedule>  dexAMETHasone  Injectable   IV Push   dexAMETHasone  Injectable 1 milliGRAM(s) IV Push every 6 hours  glucagon  Injectable 1 milliGRAM(s) IntraMuscular once  hydrALAZINE Injectable 10 milliGRAM(s) IV Push every 4 hours PRN  lactated ringers. 1000 milliLiter(s) (50 mL/Hr) IV Continuous <Continuous>  lactulose Syrup 10 Gram(s) Enteral Tube once PRN  lisinopril 20 milliGRAM(s) Oral every 24 hours  pantoprazole  Injectable 40 milliGRAM(s) IV Push daily  polyethylene glycol 3350 17 Gram(s) Oral every 12 hours  potassium chloride   Powder 20 milliEquivalent(s) Oral once  senna 2 Tablet(s) Oral at bedtime    LABS:                      13.3   5.74  )-----------( 171      ( 17 Mar 2022 05:34 )             37.9     03-17    138  |  108  |  30<H>  ----------------------------<  135<H>  3.8   |  22  |  0.87    Ca    8.5      17 Mar 2022 05:34  Phos  2.8     03-17  Mg     2.0     03-17      ABG - ( 15 Mar 2022 14:28 )  pH, Arterial: 7.44  pH, Blood: x     /  pCO2: 34    /  pO2: 151   / HCO3: 23    / Base Excess: -0.5  /  SaO2: 98.7        HbA1C = 5.4 ()  LDL = 108 ()   HDL = 43 ()  TG = 58 ()  TSH = 3.050 ()    Bacteriology:   Blood Cx NGTD  CSF studies:  EEG:  Neuroimagin/11 MRI no acute infarcts:  3cm giant aneurysm basilar artery, partially thrombosed, marked compression of brainstem, distortion of IV but patent  03/10 CTA:  dolichoectasia of basilar artery, partially thrombosed aneurysm; diffuse idiopathic skeletal hyperostosis  Other imaging:    IV FLUIDS:   DRIPS:   DIET: TFs  Lines: [place]  Drains:      CODE STATUS:  Full Code                       GOALS OF CARE:  Aggressive                      DISPOSITION:  ICU

## 2022-03-18 LAB
ANION GAP SERPL CALC-SCNC: 9 MMOL/L — SIGNIFICANT CHANGE UP (ref 5–17)
BUN SERPL-MCNC: 27 MG/DL — HIGH (ref 7–23)
CALCIUM SERPL-MCNC: 8.4 MG/DL — SIGNIFICANT CHANGE UP (ref 8.4–10.5)
CHLORIDE SERPL-SCNC: 108 MMOL/L — SIGNIFICANT CHANGE UP (ref 96–108)
CO2 SERPL-SCNC: 22 MMOL/L — SIGNIFICANT CHANGE UP (ref 22–31)
CREAT SERPL-MCNC: 0.89 MG/DL — SIGNIFICANT CHANGE UP (ref 0.5–1.3)
EGFR: 88 ML/MIN/1.73M2 — SIGNIFICANT CHANGE UP
GLUCOSE SERPL-MCNC: 97 MG/DL — SIGNIFICANT CHANGE UP (ref 70–99)
HCT VFR BLD CALC: 42.6 % — SIGNIFICANT CHANGE UP (ref 39–50)
HGB BLD-MCNC: 15 G/DL — SIGNIFICANT CHANGE UP (ref 13–17)
MAGNESIUM SERPL-MCNC: 2 MG/DL — SIGNIFICANT CHANGE UP (ref 1.6–2.6)
MCHC RBC-ENTMCNC: 32.1 PG — SIGNIFICANT CHANGE UP (ref 27–34)
MCHC RBC-ENTMCNC: 35.2 GM/DL — SIGNIFICANT CHANGE UP (ref 32–36)
MCV RBC AUTO: 91.2 FL — SIGNIFICANT CHANGE UP (ref 80–100)
NRBC # BLD: 0 /100 WBCS — SIGNIFICANT CHANGE UP (ref 0–0)
PHOSPHATE SERPL-MCNC: 2.8 MG/DL — SIGNIFICANT CHANGE UP (ref 2.5–4.5)
PLATELET # BLD AUTO: 214 K/UL — SIGNIFICANT CHANGE UP (ref 150–400)
POTASSIUM SERPL-MCNC: 3.4 MMOL/L — LOW (ref 3.5–5.3)
POTASSIUM SERPL-SCNC: 3.4 MMOL/L — LOW (ref 3.5–5.3)
RBC # BLD: 4.67 M/UL — SIGNIFICANT CHANGE UP (ref 4.2–5.8)
RBC # FLD: 12.4 % — SIGNIFICANT CHANGE UP (ref 10.3–14.5)
SODIUM SERPL-SCNC: 139 MMOL/L — SIGNIFICANT CHANGE UP (ref 135–145)
WBC # BLD: 6.08 K/UL — SIGNIFICANT CHANGE UP (ref 3.8–10.5)
WBC # FLD AUTO: 6.08 K/UL — SIGNIFICANT CHANGE UP (ref 3.8–10.5)

## 2022-03-18 PROCEDURE — 99233 SBSQ HOSP IP/OBS HIGH 50: CPT

## 2022-03-18 RX ORDER — POTASSIUM CHLORIDE 20 MEQ
40 PACKET (EA) ORAL
Refills: 0 | Status: COMPLETED | OUTPATIENT
Start: 2022-03-18 | End: 2022-03-18

## 2022-03-18 RX ORDER — LACTULOSE 10 G/15ML
10 SOLUTION ORAL ONCE
Refills: 0 | Status: COMPLETED | OUTPATIENT
Start: 2022-03-18 | End: 2022-03-18

## 2022-03-18 RX ADMIN — Medication 4 MILLILITER(S): at 22:55

## 2022-03-18 RX ADMIN — APIXABAN 5 MILLIGRAM(S): 2.5 TABLET, FILM COATED ORAL at 19:32

## 2022-03-18 RX ADMIN — LACTULOSE 10 GRAM(S): 10 SOLUTION ORAL at 11:55

## 2022-03-18 RX ADMIN — Medication 1 ENEMA: at 21:27

## 2022-03-18 RX ADMIN — CHLORHEXIDINE GLUCONATE 1 APPLICATION(S): 213 SOLUTION TOPICAL at 05:26

## 2022-03-18 RX ADMIN — Medication 10 MILLIGRAM(S): at 06:10

## 2022-03-18 RX ADMIN — Medication 4 MILLILITER(S): at 02:38

## 2022-03-18 RX ADMIN — Medication 10 MILLIGRAM(S): at 11:55

## 2022-03-18 RX ADMIN — LISINOPRIL 20 MILLIGRAM(S): 2.5 TABLET ORAL at 19:33

## 2022-03-18 RX ADMIN — Medication 3 MILLILITER(S): at 09:27

## 2022-03-18 RX ADMIN — Medication 4 MILLILITER(S): at 18:46

## 2022-03-18 RX ADMIN — APIXABAN 5 MILLIGRAM(S): 2.5 TABLET, FILM COATED ORAL at 05:26

## 2022-03-18 RX ADMIN — SENNA PLUS 2 TABLET(S): 8.6 TABLET ORAL at 21:27

## 2022-03-18 RX ADMIN — Medication 10 MILLIGRAM(S): at 21:26

## 2022-03-18 RX ADMIN — Medication 40 MILLIEQUIVALENT(S): at 07:05

## 2022-03-18 RX ADMIN — Medication 3 MILLILITER(S): at 05:28

## 2022-03-18 RX ADMIN — CARVEDILOL PHOSPHATE 6.25 MILLIGRAM(S): 80 CAPSULE, EXTENDED RELEASE ORAL at 19:33

## 2022-03-18 RX ADMIN — Medication 3 MILLILITER(S): at 02:38

## 2022-03-18 RX ADMIN — Medication 3 MILLILITER(S): at 19:32

## 2022-03-18 RX ADMIN — Medication 10 MILLIGRAM(S): at 16:00

## 2022-03-18 RX ADMIN — Medication 4 MILLILITER(S): at 05:26

## 2022-03-18 RX ADMIN — POLYETHYLENE GLYCOL 3350 17 GRAM(S): 17 POWDER, FOR SOLUTION ORAL at 19:32

## 2022-03-18 RX ADMIN — Medication 4 MILLILITER(S): at 09:27

## 2022-03-18 RX ADMIN — Medication 3 MILLILITER(S): at 22:55

## 2022-03-18 RX ADMIN — Medication 40 MILLIEQUIVALENT(S): at 11:55

## 2022-03-18 RX ADMIN — AMLODIPINE BESYLATE 5 MILLIGRAM(S): 2.5 TABLET ORAL at 05:26

## 2022-03-18 RX ADMIN — POLYETHYLENE GLYCOL 3350 17 GRAM(S): 17 POWDER, FOR SOLUTION ORAL at 05:26

## 2022-03-18 NOTE — PROGRESS NOTE ADULT - ASSESSMENT
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 No neurosurgical intervention being offered.   Decadron taper.  REHAB: Physical therapy evaluation and management EARLY MOB:  HOB up    PULM: Off HFNC. Currently on room air  Continue incentive spirometry; chest PT Q4 hours, nebs to q4h, pulmicort  ENT following     CARDIO: Sinus bradycardia. Monitor.  SBP goal 100-160mm Hg. Continue carvedilol, amlodipine, lisinopril   Holding  HCTZ  TTE: LVH. EF 65-70%    ENDO: Blood sugar goals 140-180 mg/dL    GI: Bowel regimen miralax bid  DIET: Advance TFs, repeat speech and swallow pending. Nutrition consult  LBM: 3/9. Discussed PEG with significant other. Contemplative.      RENAL: IVL  Na goal 140-150; daily BMP    HEM/ONC: Hb stable INR 1.17  VTE Prophylaxis: SCDs, Eliquis    ID: Afebrile, leukocytosis; piperacillin/tazobactam (last day 03/17) off vancomycin    Social: Rafiqmichael Forrester  is HCP; discussed feeding with wife and patient who are both agreeable to NGT insertion.  Held discussion with patient's significant other and patient on 3/16 regarding PEG. They have not as yet made a decision. Will address again on 3/17.      Plan discussed with RN, house staff. 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 No neurosurgical intervention being offered.   Decadron taper.  REHAB: Physical therapy evaluation and management EARLY MOB:  HOB up    PULM: Off HFNC. Currently on room air. High aspiration.   Continue incentive spirometry; chest PT Q4 hours, nebs to q4h, pulmicort  ENT following     CARDIO: Sinus bradycardia. Monitor.  SBP goal 100-160mm Hg. Continue carvedilol (holding parameters <60bpm), amlodipine, lisinopril   Holding  HCTZ  TTE: LVH. EF 65-70%    ENDO: Blood sugar goals 140-180 mg/dL    GI: Bowel regimen miralax bid  DIET: Advance TFs to goal. Repeat speech and swallow pending. Nutrition consult  LBM: 3/12. Discussed PEG with significant other. Will follow up.   Lactulose.    RENAL: IVL  Na goal 140-150; daily BMP    HEM/ONC: Hb stable INR 1.17  VTE Prophylaxis: SCDs, Eliquis.    ID: Afebrile, leukocytosis; piperacillin/tazobactam (last day 03/17).    Social: Rafiq Forrester  is HCP. NGT in place.   Held discussion with patient's significant other and patient on 3/16 regarding PEG. As of 3/17 wife is more in favor of PEG and stated that she will  convey this to patient.. They understand and were shown images of giant basilar aneurysm and aware that no surgery will be offered and remains at  risk of rupture. Will address again GOC on 3/18.       Plan discussed with RN, house staff. 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 No neurosurgical intervention being offered. Family aware  S/P decadron taper.  REHAB: Physical therapy evaluation and management EARLY MOB:  HOB elevated    PULM: Off HFNC. Currently on room air. High aspiration.   Continue incentive spirometry; chest PT Q4 hours, nebs to q4h, pulmicort  ENT following     CARDIO: Asymptomatic sinus bradycardia. Monitor.  SBP goal 100-160mm Hg. Continue carvedilol (holding parameters <60bpm), amlodipine, lisinopril   Holding  HCTZ  TTE: LVH. EF 65-70%    ENDO: Blood sugar goals 140-180 mg/dL    GI: Bowel regimen miralax bid  DIET: Advance TFs to goal. Repeat speech and swallow pending. Nutrition consult  LBM: 3/12. Discussed PEG with significant other. Will follow up.   Lactulose.    RENAL: IVL  Na goal 140-150; daily BMP    HEM/ONC: Hb stable INR 1.17  VTE Prophylaxis: SCDs, Eliquis.    ID: Afebrile, leukocytosis; piperacillin/tazobactam (last day 03/17).    Social: Rafiq Forrester  is HCP. NGT in place.   Held discussion with patient's significant other and patient on 3/16 regarding PEG. As of 3/17 wife is more in favor of PEG and stated that she will  convey this to patient.. They understand and were shown images of giant basilar aneurysm and aware that no surgery will be offered and remains at  risk of rupture. Will address again GOC on 3/18.       Plan discussed with RN, house staff. 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 No neurosurgical intervention being offered. Family aware  S/P decadron taper.  REHAB: Physical therapy evaluation and management EARLY MOB:  HOB elevated    PULM: Off HFNC. Currently on room air. High aspiration.   Continue incentive spirometry; chest PT Q4 hours, nebs to q4h, pulmicort  ENT following     CARDIO: Asymptomatic sinus bradycardia. Monitor.  SBP goal 100-160mm Hg. Continue carvedilol (holding parameters <60bpm), amlodipine, lisinopril   Holding  HCTZ  TTE: LVH. EF 65-70%    ENDO: Blood sugar goals 140-180 mg/dL    GI: Bowel regimen miralax bid  DIET: Advance TFs to goal. Repeat speech and swallow pending. Nutrition consult  LBM: 3/12. Discussed PEG with significant other. Will follow up.   Lactulose.    RENAL: IVL  Na goal 140-150; daily BMP    HEM/ONC: Hb stable INR 1.17  VTE Prophylaxis: SCDs, Eliquis.    ID: Afebrile, leukocytosis; piperacillin/tazobactam (last day 03/17).    Social: Rafiq Forrester  is HCP. NGT in place.   Held discussion with patient's significant other and patient on 3/16 regarding PEG. As of 3/17 wife is more in favor of PEG and stated that she will  convey this to patient.. They understand and were shown images of giant basilar aneurysm and aware that no surgery will be offered and remains at  risk of rupture. Will address again GOC on 3/18.     ICU stepdown Checklist:    Completed: 03-18 @ 14:46    [X] hemodynamically stable – VS WNL and stable x 24hours, UO adequate  [n/a ] if  previously on HDA - off pressors x 24h with stable neuro exam    [X] no new symptoms x 24h (i.e. new fever, new-onset nausea/vomiting)  [X] stable labs: (i.e. WBC not rising, sodium not dropping)  [X] patient is at high risk for aspiration                  [x] family currently against trach/ PEG                  [x] stepdown to bed close to nurse’s station  [n/a] low suctioning requirements (i.e. q4h or less)  [X] sign-off from primary RN*  [X] drains do not require ICU level of care  [X] if patient previously agitated or with behavioral issues – controlled   [X] pain controlled      Plan discussed with RN, house staff. 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 No neurosurgical intervention being offered. Family aware  S/P decadron taper.  REHAB: Physical therapy evaluation and management EARLY MOB:  HOB elevated    PULM: Off HFNC. Currently on room air. High aspiration.   Continue incentive spirometry; chest PT Q4 hours, nebs to q4h, pulmicort  ENT following     CARDIO: Asymptomatic sinus bradycardia. Monitor.  SBP goal 100-160mm Hg. Continue carvedilol (holding parameters <60bpm), amlodipine, lisinopril   Holding  HCTZ  TTE: LVH. EF 65-70%    ENDO: Blood sugar goals 140-180 mg/dL    GI: Bowel regimen miralax bid  DIET: Advance TFs to goal. Repeat speech and swallow pending. Nutrition consult  LBM: 3/12. Discussed PEG with significant other. Will follow up.   Lactulose.    RENAL: IVL  Na goal 140-150; daily BMP    HEM/ONC: Hb stable INR 1.17  VTE Prophylaxis: SCDs, Eliquis.    ID: Afebrile, leukocytosis; piperacillin/tazobactam (last day 03/17).    Social: Rafiq Forrester  is HCP. NGT in place.   Held discussion with patient's significant other and patient on 3/16 regarding PEG. As of 3/17 wife is more in favor of PEG and stated that she will  convey this to patient.. They understand and were shown images of giant basilar aneurysm and aware that no surgery will be offered and remains at  risk of rupture. Will address again GOC on 3/18.     ICU stepdown Checklist:    Completed: 03-18 @ 14:46    [X] hemodynamically stable – VS WNL and stable x 24hours, UO adequate  [n/a ] if  previously on HDA - off pressors x 24h with stable neuro exam    [X] no new symptoms x 24h (i.e. new fever, new-onset nausea/vomiting)  [X] stable labs: (i.e. WBC not rising, sodium not dropping)  [X] patient is at high risk for aspiration                  [x] family currently undecided on PEG                  [x] stepdown to bed close to nurse’s station  [n/a] low suctioning requirements (i.e. q4h or less)  [X] sign-off from primary RN*  [X] drains do not require ICU level of care  [X] if patient previously agitated or with behavioral issues – controlled   [X] pain controlled      Plan discussed with RN, house staff.

## 2022-03-18 NOTE — CHART NOTE - NSCHARTNOTEFT_GEN_A_CORE
Admitting Diagnosis:   Patient is a 76y old  Male who presents with a chief complaint of CVA, left facial, left-sided weakness (18 Mar 2022 06:53)    PAST MEDICAL & SURGICAL HISTORY:  HTN, HLD, afib (on eliquis, unknown if last taken today,) CVA in 2020    Current Nutrition Order: NPO diet  EN regimen: Jevity 1.2 @ 20 ml/hr x24hrs via NGT providing 480 ml TV, 576 kcal, 27g pro, 387 ml free water     PO Intake: Good (%) [   ]  Fair (50-75%) [   ] Poor (<25%) [   ]- N/A    GI Issues:   WDL, No n/v/d noted  No abd distention    Pain:  No pain noted at this time    Skin Integrity:  Surgical incision, marichuy score 13  +2 pitting edema left leg and hand, +1 generalized pitting edema  No pressure ulcers noted    Labs:   03-18    139  |  108  |  27<H>  ----------------------------<  97  3.4<L>   |  22  |  0.89    Ca    8.4      18 Mar 2022 05:34  Phos  2.8     03-18  Mg     2.0     03-18    Medications:  MEDICATIONS  (STANDING):  acetylcysteine 20%  Inhalation 4 milliLiter(s) Inhalation every 4 hours  albuterol/ipratropium for Nebulization 3 milliLiter(s) Nebulizer every 4 hours  amLODIPine   Tablet 5 milliGRAM(s) Oral daily  apixaban 5 milliGRAM(s) Enteral Tube every 12 hours  bisacodyl Suppository 10 milliGRAM(s) Rectal daily  carvedilol 6.25 milliGRAM(s) Oral every 12 hours  chlorhexidine 2% Cloths 1 Application(s) Topical <User Schedule>  glucagon  Injectable 1 milliGRAM(s) IntraMuscular once  lisinopril 20 milliGRAM(s) Oral every 24 hours  polyethylene glycol 3350 17 Gram(s) Oral every 12 hours  potassium chloride   Solution 40 milliEquivalent(s) Oral every 2 hours  senna 2 Tablet(s) Oral at bedtime    MEDICATIONS  (PRN):  acetaminophen    Suspension .. 650 milliGRAM(s) Oral every 6 hours PRN Temp greater or equal to 38C (100.4F), Mild Pain (1 - 3)  hydrALAZINE Injectable 10 milliGRAM(s) IV Push every 4 hours PRN SBP>160  lactulose Syrup 10 Gram(s) Enteral Tube once PRN constipation    Admitting Anthropometrics:  · Height for BMI (FEET)	6 Feet  · Height for BMI (INCHES)	3 Inch(s)  · Height for BMI (CENTIMETERS)	190.5 Centimeter(s)  · Weight for BMI (lbs)	260 lb  · Weight for BMI (kg)	117.9 kg  · Body Mass Index	32.4  · Ideal Body Weight (lbs)	196  · Ideal Body Weight (kg)	88.9    Weight:  3/10 260lbs  3/14 176lbs (?accuracy- pt does not appear to be this weight)    Weight Change:   Please obtain new weight when feasible and trend biweekly.     Nutrition Focused Physical Exam: Completed [   ]  Not Pertinent [ x  ]    Estimated energy needs:   IBW used for calculations as pt >120% of IBW (133%). Needs adjusted for advanced age. **Adjust fluids per team.  Kcal (20-25 kcal/kg): 6601-5548 kcal  Protein (1.0-1.2 g/kg pro): 88-106g pro  Fluids (30-35 ml/kg): 0643-5398 ml    Subjective:   76M with PMHx sig for HTN, HLD, afib (on eliquis, unknown if last taken today,) CVA in 2020 (with minimal residual right-sided weakness per girlfriend) p/w dysarthria, left facial droop, and left-sided weakness. CTH without findings of hemorrhage. 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 NIHSS 20. Pt is not a tpa candidate as pt is out of window. Transferred to NSICU for further assessment/ monitoring. S/p diagnostic angio with findings of partially thrombosed basilar artery aneurysm 3/11/22. SLP eval 3/12 and follow up 3/14 both with recs for NPO with NGT placement for initiation of EN. Pt to start on trickle feeds once NGT placement if confirmed per disc with team during rounds.         Previous Nutrition Diagnosis: Inadequate energy intake RT inability to meet est needs on current diet AEB NPO, meeting 0% of est needs.     Active [ x  ]  Resolved [   ]    If resolved, new PES:     Goal/Expected Outcome 1. Diet will advance within 24-48hrs 2. Consistently meet >75% est needs.    Recommendations:  1. NPO  >>Advance to DASH/ TLC diet with SLPs recommendation for consistency when medically feasible  2. If team wish to place NGT and initiate EN, recommend; Jevity 1.2 @ 70 ml/hr x24hrs via NGT providing 1680 ml TV, 2016 kcal, 93g pro, 1356 ml free water.   >>Start at 10 ml/hr and advance 10 ml q6hrs until goal rate is met  >>FWF per team  >>Maintain aspiration precautions at all times  3. Pain and bowel regimen per team   4. Cont to monitor lytes and replete prn   5. RD diet edu prn    Education: Deferred    Risk Level: High [ x  ] Moderate [   ] Low [   ]. Admitting Diagnosis:   Patient is a 76y old  Male who presents with a chief complaint of CVA, left facial, left-sided weakness (18 Mar 2022 06:53)    PAST MEDICAL & SURGICAL HISTORY:  HTN, HLD, afib (on eliquis, unknown if last taken today,) CVA in 2020    Current Nutrition Order: NPO diet  EN regimen: Jevity 1.2 @ 20 ml/hr x24hrs via NGT providing 480 ml TV, 576 kcal, 27g pro, 387 ml free water     PO Intake: Good (%) [   ]  Fair (50-75%) [   ] Poor (<25%) [   ]- N/A    GI Issues:   WDL, No n/v/d noted  No abd distention    Pain:  No pain noted at this time    Skin Integrity:  Surgical incision, marichuy score 13  +2 pitting edema left leg and hand, +1 generalized pitting edema  No pressure ulcers noted    Labs:   03-18    139  |  108  |  27<H>  ----------------------------<  97  3.4<L>   |  22  |  0.89    Ca    8.4      18 Mar 2022 05:34  Phos  2.8     03-18  Mg     2.0     03-18    Medications:  MEDICATIONS  (STANDING):  acetylcysteine 20%  Inhalation 4 milliLiter(s) Inhalation every 4 hours  albuterol/ipratropium for Nebulization 3 milliLiter(s) Nebulizer every 4 hours  amLODIPine   Tablet 5 milliGRAM(s) Oral daily  apixaban 5 milliGRAM(s) Enteral Tube every 12 hours  bisacodyl Suppository 10 milliGRAM(s) Rectal daily  carvedilol 6.25 milliGRAM(s) Oral every 12 hours  chlorhexidine 2% Cloths 1 Application(s) Topical <User Schedule>  glucagon  Injectable 1 milliGRAM(s) IntraMuscular once  lisinopril 20 milliGRAM(s) Oral every 24 hours  polyethylene glycol 3350 17 Gram(s) Oral every 12 hours  potassium chloride   Solution 40 milliEquivalent(s) Oral every 2 hours  senna 2 Tablet(s) Oral at bedtime    MEDICATIONS  (PRN):  acetaminophen    Suspension .. 650 milliGRAM(s) Oral every 6 hours PRN Temp greater or equal to 38C (100.4F), Mild Pain (1 - 3)  hydrALAZINE Injectable 10 milliGRAM(s) IV Push every 4 hours PRN SBP>160  lactulose Syrup 10 Gram(s) Enteral Tube once PRN constipation    Admitting Anthropometrics:  · Height for BMI (FEET)	6 Feet  · Height for BMI (INCHES)	3 Inch(s)  · Height for BMI (CENTIMETERS)	190.5 Centimeter(s)  · Weight for BMI (lbs)	260 lb  · Weight for BMI (kg)	117.9 kg  · Body Mass Index	32.4  · Ideal Body Weight (lbs)	196  · Ideal Body Weight (kg)	88.9    Weight:  3/10 260lbs  3/14 176lbs (?accuracy- pt does not appear to be this weight)    Weight Change:   Please obtain new weight when feasible and trend biweekly.     Nutrition Focused Physical Exam: Completed [   ]  Not Pertinent [ x  ]    Estimated energy needs:   IBW used for calculations as pt >120% of IBW (133%). Needs adjusted for advanced age. **Adjust fluids per team.  Kcal (20-25 kcal/kg): 0302-5888 kcal  Protein (1.0-1.2 g/kg pro): 88-106g pro  Fluids (30-35 ml/kg): 3205-1774 ml    Subjective:   76M with PMHx sig for HTN, HLD, afib (on eliquis, unknown if last taken today,) CVA in 2020 (with minimal residual right-sided weakness per girlfriend) p/w dysarthria, left facial droop, and left-sided weakness. CTH without findings of hemorrhage. 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 NIHSS 20. Pt is not a tpa candidate as pt is out of window. Transferred to NSICU for further assessment/ monitoring. S/p diagnostic angio with findings of partially thrombosed basilar artery aneurysm 3/11/22. SLP eval 3/12 and follow up 3/14 both with recs for NPO with NGT placement for initiation of EN. Pt to start on trickle feeds once NGT placement if confirmed per disc with team during rounds.     On assessment, pt resting in bed. Currently NPO with EN running at trickle feeds tolerating well. Would recommend slowly advance to goal of 70 ml/hr to optimize nutrition. No reported n/v/d/c. No abd distention. No s/sx of aspiration. Education deferred at this time. Please see nutr recs below. RD to follow.     Previous Nutrition Diagnosis: Inadequate energy intake RT inability to meet est needs on current diet AEB NPO, meeting 0% of est needs.     Active [ x  ]  Resolved [   ]    If resolved, new PES:     Goal/Expected Outcome 1. Diet will advance within 24-48hrs 2. Consistently meet >75% est needs.    Recommendations:  1. NPO  >>Advance to DASH/ TLC diet with SLPs recommendation for consistency when medically feasible  2. To optimize nutrition via EN, recommend; Jevity 1.2 @ 70 ml/hr x24hrs via NGT providing 1680 ml TV, 2016 kcal, 93g pro, 1356 ml free water.   >>Advance 10 ml q6hrs until goal rate is met  >>FWF per team  >>Maintain aspiration precautions at all times  3. Pain and bowel regimen per team   4. Cont to monitor lytes and replete prn   5. RD diet edu prn    Education: Deferred    Risk Level: High [ x  ] Moderate [   ] Low [   ].

## 2022-03-18 NOTE — PROGRESS NOTE ADULT - SUBJECTIVE AND OBJECTIVE BOX
Physical Medicine and Rehabilitation Progress Note:    Patient is a 76y old  Male who presents with a chief complaint of CVA, left facial, left-sided weakness (18 Mar 2022 06:53)      HPI:  75y/o M with PMHx sig for HTN, HLD, afib (on eliquis, unknown if last taken today,) CVA in 2020 (with minimal residual right-sided weakness per girlfriend,) last known normal at 3pm when girlfriend spoke to him. 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 Upon arrival to [facility], BP noted to be 171/101. Stroke code was called. CTH without findings of hemorrhage. 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 NIHSS 20. No TPA was administered as Pt is out of window. Per patient's girlfriend, the basilar artery aneurysm is known and was diagnosed in 2020 at [facility] when he suffered from a CVA. At that time, he underwent a "treatment for the aneurysm," which the girlfriend reports was too risky and was aborted. Patient never attended his follow-up appointments due to fear per girlfriend.  (10 Mar 2022 22:52)                            15.0   6.08  )-----------( 214      ( 18 Mar 2022 05:34 )             42.6       03-18    139  |  108  |  27<H>  ----------------------------<  97  3.4<L>   |  22  |  0.89    Ca    8.4      18 Mar 2022 05:34  Phos  2.8     03-18  Mg     2.0     03-18      Vital Signs Last 24 Hrs  T(C): 35.8 (18 Mar 2022 09:15), Max: 37.2 (17 Mar 2022 17:25)  T(F): 96.5 (18 Mar 2022 09:15), Max: 99 (17 Mar 2022 17:25)  HR: 54 (18 Mar 2022 11:00) (51 - 68)  BP: 145/70 (18 Mar 2022 11:00) (127/73 - 163/77)  BP(mean): 101 (18 Mar 2022 11:00) (76 - 113)  RR: 18 (18 Mar 2022 11:00) (14 - 24)  SpO2: 94% (18 Mar 2022 11:00) (92% - 98%)    MEDICATIONS  (STANDING):  acetylcysteine 20%  Inhalation 4 milliLiter(s) Inhalation every 4 hours  albuterol/ipratropium for Nebulization 3 milliLiter(s) Nebulizer every 4 hours  amLODIPine   Tablet 5 milliGRAM(s) Oral daily  apixaban 5 milliGRAM(s) Enteral Tube every 12 hours  bisacodyl Suppository 10 milliGRAM(s) Rectal daily  carvedilol 6.25 milliGRAM(s) Oral every 12 hours  chlorhexidine 2% Cloths 1 Application(s) Topical <User Schedule>  glucagon  Injectable 1 milliGRAM(s) IntraMuscular once  lactulose Syrup 10 Gram(s) Enteral Tube once  lisinopril 20 milliGRAM(s) Oral every 24 hours  polyethylene glycol 3350 17 Gram(s) Oral every 12 hours  potassium chloride   Solution 40 milliEquivalent(s) Oral every 2 hours  senna 2 Tablet(s) Oral at bedtime    MEDICATIONS  (PRN):  acetaminophen    Suspension .. 650 milliGRAM(s) Oral every 6 hours PRN Temp greater or equal to 38C (100.4F), Mild Pain (1 - 3)  hydrALAZINE Injectable 10 milliGRAM(s) IV Push every 4 hours PRN SBP>160    Currently Undergoing Physical/ Occupational Therapy at bedside.    PT/OT Functional Status Assessment:   3/17/2022      Neuromuscular Re-education  Neuromuscular Re-education Detail: MMT performed: (R)UE/LE 5/5 for all major pivots; (L) shld flex 1/5, (L)elbow flx 1/5, (L)elbow ext 1/5, (L)grasp 1/5Pt performed 3 lateral leans onto L forearm with encouragement of L elbow extension to return to midline with modA.Pt with intact LUE sensation.     Lower Body Dressing Training  Lower Body Dressing Training Assistance: maximum assist (25% patient effort);  1 person assist            PM&R Impression: as above    Current Disposition Plan Recommendations:    acute rehab placement

## 2022-03-18 NOTE — PROGRESS NOTE ADULT - SUBJECTIVE AND OBJECTIVE BOX
=================================  NEUROCRITICAL CARE ATTENDING NOTE  =================================    BREANNA MCCORMACK   MRN-[id number]  Summary:  76y/M with HTN, HLD, afib (on eliquis, unknown if last taken today,) CVA in  (with minimal residual right-sided weakness per girlfriend,) last known normal at 3pm when girlfriend spoke to him. 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 Upon arrival to [facility], BP noted to be 171/101. Stroke code was called. CTH without findings of hemorrhage. 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 NIHSS 20. No TPA was administered as Pt is out of window. Per patient's girlfriend, the basilar artery aneurysm is known and was diagnosed in  at [facility] when he suffered from a CVA. At that time, he underwent a "treatment for the aneurysm," which the girlfriend reports was too risky and was aborted. Patient never attended his follow-up appointments due to fear per girlfriend.  (10 Mar 2022 22:52)    COURSE IN THE HOSPITAL:  03/10 admitted to [facility]   Tmax 38.3; s/p angio showing extremely tortuous and elongated aortic arch with dolichoectatic vertebrobasilar system, giant partially thrombosed vertebrobasilar aneurysm   Tmax 37.9 tachypneic overnight; apixaban restarted; NGT inserted, tube feeds started at night   Tmax 38.3, desaturation to 88% this morning - now requiring 5L/min, tube feeds held; started on HFNC, improved   Tmax 37.8.  No significant events overnight.   3/16: Had discussion with patient and significant other regarding PEG. They understand the importance of it but are still contemplating.   3/17: Afebrile. Remains on room air  3/18:     Past Medical History:   Allergies:  No Known Allergies  Home meds:   ·	amLODIPine 5 mg oral tablet: 1 tab(s) orally once a day  ·	captopril 100 mg oral tablet:   ·	carvedilol 6.25 mg oral tablet:   ·	Eliquis 5 mg oral tablet:   ·	hydroCHLOROthiazide 25 mg oral tablet:     ROS:   All systems reviewed. Able only to nod or shake head.   Denies all complaints. No pain, CP, SOB, N/V.   Wants ice chips    ICU Vital Signs Last 24 Hrs  T(C): 36.6 (18 Mar 2022 05:43), Max: 37.2 (17 Mar 2022 17:25)  T(F): 97.8 (18 Mar 2022 05:43), Max: 99 (17 Mar 2022 17:25)  HR: 56 (18 Mar 2022 06:00) (48 - 68)  BP: 160/73 (18 Mar 2022 06:00) (129/64 - 163/77)  BP(mean): 105 (18 Mar 2022 06:00) (76 - 116)  ABP: 147/63 (18 Mar 2022 06:00) (133/59 - 166/82)  ABP(mean): 93 (18 Mar 2022 06:00) (84 - 114)  RR: 16 (18 Mar 2022 06:00) (15 - 24)  SpO2: 96% (18 Mar 2022 06:00) (92% - 98%)      22 @ 07:01  -  22 @ 07:00  --------------------------------------------------------  IN: 2165 mL / OUT: 1277 mL / NET: 888 mL    22 @ 07:01  -  22 @ 06:54  --------------------------------------------------------  IN: 770 mL / OUT: 1775 mL / NET: -1005 mL      PHYSICAL EXAMINATION  NEUROLOGIC EXAMINATION:  Patient is awake, alert, oriented x3, following commands, CARISSA, L facial droop, LUE extensor posturing , able to wiggle fingers, L LE TF, RUE/LE 5/5, able to write, able to say some words   GENERAL:   EENT: Anicteric  CARDIOVASCULAR: (+) S1 S2, bradycardic rate and regular rhythm  PULMONARY: Mildly diminshed breath sounds  ABDOMEN: Soft, nontender with normoactive bowel sounds  EXTREMITIES: No edema, no groin hematoma, pulses good  SKIN: No rash    MEDICATION:   acetaminophen    Suspension .. 650 milliGRAM(s) Oral every 6 hours PRN  acetylcysteine 20%  Inhalation 4 milliLiter(s) Inhalation every 4 hours  albuterol/ipratropium for Nebulization 3 milliLiter(s) Nebulizer every 4 hours  amLODIPine   Tablet 5 milliGRAM(s) Oral daily  apixaban 5 milliGRAM(s) Enteral Tube every 12 hours  bisacodyl Suppository 10 milliGRAM(s) Rectal daily  carvedilol 6.25 milliGRAM(s) Oral every 12 hours  chlorhexidine 2% Cloths 1 Application(s) Topical <User Schedule>  glucagon  Injectable 1 milliGRAM(s) IntraMuscular once  hydrALAZINE Injectable 10 milliGRAM(s) IV Push every 4 hours PRN  lactulose Syrup 10 Gram(s) Enteral Tube once PRN  lisinopril 20 milliGRAM(s) Oral every 24 hours  polyethylene glycol 3350 17 Gram(s) Oral every 12 hours  potassium chloride   Solution 40 milliEquivalent(s) Oral every 2 hours  senna 2 Tablet(s) Oral at bedtime      LABS:                    15.0   6.08  )-----------( 214      ( 18 Mar 2022 05:34 )             42.6     -18    139  |  108  |  27<H>  ----------------------------<  97  3.4<L>   |  22  |  0.89    Ca    8.4      18 Mar 2022 05:34  Phos  2.8     -  Mg     2.0           Bacteriology:   Blood Cx NGTD  CSF studies:  EEG:  Neuroimagin/11 MRI no acute infarcts:  3cm giant aneurysm basilar artery, partially thrombosed, marked compression of brainstem, distortion of IV but patent  03/10 CTA:  dolichoectasia of basilar artery, partially thrombosed aneurysm; diffuse idiopathic skeletal hyperostosis  Other imaging:    IV FLUIDS:   DRIPS:   DIET: TFs  Lines: [place]  Drains:      CODE STATUS:  Full Code                       GOALS OF CARE:  Aggressive                      DISPOSITION:  ICU =================================  NEUROCRITICAL CARE ATTENDING NOTE  =================================    BREANNA MCCORMACK   MRN-[id number]  Summary:  76y/M with HTN, HLD, afib (on eliquis, unknown if last taken today,) CVA in  (with minimal residual right-sided weakness per girlfriend,) last known normal at 3pm when girlfriend spoke to him. 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 Upon arrival to [facility], BP noted to be 171/101. Stroke code was called. CTH without findings of hemorrhage. 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 NIHSS 20. No TPA was administered as Pt is out of window. Per patient's girlfriend, the basilar artery aneurysm is known and was diagnosed in  at [facility] when he suffered from a CVA. At that time, he underwent a "treatment for the aneurysm," which the girlfriend reports was too risky and was aborted. Patient never attended his follow-up appointments due to fear per girlfriend.  (10 Mar 2022 22:52)    COURSE IN THE HOSPITAL:  03/10: Admitted to [facility]   Tmax 38.3; s/p angio showing extremely tortuous and elongated aortic arch with dolichoectatic vertebrobasilar system, giant partially thrombosed vertebrobasilar aneurysm   Tmax 37.9 tachypneic overnight; apixaban restarted; NGT inserted, tube feeds started at night   Tmax 38.3, desaturation to 88% this morning - now requiring 5L/min, tube feeds held; started on HFNC, improved   Tmax 37.8.  No significant events overnight.   3/16: Had discussion with patient and significant other regarding PEG. They understand the importance of it but are still contemplating.   3/17: Afebrile. Remains on room air. Worked with PT- sat up edge of bed  3/18: Neurostable. On room air.     Past Medical History:   Allergies:  No Known Allergies  Home meds:   ·	amLODIPine 5 mg oral tablet: 1 tab(s) orally once a day  ·	captopril 100 mg oral tablet:   ·	carvedilol 6.25 mg oral tablet:   ·	Eliquis 5 mg oral tablet:   ·	hydroCHLOROthiazide 25 mg oral tablet:     ROS:   All systems reviewed. Able only to nod or shake head.   Denies all complaints. No pain, CP, SOB, N/V.   Wants ice chips    ICU Vital Signs Last 24 Hrs  T(C): 36.6 (18 Mar 2022 05:43), Max: 37.2 (17 Mar 2022 17:25)  T(F): 97.8 (18 Mar 2022 05:43), Max: 99 (17 Mar 2022 17:25)  HR: 56 (18 Mar 2022 06:00) (48 - 68)  BP: 160/73 (18 Mar 2022 06:00) (129/64 - 163/77)  BP(mean): 105 (18 Mar 2022 06:00) (76 - 116)  ABP: 147/63 (18 Mar 2022 06:00) (133/59 - 166/82)  ABP(mean): 93 (18 Mar 2022 06:00) (84 - 114)  RR: 16 (18 Mar 2022 06:00) (15 - 24)  SpO2: 96% (18 Mar 2022 06:00) (92% - 98%)      22 @ 07:  -  22 @ 07:00  --------------------------------------------------------  IN: 2165 mL / OUT: 1277 mL / NET: 888 mL    22 @ 07:01  -  22 @ 06:54  --------------------------------------------------------  IN: 770 mL / OUT: 1775 mL / NET: -1005 mL      PHYSICAL EXAMINATION  NEUROLOGIC EXAMINATION:  Patient is awake, alert, oriented x3, following commands, CARISSA, L facial droop, LUE extensor posturing though able to wiggle fingers, LLE TF, RUE/LE 5/5, able to write, able to say some words   GENERAL:   EENT: Anicteric  CARDIOVASCULAR: (+) S1 S2, mildly bradycardic   PULMONARY: Mildly diminshed breath sounds  ABDOMEN: Soft, nontender with normoactive bowel sounds  EXTREMITIES: No edema, no groin hematoma, pulses good  SKIN: No rash    MEDICATION:   acetaminophen    Suspension .. 650 milliGRAM(s) Oral every 6 hours PRN  acetylcysteine 20%  Inhalation 4 milliLiter(s) Inhalation every 4 hours  albuterol/ipratropium for Nebulization 3 milliLiter(s) Nebulizer every 4 hours  amLODIPine   Tablet 5 milliGRAM(s) Oral daily  apixaban 5 milliGRAM(s) Enteral Tube every 12 hours  bisacodyl Suppository 10 milliGRAM(s) Rectal daily  carvedilol 6.25 milliGRAM(s) Oral every 12 hours  chlorhexidine 2% Cloths 1 Application(s) Topical <User Schedule>  glucagon  Injectable 1 milliGRAM(s) IntraMuscular once  hydrALAZINE Injectable 10 milliGRAM(s) IV Push every 4 hours PRN  lactulose Syrup 10 Gram(s) Enteral Tube once PRN  lisinopril 20 milliGRAM(s) Oral every 24 hours  polyethylene glycol 3350 17 Gram(s) Oral every 12 hours  potassium chloride   Solution 40 milliEquivalent(s) Oral every 2 hours  senna 2 Tablet(s) Oral at bedtime      LABS:                    15.0   6.08  )-----------( 214      ( 18 Mar 2022 05:34 )             42.6     03-18    139  |  108  |  27<H>  ----------------------------<  97  3.4<L>   |  22  |  0.89    Ca    8.4      18 Mar 2022 05:34  Phos  2.8     03-18  Mg     2.0     03-18      Bacteriology:   Blood Cx NGTD  CSF studies:  EEG:  Neuroimagin/11 MRI no acute infarcts:  3cm giant aneurysm basilar artery, partially thrombosed, marked compression of brainstem, distortion of IV but patent  03/10 CTA:  dolichoectasia of basilar artery, partially thrombosed aneurysm; diffuse idiopathic skeletal hyperostosis  Other imaging:    IV FLUIDS:   DRIPS:   DIET: TFs  Lines: Karen  Drains:      CODE STATUS:  Full Code                       GOALS OF CARE:  Aggressive                      DISPOSITION:  ICU

## 2022-03-18 NOTE — PROGRESS NOTE ADULT - ASSESSMENT
per Neurosurgery    77 y/o M with PMHx sig for HTN, HLD, afib (on eliquis, unknown if last taken today,) CVA in 2020 (with minimal residual right-sided weakness per girlfriend) p/w dysarthria, left facial droop, and left-sided weakness. CTH without findings of hemorrhage. 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 NIHSS 20. Pt is not a tpa candidate as Pt is out of window. Patient now s/p diagnostic angio with findings of partially thrombosed basilar artery aneurysm 3/11/22. course c/b resp insufficiency weaned off HFNC     Plan:   PLAN:  Neuro:  -neuro q4/vital checks q2  -MR head complete 3/11 no acute infarcts  -stroke core measures  -EEG negative, discontinued   - no plan for further intervention  - decadron taper completed 3/17    Cardiac:  -SBP goal 100-160  - bradycardic down to 40s, monitor hemodynamically   -cont home norvasc, captopril, carvedilol (home HCTZ held currently)  -echo 3/11- mild LVH, EF 65-70%  -home dose Eliquis dose resumed (afib)    Pulm:  - RA  - mucomyst and duonebs q4    ENT:   - Laryngoscopy: No masses visualized, hypomobility of L sided vocal cords, dysarthric, hypophonic, risk for aspiration, good cough reflex.Dr. Onofre to discuss with Dr. Peterson regarding a possible vocal cord injection.     Renal:  - IVL  - Na goal 135-145    GI:  -TF via NGT - goal 20cc/hr  -bowel regimen    Endo:  -ISS   -A1C 5.4    Heme;  -H/H stable  -SCDs  -cont home eliquis 5 bid per neurology  -LE dopplers 3/11 negative    ID:  -zosyn empirically for PNA completed 3/17  -f/u pan cx 3/12; NGTD

## 2022-03-18 NOTE — PROGRESS NOTE ADULT - SUBJECTIVE AND OBJECTIVE BOX
HPI:  75y/o M with PMHx sig for HTN, HLD, afib (on eliquis, unknown if last taken today,) CVA in 2020 (with minimal residual right-sided weakness per girlfriend,) last known normal at 3pm when girlfriend spoke to him. EMS found patient in his office at [employer] [facility] [employer]] (he is a neuroscience professor,) with dysarthria, left facial droop, and left-sided weakness. Upon arrival to [facility], BP noted to be 171/101. Stroke code was called. CTH without findings of hemorrhage. 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 NIHSS 20. No TPA was administered as Pt is out of window. Per patient's girlfriend, the basilar artery aneurysm is known and was diagnosed in 2020 at [facility] when he suffered from a CVA. At that time, he underwent a "treatment for the aneurysm," which the girlfriend reports was too risky and was aborted. Patient never attended his follow-up appointments due to fear per girlfriend.  (10 Mar 2022 22:52)      3/10: Admitted for further workup of stroke symptoms and basilar artery aneurysm.  3/11: Karen placed overnight. 3% hypertonic saline started. Neuro exam stable. Started on vEEG for aphasia  3/12: GM overnight. Neuro exam stable. 3% d/c. home eliquis 5 bid. failed s/s. started on TF via NGT, spiked fever 101, f/u panculture   3/13: GM overnight, neuro stable, veeg negative. Vanc/zosyn started for empiric PNA.   3/14: GM o/n neuro stable. on HFNC. ENT plan for laryngoscope today with . failed s/s, pend repeat eval. dc'd captopril, started lisinopril. dc'd vanc. Laryngoscopy: No masses visualized, hypomobility of L sided vocal cords, dysarthric, hypophonic, risk for aspiration, good cough reflex.Dr. Onofre to discuss with Dr. Peterson regarding a possible vocal cord injection.   3/15: GM overnight, neuro stable, on HFNC 30/30 overnight, transitioned to 4LNC  3/16: GM overnight, neuro stable, tolerating NC and satting well    3/17: POD6 dx angio. GM o/n neuro stable. trickle feeds via NGT  3/18: POD7 GM o/n, saturating well in RA. tolerating TF           Vital Signs Last 24 Hrs  T(C): 36.6 (18 Mar 2022 05:43), Max: 37.2 (17 Mar 2022 17:25)  T(F): 97.8 (18 Mar 2022 05:43), Max: 99 (17 Mar 2022 17:25)  HR: 58 (18 Mar 2022 05:00) (48 - 68)  BP: 139/68 (18 Mar 2022 05:00) (129/64 - 163/77)  BP(mean): 97 (18 Mar 2022 05:00) (76 - 116)  RR: 19 (18 Mar 2022 05:00) (15 - 24)  SpO2: 95% (18 Mar 2022 05:00) (92% - 98%)    I&O's Detail    16 Mar 2022 07:01  -  17 Mar 2022 07:00  --------------------------------------------------------  IN:    Enteral Tube Flush: 120 mL    IV PiggyBack: 300 mL    Jevity 1.2: 220 mL    Lactated Ringers: 700 mL    Lactated Ringers: 300 mL    Lactated Ringers: 525 mL  Total IN: 2165 mL    OUT:    Incontinent per Condom Catheter (mL): 2 mL    Intermittent Catheterization - Urethral (mL): 1275 mL  Total OUT: 1277 mL    Total NET: 888 mL      17 Mar 2022 07:01  -  18 Mar 2022 06:03  --------------------------------------------------------  IN:    Jevity 1.2: 480 mL    Lactated Ringers: 150 mL  Total IN: 630 mL    OUT:    Intermittent Catheterization - Urethral (mL): 575 mL    Voided (mL): 750 mL  Total OUT: 1325 mL    Total NET: -695 mL        I&O's Summary    16 Mar 2022 07:01  -  17 Mar 2022 07:00  --------------------------------------------------------  IN: 2165 mL / OUT: 1277 mL / NET: 888 mL    17 Mar 2022 07:01  -  18 Mar 2022 06:03  --------------------------------------------------------  IN: 630 mL / OUT: 1325 mL / NET: -695 mL        PHYSICAL EXAM:  awake, alert, oriented, x 3 (with choices,)   Hypophonic voice   expressive aphasia, dysarthria.   RUE 5/5 throughout,   RLE HF 3/5, KE/KF 4-/5, DF/PF 5/5,   LUE  and withdrawing  LLE w/d. to noxious   PT/DP pulses 2+ and symmetric  R groin site C/D/I, steris in place     [x] marte [3/10- 3/11]  [ ] L radial a-line [3/10- ]    TUBES/LINES:  [] CVC  [] A-line  [] Lumbar Drain  [] Ventriculostomy  [] Other    DIET:  [] NPO  [] Mechanical  [x] Tube feeds    LABS:                        15.0   6.08  )-----------( 214      ( 18 Mar 2022 05:34 )             42.6     03-18    139  |  108  |  x   ----------------------------<  97  x    |  22  |  0.89    Ca    8.4      18 Mar 2022 05:34  Phos  2.8     03-18  Mg     2.0     03-18              CAPILLARY BLOOD GLUCOSE          Drug Levels: [] N/A    CSF Analysis: [] N/A      Allergies    No Known Allergies    Intolerances      MEDICATIONS:  Antibiotics:    Neuro:  acetaminophen    Suspension .. 650 milliGRAM(s) Oral every 6 hours PRN    Anticoagulation:  apixaban 5 milliGRAM(s) Enteral Tube every 12 hours    OTHER:  acetylcysteine 20%  Inhalation 4 milliLiter(s) Inhalation every 4 hours  albuterol/ipratropium for Nebulization 3 milliLiter(s) Nebulizer every 4 hours  amLODIPine   Tablet 5 milliGRAM(s) Oral daily  bisacodyl Suppository 10 milliGRAM(s) Rectal daily  carvedilol 6.25 milliGRAM(s) Oral every 12 hours  chlorhexidine 2% Cloths 1 Application(s) Topical <User Schedule>  glucagon  Injectable 1 milliGRAM(s) IntraMuscular once  hydrALAZINE Injectable 10 milliGRAM(s) IV Push every 4 hours PRN  lactulose Syrup 10 Gram(s) Enteral Tube once PRN  lisinopril 20 milliGRAM(s) Oral every 24 hours  polyethylene glycol 3350 17 Gram(s) Oral every 12 hours  senna 2 Tablet(s) Oral at bedtime    IVF:    CULTURES:  Culture Results:   No growth at 5 days. (03-12 @ 18:54)    RADIOLOGY & ADDITIONAL TESTS:      ASSESSMENT:  75y/o M with PMHx sig for HTN, HLD, afib (on eliquis, unknown if last taken today,) CVA in 2020 (with minimal residual right-sided weakness per girlfriend) p/w dysarthria, left facial droop, and left-sided weakness. CTH without findings of hemorrhage. 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 NIHSS 20. Pt is not a tpa candidate as Pt is out of window. Patient now s/p diagnostic angio with findings of partially thrombosed basilar artery aneurysm 3/11/22. course c/b resp insufficiency weaned off HFNC     BASILAR ARTERY ANEURYSM    Handoff    MEWS Score    Brain aneurysm    Brain aneurysm    Basilar artery aneurysm    Angiogram, carotid and cerebral, bilateral    STROKE    [email or web address]        Plan:   PLAN:  Neuro:  -neuro q4/vital checks q2  -MR head complete 3/11 no acute infarcts  -stroke core measures  -EEG negative, discontinued   - no plan for further intervention  - decadron taper completed 3/17    Cardiac:  -SBP goal 100-160  - bradycardic down to 40s, monitor hemodynamically   -cont home norvasc, captopril, carvedilol (home HCTZ held currently)  -echo 3/11- mild LVH, EF 65-70%  -home dose Eliquis dose resumed (afib)    Pulm:  - RA  - mucomyst and duonebs q4    ENT:   - Laryngoscopy: No masses visualized, hypomobility of L sided vocal cords, dysarthric, hypophonic, risk for aspiration, good cough reflex.Dr. Onofre to discuss with Dr. Peterson regarding a possible vocal cord injection.     Renal:  - IVL  - Na goal 135-145    GI:  -TF via NGT - goal 20cc/hr  -bowel regimen    Endo:  -ISS   -A1C 5.4    Heme;  -H/H stable  -SCDs  -cont home eliquis 5 bid per neurology  -LE dopplers 3/11 negative    ID:  -zosyn empirically for PNA completed 3/17  -f/u pan cx 3/12; NGTD    Dispo:  ICU status, full code, pending AR  Family updated with plan  Assessment and plan discussed with Dr. Peterson and Dr. Rubi      Assessment:  Present when checked    []  GCS  E   V  M     Heart Failure: []Acute, [] acute on chronic , []chronic  Heart Failure:  [] Diastolic (HFpEF), [] Systolic (HFrEF), []Combined (HFpEF and HFrEF), [] RHF, [] Pulm HTN, [] Other    [] LALO, [] ATN, [] AIN, [] other  [] CKD1, [] CKD2, [] CKD 3, [] CKD 4, [] CKD 5, []ESRD    Encephalopathy: [] Metabolic, [] Hepatic, [] toxic, [] Neurological, [] Other    Abnormal Nurtitional Status: [] malnurtition (see nutrition note), [ ]underweight: BMI < 19, [] morbid obesity: BMI >40, [] Cachexia    [] Sepsis  [] hypovolemic shock,[] cardiogenic shock, [] hemorrhagic shock, [] neuogenic shock  [] Acute Respiratory Failure  []Cerebral edema, [] Brain compression/ herniation,   [] Functional quadriplegia  [] Acute blood loss anemia

## 2022-03-19 LAB
ANION GAP SERPL CALC-SCNC: 10 MMOL/L — SIGNIFICANT CHANGE UP (ref 5–17)
BUN SERPL-MCNC: 27 MG/DL — HIGH (ref 7–23)
CALCIUM SERPL-MCNC: 8.9 MG/DL — SIGNIFICANT CHANGE UP (ref 8.4–10.5)
CHLORIDE SERPL-SCNC: 108 MMOL/L — SIGNIFICANT CHANGE UP (ref 96–108)
CO2 SERPL-SCNC: 21 MMOL/L — LOW (ref 22–31)
CREAT SERPL-MCNC: 0.86 MG/DL — SIGNIFICANT CHANGE UP (ref 0.5–1.3)
EGFR: 89 ML/MIN/1.73M2 — SIGNIFICANT CHANGE UP
GLUCOSE SERPL-MCNC: 132 MG/DL — HIGH (ref 70–99)
HCT VFR BLD CALC: 43.4 % — SIGNIFICANT CHANGE UP (ref 39–50)
HGB BLD-MCNC: 15.1 G/DL — SIGNIFICANT CHANGE UP (ref 13–17)
MAGNESIUM SERPL-MCNC: 2.1 MG/DL — SIGNIFICANT CHANGE UP (ref 1.6–2.6)
MCHC RBC-ENTMCNC: 31.9 PG — SIGNIFICANT CHANGE UP (ref 27–34)
MCHC RBC-ENTMCNC: 34.8 GM/DL — SIGNIFICANT CHANGE UP (ref 32–36)
MCV RBC AUTO: 91.6 FL — SIGNIFICANT CHANGE UP (ref 80–100)
NRBC # BLD: 0 /100 WBCS — SIGNIFICANT CHANGE UP (ref 0–0)
PHOSPHATE SERPL-MCNC: 3.2 MG/DL — SIGNIFICANT CHANGE UP (ref 2.5–4.5)
PLATELET # BLD AUTO: 233 K/UL — SIGNIFICANT CHANGE UP (ref 150–400)
POTASSIUM SERPL-MCNC: 3.9 MMOL/L — SIGNIFICANT CHANGE UP (ref 3.5–5.3)
POTASSIUM SERPL-SCNC: 3.9 MMOL/L — SIGNIFICANT CHANGE UP (ref 3.5–5.3)
RBC # BLD: 4.74 M/UL — SIGNIFICANT CHANGE UP (ref 4.2–5.8)
RBC # FLD: 12.6 % — SIGNIFICANT CHANGE UP (ref 10.3–14.5)
SODIUM SERPL-SCNC: 139 MMOL/L — SIGNIFICANT CHANGE UP (ref 135–145)
WBC # BLD: 7.59 K/UL — SIGNIFICANT CHANGE UP (ref 3.8–10.5)
WBC # FLD AUTO: 7.59 K/UL — SIGNIFICANT CHANGE UP (ref 3.8–10.5)

## 2022-03-19 PROCEDURE — 99232 SBSQ HOSP IP/OBS MODERATE 35: CPT

## 2022-03-19 PROCEDURE — 99223 1ST HOSP IP/OBS HIGH 75: CPT

## 2022-03-19 RX ORDER — POTASSIUM CHLORIDE 20 MEQ
40 PACKET (EA) ORAL ONCE
Refills: 0 | Status: COMPLETED | OUTPATIENT
Start: 2022-03-19 | End: 2022-03-19

## 2022-03-19 RX ADMIN — APIXABAN 5 MILLIGRAM(S): 2.5 TABLET, FILM COATED ORAL at 17:39

## 2022-03-19 RX ADMIN — Medication 4 MILLILITER(S): at 06:09

## 2022-03-19 RX ADMIN — Medication 40 MILLIEQUIVALENT(S): at 14:22

## 2022-03-19 RX ADMIN — APIXABAN 5 MILLIGRAM(S): 2.5 TABLET, FILM COATED ORAL at 06:09

## 2022-03-19 RX ADMIN — Medication 4 MILLILITER(S): at 14:23

## 2022-03-19 RX ADMIN — Medication 4 MILLILITER(S): at 17:38

## 2022-03-19 RX ADMIN — Medication 3 MILLILITER(S): at 14:22

## 2022-03-19 RX ADMIN — CARVEDILOL PHOSPHATE 6.25 MILLIGRAM(S): 80 CAPSULE, EXTENDED RELEASE ORAL at 06:09

## 2022-03-19 RX ADMIN — Medication 3 MILLILITER(S): at 17:39

## 2022-03-19 RX ADMIN — CHLORHEXIDINE GLUCONATE 1 APPLICATION(S): 213 SOLUTION TOPICAL at 06:08

## 2022-03-19 RX ADMIN — AMLODIPINE BESYLATE 5 MILLIGRAM(S): 2.5 TABLET ORAL at 06:09

## 2022-03-19 RX ADMIN — POLYETHYLENE GLYCOL 3350 17 GRAM(S): 17 POWDER, FOR SOLUTION ORAL at 17:38

## 2022-03-19 RX ADMIN — LISINOPRIL 20 MILLIGRAM(S): 2.5 TABLET ORAL at 17:39

## 2022-03-19 RX ADMIN — Medication 4 MILLILITER(S): at 10:36

## 2022-03-19 RX ADMIN — Medication 3 MILLILITER(S): at 10:36

## 2022-03-19 RX ADMIN — Medication 3 MILLILITER(S): at 06:09

## 2022-03-19 RX ADMIN — Medication 3 MILLILITER(S): at 02:00

## 2022-03-19 RX ADMIN — CARVEDILOL PHOSPHATE 6.25 MILLIGRAM(S): 80 CAPSULE, EXTENDED RELEASE ORAL at 18:09

## 2022-03-19 RX ADMIN — Medication 4 MILLILITER(S): at 02:00

## 2022-03-19 RX ADMIN — Medication 10 MILLIGRAM(S): at 18:09

## 2022-03-19 NOTE — CONSULT NOTE ADULT - SUBJECTIVE AND OBJECTIVE BOX
Surgery Consult Note    HPI:  77y/o M with PMHx sig for HTN, HLD, afib (on eliquis, unknown if last taken today,) CVA in 2020 (with minimal residual right-sided weakness per girlfriend,) last known normal at 3pm when girlfriend spoke to him. EMS found patient in his office at [employer] (he is a neuroscience professor,) with dysarthria, left facial droop, and left-sided weakness. Upon arrival to [facility], BP noted to be 171/101. Stroke code was called. CTH without findings of hemorrhage. 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 NIHSS 20. No TPA was administered as Pt is out of window. Per patient's girlfriend, the basilar artery aneurysm is known and was diagnosed in 2020 at [facility] when he suffered from a CVA. At that time, he underwent a "treatment for the aneurysm," which the girlfriend reports was too risky and was aborted. Patient never attended his follow-up appointments due to fear per girlfriend.  (10 Mar 2022 22:52)      Attending:  [facility]    Surgery Addendum:   75 yo PMH of a fib on Eliquis, HTN, HLD, CVA 2020 with minimal residual right sided weakness, and large basilar artery aneurysm with brain stem compression. He was admitted for new left sided weakness, hypophonia and inability to swallow related to brain stem compression by the aneurysm. We are consutled for PEG tube placement given him failing swallow studies. The patient denied any prior abdominal surgeries. He currently has NG tube and recieving tube feeds. He just stepped down from the ICU recently and is pending acute rehab placement.    PAST MEDICAL & SURGICAL HISTORY:      MEDICATIONS  (STANDING):  acetylcysteine 20%  Inhalation 4 milliLiter(s) Inhalation every 4 hours  albuterol/ipratropium for Nebulization 3 milliLiter(s) Nebulizer every 4 hours  amLODIPine   Tablet 5 milliGRAM(s) Oral daily  apixaban 5 milliGRAM(s) Enteral Tube every 12 hours  bisacodyl Suppository 10 milliGRAM(s) Rectal daily  carvedilol 6.25 milliGRAM(s) Oral every 12 hours  chlorhexidine 2% Cloths 1 Application(s) Topical <User Schedule>  glucagon  Injectable 1 milliGRAM(s) IntraMuscular once  lisinopril 20 milliGRAM(s) Oral every 24 hours  polyethylene glycol 3350 17 Gram(s) Oral every 12 hours  potassium chloride   Powder 40 milliEquivalent(s) Enteral Tube once  senna 2 Tablet(s) Oral at bedtime    MEDICATIONS  (PRN):  acetaminophen    Suspension .. 650 milliGRAM(s) Oral every 6 hours PRN Temp greater or equal to 38C (100.4F), Mild Pain (1 - 3)  hydrALAZINE Injectable 10 milliGRAM(s) IV Push every 4 hours PRN SBP>160      Allergies    No Known Allergies    Intolerances        SOCIAL HISTORY:    FAMILY HISTORY:      Vital Signs Last 24 Hrs  T(C): 36.7 (19 Mar 2022 09:16), Max: 37 (19 Mar 2022 05:28)  T(F): 98.1 (19 Mar 2022 09:16), Max: 98.6 (19 Mar 2022 05:28)  HR: 70 (19 Mar 2022 11:47) (57 - 76)  BP: 145/74 (19 Mar 2022 11:47) (141/68 - 177/85)  BP(mean): 102 (19 Mar 2022 11:47) (97 - 122)  RR: 16 (19 Mar 2022 11:47) (16 - 22)  SpO2: 92% (19 Mar 2022 11:47) (92% - 97%)    I&O's Summary    18 Mar 2022 07:01  -  19 Mar 2022 07:00  --------------------------------------------------------  IN: 1150 mL / OUT: 1901 mL / NET: -751 mL    19 Mar 2022 07:01  -  19 Mar 2022 14:08  --------------------------------------------------------  IN: 350 mL / OUT: 375 mL / NET: -25 mL        Physical Exam:  General: NAD, resting comfortably, Older male who is having difficulty speaking and difficulty writing down his responses on a piece of paper   HEENT:EOMI, normal hearing, hypophonia and dysarthria   Pulmonary: normal resp effort  Cardiovascular: NSR, no murmurs  Abd: soft, NT, ND, umbilical hernia, no surgical scars  Extremities: WWP, normal strength, no clubbing/cyanosis/edema  Neuro: Left sided weakness  Pulses: palpable distal pulses    Lines/drains/tubes:    LABS:                        15.1   7.59  )-----------( 233      ( 19 Mar 2022 06:20 )             43.4     03-19    139  |  108  |  27<H>  ----------------------------<  132<H>  3.9   |  21<L>  |  0.86    Ca    8.9      19 Mar 2022 06:20  Phos  3.2     03-19  Mg     2.1     03-19          CAPILLARY BLOOD GLUCOSE            Cultures:      RADIOLOGY & ADDITIONAL STUDIES:

## 2022-03-19 NOTE — PROGRESS NOTE ADULT - SUBJECTIVE AND OBJECTIVE BOX
HPI:  77y/o M with PMHx sig for HTN, HLD, afib (on eliquis, unknown if last taken today,) CVA in 2020 (with minimal residual right-sided weakness per girlfriend,) last known normal at 3pm when girlfriend spoke to him. 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 Upon arrival to [facility], BP noted to be 171/101. Stroke code was called. CTH without findings of hemorrhage. 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 NIHSS 20. No TPA was administered as Pt is out of window. Per patient's girlfriend, the basilar artery aneurysm is known and was diagnosed in 2020 at [facility] when he suffered from a CVA. At that time, he underwent a "treatment for the aneurysm," which the girlfriend reports was too risky and was aborted. Patient never attended his follow-up appointments due to fear per girlfriend.  (10 Mar 2022 22:52)    OVERNIGHT EVENTS: GM o/n, neuro stable.    HOSPITAL COURSE:   3/10: Admitted for further workup of stroke symptoms and basilar artery aneurysm.  3/11: Tendoy placed overnight. 3% hypertonic saline started. Neuro exam stable. Started on vEEG for aphasia  3/12: GM overnight. Neuro exam stable. 3% d/c. home eliquis 5 bid. failed s/s. started on TF via NGT, spiked fever 101, f/u panculture   3/13: GM overnight, neuro stable, veeg negative. Vanc/zosyn started for empiric PNA.   3/14: GM o/n neuro stable. on HFNC. ENT plan for laryngoscope today with . failed s/s, pend repeat eval. dc'd captopril, started lisinopril. dc'd vanc. Laryngoscopy: No masses visualized, hypomobility of L sided vocal cords, dysarthric, hypophonic, risk for aspiration, good cough reflex.Dr. Onofre to discuss with Dr. Peterson regarding a possible vocal cord injection.   3/15: GM overnight, neuro stable, on HFNC 30/30 overnight, transitioned to 4LNC  3/16: GM overnight, neuro stable, tolerating NC and satting well    3/17: POD6 dx angio. GM o/n neuro stable. trickle feeds via NGT  3/18: POD7 GM o/n, saturating well in RA. tolerating TF   03/19: POD8 GM o/n. pend gen surg consult for PEG placement    Vital Signs Last 24 Hrs  T(C): 36.8 (18 Mar 2022 22:07), Max: 36.8 (18 Mar 2022 22:07)  T(F): 98.2 (18 Mar 2022 22:07), Max: 98.2 (18 Mar 2022 22:07)  HR: 74 (19 Mar 2022 00:00) (53 - 74)  BP: 150/72 (19 Mar 2022 00:00) (127/73 - 177/85)  BP(mean): 100 (19 Mar 2022 00:00) (76 - 122)  RR: 18 (19 Mar 2022 00:00) (14 - 24)  SpO2: 95% (19 Mar 2022 00:00) (93% - 98%)    I&O's Summary    17 Mar 2022 07:01  -  18 Mar 2022 07:00  --------------------------------------------------------  IN: 830 mL / OUT: 1775 mL / NET: -945 mL    18 Mar 2022 07:01  -  19 Mar 2022 02:09  --------------------------------------------------------  IN: 840 mL / OUT: 1901 mL / NET: -1061 mL        PHYSICAL EXAM:  General: NAD, pt is comfortably sitting up in bed, on room air  Cardiovascular: Regular rate and rhythm  Respiratory: nonlabored breathing, normal chest rise   GI: abdomen soft, nontender nondistended  Neuro: A&Ox3 with choices, hypophonic, Follows commands.  RUE 5/5, HF 3/5, KE/KF 4-/5, DF/PF 5/5  LUE 2/5 distally, 0/5 proximally, LLE WD   Extremities: distal pulses 2+ x4  Wound/incision: R groin c/d/i    TUBES/LINES:  [] CVC  [] A-line  [] Lumbar Drain  [] Ventriculostomy  [x] marte [3/10- 3/11]  [ ] L radial a-line [3/10- ]    DIET:  [] NPO  [] Mechanical  [x] Tube feeds (NGT)    LABS:                        15.0   6.08  )-----------( 214      ( 18 Mar 2022 05:34 )             42.6     03-18    139  |  108  |  27<H>  ----------------------------<  97  3.4<L>   |  22  |  0.89    Ca    8.4      18 Mar 2022 05:34  Phos  2.8     03-18  Mg     2.0     03-18              CAPILLARY BLOOD GLUCOSE          Drug Levels: [] N/A    CSF Analysis: [] N/A      Allergies    No Known Allergies    Intolerances      MEDICATIONS:  Antibiotics:    Neuro:  acetaminophen    Suspension .. 650 milliGRAM(s) Oral every 6 hours PRN    Anticoagulation:  apixaban 5 milliGRAM(s) Enteral Tube every 12 hours    OTHER:  acetylcysteine 20%  Inhalation 4 milliLiter(s) Inhalation every 4 hours  albuterol/ipratropium for Nebulization 3 milliLiter(s) Nebulizer every 4 hours  amLODIPine   Tablet 5 milliGRAM(s) Oral daily  bisacodyl Suppository 10 milliGRAM(s) Rectal daily  carvedilol 6.25 milliGRAM(s) Oral every 12 hours  chlorhexidine 2% Cloths 1 Application(s) Topical <User Schedule>  glucagon  Injectable 1 milliGRAM(s) IntraMuscular once  hydrALAZINE Injectable 10 milliGRAM(s) IV Push every 4 hours PRN  lisinopril 20 milliGRAM(s) Oral every 24 hours  polyethylene glycol 3350 17 Gram(s) Oral every 12 hours  senna 2 Tablet(s) Oral at bedtime    IVF:    CULTURES:  Culture Results:   No growth at 5 days. (03-12 @ 18:54)    RADIOLOGY & ADDITIONAL TESTS:  < from: Xray Chest 1 View- PORTABLE-Urgent (Xray Chest 1 View- PORTABLE-Urgent .) (03.17.22 @ 14:31) >  Findings/  impression: Left basilar opacity/pleural effusion, unchanged. Stable   heart size, thoracic aortic calcification. Stable bony structures.      ASSESSMENT:  77y/o M with PMHx sig for HTN, HLD, afib (on eliquis, unknown if last taken today,) CVA in 2020 (with minimal residual right-sided weakness per girlfriend) p/w dysarthria, left facial droop, and left-sided weakness. CTH without findings of hemorrhage. 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 NIHSS 20. Pt is not a tpa candidate as Pt is out of window. Patient now s/p diagnostic angio with findings of partially thrombosed basilar artery aneurysm 3/11/22. course c/b resp insufficiency weaned off HFNC.       BASILAR ARTERY ANEURYSM    Handoff    MEWS Score    Brain aneurysm    Brain aneurysm    Basilar artery aneurysm    Angiogram, carotid and cerebral, bilateral    STROKE    [email or web address]        PLAN:  PLAN:   Neuro:   - neuro checks/vital signs q4hrs  - MR head 3/11: no acute infarcts   - stroke core measures   - EEG negative 3/13, dc'd  - no plan for further neurosurgical intervention     Cardio:  - SBP goal 100-160   - cont home norvasc, carvedilol (home HCTZ held), Lisinopril substituted for Captopril   - echo 3/11: mild LVH, EF 65-70%   - home dose Eliquis for Afib    Pulm:  - satting well on RA  - standing mucomyst and duonebs  - Chest PT     ENT:   - ENT consulted, s/p laryngoscopy 3/14: No masses, hypomobility of L sided vocal cord  - f/u with Dr. Onofre/Dr. Peterson for possible vocal cord injection.     Renal:  - Na goal 135-145  - IVL    GI:  - TF via NGT  - pend gen surg consult for PEG placement   - bowel regimen    Endo:  - ISS dc'd  - A1C 5.4    Heme;  - SCDs for DVT ppx  - cont home eliquis 5mg BID per neurology  - LE dopplers 3/11: neg for DVT   - pend LUE US for edema     ID:  - zosyn empirically for PNA completed 3/17  - f/u pan cx 3/12; NGTD    Dispo:  - stepdown status, full code, pending AR  - Family updated with plan    Assessment and plan discussed with Dr. Peterson

## 2022-03-19 NOTE — CONSULT NOTE ADULT - ASSESSMENT
75 yo PMH of a fib on Eliquis, HTN, HLD, CVA 2020 with minimal residual right sided weakness, and large basilar artery aneurysm with brain stem compression. He was admitted for new left sided weakness, hypophonia and inability to swallow related to brain stem compression by the aneurysm. We are consulted for PEG placement. Recs:   - We would want to know the prognosis of his current aneurysm and life expectancy   - Pending further discussion by the attending on Monday  - Team 4C will follow

## 2022-03-19 NOTE — CONSULT NOTE ADULT - ASSESSMENT
76YOM with history of essential HTN, HLD, chronic atrial fibrillation on Eliquis, CVA 2020 admitted for stroke symptoms found to have large left basilar artery aneurysm compressing the brain stem.    L basilar artery aneurysm - seen on imaging and angiogram, with L sided hemiplegia, dysarthria, facial droop.   Severe protein-calorie malnutrition - due to the above. Failed S/S. Plan PEG tube, on TFs  Essential HTN - home meds include HCTZ 25mg once daily, carvedilol 6.25mg bid, amlodipine 5mg once daily, captopril 100mg tid  HLD - on atorvastatin 80mg qhs  Chronic atrial fibrillation - on Eliquis    Plan:  -surgery eval for PEG tube   -on TFs for now - per nutrition  -continue home meds  -per neurosurgery, no surgical intervention available

## 2022-03-19 NOTE — CONSULT NOTE ADULT - SUBJECTIVE AND OBJECTIVE BOX
CC: L sided facial droop, weakness    HPI:  Per admission H&P:  "77y/o M with PMHx sig for HTN, HLD, afib (on eliquis, unknown if last taken today,) CVA in 2020 (with minimal residual right-sided weakness per girlfriend,) last known normal at 3pm when girlfriend spoke to him. 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 Upon arrival to [facility], BP noted to be 171/101. Stroke code was called. CTH without findings of hemorrhage. 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 NIHSS 20. No TPA was administered as Pt is out of window. Per patient's girlfriend, the basilar artery aneurysm is known and was diagnosed in 2020 at [facility] when he suffered from a CVA. At that time, he underwent a "treatment for the aneurysm," which the girlfriend reports was too risky and was aborted. Patient never attended his follow-up appointments due to fear per girlfriend."    Patient admitted to neurosurgical service to NSICU, 3.1 x 2.6cm aneurysm confirmed via imaging and angiogram (3/11). Per neurosurgery team no surgical intervention planned for aneurysm. s/p laryngoscopy by ENT 3/14 with hypomobility of vocal cords. Has not passed S/S this admit, NGT placed and started TFs; plans for PEG tube early next week.     Seen by me this morning; he denies any pain, shortness of breath, fever, chills, dizziness, lightheadedness, other symptoms. On room air. R sided hemiplegia though he does say he has sensation in his L side.    12 point ROS reviewed and negative except as otherwise stated in HPI and below    PAST MEDICAL & SURGICAL HISTORY:    SOCIAL HISTORY:  no tobacco/alcohol/drug use    FAMILY HISTORY:  negative    ALLERGIES:  No Known Allergies      HOME MEDICATIONS:  amLODIPine 5 mg oral tablet: 1 tab(s) orally once a day  captopril 100 mg oral tablet: 1  orally 3 times a day  carvedilol 6.25 mg oral tablet: orally 2 times a day  Eliquis 5 mg oral tablet:   hydroCHLOROthiazide 25 mg oral tablet:   Lipitor 80 mg oral tablet: 1 tab(s) orally once a day      Vital Signs Last 24 Hrs  T(F): 98.1 (19 Mar 2022 09:16), Max: 98.6 (19 Mar 2022 05:28)  HR: 70 (19 Mar 2022 11:47) (53 - 76)  BP: 145/74 (19 Mar 2022 11:47) (141/67 - 177/85)  RR: 16 (19 Mar 2022 11:47) (16 - 24)  SpO2: 92% (19 Mar 2022 11:47) (92% - 97%)    PHYSICAL EXAM:  GENERAL: lying in hospital bed no acute distress  HEAD:  Atraumatic, Normocephalic  EYES: EOMI, PERRLA, conjunctiva and sclera clear  ENMT: No tonsillar erythema, exudates, or enlargement; NGT in place, TFs running. L sided facial droop  NECK: Supple, No JVD  CHEST/LUNG: Clear to auscultation bilaterally; No rales, rhonchi, wheezing, or rubs  HEART: Regular rate and rhythm; S1/S2, No murmurs, rubs, or gallops  ABDOMEN: obese abdomen, soft, Nontender, Nondistended; Bowel sounds present  VASCULAR: Normal pulses, Normal capillary refill  EXTREMITIES:  2+ Peripheral Pulses, No cyanosis, No edema  LYMPH: No lymphadenopathy noted  SKIN: Warm, Intact  PSYCH: Normal mood and affect  NERVOUS SYSTEM:  awake, alert, oriented x3, participates in interview, L sided hemiplegia though denies any numbness. 4/5 strength in RUE/RLE    LABS:                        15.1   7.59  )-----------( 233      ( 19 Mar 2022 06:20 )             43.4     03-19    139  |  108  |  27  ----------------------------<  132  3.9   |  21  |  0.86    Ca    8.9      19 Mar 2022 06:20  Phos  3.2     03-19  Mg     2.1     03-19    Culture - Blood (collected 12 Mar 2022 18:54)  Source: .Blood Blood-Peripheral  Final Report (17 Mar 2022 19:00):    No growth at 5 days.      COVID-19 PCR: Rcahnatec (03-17-22 @ 05:34)  COVID-19 PCR: Negative (03-10-22 @ 21:21)    RADIOLOGY & ADDITIONAL TESTS:  < from: Xray Chest 1 View- PORTABLE-Urgent (Xray Chest 1 View- PORTABLE-Urgent .) (03.17.22 @ 14:31) >  Findings/  impression: Left basilar opacity/pleural effusion, unchanged. Stable   heart size, thoracic aortic calcification. Stable bony structures.    < end of copied text >    `< from: MR Head No Cont (03.11.22 @ 00:06) >  IMPRESSION:  1. No acute infarct.  2. Large basilar artery aneurysm, compressing the brainstem, as on CT.    < end of copied text >

## 2022-03-20 LAB
ALBUMIN SERPL ELPH-MCNC: 2.7 G/DL — LOW (ref 3.3–5)
ALP SERPL-CCNC: 47 U/L — SIGNIFICANT CHANGE UP (ref 40–120)
ALT FLD-CCNC: 28 U/L — SIGNIFICANT CHANGE UP (ref 10–45)
ANION GAP SERPL CALC-SCNC: 11 MMOL/L — SIGNIFICANT CHANGE UP (ref 5–17)
AST SERPL-CCNC: 18 U/L — SIGNIFICANT CHANGE UP (ref 10–40)
BILIRUB SERPL-MCNC: 0.8 MG/DL — SIGNIFICANT CHANGE UP (ref 0.2–1.2)
BUN SERPL-MCNC: 23 MG/DL — SIGNIFICANT CHANGE UP (ref 7–23)
CALCIUM SERPL-MCNC: 8.6 MG/DL — SIGNIFICANT CHANGE UP (ref 8.4–10.5)
CHLORIDE SERPL-SCNC: 108 MMOL/L — SIGNIFICANT CHANGE UP (ref 96–108)
CO2 SERPL-SCNC: 19 MMOL/L — LOW (ref 22–31)
CREAT SERPL-MCNC: 0.89 MG/DL — SIGNIFICANT CHANGE UP (ref 0.5–1.3)
EGFR: 88 ML/MIN/1.73M2 — SIGNIFICANT CHANGE UP
GLUCOSE SERPL-MCNC: 137 MG/DL — HIGH (ref 70–99)
MAGNESIUM SERPL-MCNC: 2 MG/DL — SIGNIFICANT CHANGE UP (ref 1.6–2.6)
PHOSPHATE SERPL-MCNC: 2.7 MG/DL — SIGNIFICANT CHANGE UP (ref 2.5–4.5)
POTASSIUM SERPL-MCNC: 4.3 MMOL/L — SIGNIFICANT CHANGE UP (ref 3.5–5.3)
POTASSIUM SERPL-SCNC: 4.3 MMOL/L — SIGNIFICANT CHANGE UP (ref 3.5–5.3)
PROT SERPL-MCNC: 5.1 G/DL — LOW (ref 6–8.3)
SODIUM SERPL-SCNC: 138 MMOL/L — SIGNIFICANT CHANGE UP (ref 135–145)

## 2022-03-20 PROCEDURE — 99232 SBSQ HOSP IP/OBS MODERATE 35: CPT

## 2022-03-20 PROCEDURE — 71045 X-RAY EXAM CHEST 1 VIEW: CPT | Mod: 26

## 2022-03-20 RX ORDER — AMLODIPINE BESYLATE 2.5 MG/1
10 TABLET ORAL DAILY
Refills: 0 | Status: DISCONTINUED | OUTPATIENT
Start: 2022-03-20 | End: 2022-04-08

## 2022-03-20 RX ORDER — AMLODIPINE BESYLATE 2.5 MG/1
5 TABLET ORAL ONCE
Refills: 0 | Status: COMPLETED | OUTPATIENT
Start: 2022-03-20 | End: 2022-03-20

## 2022-03-20 RX ORDER — SODIUM,POTASSIUM PHOSPHATES 278-250MG
1 POWDER IN PACKET (EA) ORAL ONCE
Refills: 0 | Status: COMPLETED | OUTPATIENT
Start: 2022-03-20 | End: 2022-03-20

## 2022-03-20 RX ADMIN — POLYETHYLENE GLYCOL 3350 17 GRAM(S): 17 POWDER, FOR SOLUTION ORAL at 06:23

## 2022-03-20 RX ADMIN — Medication 3 MILLILITER(S): at 11:24

## 2022-03-20 RX ADMIN — LISINOPRIL 20 MILLIGRAM(S): 2.5 TABLET ORAL at 18:33

## 2022-03-20 RX ADMIN — Medication 4 MILLILITER(S): at 06:23

## 2022-03-20 RX ADMIN — Medication 3 MILLILITER(S): at 18:33

## 2022-03-20 RX ADMIN — POLYETHYLENE GLYCOL 3350 17 GRAM(S): 17 POWDER, FOR SOLUTION ORAL at 18:33

## 2022-03-20 RX ADMIN — Medication 3 MILLILITER(S): at 06:23

## 2022-03-20 RX ADMIN — AMLODIPINE BESYLATE 5 MILLIGRAM(S): 2.5 TABLET ORAL at 06:22

## 2022-03-20 RX ADMIN — Medication 1 PACKET(S): at 11:25

## 2022-03-20 RX ADMIN — Medication 4 MILLILITER(S): at 21:37

## 2022-03-20 RX ADMIN — CARVEDILOL PHOSPHATE 6.25 MILLIGRAM(S): 80 CAPSULE, EXTENDED RELEASE ORAL at 06:22

## 2022-03-20 RX ADMIN — Medication 4 MILLILITER(S): at 18:32

## 2022-03-20 RX ADMIN — CHLORHEXIDINE GLUCONATE 1 APPLICATION(S): 213 SOLUTION TOPICAL at 07:42

## 2022-03-20 RX ADMIN — CARVEDILOL PHOSPHATE 6.25 MILLIGRAM(S): 80 CAPSULE, EXTENDED RELEASE ORAL at 18:33

## 2022-03-20 RX ADMIN — Medication 10 MILLIGRAM(S): at 11:25

## 2022-03-20 RX ADMIN — Medication 4 MILLILITER(S): at 11:24

## 2022-03-20 RX ADMIN — Medication 3 MILLILITER(S): at 21:36

## 2022-03-20 RX ADMIN — APIXABAN 5 MILLIGRAM(S): 2.5 TABLET, FILM COATED ORAL at 18:33

## 2022-03-20 RX ADMIN — APIXABAN 5 MILLIGRAM(S): 2.5 TABLET, FILM COATED ORAL at 06:22

## 2022-03-20 RX ADMIN — AMLODIPINE BESYLATE 5 MILLIGRAM(S): 2.5 TABLET ORAL at 22:28

## 2022-03-20 NOTE — PROGRESS NOTE ADULT - SUBJECTIVE AND OBJECTIVE BOX
HPI:  75y/o M with PMHx sig for HTN, HLD, afib (on eliquis, unknown if last taken today,) CVA in 2020 (with minimal residual right-sided weakness per girlfriend,) last known normal at 3pm when girlfriend spoke to him. 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 Upon arrival to [facility], BP noted to be 171/101. Stroke code was called. CTH without findings of hemorrhage. 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 NIHSS 20. No TPA was administered as Pt is out of window. Per patient's girlfriend, the basilar artery aneurysm is known and was diagnosed in 2020 at [facility] when he suffered from a CVA. At that time, he underwent a "treatment for the aneurysm," which the girlfriend reports was too risky and was aborted. Patient never attended his follow-up appointments due to fear per girlfriend.  (10 Mar 2022 22:52)    SURGERY: Angiogram, carotid and cerebral, bilateral  PAST MEDICAL & SURGICAL HISTORY:  HTN, HLD, afib (on eliquis, unknown if last taken today,) CVA in 2020       Allergies and Intolerances:        Allergies:  	No Known Allergies:     Home Medications:   * Patient Currently Takes Medications as of 10-Mar-2022 23:03 documented in Structured Notes  · 	amLODIPine 5 mg oral tablet: Last Dose Taken:  , 1 tab(s) orally once a day  · 	captopril 100 mg oral tablet: Last Dose Taken:    · 	carvedilol 6.25 mg oral tablet: Last Dose Taken:    · 	hydroCHLOROthiazide 25 mg oral tablet: Last Dose Taken:    · 	Eliquis 5 mg oral tablet: Last Dose Taken:      .    Patient History:    Social History:  Social History (marital status, living situation, occupation, tobacco use, alcohol and drug use, and sexual history): Denies alcohol use, tobacco use, or illicit drug use     Tobacco Screening:  · Core Measure Site	No    Risk Assessment:    Present on Admission:  Deep Venous Thrombosis	no  Pulmonary Embolus	no            REVIEW OF SYSTEMS: [ ] Unable to Assess due to neurologic exam   [x ] All ROS addressed below are non-contributory, except:  Neuro: [ ] Headache [ ] Back pain [ ] Numbness [x ] Weakness [ ] Ataxia [ ] Dizziness [ ] Aphasia [ x] Dysarthria [ ] Visual disturbance  Resp: [ ] Shortness of breath/dyspnea, [ ] Orthopnea [ ] Cough  CV: [ ] Chest pain [ ] Palpitation [ ] Lightheadedness [ ] Syncope  Renal: [ ] Thirst [ ] Edema  GI: [ ] Nausea [ ] Emesis [ ] Abdominal pain [ ] Constipation [ ] Diarrhea  Hem: [ ] Hematemesis [ ] bright red blood per rectum  ID: [ ] Fever [ ] Chills [ ] Dysuria  ENT: [ ] Rhinorrhea          EVENTS:   transferred to the NSCU for closer neuro monitoring       ICU Vital Signs Last 24 Hrs  T(C): 37.2 (20 Mar 2022 19:25), Max: 37.6 (20 Mar 2022 09:00)  T(F): 99 (20 Mar 2022 19:25), Max: 99.7 (20 Mar 2022 09:00)  HR: 68 (20 Mar 2022 19:33) (60 - 70)  BP: 150/77 (20 Mar 2022 19:25) (147/70 - 164/84)  BP(mean): 108 (20 Mar 2022 19:25) (99 - 117)  ABP: --  ABP(mean): --  RR: 37 (20 Mar 2022 19:33) (14 - 37)  SpO2: 94% (20 Mar 2022 19:33) (91% - 99%)     03-19 @ 07:01  -  03-20 @ 07:00  --------------------------------------------------------  IN: 1680 mL / OUT: 1326 mL / NET: 354 mL    03-20 @ 07:01  -  03-20 @ 20:06  --------------------------------------------------------  IN: 940 mL / OUT: 1500 mL / NET: -560 mL                             15.1   7.59  )-----------( 233      ( 19 Mar 2022 06:20 )             43.4    03-20    138  |  108  |  23  ----------------------------<  137<H>  4.3   |  19<L>  |  0.89    Ca    8.6      20 Mar 2022 06:30  Phos  2.7     03-20  Mg     2.0     03-20    TPro  5.1<L>  /  Alb  2.7<L>  /  TBili  0.8  /  DBili  x   /  AST  18  /  ALT  28  /  AlkPhos  47  03-20            PHYSICAL EXAM:    General: No Acute Distress     Neurological:     Pulmonary: Clear to Auscultation, No Rales, No Rhonchi, No Wheezes     Cardiovascular: S1, S2, Regular Rate and Rhythm     Gastrointestinal: Soft, Nontender, Nondistended     Extremities: No calf tenderness     Incision:       MEDICATIONS:  Antibiotics:      Neurological:   acetaminophen    Suspension .. 650 milliGRAM(s) Oral every 6 hours PRN    Cardiac:   amLODIPine   Tablet 5 milliGRAM(s) Oral daily  carvedilol 6.25 milliGRAM(s) Oral every 12 hours  hydrALAZINE Injectable 10 milliGRAM(s) IV Push every 4 hours PRN SBP>160  lisinopril 20 milliGRAM(s) Oral every 24 hours    Pulm:  acetylcysteine 20%  Inhalation 4 milliLiter(s) Inhalation every 4 hours  albuterol/ipratropium for Nebulization 3 milliLiter(s) Nebulizer every 4 hours    Heme:   apixaban 5 milliGRAM(s) Enteral Tube every 12 hours    Other:   bisacodyl Suppository 10 milliGRAM(s) Rectal daily  chlorhexidine 2% Cloths 1 Application(s) Topical <User Schedule>  glucagon  Injectable 1 milliGRAM(s) IntraMuscular once  polyethylene glycol 3350 17 Gram(s) Oral every 12 hours  senna 2 Tablet(s) Oral at bedtime       DEVICES: [] Restraints [] EMERY/HMV []LD [] ET tube [] Trach [] Chest Tube [] A-line [] Arredondo [] NGT [] Rectal Tube       A/P:  HPI:  75y/o M with PMHx sig for HTN, HLD, afib (on eliquis, unknown if last taken today,) CVA in 2020 (with minimal residual right-sided weakness per girlfriend,)  with a large 3cm circumscribed, partially thrombosed basilar artery aneurysm with brainstem compression.     Neuro: neuro checks q 1 hr   NS not planning to intervene on the aneurysm   Respiratory:   CV: SBP goal 100-140 mmhg  HTN on amLODIPine and lisinopril    atrial fib on carvedilol   was on eliquis, held today , transition to heparin drip tomorrow in preparation for PEG   Endocrine: target sugar 140-180     DVT ppx: SCD, will transition to heparin drip tomorrow   Renal: IVL  ID: afebrile   GI: NPO, will need a PEG due to dysphagia from brainstem compression   TF for now       not critical   moderate complex medical condition                         HPI:  77y/o M with PMHx sig for HTN, HLD, afib (on eliquis, unknown if last taken today,) CVA in 2020 (with minimal residual right-sided weakness per girlfriend,) last known normal at 3pm when girlfriend spoke to him. 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 Upon arrival to [facility], BP noted to be 171/101. Stroke code was called. CTH without findings of hemorrhage. 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 NIHSS 20. No TPA was administered as Pt is out of window. Per patient's girlfriend, the basilar artery aneurysm is known and was diagnosed in 2020 at [facility] when he suffered from a CVA. At that time, he underwent a "treatment for the aneurysm," which the girlfriend reports was too risky and was aborted. Patient never attended his follow-up appointments due to fear per girlfriend.  (10 Mar 2022 22:52)    SURGERY: Angiogram, carotid and cerebral, bilateral  PAST MEDICAL & SURGICAL HISTORY:  HTN, HLD, afib (on eliquis, unknown if last taken today,) CVA in 2020       Allergies and Intolerances:        Allergies:  	No Known Allergies:     Home Medications:   * Patient Currently Takes Medications as of 10-Mar-2022 23:03 documented in Structured Notes  · 	amLODIPine 5 mg oral tablet: Last Dose Taken:  , 1 tab(s) orally once a day  · 	captopril 100 mg oral tablet: Last Dose Taken:    · 	carvedilol 6.25 mg oral tablet: Last Dose Taken:    · 	hydroCHLOROthiazide 25 mg oral tablet: Last Dose Taken:    · 	Eliquis 5 mg oral tablet: Last Dose Taken:      .    Patient History:    Social History:  Social History (marital status, living situation, occupation, tobacco use, alcohol and drug use, and sexual history): Denies alcohol use, tobacco use, or illicit drug use     Tobacco Screening:  · Core Measure Site	No    Risk Assessment:    Present on Admission:  Deep Venous Thrombosis	no  Pulmonary Embolus	no            REVIEW OF SYSTEMS: [ ] Unable to Assess due to neurologic exam   [x ] All ROS addressed below are non-contributory, except:  Neuro: [ ] Headache [ ] Back pain [ ] Numbness [x ] Weakness [ ] Ataxia [ ] Dizziness [ ] Aphasia [ x] Dysarthria [ ] Visual disturbance  Resp: [ ] Shortness of breath/dyspnea, [ ] Orthopnea [ ] Cough  CV: [ ] Chest pain [ ] Palpitation [ ] Lightheadedness [ ] Syncope  Renal: [ ] Thirst [ ] Edema  GI: [ ] Nausea [ ] Emesis [ ] Abdominal pain [ ] Constipation [ ] Diarrhea  Hem: [ ] Hematemesis [ ] bright red blood per rectum  ID: [ ] Fever [ ] Chills [ ] Dysuria  ENT: [ ] Rhinorrhea          EVENTS:   transferred to the NSCU for closer neuro monitoring       ICU Vital Signs Last 24 Hrs  T(C): 37.2 (20 Mar 2022 19:25), Max: 37.6 (20 Mar 2022 09:00)  T(F): 99 (20 Mar 2022 19:25), Max: 99.7 (20 Mar 2022 09:00)  HR: 68 (20 Mar 2022 19:33) (60 - 70)  BP: 150/77 (20 Mar 2022 19:25) (147/70 - 164/84)  BP(mean): 108 (20 Mar 2022 19:25) (99 - 117)  ABP: --  ABP(mean): --  RR: 37 (20 Mar 2022 19:33) (14 - 37)  SpO2: 94% (20 Mar 2022 19:33) (91% - 99%)     03-19 @ 07:01  -  03-20 @ 07:00  --------------------------------------------------------  IN: 1680 mL / OUT: 1326 mL / NET: 354 mL    03-20 @ 07:01  -  03-20 @ 20:06  --------------------------------------------------------  IN: 940 mL / OUT: 1500 mL / NET: -560 mL                             15.1   7.59  )-----------( 233      ( 19 Mar 2022 06:20 )             43.4    03-20    138  |  108  |  23  ----------------------------<  137<H>  4.3   |  19<L>  |  0.89    Ca    8.6      20 Mar 2022 06:30  Phos  2.7     03-20  Mg     2.0     03-20    TPro  5.1<L>  /  Alb  2.7<L>  /  TBili  0.8  /  DBili  x   /  AST  18  /  ALT  28  /  AlkPhos  47  03-20            PHYSICAL EXAM:    General: No Acute Distress     Neurological: Neuro: A&Ox3 , hypophonic,  dysarthric, SUPA, intact EOM, left facial , left side 0/5, right side 5/5 , Follows commands.    Pulmonary: Clear to Auscultation, No Rales, No Rhonchi, No Wheezes     Cardiovascular: S1, S2, Regular Rate and Rhythm     Gastrointestinal: Soft, Nontender, Nondistended     Extremities: left UE swelling , nl pulses     Incision:       MEDICATIONS:  Antibiotics:      Neurological:   acetaminophen    Suspension .. 650 milliGRAM(s) Oral every 6 hours PRN    Cardiac:   amLODIPine   Tablet 5 milliGRAM(s) Oral daily  carvedilol 6.25 milliGRAM(s) Oral every 12 hours  hydrALAZINE Injectable 10 milliGRAM(s) IV Push every 4 hours PRN SBP>160  lisinopril 20 milliGRAM(s) Oral every 24 hours    Pulm:  acetylcysteine 20%  Inhalation 4 milliLiter(s) Inhalation every 4 hours  albuterol/ipratropium for Nebulization 3 milliLiter(s) Nebulizer every 4 hours    Heme:   apixaban 5 milliGRAM(s) Enteral Tube every 12 hours    Other:   bisacodyl Suppository 10 milliGRAM(s) Rectal daily  chlorhexidine 2% Cloths 1 Application(s) Topical <User Schedule>  glucagon  Injectable 1 milliGRAM(s) IntraMuscular once  polyethylene glycol 3350 17 Gram(s) Oral every 12 hours  senna 2 Tablet(s) Oral at bedtime       DEVICES: [] Restraints [] EMERY/HMV []LD [] ET tube [] Trach [] Chest Tube [] A-line [] Arredondo [] NGT [] Rectal Tube       A/P:  77y/o M with PMHx sig for HTN, HLD, afib (on eliquis, unknown if last taken today,) CVA in 2020 (with minimal residual right-sided weakness per girlfriend,)  with a large 3cm circumscribed, partially thrombosed basilar artery aneurysm with brainstem compression.     Neuro: neuro checks q 2 hr   NS not planning to intervene on the aneurysm   previous CVS on apixaban   Respiratory: RA   CV: SBP goal 100-160 mmhg  US  venous of the left UE   essential HTN on amLODIPine and lisinopril    increase amlodipine to 10 mg  due to unsecured aneurysm   atrial fib on carvedilol   was on eliquis, held today , transition to heparin drip tomorrow in preparation for PEG   Endocrine: target sugar 140-180     DVT ppx: will transition to heparin drip tomorrow   d/c apixaban   Renal: IVL  ID: afebrile   GI: NPO, will need a PEG due to dysphagia from brainstem compression   TF for now         not critical   moderate complex medical condition

## 2022-03-20 NOTE — PROGRESS NOTE ADULT - SUBJECTIVE AND OBJECTIVE BOX
HPI:  77y/o M with PMHx sig for HTN, HLD, afib (on eliquis, unknown if last taken today,) CVA in 2020 (with minimal residual right-sided weakness per girlfriend,) last known normal at 3pm when girlfriend spoke to him. 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 Upon arrival to [facility], BP noted to be 171/101. Stroke code was called. CTH without findings of hemorrhage. 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 NIHSS 20. No TPA was administered as Pt is out of window. Per patient's girlfriend, the basilar artery aneurysm is known and was diagnosed in 2020 at [facility] when he suffered from a CVA. At that time, he underwent a "treatment for the aneurysm," which the girlfriend reports was too risky and was aborted. Patient never attended his follow-up appointments due to fear per girlfriend.  (10 Mar 2022 22:52)    OVERNIGHT EVENTS: Patient doing well, has a mild mucus filled cough, ordered for mucomyst and duonebs. does not require frequent suctioning requirements, will continue to monitor.     HOSPITAL COURSE:   3/10: Admitted for further workup of stroke symptoms and basilar artery aneurysm.  3/11: Karen placed overnight. 3% hypertonic saline started. Neuro exam stable. Started on vEEG for aphasia  3/12: GM overnight. Neuro exam stable. 3% d/c. home eliquis 5 bid. failed s/s. started on TF via NGT, spiked fever 101, f/u panculture   3/13: GM overnight, neuro stable, veeg negative. Vanc/zosyn started for empiric PNA.   3/14: GM o/n neuro stable. on HFNC. ENT plan for laryngoscope today with . failed s/s, pend repeat eval. dc'd captopril, started lisinopril. dc'd vanc. Laryngoscopy: No masses visualized, hypomobility of L sided vocal cords, dysarthric, hypophonic, risk for aspiration, good cough reflex.Dr. Onofre to discuss with Dr. Peterson regarding a possible vocal cord injection.   3/15: GM overnight, neuro stable, on HFNC 30/30 overnight, transitioned to 4LNC  3/16: GM overnight, neuro stable, tolerating NC and satting well    3/17: POD6 dx angio. GM o/n neuro stable. trickle feeds via NGT  3/18: POD7 GM o/n, saturating well in RA. tolerating TF   03/19: POD8 GM o/n. gen surg consulted for PEG placement.  03/20: POD9 GM o/n.       Vital Signs Last 24 Hrs  T(C): 37.4 (19 Mar 2022 19:28), Max: 37.4 (19 Mar 2022 19:28)  T(F): 99.4 (19 Mar 2022 19:28), Max: 99.4 (19 Mar 2022 19:28)  HR: 66 (19 Mar 2022 20:27) (64 - 76)  BP: 164/84 (19 Mar 2022 20:27) (141/73 - 164/84)  BP(mean): 115 (19 Mar 2022 20:27) (100 - 115)  RR: 16 (19 Mar 2022 20:27) (16 - 18)  SpO2: 91% (19 Mar 2022 20:27) (91% - 96%)    I&O's Summary    18 Mar 2022 07:01  -  19 Mar 2022 07:00  --------------------------------------------------------  IN: 1150 mL / OUT: 1901 mL / NET: -751 mL    19 Mar 2022 07:01  -  20 Mar 2022 02:05  --------------------------------------------------------  IN: 910 mL / OUT: 826 mL / NET: 84 mL        PHYSICAL EXAM:  General: NAD, pt is comfortably sitting up in bed, on room air  Cardiovascular: Regular rate and rhythm  Respiratory: nonlabored breathing, normal chest rise   GI: abdomen soft, nontender nondistended  Neuro: A&Ox3 with choices, hypophonic, Follows commands.  RUE 5/5, RLE 5/5 distally, 2/5 proximally  LUE 2/5 distally, 0/5 proximally, LLE WD   Extremities: distal pulses 2+ x4  Wound/incision: R groin c/d/i    TUBES/LINES:  [] CVC  [] A-line  [] Lumbar Drain  [] Ventriculostomy  [x] marte [3/10- 3/11]  [ ] L radial a-line [3/10- ]    DIET:  [] NPO  [] Mechanical  [x] Tube feeds (NGT)    LABS:                        15.1   7.59  )-----------( 233      ( 19 Mar 2022 06:20 )             43.4     03-19    139  |  108  |  27<H>  ----------------------------<  132<H>  3.9   |  21<L>  |  0.86    Ca    8.9      19 Mar 2022 06:20  Phos  3.2     03-19  Mg     2.1     03-19              CAPILLARY BLOOD GLUCOSE          Drug Levels: [] N/A    CSF Analysis: [] N/A      Allergies    No Known Allergies    Intolerances      MEDICATIONS:  Antibiotics:    Neuro:  acetaminophen    Suspension .. 650 milliGRAM(s) Oral every 6 hours PRN    Anticoagulation:  apixaban 5 milliGRAM(s) Enteral Tube every 12 hours    OTHER:  acetylcysteine 20%  Inhalation 4 milliLiter(s) Inhalation every 4 hours  albuterol/ipratropium for Nebulization 3 milliLiter(s) Nebulizer every 4 hours  amLODIPine   Tablet 5 milliGRAM(s) Oral daily  bisacodyl Suppository 10 milliGRAM(s) Rectal daily  carvedilol 6.25 milliGRAM(s) Oral every 12 hours  chlorhexidine 2% Cloths 1 Application(s) Topical <User Schedule>  glucagon  Injectable 1 milliGRAM(s) IntraMuscular once  hydrALAZINE Injectable 10 milliGRAM(s) IV Push every 4 hours PRN  lisinopril 20 milliGRAM(s) Oral every 24 hours  polyethylene glycol 3350 17 Gram(s) Oral every 12 hours  senna 2 Tablet(s) Oral at bedtime    IVF:    CULTURES:  Culture Results:   No growth at 5 days. (03-12 @ 18:54)    RADIOLOGY & ADDITIONAL TESTS:      ASSESSMENT:  77y/o M with PMHx sig for HTN, HLD, afib (on eliquis, unknown if last taken today,) CVA in 2020 (with minimal residual right-sided weakness per girlfriend) p/w dysarthria, left facial droop, and left-sided weakness. CTH without findings of hemorrhage. 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 NIHSS 20. Pt is not a tpa candidate as Pt is out of window. Patient now s/p diagnostic angio with findings of partially thrombosed basilar artery aneurysm 3/11/22. course c/b resp insufficiency weaned off HFNC.     BASILAR ARTERY ANEURYSM    Handoff    MEWS Score    Brain aneurysm    Brain aneurysm    Basilar artery aneurysm    Angiogram, carotid and cerebral, bilateral    STROKE    [email or web address]        PLAN:    Neuro:   - neuro checks/vital signs q4hrs  - MR head 3/11: no acute infarcts   - stroke core measures   - EEG negative 3/13, dc'd  - no plan for further neurosurgical intervention   - transfer to NSICU 3/21 for Heparin gtt/PEG placement    Cardio:  - SBP goal 100-160   - cont home norvasc, carvedilol (home HCTZ held), Lisinopril substituted for Captopril   - echo 3/11: mild LVH, EF 65-70%   - home dose Eliquis for Afib    Pulm:  - satting well on RA  - standing mucomyst and duonebs  - Chest PT     ENT:   - ENT consulted, s/p laryngoscopy 3/14: No masses, hypomobility of L sided vocal cord  - f/u with Dr. Onofre/Dr. Peterson for possible vocal cord injection.     Renal:  - Na goal 135-145  - IVL    GI:  - TF via NGT  - pend PEG with Eleni in NSICU 3/23  - bowel regimen, BM 3/19    Endo:  - ISS dc'd  - A1C 5.4    Heme;  - SCDs for DVT ppx  - cont home eliquis 5mg BID per neurology  - hold Eliquis 3/21, start Heparin gtt 3/21  - LE dopplers 3/11: neg for DVT   - pend LUE US for edema     ID:  - zosyn empirically for PNA completed 3/17  - f/u pan cx 3/12; NGTD    Dispo:  - stepdown status, full code, pending AR  - Family updated with plan    Assessment and plan discussed with Dr. Peterson

## 2022-03-20 NOTE — PROGRESS NOTE ADULT - TIME BILLING
basilar aneurysm with brainstem compression, leading to dysphagia and focal neurologic deficits   history of atrial fib and CVA on apixaban, will need a PEG tube. Plan is to d/c apixaban, and change to heparin drip until PEG is placed.   No NS intervention per NS basilar aneurysm with brainstem compression, leading to dysphagia and focal neurologic deficits   history of atrial fib and CVA on apixaban, will need a PEG tube. Plan is to d/c apixaban, and change to heparin drip until PEG is placed.   No NS intervention per NS  keep BP well controlled when on heparin drip

## 2022-03-20 NOTE — PROGRESS NOTE ADULT - SUBJECTIVE AND OBJECTIVE BOX
Subjective/Interval events:  No acute overnight events. Denies pain, SOB, dizziness, lightheadedness. Asking where his wife is.    MEDICATIONS  (STANDING):  acetylcysteine 20%  Inhalation 4 milliLiter(s) Inhalation every 4 hours  albuterol/ipratropium for Nebulization 3 milliLiter(s) Nebulizer every 4 hours  amLODIPine   Tablet 5 milliGRAM(s) Oral daily  apixaban 5 milliGRAM(s) Enteral Tube every 12 hours  bisacodyl Suppository 10 milliGRAM(s) Rectal daily  carvedilol 6.25 milliGRAM(s) Oral every 12 hours  chlorhexidine 2% Cloths 1 Application(s) Topical <User Schedule>  glucagon  Injectable 1 milliGRAM(s) IntraMuscular once  lisinopril 20 milliGRAM(s) Oral every 24 hours  polyethylene glycol 3350 17 Gram(s) Oral every 12 hours  potassium phosphate / sodium phosphate Powder (PHOS-NaK) 1 Packet(s) Oral once  senna 2 Tablet(s) Oral at bedtime    MEDICATIONS  (PRN):  acetaminophen    Suspension .. 650 milliGRAM(s) Oral every 6 hours PRN Temp greater or equal to 38C (100.4F), Mild Pain (1 - 3)  hydrALAZINE Injectable 10 milliGRAM(s) IV Push every 4 hours PRN SBP>160      Vital Signs Last 24 Hrs  T(C): 37.6 (20 Mar 2022 09:00), Max: 37.6 (20 Mar 2022 09:00)  T(F): 99.7 (20 Mar 2022 09:00), Max: 99.7 (20 Mar 2022 09:00)  HR: 62 (20 Mar 2022 09:51) (62 - 70)  BP: 147/70 (20 Mar 2022 09:51) (145/74 - 164/84)  BP(mean): 100 (20 Mar 2022 09:51) (99 - 117)  RR: 18 (20 Mar 2022 09:51) (16 - 18)  SpO2: 95% (20 Mar 2022 09:51) (91% - 96%)    PHYSICAL EXAM:  GENERAL: pleasant, appropriate, no acute distress. Dysarthric but understands interview questions, attempts to answer, can communicate in writing too  HEENT - NC/AT, EOMI, PERLLA, oropharynx clear without exudate or erythema, moist mucus membranes. NGT in place  NECK: Soft, supple, No JVD, no lymphadenopathy  CHEST/LUNG: Coarse breath sounds diffusely. Normal work of breathing, not tachypneic  HEART: Regular rate and rhythm; No murmurs, rubs, or gallops, normal s1/s2. Warm and well-perfused  ABDOMEN: Soft, Nontender, Nondistended. Normoactive bowel sounds.  EXTREMITIES:  2+ Peripheral Pulses, No clubbing, cyanosis, or edema  NEURO:  awake, alert; difficult to assess orientation given aphasia/dysarthria, though he attempts to participate in interview with writing and/or verbally. Cranial nerves intact, no motor or sensory deficits. L sided hemiplegia, dysarthric  SKIN: No rashes or lesions    LABS:  03-20    138  |  108  |  23  ----------------------------<  137  4.3   |  19  |  0.89    Ca    8.6      20 Mar 2022 06:30  Phos  2.7     03-20  Mg     2.0     03-20    TPro  5.1  /  Alb  2.7  /  TBili  0.8  /  DBili  x   /  AST  18  /  ALT  28  /  AlkPhos  47  03-20                          15.1   7.59  )-----------( 233      ( 19 Mar 2022 06:20 )             43.4       COVID-19 PCR: NotDetec (03-17-22 @ 05:34)  COVID-19 PCR: Negative (03-10-22 @ 21:21)      RADIOLOGY & ADDITIONAL TESTS:  < from: Xray Chest 1 View- PORTABLE-Routine (Xray Chest 1 View- PORTABLE-Routine in AM.) (03.20.22 @ 06:12) >    Frontal examination of the chest demonstrates the heart to be within   normal limits in transverse diameter. Dobbhoff feeding tube overlying   course of esophagus and left abdomen. Partial silhouetting left   hemidiaphragm which reflect infiltrate and/or effusion. Limited   inspiration.    IMPRESSION: Partial silhouetting left hemidiaphragm which may reflect   infiltrate and/or effusion. Limited inspiration    < end of copied text >

## 2022-03-21 LAB
ANION GAP SERPL CALC-SCNC: 7 MMOL/L — SIGNIFICANT CHANGE UP (ref 5–17)
ANION GAP SERPL CALC-SCNC: 9 MMOL/L — SIGNIFICANT CHANGE UP (ref 5–17)
APTT BLD: 34.5 SEC — SIGNIFICANT CHANGE UP (ref 27.5–35.5)
APTT BLD: 36.3 SEC — HIGH (ref 27.5–35.5)
BUN SERPL-MCNC: 24 MG/DL — HIGH (ref 7–23)
BUN SERPL-MCNC: 25 MG/DL — HIGH (ref 7–23)
CALCIUM SERPL-MCNC: 8.6 MG/DL — SIGNIFICANT CHANGE UP (ref 8.4–10.5)
CALCIUM SERPL-MCNC: 8.9 MG/DL — SIGNIFICANT CHANGE UP (ref 8.4–10.5)
CHLORIDE SERPL-SCNC: 103 MMOL/L — SIGNIFICANT CHANGE UP (ref 96–108)
CHLORIDE SERPL-SCNC: 106 MMOL/L — SIGNIFICANT CHANGE UP (ref 96–108)
CO2 SERPL-SCNC: 18 MMOL/L — LOW (ref 22–31)
CO2 SERPL-SCNC: 24 MMOL/L — SIGNIFICANT CHANGE UP (ref 22–31)
CREAT SERPL-MCNC: 0.82 MG/DL — SIGNIFICANT CHANGE UP (ref 0.5–1.3)
CREAT SERPL-MCNC: 0.83 MG/DL — SIGNIFICANT CHANGE UP (ref 0.5–1.3)
EGFR: 90 ML/MIN/1.73M2 — SIGNIFICANT CHANGE UP
EGFR: 90 ML/MIN/1.73M2 — SIGNIFICANT CHANGE UP
GLUCOSE SERPL-MCNC: 153 MG/DL — HIGH (ref 70–99)
GLUCOSE SERPL-MCNC: 171 MG/DL — HIGH (ref 70–99)
HCT VFR BLD CALC: 47.4 % — SIGNIFICANT CHANGE UP (ref 39–50)
HGB BLD-MCNC: 15.8 G/DL — SIGNIFICANT CHANGE UP (ref 13–17)
INR BLD: 1.56 — HIGH (ref 0.88–1.16)
MAGNESIUM SERPL-MCNC: 2.1 MG/DL — SIGNIFICANT CHANGE UP (ref 1.6–2.6)
MCHC RBC-ENTMCNC: 31.2 PG — SIGNIFICANT CHANGE UP (ref 27–34)
MCHC RBC-ENTMCNC: 33.3 GM/DL — SIGNIFICANT CHANGE UP (ref 32–36)
MCV RBC AUTO: 93.7 FL — SIGNIFICANT CHANGE UP (ref 80–100)
NRBC # BLD: 0 /100 WBCS — SIGNIFICANT CHANGE UP (ref 0–0)
OSMOLALITY UR: 610 MOSM/KG — SIGNIFICANT CHANGE UP (ref 300–900)
PHOSPHATE SERPL-MCNC: 3.1 MG/DL — SIGNIFICANT CHANGE UP (ref 2.5–4.5)
PLATELET # BLD AUTO: 264 K/UL — SIGNIFICANT CHANGE UP (ref 150–400)
POTASSIUM SERPL-MCNC: 4.3 MMOL/L — SIGNIFICANT CHANGE UP (ref 3.5–5.3)
POTASSIUM SERPL-MCNC: 4.6 MMOL/L — SIGNIFICANT CHANGE UP (ref 3.5–5.3)
POTASSIUM SERPL-SCNC: 4.3 MMOL/L — SIGNIFICANT CHANGE UP (ref 3.5–5.3)
POTASSIUM SERPL-SCNC: 4.6 MMOL/L — SIGNIFICANT CHANGE UP (ref 3.5–5.3)
PROTHROM AB SERPL-ACNC: 18.7 SEC — HIGH (ref 10.5–13.4)
RBC # BLD: 5.06 M/UL — SIGNIFICANT CHANGE UP (ref 4.2–5.8)
RBC # FLD: 12.6 % — SIGNIFICANT CHANGE UP (ref 10.3–14.5)
SODIUM SERPL-SCNC: 133 MMOL/L — LOW (ref 135–145)
SODIUM SERPL-SCNC: 134 MMOL/L — LOW (ref 135–145)
SODIUM UR-SCNC: 120 MMOL/L — SIGNIFICANT CHANGE UP
WBC # BLD: 15.51 K/UL — HIGH (ref 3.8–10.5)
WBC # FLD AUTO: 15.51 K/UL — HIGH (ref 3.8–10.5)

## 2022-03-21 PROCEDURE — 71045 X-RAY EXAM CHEST 1 VIEW: CPT | Mod: 26

## 2022-03-21 PROCEDURE — 93971 EXTREMITY STUDY: CPT | Mod: 26,LT

## 2022-03-21 PROCEDURE — 99024 POSTOP FOLLOW-UP VISIT: CPT

## 2022-03-21 PROCEDURE — 99291 CRITICAL CARE FIRST HOUR: CPT

## 2022-03-21 RX ORDER — HEPARIN SODIUM 5000 [USP'U]/ML
10 INJECTION INTRAVENOUS; SUBCUTANEOUS
Qty: 25000 | Refills: 0 | Status: DISCONTINUED | OUTPATIENT
Start: 2022-03-21 | End: 2022-03-21

## 2022-03-21 RX ORDER — HEPARIN SODIUM 5000 [USP'U]/ML
1100 INJECTION INTRAVENOUS; SUBCUTANEOUS
Qty: 25000 | Refills: 0 | Status: DISCONTINUED | OUTPATIENT
Start: 2022-03-21 | End: 2022-03-22

## 2022-03-21 RX ADMIN — POLYETHYLENE GLYCOL 3350 17 GRAM(S): 17 POWDER, FOR SOLUTION ORAL at 18:32

## 2022-03-21 RX ADMIN — Medication 10 MILLIGRAM(S): at 11:20

## 2022-03-21 RX ADMIN — Medication 3 MILLILITER(S): at 11:14

## 2022-03-21 RX ADMIN — CHLORHEXIDINE GLUCONATE 1 APPLICATION(S): 213 SOLUTION TOPICAL at 06:09

## 2022-03-21 RX ADMIN — LISINOPRIL 20 MILLIGRAM(S): 2.5 TABLET ORAL at 18:33

## 2022-03-21 RX ADMIN — CARVEDILOL PHOSPHATE 6.25 MILLIGRAM(S): 80 CAPSULE, EXTENDED RELEASE ORAL at 06:08

## 2022-03-21 RX ADMIN — HEPARIN SODIUM 10 UNIT(S)/HR: 5000 INJECTION INTRAVENOUS; SUBCUTANEOUS at 09:45

## 2022-03-21 RX ADMIN — Medication 4 MILLILITER(S): at 21:05

## 2022-03-21 RX ADMIN — AMLODIPINE BESYLATE 10 MILLIGRAM(S): 2.5 TABLET ORAL at 06:08

## 2022-03-21 RX ADMIN — Medication 4 MILLILITER(S): at 11:19

## 2022-03-21 RX ADMIN — Medication 3 MILLILITER(S): at 21:04

## 2022-03-21 RX ADMIN — CARVEDILOL PHOSPHATE 6.25 MILLIGRAM(S): 80 CAPSULE, EXTENDED RELEASE ORAL at 18:32

## 2022-03-21 RX ADMIN — SENNA PLUS 2 TABLET(S): 8.6 TABLET ORAL at 22:19

## 2022-03-21 RX ADMIN — Medication 3 MILLILITER(S): at 06:08

## 2022-03-21 RX ADMIN — Medication 3 MILLILITER(S): at 01:44

## 2022-03-21 RX ADMIN — Medication 4 MILLILITER(S): at 06:08

## 2022-03-21 RX ADMIN — Medication 4 MILLILITER(S): at 16:25

## 2022-03-21 RX ADMIN — Medication 3 MILLILITER(S): at 16:25

## 2022-03-21 RX ADMIN — Medication 4 MILLILITER(S): at 01:44

## 2022-03-21 NOTE — PROGRESS NOTE ADULT - SUBJECTIVE AND OBJECTIVE BOX
=================================  NEUROCRITICAL CARE ATTENDING NOTE  =================================    BREANNA MCCORMACK   MRN-[id number]  Summary:  76y/M with HTN, HLD, afib (on eliquis, unknown if last taken today,) CVA in  (with minimal residual right-sided weakness per girlfriend,) last known normal at 3pm when girlfriend spoke to him. 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 Upon arrival to [facility], BP noted to be 171/101. Stroke code was called. CTH without findings of hemorrhage. 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 NIHSS 20. No TPA was administered as Pt is out of window. Per patient's girlfriend, the basilar artery aneurysm is known and was diagnosed in  at [facility] when he suffered from a CVA. At that time, he underwent a "treatment for the aneurysm," which the girlfriend reports was too risky and was aborted. Patient never attended his follow-up appointments due to fear per girlfriend.  (10 Mar 2022 22:52)    COURSE IN THE HOSPITAL:  03/10 admitted to [facility]   Tmax 38.3; s/p angio showing extremely tortuous and elongated aortic arch with dolichoectatic vertebrobasilar system, giant partially thrombosed vertebrobasilar aneurysm   Tmax 37.9 tachypneic overnight; apixaban restarted; NGT inserted, tube feeds started at night   Tmax 38.3, desaturation to 88% this morning - now requiring 5L/min, tube feeds held; started on HFNC, improved   Tmax 37.8.  No significant events overnight.      transferred back to NSICU overnight    Past Medical History:   Allergies:  No Known Allergies  Home meds:   ·	amLODIPine 5 mg oral tablet: 1 tab(s) orally once a day  ·	captopril 100 mg oral tablet:   ·	carvedilol 6.25 mg oral tablet:   ·	Eliquis 5 mg oral tablet:   ·	hydroCHLOROthiazide 25 mg oral tablet:     PHYSICAL EXAMINATION  T(C): 37.8 ( @ 05:06), Max: 37.8 ( @ 21:01) HR: 69 ( @ 06:00) (60 - 74) BP: 153/71 ( @ 06:00) (118/61 - 159/77) RR: 29 ( @ 06:00) (14 - 37) SpO2: 100% ( @ 06:00) (93% - 100%)  NEUROLOGIC EXAMINATION:  Patient is awake, alert, oriented x3, hypophonic, following commands, CARISSA, L facial droop, R UE/LE 5/5, L UE moves fingers, unable to make a fist, withdraws, L LE brisk withdrawal  GENERAL: room air  EENT:  anicteric  CARDIOVASCULAR: (+) S1 S2, bradycardic rate and regular rhythm  PULMONARY: clear to auscultation  ABDOMEN: soft, nontender with normoactive bowel sounds  EXTREMITIES: no edema, no groin hematoma, pulses good  SKIN: no rash    LABS:                         15.8   15.51 )-----------( 264      ( 21 Mar 2022 05:28 )             47.4       133<L>  |  106  |  24<H>  ----------------------------<  153<H>  4.6   |  18<L>  |  0.83    Ca    8.9      21 Mar 2022 05:28  Phos  3.1       Mg     2.1         TPro  5.1<L>  /  Alb  2.7<L>  /  TBili  0.8  /  DBili  x   /  AST  18  /  ALT  28  /  AlkPhos  47      03-19 @ 07:01  -   @ 07:00  IN: 1680 mL / OUT: 1326 mL / NET: 354 mL    HbA1C = 5.4 ()  LDL = 108 ()   HDL = 43 ()  TG = 58 ()  TSH = 3.050 ()    Bacteriology:   Blood CS NG1D  CSF studies:  EEG:  Neuroimagin/11 MRI no acute infarcts:  3cm giant aneurysm basilar artery, partially thrombosed, marked compression of brainstem, distortion of IV but patent  03/10 CTA:  dolichoectasia of basilar artery, partially thrombosed aneurysm; diffuse idiopathic skeletal hyperostosis  Other imaging:    MEDICATIONS:     amLODIPine   Tablet 10 milliGRAM(s) Oral daily  carvedilol 6.25 milliGRAM(s) Oral every 12 hours  lisinopril 20 milliGRAM(s) Oral every 24 hours  acetylcysteine 20%  Inhalation 4 Inhalation every 4 hours  albuterol/ipratropium for Nebulization 3 Nebulizer every 4 hours  bisacodyl Suppository 10 Rectal daily  polyethylene glycol 3350 17 Oral every 12 hours  senna 2 Oral at bedtime  glucagon  Injectable 1 IntraMuscular once  chlorhexidine 2% Cloths 1 Topical <User Schedule>  acetaminophen    Suspension .. 650 Oral every 6 hours PRN  hydrALAZINE Injectable 10 IV Push every 4 hours PRN      MEDICATIONS:     ·	apixaban 5 Enteral Tube every 12 hours  ·	piperacillin/tazobactam IVPB.. 3.375 IV Intermittent every 6 hours  ·	vancomycin  IVPB 1750 IV Intermittent every 12 hours  ·	amLODIPine   Tablet 5 milliGRAM(s) Oral daily  ·	captopril 100 milliGRAM(s) Oral daily  ·	carvedilol 6.25 milliGRAM(s) Oral every 12 hours  ·	acetylcysteine 20%  Inhalation 4 Inhalation every 4 hours  ·	albuterol/ipratropium for Nebulization 3 Nebulizer every 4 hours  ·	polyethylene glycol 3350 17 Oral daily  ·	senna 2 Oral at bedtime  ·	acetaminophen    Suspension .. 650 Oral every 6 hours PRN  ·	hydrALAZINE Injectable 10 IV Push every 4 hours PRN    IV FLUIDS: NS@100cc/hr  DRIPS:  DIET: NPO  Lines: [place]  Drains:      CODE STATUS:  Full Code                       GOALS OF CARE:  aggressive                      DISPOSITION:  ICU =================================  NEUROCRITICAL CARE ATTENDING NOTE  =================================    BREANNA MCCORMACK   MRN-[id number]  Summary:  76y/M with HTN, HLD, afib (on eliquis, unknown if last taken today,) CVA in  (with minimal residual right-sided weakness per girlfriend,) last known normal at 3pm when girlfriend spoke to him. 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 Upon arrival to [facility], BP noted to be 171/101. Stroke code was called. CTH without findings of hemorrhage. 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 NIHSS 20. No TPA was administered as Pt is out of window. Per patient's girlfriend, the basilar artery aneurysm is known and was diagnosed in  at [facility] when he suffered from a CVA. At that time, he underwent a "treatment for the aneurysm," which the girlfriend reports was too risky and was aborted. Patient never attended his follow-up appointments due to fear per girlfriend.  (10 Mar 2022 22:52)    COURSE IN THE HOSPITAL:  03/10 admitted to [facility]   Tmax 38.3; s/p angio showing extremely tortuous and elongated aortic arch with dolichoectatic vertebrobasilar system, giant partially thrombosed vertebrobasilar aneurysm   Tmax 37.9 tachypneic overnight; apixaban restarted; NGT inserted, tube feeds started at night   Tmax 38.3, desaturation to 88% this morning - now requiring 5L/min, tube feeds held; started on HFNC, improved   Tmax 37.8.  No significant events overnight.      transferred back to NSICU overnight    Past Medical History:   Allergies:  No Known Allergies  Home meds:   ·	amLODIPine 5 mg oral tablet: 1 tab(s) orally once a day  ·	captopril 100 mg oral tablet:   ·	carvedilol 6.25 mg oral tablet:   ·	Eliquis 5 mg oral tablet:   ·	hydroCHLOROthiazide 25 mg oral tablet:     PHYSICAL EXAMINATION  T(C): 37.8 ( @ 05:06), Max: 37.8 ( @ 21:01) HR: 69 ( @ 06:00) (60 - 74) BP: 153/71 ( @ 06:00) (118/61 - 159/77) RR: 29 ( @ 06:00) (14 - 37) SpO2: 100% ( @ 06:00) (93% - 100%)  NEUROLOGIC EXAMINATION:  Patient is awake, alert, oriented x3 with choices, hypophonic dysarthric, following commands, CARISSA, L facial droop, R UE/LE 5/5, L UE 1/5 moves fingers, unable to make a fist, withdraws, L LE brisk withdrawal  GENERAL: 2L NC  EENT:  anicteric  CARDIOVASCULAR: (+) S1 S2, regular rate and regular rhythm  PULMONARY: clear to auscultation  ABDOMEN: soft, nontender with normoactive bowel sounds  EXTREMITIES: no edema, no groin hematoma, pulses good  SKIN: no rash    LABS:              (7)       15.8   15.51 )-----------( 264      ( 21 Mar 2022 05:28 )             47.4     (138)  133<L>  |  106  |  24<H>  ----------------------------<  153<H>  4.6   |  18<L>  |  0.83    Ca    8.9      21 Mar 2022 05:28  Phos  3.1       Mg     2.1         TPro  5.1<L>  /  Alb  2.7<L>  /  TBili  0.8  /  DBili  x   /  AST  18  /  ALT  28  /  AlkPhos  47  20    PT/INR - ( 21 Mar 2022 05:28 )   PT: 18.7 sec;   INR: 1.56    PTT - ( 21 Mar 2022 05:28 )  PTT:36.3 sec     @ 07:01  -   @ 07:00  IN: 1680 mL / OUT: 1326 mL / NET: 354 mL    HbA1C = 5.4 ()  LDL = 108 ()   HDL = 43 ()  TG = 58 ()  TSH = 3.050 ()    Bacteriology:   Blood CS NG1D  CSF studies:  EEG:  Neuroimagin/11 MRI no acute infarcts:  3cm giant aneurysm basilar artery, partially thrombosed, marked compression of brainstem, distortion of IV but patent  03/10 CTA:  dolichoectasia of basilar artery, partially thrombosed aneurysm; diffuse idiopathic skeletal hyperostosis  Other imaging:    MEDICATIONS:     ·	amLODIPine   Tablet 10 milliGRAM(s) Oral daily  ·	carvedilol 6.25 milliGRAM(s) Oral every 12 hours  ·	lisinopril 20 milliGRAM(s) Oral every 24 hours  ·	acetylcysteine 20%  Inhalation 4 Inhalation every 4 hours  ·	albuterol/ipratropium for Nebulization 3 Nebulizer every 4 hours  ·	bisacodyl Suppository 10 Rectal daily  ·	polyethylene glycol 3350 17 Oral every 12 hours  ·	senna 2 Oral at bedtime  ·	acetaminophen    Suspension .. 650 Oral every 6 hours PRN  ·	hydrALAZINE Injectable 10 IV Push every 4 hours PRN    Antibiotic History  ·	piperacillin/tazobactam IVPB.:  until    ·	vancomycin 03/13 x1    IV FLUIDS: IVL   DRIPS:  DIET: Jevity @ 70  Lines:   Drains:      CODE STATUS:  Full Code                       GOALS OF CARE:  aggressive                      DISPOSITION:  ICU

## 2022-03-21 NOTE — PROGRESS NOTE ADULT - SUBJECTIVE AND OBJECTIVE BOX
Patient nonverbal, unable to participate in exam  Tolerated exam well    MEDICATIONS  (STANDING):  acetylcysteine 20%  Inhalation 4 milliLiter(s) Inhalation every 4 hours  albuterol/ipratropium for Nebulization 3 milliLiter(s) Nebulizer every 4 hours  amLODIPine   Tablet 10 milliGRAM(s) Oral daily  bisacodyl Suppository 10 milliGRAM(s) Rectal daily  carvedilol 6.25 milliGRAM(s) Oral every 12 hours  chlorhexidine 2% Cloths 1 Application(s) Topical <User Schedule>  glucagon  Injectable 1 milliGRAM(s) IntraMuscular once  heparin  Infusion 10 Unit(s)/Hr (0.1 mL/Hr) IV Continuous <Continuous>  lisinopril 20 milliGRAM(s) Oral every 24 hours  polyethylene glycol 3350 17 Gram(s) Oral every 12 hours  senna 2 Tablet(s) Oral at bedtime    MEDICATIONS  (PRN):  acetaminophen    Suspension .. 650 milliGRAM(s) Oral every 6 hours PRN Temp greater or equal to 38C (100.4F), Mild Pain (1 - 3)  hydrALAZINE Injectable 10 milliGRAM(s) IV Push every 4 hours PRN SBP>160      Vital Signs Last 24 Hrs  T(C): 37.8 (21 Mar 2022 05:06), Max: 37.8 (20 Mar 2022 21:01)  T(F): 100.1 (21 Mar 2022 05:06), Max: 100.1 (20 Mar 2022 21:01)  HR: 66 (21 Mar 2022 08:00) (60 - 74)  BP: 111/56 (21 Mar 2022 08:00) (111/56 - 159/77)  BP(mean): 80 (21 Mar 2022 08:00) (80 - 111)  RR: 27 (21 Mar 2022 08:00) (14 - 37)  SpO2: 93% (21 Mar 2022 08:00) (93% - 100%)    Physical Exam  General: NAD, resting comfortably in bed  Pulm: Nonlabored breathing, no respiratory distress  Abd: soft, ND, NT  Extrem: WWP, no edema      I&O's Summary    20 Mar 2022 07:01  -  21 Mar 2022 07:00  --------------------------------------------------------  IN: 1840 mL / OUT: 2425 mL / NET: -585 mL    21 Mar 2022 07:01  -  21 Mar 2022 08:17  --------------------------------------------------------  IN: 70 mL / OUT: 0 mL / NET: 70 mL                              15.8   15.51 )-----------( 264      ( 21 Mar 2022 05:28 )             47.4       03-21    133<L>  |  106  |  24<H>  ----------------------------<  153<H>  4.6   |  18<L>  |  0.83    Ca    8.9      21 Mar 2022 05:28  Phos  3.1     03-21  Mg     2.1     03-21    TPro  5.1<L>  /  Alb  2.7<L>  /  TBili  0.8  /  DBili  x   /  AST  18  /  ALT  28  /  AlkPhos  47  03-20      Micro: no new    Imaging: no abdominal imaging

## 2022-03-21 NOTE — PROGRESS NOTE ADULT - ASSESSMENT
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, hemiplegia  diffuse idiopathic skeletal hyperostosis  Hypertension dyslipidemia  h/o CVA 2020, minimal R sided weakness  Atrial fibrillation with controlled ventricular rate    PLAN:   NEURO: neurochecks q2h, VS q1h, PRN pain meds with acetaminophen, no opiates / benzos  angio done - f/u final plans neurointerventional     REHAB:  physical therapy evaluation and management    EARLY MOB:  HOB up    PULM:  PRN O2 support to keep sats >/=92%, incentive spirometry; nebs to q4h, pulmicort; ENT consulted, f/u vocal cord injection plans  CARDIO:  SBP goal 100-160mm Hg, cont carvedilol, amlodipine, switch captopril to lisinopril   ENDO:  Blood sugar goals 140-180 mg/dL  GI:  bowel regimen miralax  DIET: TF at goal; PEG on Wed  RENAL:  continue NS @100c/hr, Na goal 135-145; daily BMP  HEM/ONC:   VTE Prophylaxis: SCDs, Eliquis; f/u US L UE  ID: low grade fever, leukocytosis; ??  Social: Rafiq Forrester  is HCP; discussed yesterday with wife and patient who are both agreeable to NGT insertion    ATTENDING ATTESTATION:  I was physically present for the key portions of the evaluation and management (E/M) service provided.  I agree with the above history, physical and plan, which I have reviewed and edited where appropriate.    Patient at high risk for neurological deterioration or death due to:  ICU delirium, aspiration PNA, DVT / PE.  Critical care time:  I have personally provided 45 minutes of critical care time, excluding time spent on separate procedures.      Plan discussed with RN, house staff. 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, hemiplegia  diffuse idiopathic skeletal hyperostosis  Hypertension dyslipidemia  h/o CVA 2020, minimal R sided weakness  Atrial fibrillation with controlled ventricular rate    PLAN:   NEURO: neurochecks q4h, VS q2h, PRN pain meds with acetaminophen, no opiates / benzos  no plans for intervention for basilar artery aneurysm  REHAB:  physical therapy evaluation and management    EARLY MOB:  OOB to chair    PULM:  PRN O2 support to keep sats >/=92%, incentive spirometry; nebs to q4h, pulmicort; ENT consulted, f/u vocal cord injection plans  CARDIO:  SBP goal 100-160mm Hg, cont carvedilol, amlodipine, lisinopril   ENDO:  Blood sugar goals 140-180 mg/dL  GI:  bowel regimen miralax  DIET: TF at goal; PEG on Wed  RENAL:  IVL, recheck BMP, check urine Na Urine OSM, POsm, BMP  HEM/ONC:  Hb stable  VTE Prophylaxis: SCDs, heparin gtt goal PTT 40-60   ID: no fever, leukocytosis; CXR daily  Social: Rafiq Forrester  is HCP    ATTENDING ATTESTATION:  I was physically present for the key portions of the evaluation and management (E/M) service provided.  I agree with the above history, physical and plan, which I have reviewed and edited where appropriate.    Patient at high risk for neurological deterioration or death due to:  ICU delirium, aspiration PNA, DVT / PE.  Critical care time:  I have personally provided 45 minutes of critical care time, excluding time spent on separate procedures.      Plan discussed with RN, house staff.

## 2022-03-21 NOTE — PROGRESS NOTE ADULT - ASSESSMENT
76M PMH AF on Eliquis, HTN, HLD, CVA 2020 with minimal residual right sided weakness, and large basilar artery aneurysm with brain stem compression. He was admitted for new left sided weakness, hypophonia and inability to swallow related to brain stem compression by the aneurysm. We are consulted for PEG placement.     - planning for PEG 3/23  - transitioned from Eliquis to heparin drip  - will confirm consent in chart  - surgery team 4c following  - discussed with Dr. Knowles

## 2022-03-22 ENCOUNTER — TRANSCRIPTION ENCOUNTER (OUTPATIENT)
Age: 77
End: 2022-03-22

## 2022-03-22 LAB
ANION GAP SERPL CALC-SCNC: 10 MMOL/L — SIGNIFICANT CHANGE UP (ref 5–17)
APTT BLD: 37.2 SEC — HIGH (ref 27.5–35.5)
APTT BLD: 44.3 SEC — HIGH (ref 27.5–35.5)
APTT BLD: >200 SEC — CRITICAL HIGH (ref 27.5–35.5)
BUN SERPL-MCNC: 25 MG/DL — HIGH (ref 7–23)
CALCIUM SERPL-MCNC: 8.7 MG/DL — SIGNIFICANT CHANGE UP (ref 8.4–10.5)
CHLORIDE SERPL-SCNC: 104 MMOL/L — SIGNIFICANT CHANGE UP (ref 96–108)
CO2 SERPL-SCNC: 23 MMOL/L — SIGNIFICANT CHANGE UP (ref 22–31)
CREAT SERPL-MCNC: 0.85 MG/DL — SIGNIFICANT CHANGE UP (ref 0.5–1.3)
EGFR: 89 ML/MIN/1.73M2 — SIGNIFICANT CHANGE UP
GLUCOSE SERPL-MCNC: 124 MG/DL — HIGH (ref 70–99)
HCT VFR BLD CALC: 44.6 % — SIGNIFICANT CHANGE UP (ref 39–50)
HGB BLD-MCNC: 15 G/DL — SIGNIFICANT CHANGE UP (ref 13–17)
MAGNESIUM SERPL-MCNC: 2 MG/DL — SIGNIFICANT CHANGE UP (ref 1.6–2.6)
MCHC RBC-ENTMCNC: 31.3 PG — SIGNIFICANT CHANGE UP (ref 27–34)
MCHC RBC-ENTMCNC: 33.6 GM/DL — SIGNIFICANT CHANGE UP (ref 32–36)
MCV RBC AUTO: 93.1 FL — SIGNIFICANT CHANGE UP (ref 80–100)
NRBC # BLD: 0 /100 WBCS — SIGNIFICANT CHANGE UP (ref 0–0)
PHOSPHATE SERPL-MCNC: 2.6 MG/DL — SIGNIFICANT CHANGE UP (ref 2.5–4.5)
PLATELET # BLD AUTO: 208 K/UL — SIGNIFICANT CHANGE UP (ref 150–400)
POTASSIUM SERPL-MCNC: 4.4 MMOL/L — SIGNIFICANT CHANGE UP (ref 3.5–5.3)
POTASSIUM SERPL-SCNC: 4.4 MMOL/L — SIGNIFICANT CHANGE UP (ref 3.5–5.3)
RBC # BLD: 4.79 M/UL — SIGNIFICANT CHANGE UP (ref 4.2–5.8)
RBC # FLD: 12.7 % — SIGNIFICANT CHANGE UP (ref 10.3–14.5)
SARS-COV-2 RNA SPEC QL NAA+PROBE: NEGATIVE — SIGNIFICANT CHANGE UP
SODIUM SERPL-SCNC: 137 MMOL/L — SIGNIFICANT CHANGE UP (ref 135–145)
WBC # BLD: 11.75 K/UL — HIGH (ref 3.8–10.5)
WBC # FLD AUTO: 11.75 K/UL — HIGH (ref 3.8–10.5)

## 2022-03-22 PROCEDURE — 73620 X-RAY EXAM OF FOOT: CPT | Mod: 26,LT

## 2022-03-22 PROCEDURE — 36600 WITHDRAWAL OF ARTERIAL BLOOD: CPT

## 2022-03-22 PROCEDURE — 99291 CRITICAL CARE FIRST HOUR: CPT

## 2022-03-22 PROCEDURE — 70450 CT HEAD/BRAIN W/O DYE: CPT | Mod: 26

## 2022-03-22 RX ORDER — SODIUM CHLORIDE 9 MG/ML
1000 INJECTION INTRAMUSCULAR; INTRAVENOUS; SUBCUTANEOUS
Refills: 0 | Status: DISCONTINUED | OUTPATIENT
Start: 2022-03-22 | End: 2022-03-23

## 2022-03-22 RX ORDER — HEPARIN SODIUM 5000 [USP'U]/ML
1100 INJECTION INTRAVENOUS; SUBCUTANEOUS
Qty: 25000 | Refills: 0 | Status: DISCONTINUED | OUTPATIENT
Start: 2022-03-22 | End: 2022-03-23

## 2022-03-22 RX ORDER — LACTULOSE 10 G/15ML
20 SOLUTION ORAL DAILY
Refills: 0 | Status: DISCONTINUED | OUTPATIENT
Start: 2022-03-22 | End: 2022-03-24

## 2022-03-22 RX ADMIN — Medication 3 MILLILITER(S): at 14:47

## 2022-03-22 RX ADMIN — Medication 650 MILLIGRAM(S): at 16:00

## 2022-03-22 RX ADMIN — Medication 4 MILLILITER(S): at 14:47

## 2022-03-22 RX ADMIN — Medication 3 MILLILITER(S): at 22:41

## 2022-03-22 RX ADMIN — Medication 3 MILLILITER(S): at 05:19

## 2022-03-22 RX ADMIN — CARVEDILOL PHOSPHATE 6.25 MILLIGRAM(S): 80 CAPSULE, EXTENDED RELEASE ORAL at 18:37

## 2022-03-22 RX ADMIN — HEPARIN SODIUM 11 UNIT(S)/HR: 5000 INJECTION INTRAVENOUS; SUBCUTANEOUS at 11:19

## 2022-03-22 RX ADMIN — CARVEDILOL PHOSPHATE 6.25 MILLIGRAM(S): 80 CAPSULE, EXTENDED RELEASE ORAL at 06:07

## 2022-03-22 RX ADMIN — Medication 62.5 MILLIMOLE(S): at 12:13

## 2022-03-22 RX ADMIN — LACTULOSE 20 GRAM(S): 10 SOLUTION ORAL at 12:12

## 2022-03-22 RX ADMIN — LISINOPRIL 20 MILLIGRAM(S): 2.5 TABLET ORAL at 18:38

## 2022-03-22 RX ADMIN — Medication 4 MILLILITER(S): at 05:18

## 2022-03-22 RX ADMIN — CHLORHEXIDINE GLUCONATE 1 APPLICATION(S): 213 SOLUTION TOPICAL at 06:08

## 2022-03-22 RX ADMIN — POLYETHYLENE GLYCOL 3350 17 GRAM(S): 17 POWDER, FOR SOLUTION ORAL at 18:37

## 2022-03-22 RX ADMIN — Medication 650 MILLIGRAM(S): at 14:34

## 2022-03-22 RX ADMIN — Medication 10 MILLIGRAM(S): at 12:12

## 2022-03-22 RX ADMIN — POLYETHYLENE GLYCOL 3350 17 GRAM(S): 17 POWDER, FOR SOLUTION ORAL at 06:08

## 2022-03-22 RX ADMIN — AMLODIPINE BESYLATE 10 MILLIGRAM(S): 2.5 TABLET ORAL at 06:07

## 2022-03-22 RX ADMIN — Medication 4 MILLILITER(S): at 18:37

## 2022-03-22 RX ADMIN — SENNA PLUS 2 TABLET(S): 8.6 TABLET ORAL at 23:54

## 2022-03-22 RX ADMIN — Medication 4 MILLILITER(S): at 22:40

## 2022-03-22 RX ADMIN — Medication 3 MILLILITER(S): at 18:37

## 2022-03-22 NOTE — PROGRESS NOTE ADULT - ASSESSMENT
76M PMH AF on Eliquis, HTN, HLD, CVA 2020 with minimal residual right sided weakness, and large basilar artery aneurysm with brain stem compression. He was admitted for new left sided weakness, hypophonia and inability to swallow related to brain stem compression by the aneurysm. We are consulted for PEG placement.     - planning for PEG 3/23  - COVID swab today  - transitioned from Eliquis to heparin drip  - consent in chart  - surgery team 4c following  - discussed with Dr. Knowles

## 2022-03-22 NOTE — PROGRESS NOTE ADULT - ASSESSMENT
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, hemiplegia  diffuse idiopathic skeletal hyperostosis  Hypertension dyslipidemia  h/o CVA 2020, minimal R sided weakness  Atrial fibrillation with controlled ventricular rate    PLAN:   NEURO: neurochecks q4h, VS q2h, PRN pain meds with acetaminophen, no opiates / benzos  no plans for intervention for basilar artery aneurysm  REHAB:  physical therapy evaluation and management    EARLY MOB:  OOB to chair    PULM:  PRN O2 support to keep sats >/=92%, incentive spirometry; nebs to q4h, pulmicort; ENT consulted, f/u vocal cord injection plans  CARDIO:  SBP goal 100-160mm Hg, cont carvedilol, amlodipine, lisinopril   ENDO:  Blood sugar goals 140-180 mg/dL  GI:  bowel regimen miralax  DIET: TF at goal; PEG on Wed  RENAL:  IVL, recheck BMP, check urine Na Urine OSM, POsm, BMP  HEM/ONC:  Hb stable  VTE Prophylaxis: SCDs, heparin gtt goal PTT 40-60   ID: no fever, leukocytosis; CXR daily  Social: Rafiq Forrester  is HCP    ATTENDING ATTESTATION:  I was physically present for the key portions of the evaluation and management (E/M) service provided.  I agree with the above history, physical and plan, which I have reviewed and edited where appropriate.    Patient at high risk for neurological deterioration or death due to:  ICU delirium, aspiration PNA, DVT / PE.  Critical care time:  I have personally provided 45 minutes of critical care time, excluding time spent on separate procedures.      Plan discussed with RN, house staff. 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, hemiplegia  diffuse idiopathic skeletal hyperostosis  Hypertension dyslipidemia  h/o CVA 2020, minimal R sided weakness  Atrial fibrillation with controlled ventricular rate    PLAN:   NEURO: neurochecks q4h, VS q2h, PRN pain meds with acetaminophen, no opiates / benzos  no plans for intervention for basilar artery aneurysm  REHAB:  physical therapy evaluation and management    EARLY MOB:  OOB to chair    PULM:  PRN O2 support to keep sats >/=92%, incentive spirometry; nebs, ENT consulted, f/u vocal cord injection plans  CARDIO:  SBP goal 100-160mm Hg, cont carvedilol, amlodipine, lisinopril   ENDO:  Blood sugar goals 140-180 mg/dL  GI:  bowel regimen miralax  DIET: TF at goal; PEG on Wed; hold TF at midnight   RENAL:  IVF once NPO  HEM/ONC:  Hb stable  VTE Prophylaxis: SCDs, heparin gtt goal PTT 40-60 - hold heparin prior to PEG  ID: no fever, leukocytosis improving;  Social: Rafiq Forrester  is HCP - updated yesterday     ATTENDING ATTESTATION:  I was physically present for the key portions of the evaluation and management (E/M) service provided.  I agree with the above history, physical and plan, which I have reviewed and edited where appropriate.    Patient at high risk for neurological deterioration or death due to:  ICU delirium, aspiration PNA, DVT / PE.  Critical care time:  I have personally provided 45 minutes of critical care time, excluding time spent on separate procedures.      Plan discussed with RN, house staff.

## 2022-03-22 NOTE — PROGRESS NOTE ADULT - ASSESSMENT
per Neurosurgery    77 y/o M with PMHx sig for HTN, HLD, afib (on eliquis, unknown if last taken today,) CVA in 2020 (with minimal residual right-sided weakness per girlfriend) p/w dysarthria, left facial droop, and left-sided weakness. CTH without findings of hemorrhage. 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 NIHSS 20. Pt is not a tpa candidate as Pt is out of window. Patient now s/p diagnostic angio with findings of partially thrombosed basilar artery aneurysm 3/11/22. course c/b resp insufficiency weaned off HFNC.     PLAN:  Neuro:   - neuro checks q 4/vital signs q 2   - MR head 3/11: no acute infarcts   - stroke core measures   - EEG negative 3/13, dc'd   - no plan for further neurosurgical intervention   - transfer to NSICU 3/20 for Heparin gtt/PEG placement (hep gtt to start 3/21)     Cardio:  - SBP goal 100-160   - cont home norvasc, carvedilol (home HCTZ held), Lisinopril substituted for Captopril   - echo 3/11: mild LVH, EF 65-70%   - home dose Eliquis for Afib --> transition from Eliquis to heparin gtt for PEG planning     Pulm:  - satting well on RA  - standing mucomyst and duonebs  - Chest PT     ENT:   - ENT consulted, s/p laryngoscopy 3/14: No masses, hypomobility of L sided vocal cord  - f/u with Dr. Onofre/Dr. Peterson for possible vocal cord injection.     Renal:  - Na goal 135-145  - IVL     GI:  - TF via NGT, at goal   - pend PEG with Eleni in NSICU 3/23  - bowel regimen, BM 3/20    Endo:  - ISS dc'd  - A1C 5.4    Heme;  - SCDs for DVT ppx  - hold Eliquis 3/21, start Heparin gtt 3/21   - LE dopplers 3/11: neg for DVT   - pend LUE US for edema     ID:  - zosyn empirically for PNA completed 3/17  - f/u pan cx 3/12; NGTD    Dispo:  - ICU status, full code   - Pending AR  - Family updated with plan  - OOB to chair

## 2022-03-22 NOTE — PROGRESS NOTE ADULT - SUBJECTIVE AND OBJECTIVE BOX
Talked with HCP/partner via phone this AM. Answered questions about PEG placement. Partner expressed concern over lack of BM and would like suppository to be given.     MEDICATIONS  (STANDING):  acetylcysteine 20%  Inhalation 4 milliLiter(s) Inhalation every 4 hours  albuterol/ipratropium for Nebulization 3 milliLiter(s) Nebulizer every 4 hours  amLODIPine   Tablet 10 milliGRAM(s) Oral daily  bisacodyl Suppository 10 milliGRAM(s) Rectal daily  carvedilol 6.25 milliGRAM(s) Oral every 12 hours  chlorhexidine 2% Cloths 1 Application(s) Topical <User Schedule>  glucagon  Injectable 1 milliGRAM(s) IntraMuscular once  lisinopril 20 milliGRAM(s) Oral every 24 hours  polyethylene glycol 3350 17 Gram(s) Oral every 12 hours  senna 2 Tablet(s) Oral at bedtime    MEDICATIONS  (PRN):  acetaminophen    Suspension .. 650 milliGRAM(s) Oral every 6 hours PRN Temp greater or equal to 38C (100.4F), Mild Pain (1 - 3)  hydrALAZINE Injectable 10 milliGRAM(s) IV Push every 4 hours PRN SBP>160      Vital Signs Last 24 Hrs  T(C): 37 (22 Mar 2022 01:53), Max: 37 (22 Mar 2022 01:53)  T(F): 98.6 (22 Mar 2022 01:53), Max: 98.6 (22 Mar 2022 01:53)  HR: 64 (22 Mar 2022 07:00) (58 - 69)  BP: 135/65 (22 Mar 2022 07:00) (118/57 - 167/81)  BP(mean): 93 (22 Mar 2022 07:00) (80 - 115)  RR: 23 (22 Mar 2022 07:00) (20 - 33)  SpO2: 97% (22 Mar 2022 07:00) (91% - 98%)    Physical Exam  General: NAD, resting comfortably in bed  Pulm: Nonlabored breathing, no respiratory distress  Abd: soft, ND, NT  Extrem: WWP, no edema      I&O's Summary    21 Mar 2022 07:01  -  22 Mar 2022 07:00  --------------------------------------------------------  IN: 1580 mL / OUT: 1825 mL / NET: -245 mL                              15.0   11.75 )-----------( 208      ( 22 Mar 2022 05:40 )             44.6       03-22    137  |  104  |  25<H>  ----------------------------<  124<H>  4.4   |  23  |  0.85    Ca    8.7      22 Mar 2022 05:40  Phos  2.6     03-22  Mg     2.0     03-22        Micro: no new    Imaging: no abdominal imaging

## 2022-03-22 NOTE — PROGRESS NOTE ADULT - SUBJECTIVE AND OBJECTIVE BOX
=================================  NEUROCRITICAL CARE ATTENDING NOTE  =================================    BREANNA MCCORMACK   MRN-[id number]  Summary:  76y/M with HTN, HLD, afib (on eliquis, unknown if last taken today,) CVA in  (with minimal residual right-sided weakness per girlfriend,) last known normal at 3pm when girlfriend spoke to him. 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 Upon arrival to [facility], BP noted to be 171/101. Stroke code was called. CTH without findings of hemorrhage. 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 NIHSS 20. No TPA was administered as Pt is out of window. Per patient's girlfriend, the basilar artery aneurysm is known and was diagnosed in  at [facility] when he suffered from a CVA. At that time, he underwent a "treatment for the aneurysm," which the girlfriend reports was too risky and was aborted. Patient never attended his follow-up appointments due to fear per girlfriend.  (10 Mar 2022 22:52)    COURSE IN THE HOSPITAL:  03/10 admitted to [facility]   Tmax 38.3; s/p angio showing extremely tortuous and elongated aortic arch with dolichoectatic vertebrobasilar system, giant partially thrombosed vertebrobasilar aneurysm   Tmax 37.9 tachypneic overnight; apixaban restarted; NGT inserted, tube feeds started at night   Tmax 38.3, desaturation to 88% this morning - now requiring 5L/min, tube feeds held; started on HFNC, improved   Tmax 37.8.  No significant events overnight.      transferred back to NSICU overnight   POD#11     Past Medical History:   Allergies:  No Known Allergies  Home meds:   ·	amLODIPine 5 mg oral tablet: 1 tab(s) orally once a day  ·	captopril 100 mg oral tablet:   ·	carvedilol 6.25 mg oral tablet:   ·	Eliquis 5 mg oral tablet:   ·	hydroCHLOROthiazide 25 mg oral tablet:     PHYSICAL EXAMINATION  T(C): 37 ( @ 01:53), Max: 37 ( @ 01:53) HR: 63 ( @ 03:00) (58 - 69) BP: 124/60 ( @ 03:00) (111/56 - 143/72) RR: 26 ( @ 03:00) (23 - 33) SpO2: 97% ( @ 03:00) (91% - 98%)   NEUROLOGIC EXAMINATION:  Patient is awake, alert, oriented x3 with choices, hypophonic dysarthric, following commands, CARISSA, L facial droop, R UE/LE 5/5, L UE 1/5 moves fingers, unable to make a fist, withdraws, L LE brisk withdrawal    hypophonic AOx3, RUE/LE 5/5, L UE moves fingers 1-2/5, proximally 0/5, L LE withdraws    GENERAL: 2L NC  EENT:  anicteric  CARDIOVASCULAR: (+) S1 S2, regular rate and regular rhythm  PULMONARY: clear to auscultation  ABDOMEN: soft, nontender with normoactive bowel sounds  EXTREMITIES: no edema, no groin hematoma, pulses good  SKIN: no rash    LABS:     (15.51) 15.0 (15.0)  11.75 )-----------( 208      ( 22 Mar 2022 05:40 )             44.6   (134, 133)  137  |  104  |  25<H>  ----------------------------<  124<H>  4.4   |  23  |  0.85    Ca    8.7      22 Mar 2022 05:40  Phos  2.6       Mg     2.0      @ 07:01  -  - @ 07:00  IN: 1840 mL / OUT: 2425 mL / NET: -585 mL    PT/INR - ( 21 Mar 2022 05:28 )   PT: 18.7 sec;   INR: 1.56   PTT - ( 22 Mar 2022 00:03 )  PTT:>200.0 sec     @ 07: @ 07:00  IN: 1680 mL / OUT: 1326 mL / NET: 354 mL    HbA1C = 5.4 ()  LDL = 108 ()   HDL = 43 ()  TG = 58 ()  TSH = 3.050 ()    Bacteriology:   Blood CS NG1D  CSF studies:  EEG:  Neuroimagin/11 MRI no acute infarcts:  3cm giant aneurysm basilar artery, partially thrombosed, marked compression of brainstem, distortion of IV but patent  03/10 CTA:  dolichoectasia of basilar artery, partially thrombosed aneurysm; diffuse idiopathic skeletal hyperostosis  Other imaging:    MEDICATIONS:     ·	amLODIPine   Tablet 10 milliGRAM(s) Oral daily  ·	carvedilol 6.25 milliGRAM(s) Oral every 12 hours  ·	lisinopril 20 milliGRAM(s) Oral every 24 hours  ·	acetylcysteine 20%  Inhalation 4 Inhalation every 4 hours  ·	albuterol/ipratropium for Nebulization 3 Nebulizer every 4 hours  ·	bisacodyl Suppository 10 Rectal daily  ·	polyethylene glycol 3350 17 Oral every 12 hours  ·	senna 2 Oral at bedtime  ·	acetaminophen    Suspension .. 650 Oral every 6 hours PRN  ·	hydrALAZINE Injectable 10 IV Push every 4 hours PRN    Antibiotic History  ·	piperacillin/tazobactam IVPB.:  until    ·	vancomycin 03/13 x1    IV FLUIDS: IVL   DRIPS:  DIET: Jevity @ 70  Lines:   Drains:      CODE STATUS:  Full Code                       GOALS OF CARE:  aggressive                      DISPOSITION:  ICU =================================  NEUROCRITICAL CARE ATTENDING NOTE  =================================    BREANNA MCCORMACK   [id number]  Summary:  76y/M with HTN, HLD, afib (on eliquis, unknown if last taken today,) CVA in  (with minimal residual right-sided weakness per girlfriend,) last known normal at 3pm when girlfriend spoke to him. 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 Upon arrival to [facility], BP noted to be 171/101. Stroke code was called. CTH without findings of hemorrhage. 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 NIHSS 20. No TPA was administered as Pt is out of window. Per patient's girlfriend, the basilar artery aneurysm is known and was diagnosed in  at [facility] when he suffered from a CVA. At that time, he underwent a "treatment for the aneurysm," which the girlfriend reports was too risky and was aborted. Patient never attended his follow-up appointments due to fear per girlfriend.  (10 Mar 2022 22:52)    COURSE IN THE HOSPITAL:  03/10 admitted to [facility]   Tmax 38.3; s/p angio showing extremely tortuous and elongated aortic arch with dolichoectatic vertebrobasilar system, giant partially thrombosed vertebrobasilar aneurysm   Tmax 37.9 tachypneic overnight; apixaban restarted; NGT inserted, tube feeds started at night   Tmax 38.3, desaturation to 88% this morning - now requiring 5L/min, tube feeds held; started on HFNC, improved   Tmax 37.8.  No significant events overnight.      transferred back to NSICU overnight   POD#11     Past Medical History:   Allergies:  No Known Allergies  Home meds:   ·	amLODIPine 5 mg oral tablet: 1 tab(s) orally once a day  ·	captopril 100 mg oral tablet:   ·	carvedilol 6.25 mg oral tablet:   ·	Eliquis 5 mg oral tablet:   ·	hydroCHLOROthiazide 25 mg oral tablet:     PHYSICAL EXAMINATION  T(C): 37 ( @ 01:53), Max: 37 ( @ 01:53) HR: 63 ( @ 03:00) (58 - 69) BP: 124/60 ( @ 03:00) (111/56 - 143/72) RR: 26 ( @ 03:00) (23 - 33) SpO2: 97% ( @ 03:00) (91% - 98%)   NEUROLOGIC EXAMINATION:  Patient is awake, alert, oriented x3 with choices, hypophonic dysarthric, following commands, CARISSA, L facial droop, R UE/LE 5/5, L UE 0/5, unable to make a fist, withdraws, L LE brisk withdrawal  GENERAL: 2L NC  EENT:  anicteric  CARDIOVASCULAR: (+) S1 S2, regular rate and regular rhythm  PULMONARY: clear to auscultation  ABDOMEN: soft, nontender with normoactive bowel sounds  EXTREMITIES: no edema, no groin hematoma, pulses good  SKIN: no rash    LABS:     (15.51) 15.0 (15.0)  11.75 )-----------( 208      ( 22 Mar 2022 05:40 )             44.6   (134, 133)  137  |  104  |  25<H>  ----------------------------<  124<H>  4.4   |  23  |  0.85    Ca    8.7      22 Mar 2022 05:40  Phos  2.6       Mg     2.0      @ 07:01  -  03- @ 07:00  IN: 1840 mL / OUT: 2425 mL / NET: -585 mL    PT/INR - ( 21 Mar 2022 05:28 )   PT: 18.7 sec;   INR: 1.56   PTT - ( 22 Mar 2022 00:03 )  PTT:>200.0 sec     @ 07:01  -   @ 07:00  IN: 1680 mL / OUT: 1326 mL / NET: 354 mL    HbA1C = 5.4 ()  LDL = 108 ()   HDL = 43 ()  TG = 58 ()  TSH = 3.050 ()    Bacteriology:   Blood CS NG1D  CSF studies:  EEG:  Neuroimagin/11 MRI no acute infarcts:  3cm giant aneurysm basilar artery, partially thrombosed, marked compression of brainstem, distortion of IV but patent  03/10 CTA:  dolichoectasia of basilar artery, partially thrombosed aneurysm; diffuse idiopathic skeletal hyperostosis  Other imaging:    MEDICATIONS:     ·	amLODIPine   Tablet 10 milliGRAM(s) Oral daily  ·	carvedilol 6.25 milliGRAM(s) Oral every 12 hours  ·	lisinopril 20 milliGRAM(s) Oral every 24 hours  ·	acetylcysteine 20%  Inhalation 4 Inhalation every 4 hours  ·	albuterol/ipratropium for Nebulization 3 Nebulizer every 4 hours  ·	bisacodyl Suppository 10 Rectal daily  ·	polyethylene glycol 3350 17 Oral every 12 hours  ·	senna 2 Oral at bedtime  ·	acetaminophen    Suspension .. 650 Oral every 6 hours PRN  ·	hydrALAZINE Injectable 10 IV Push every 4 hours PRN    Antibiotic History  ·	piperacillin/tazobactam IVPB.:  until    ·	vancomycin 03/13 x1    IV FLUIDS: IVL   DRIPS:  - heparin drip - held for  (falsely high?) - now back to 11  DIET: Jevity @ 70  Lines:   Drains:      CODE STATUS:  Full Code                       GOALS OF CARE:  aggressive                      DISPOSITION:  ICU

## 2022-03-22 NOTE — PROGRESS NOTE ADULT - SUBJECTIVE AND OBJECTIVE BOX
Physical Medicine and Rehabilitation Progress Note:    Patient is a 76y old  Male who presents with a chief complaint of CVA, left facial, left-sided weakness (22 Mar 2022 08:15)      HPI:  75y/o M with PMHx sig for HTN, HLD, afib (on eliquis, unknown if last taken today,) CVA in 2020 (with minimal residual right-sided weakness per girlfriend,) last known normal at 3pm when girlfriend spoke to him. 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 Upon arrival to [facility], BP noted to be 171/101. Stroke code was called. CTH without findings of hemorrhage. 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 NIHSS 20. No TPA was administered as Pt is out of window. Per patient's girlfriend, the basilar artery aneurysm is known and was diagnosed in 2020 at [facility] when he suffered from a CVA. At that time, he underwent a "treatment for the aneurysm," which the girlfriend reports was too risky and was aborted. Patient never attended his follow-up appointments due to fear per girlfriend.  (10 Mar 2022 22:52)                            15.0   11.75 )-----------( 208      ( 22 Mar 2022 05:40 )             44.6       03-22    137  |  104  |  25<H>  ----------------------------<  124<H>  4.4   |  23  |  0.85    Ca    8.7      22 Mar 2022 05:40  Phos  2.6     03-22  Mg     2.0     03-22      Vital Signs Last 24 Hrs  T(C): 38 (22 Mar 2022 14:00), Max: 38 (22 Mar 2022 14:00)  T(F): 100.4 (22 Mar 2022 14:00), Max: 100.4 (22 Mar 2022 14:00)  HR: 73 (22 Mar 2022 14:00) (58 - 74)  BP: 145/78 (22 Mar 2022 14:00) (119/56 - 167/81)  BP(mean): 104 (22 Mar 2022 14:00) (80 - 115)  RR: 31 (22 Mar 2022 14:00) (20 - 33)  SpO2: 96% (22 Mar 2022 14:00) (96% - 98%)    MEDICATIONS  (STANDING):  acetylcysteine 20%  Inhalation 4 milliLiter(s) Inhalation every 4 hours  albuterol/ipratropium for Nebulization 3 milliLiter(s) Nebulizer every 4 hours  amLODIPine   Tablet 10 milliGRAM(s) Oral daily  bisacodyl Suppository 10 milliGRAM(s) Rectal daily  carvedilol 6.25 milliGRAM(s) Oral every 12 hours  chlorhexidine 2% Cloths 1 Application(s) Topical <User Schedule>  glucagon  Injectable 1 milliGRAM(s) IntraMuscular once  heparin  Infusion 1100 Unit(s)/Hr (11 mL/Hr) IV Continuous <Continuous>  lactulose Syrup 20 Gram(s) Oral daily  lisinopril 20 milliGRAM(s) Oral every 24 hours  polyethylene glycol 3350 17 Gram(s) Oral every 12 hours  senna 2 Tablet(s) Oral at bedtime  sodium chloride 0.9%. 1000 milliLiter(s) (100 mL/Hr) IV Continuous <Continuous>    MEDICATIONS  (PRN):  acetaminophen    Suspension .. 650 milliGRAM(s) Oral every 6 hours PRN Temp greater or equal to 38C (100.4F), Mild Pain (1 - 3)  hydrALAZINE Injectable 10 milliGRAM(s) IV Push every 4 hours PRN SBP>160    Currently Undergoing Physical/ Occupational Therapy at bedside.    PT/OT Functional Status Assessment:   3/21/2022    Cognitive/Neuro/Behavioral  Cognitive/Neuro/Behavioral [WDL Definition: Alert; opens eyes spontaneously; arouses to voice or touch; oriented x 4; follows commands; speech spontaneous, logical; purposeful motor response; behavior appropriate to situation]: WDL except  Level of Consciousness: confused    Language Assistance  Preferred Language to Address Healthcare Preferred Language to Address Healthcare: English    Therapeutic Interventions      Bed Mobility  Bed Mobility Training Rolling/Turning: maximum assist (25% patient effort);  2 person assist;  nonverbal cues (demo/gestures);  verbal cues  Bed Mobility Training Scooting: maximum assist (25% patient effort);  2 person assist;  nonverbal cues (demo/gestures);  verbal cues  Bed Mobility Training Sit-to-Supine: maximum assist (25% patient effort);  2 person assist;  nonverbal cues (demo/gestures);  verbal cues  Bed Mobility Training Supine-to-Sit: maximum assist (25% patient effort);  2 person assist;  nonverbal cues (demo/gestures);  verbal cues  Bed Mobility Training Limitations: decreased ability to use arms for pushing/pulling;  decreased ability to use legs for bridging/pushing;  impaired ability to control trunk for mobility;  decreased strength;  impaired balance;  impaired postural control;  cognitive, decreased safety awareness;  abnormal muscle tone    Therapeutic Exercise  Therapeutic Exercise Detail: dangling on EOB x10 min w/ mod/maxA for trunk control 2/2 listing to the LRLE AAROM x10: LAQ, marching LLE PROM x10: LAQ, marching reaching for objects outside of DANILO and across midline x5 w/ RUE trunk flex/ext x10 w/ modA           PM&R Impression: as above    Current Disposition Plan Recommendations:    acute rehab placement

## 2022-03-22 NOTE — PROGRESS NOTE ADULT - SUBJECTIVE AND OBJECTIVE BOX
HPI:  77y/o M with PMHx sig for HTN, HLD, afib (on eliquis, unknown if last taken today,) CVA in 2020 (with minimal residual right-sided weakness per girlfriend,) last known normal at 3pm when girlfriend spoke to him. 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 Upon arrival to [facility], BP noted to be 171/101. Stroke code was called. CTH without findings of hemorrhage. 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 NIHSS 20. No TPA was administered as Pt is out of window. Per patient's girlfriend, the basilar artery aneurysm is known and was diagnosed in 2020 at [facility] when he suffered from a CVA. At that time, he underwent a "treatment for the aneurysm," which the girlfriend reports was too risky and was aborted. Patient never attended his follow-up appointments due to fear per girlfriend.  (10 Mar 2022 22:52)    OVERNIGHT EVENTS: GM    Hospital Course:   3/10: Admitted for further workup of stroke symptoms and basilar artery aneurysm.  3/11: Karen placed overnight. 3% hypertonic saline started. Neuro exam stable. Started on vEEG for aphasia  3/12: GM overnight. Neuro exam stable. 3% d/c. home eliquis 5 bid. failed s/s. started on TF via NGT, spiked fever 101, f/u panculture   3/13: GM overnight, neuro stable, veeg negative. Vanc/zosyn started for empiric PNA.   3/14: GM o/n neuro stable. on HFNC. ENT plan for laryngoscope today with . failed s/s, pend repeat eval. dc'd captopril, started lisinopril. dc'd vanc. Laryngoscopy: No masses visualized, hypomobility of L sided vocal cords, dysarthric, hypophonic, risk for aspiration, good cough reflex.Dr. Onofre to discuss with Dr. Peterson regarding a possible vocal cord injection.   3/15: GM overnight, neuro stable, on HFNC 30/30 overnight, transitioned to 4LNC  3/16: GM overnight, neuro stable, tolerating NC and satting well    3/17: POD6 dx angio. GM o/n neuro stable. trickle feeds via NGT  3/18: POD7 GM o/n, saturating well in RA. tolerating TF   03/19: POD8 GM o/n. gen surg consulted for PEG placement.  03/20: POD9 GM o/n. Transferred back to ICU for heparin gtt in preparation for PEG placement Wed.   3/21: POD10 GM o/n, neuro exam stable. Transferred to ICU in preparation for hep gtt to start tomorrow, plan for PEG on Wednesday. F/u am coags.   3/22: POD11. GM o/n neuro stable. on heparin gtt ptt goal 40-60    Vital Signs Last 24 Hrs  T(C): 36.8 (21 Mar 2022 22:12), Max: 37.8 (21 Mar 2022 05:06)  T(F): 98.3 (21 Mar 2022 22:12), Max: 100.1 (21 Mar 2022 05:06)  HR: 58 (21 Mar 2022 22:00) (58 - 74)  BP: 129/58 (21 Mar 2022 22:00) (111/56 - 153/71)  BP(mean): 84 (21 Mar 2022 22:00) (80 - 106)  RR: 26 (21 Mar 2022 22:00) (23 - 34)  SpO2: 97% (21 Mar 2022 22:00) (91% - 100%)    I&O's Summary    20 Mar 2022 07:01  -  21 Mar 2022 07:00  --------------------------------------------------------  IN: 1840 mL / OUT: 2425 mL / NET: -585 mL    21 Mar 2022 07:01  -  22 Mar 2022 00:35  --------------------------------------------------------  IN: 1020 mL / OUT: 1175 mL / NET: -155 mL        PHYSICAL EXAM:  GEN: laying in bed, appears well, NAD  NEURO: AOx3. FC, OE spont, speech intact, face symmetric. CNII-XII intact. PERRL, EOMI. No pronator drift. MAEx4. 5/5 strength throughout. SILT  CV: RRR +S1/S2  PULM: CTAB  GI: Abd soft, NT/ND  EXT: ext warm, dry, nontender    TUBES/LINES:  [] Arredondo  [] Lumbar Drain  [] Wound Drains  [] Others      DIET:  [] NPO  [] Mechanical  [] Tube feeds    LABS:                        15.8   15.51 )-----------( 264      ( 21 Mar 2022 05:28 )             47.4     03-21    134<L>  |  103  |  25<H>  ----------------------------<  171<H>  4.3   |  24  |  0.82    Ca    8.6      21 Mar 2022 17:10  Phos  3.1     03-21  Mg     2.1     03-21    TPro  5.1<L>  /  Alb  2.7<L>  /  TBili  0.8  /  DBili  x   /  AST  18  /  ALT  28  /  AlkPhos  47  03-20    PT/INR - ( 21 Mar 2022 05:28 )   PT: 18.7 sec;   INR: 1.56          PTT - ( 21 Mar 2022 17:11 )  PTT:34.5 sec        CAPILLARY BLOOD GLUCOSE          Drug Levels: [] N/A    CSF Analysis: [] N/A      Allergies    No Known Allergies    Intolerances      MEDICATIONS:  Antibiotics:    Neuro:  acetaminophen    Suspension .. 650 milliGRAM(s) Oral every 6 hours PRN    Anticoagulation:  heparin  Infusion 1100 Unit(s)/Hr IV Continuous <Continuous>    OTHER:  acetylcysteine 20%  Inhalation 4 milliLiter(s) Inhalation every 4 hours  albuterol/ipratropium for Nebulization 3 milliLiter(s) Nebulizer every 4 hours  amLODIPine   Tablet 10 milliGRAM(s) Oral daily  bisacodyl Suppository 10 milliGRAM(s) Rectal daily  carvedilol 6.25 milliGRAM(s) Oral every 12 hours  chlorhexidine 2% Cloths 1 Application(s) Topical <User Schedule>  glucagon  Injectable 1 milliGRAM(s) IntraMuscular once  hydrALAZINE Injectable 10 milliGRAM(s) IV Push every 4 hours PRN  lisinopril 20 milliGRAM(s) Oral every 24 hours  polyethylene glycol 3350 17 Gram(s) Oral every 12 hours  senna 2 Tablet(s) Oral at bedtime    IVF:    CULTURES:  Culture Results:   No growth at 5 days. (03-12 @ 18:54)    RADIOLOGY & ADDITIONAL TESTS:      ASSESSMENT:  76y Male s/p    BASILAR ARTERY ANEURYSM    Handoff    MEWS Score    Brain aneurysm    Brain aneurysm    Basilar artery aneurysm    Angiogram, carotid and cerebral, bilateral    STROKE    SysAdmin_VisitLink        PLAN:      Assessment:  Present when checked    []  GCS  E   V  M     Heart Failure: []Acute, [] acute on chronic , []chronic  Heart Failure:  [] Diastolic (HFpEF), [] Systolic (HFrEF), []Combined (HFpEF and HFrEF), [] RHF, [] Pulm HTN, [] Other    [] LALO, [] ATN, [] AIN, [] other  [] CKD1, [] CKD2, [] CKD 3, [] CKD 4, [] CKD 5, []ESRD    Encephalopathy: [] Metabolic, [] Hepatic, [] toxic, [] Neurological, [] Other    Abnormal Nurtitional Status: [] malnurtition (see nutrition note), [ ]underweight: BMI < 19, [] morbid obesity: BMI >40, [] Cachexia    [] Sepsis  [] hypovolemic shock,[] cardiogenic shock, [] hemorrhagic shock, [] neuogenic shock  [] Acute Respiratory Failure  []Cerebral edema, [] Brain compression/ herniation,   [] Functional quadriplegia  [] Acute blood loss anemia   HPI:  75y/o M with PMHx sig for HTN, HLD, afib (on eliquis, unknown if last taken today,) CVA in 2020 (with minimal residual right-sided weakness per girlfriend,) last known normal at 3pm when girlfriend spoke to him. 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 Upon arrival to [facility], BP noted to be 171/101. Stroke code was called. CTH without findings of hemorrhage. 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 NIHSS 20. No TPA was administered as Pt is out of window. Per patient's girlfriend, the basilar artery aneurysm is known and was diagnosed in 2020 at [facility] when he suffered from a CVA. At that time, he underwent a "treatment for the aneurysm," which the girlfriend reports was too risky and was aborted. Patient never attended his follow-up appointments due to fear per girlfriend.  (10 Mar 2022 22:52)    OVERNIGHT EVENTS: GM    Hospital Course:   3/10: Admitted for further workup of stroke symptoms and basilar artery aneurysm.  3/11: Karen placed overnight. 3% hypertonic saline started. Neuro exam stable. Started on vEEG for aphasia  3/12: GM overnight. Neuro exam stable. 3% d/c. home eliquis 5 bid. failed s/s. started on TF via NGT, spiked fever 101, f/u panculture   3/13: GM overnight, neuro stable, veeg negative. Vanc/zosyn started for empiric PNA.   3/14: GM o/n neuro stable. on HFNC. ENT plan for laryngoscope today with . failed s/s, pend repeat eval. dc'd captopril, started lisinopril. dc'd vanc. Laryngoscopy: No masses visualized, hypomobility of L sided vocal cords, dysarthric, hypophonic, risk for aspiration, good cough reflex.Dr. Onofre to discuss with Dr. Peterson regarding a possible vocal cord injection.   3/15: GM overnight, neuro stable, on HFNC 30/30 overnight, transitioned to 4LNC  3/16: GM overnight, neuro stable, tolerating NC and satting well    3/17: POD6 dx angio. GM o/n neuro stable. trickle feeds via NGT  3/18: POD7 GM o/n, saturating well in RA. tolerating TF   03/19: POD8 GM o/n. gen surg consulted for PEG placement.  03/20: POD9 GM o/n. Transferred back to ICU for heparin gtt in preparation for PEG placement Wed.   3/21: POD10 GM o/n, neuro exam stable. Transferred to ICU in preparation for hep gtt to start tomorrow, plan for PEG on Wednesday. F/u am coags.   3/22: POD11. GM o/n neuro stable. on heparin gtt ptt goal 40-60    Vital Signs Last 24 Hrs  T(C): 36.8 (21 Mar 2022 22:12), Max: 37.8 (21 Mar 2022 05:06)  T(F): 98.3 (21 Mar 2022 22:12), Max: 100.1 (21 Mar 2022 05:06)  HR: 58 (21 Mar 2022 22:00) (58 - 74)  BP: 129/58 (21 Mar 2022 22:00) (111/56 - 153/71)  BP(mean): 84 (21 Mar 2022 22:00) (80 - 106)  RR: 26 (21 Mar 2022 22:00) (23 - 34)  SpO2: 97% (21 Mar 2022 22:00) (91% - 100%)    I&O's Summary    20 Mar 2022 07:01  -  21 Mar 2022 07:00  --------------------------------------------------------  IN: 1840 mL / OUT: 2425 mL / NET: -585 mL    21 Mar 2022 07:01  -  22 Mar 2022 00:35  --------------------------------------------------------  IN: 1020 mL / OUT: 1175 mL / NET: -155 mL        PHYSICAL EXAM:  GEN: laying in bed, appears well, NAD  NEURO: AOx3. FC, OE spont, +hypophonia, +L facial. PERRL, EOMI. RUE/RLE 5/5. LUE HG 2/5, o/w 0/5 proximally. LLE w/d to noxious.  CV: RRR +S1/S2  PULM: CTAB  GI: Abd soft, NT/ND  EXT: ext warm, dry, nontender    TUBES/LINES:  [] Arredondo  [] Lumbar Drain  [] Wound Drains  [] Others      DIET:  [] NPO  [] Mechanical  [x] Tube feeds    LABS:                        15.8   15.51 )-----------( 264      ( 21 Mar 2022 05:28 )             47.4     03-21    134<L>  |  103  |  25<H>  ----------------------------<  171<H>  4.3   |  24  |  0.82    Ca    8.6      21 Mar 2022 17:10  Phos  3.1     03-21  Mg     2.1     03-21    TPro  5.1<L>  /  Alb  2.7<L>  /  TBili  0.8  /  DBili  x   /  AST  18  /  ALT  28  /  AlkPhos  47  03-20    PT/INR - ( 21 Mar 2022 05:28 )   PT: 18.7 sec;   INR: 1.56          PTT - ( 21 Mar 2022 17:11 )  PTT:34.5 sec        CAPILLARY BLOOD GLUCOSE          Drug Levels: [] N/A    CSF Analysis: [] N/A      Allergies    No Known Allergies    Intolerances      MEDICATIONS:  Antibiotics:    Neuro:  acetaminophen    Suspension .. 650 milliGRAM(s) Oral every 6 hours PRN    Anticoagulation:  heparin  Infusion 1100 Unit(s)/Hr IV Continuous <Continuous>    OTHER:  acetylcysteine 20%  Inhalation 4 milliLiter(s) Inhalation every 4 hours  albuterol/ipratropium for Nebulization 3 milliLiter(s) Nebulizer every 4 hours  amLODIPine   Tablet 10 milliGRAM(s) Oral daily  bisacodyl Suppository 10 milliGRAM(s) Rectal daily  carvedilol 6.25 milliGRAM(s) Oral every 12 hours  chlorhexidine 2% Cloths 1 Application(s) Topical <User Schedule>  glucagon  Injectable 1 milliGRAM(s) IntraMuscular once  hydrALAZINE Injectable 10 milliGRAM(s) IV Push every 4 hours PRN  lisinopril 20 milliGRAM(s) Oral every 24 hours  polyethylene glycol 3350 17 Gram(s) Oral every 12 hours  senna 2 Tablet(s) Oral at bedtime    IVF:    CULTURES:  Culture Results:   No growth at 5 days. (03-12 @ 18:54)    RADIOLOGY & ADDITIONAL TESTS:      ASSESSMENT:  75y/o M with PMHx sig for HTN, HLD, afib (on eliquis, unknown if last taken today,) CVA in 2020 (with minimal residual right-sided weakness per girlfriend) p/w dysarthria, left facial droop, and left-sided weakness. CTH without findings of hemorrhage. 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 NIHSS 20. Pt is not a tpa candidate as Pt is out of window. Patient now s/p diagnostic angio with findings of partially thrombosed basilar artery aneurysm 3/11/22. course c/b resp insufficiency weaned off HFNC.     BASILAR ARTERY ANEURYSM    Handoff    MEWS Score    Brain aneurysm    Brain aneurysm    Basilar artery aneurysm    Angiogram, carotid and cerebral, bilateral    STROKE    SysAdmin_VisitLink        PLAN:  Neuro:   - neuro checks q 4/vital signs q 2   - MR head 3/11: no acute infarcts   - stroke core measures   - EEG negative 3/13, dc'd   - no plan for further neurosurgical intervention   - transfer to NSICU 3/20 for Heparin gtt/PEG placement (hep gtt to start 3/21)     Cardio:  - SBP goal 100-160   - cont home norvasc, carvedilol (home HCTZ held), Lisinopril substituted for Captopril   - echo 3/11: mild LVH, EF 65-70%   - home dose Eliquis for Afib --> transition from Eliquis to heparin gtt for PEG planning     Pulm:  - satting well on RA  - standing mucomyst and duonebs  - Chest PT     ENT:   - ENT consulted, s/p laryngoscopy 3/14: No masses, hypomobility of L sided vocal cord  - f/u with Dr. Onofre/Dr. Peterson for possible vocal cord injection.     Renal:  - Na goal 135-145  - IVL     GI:  - TF via NGT, at goal   - pend PEG with Eleni in NSICU 3/23  - bowel regimen, BM 3/20    Endo:  - ISS dc'd  - A1C 5.4    Heme;  - SCDs for DVT ppx  - hold Eliquis 3/21, start Heparin gtt 3/21   - LE dopplers 3/11: neg for DVT   - pend LUE US for edema     ID:  - zosyn empirically for PNA completed 3/17  - f/u pan cx 3/12; NGTD    Dispo:  - ICU status, full code   - Pending AR  - Family updated with plan  - OOB to chair     Assessment and plan discussed with Dr. Peterson      Assessment:  Present when checked    []  GCS  E   V  M     Heart Failure: []Acute, [] acute on chronic , []chronic  Heart Failure:  [] Diastolic (HFpEF), [] Systolic (HFrEF), []Combined (HFpEF and HFrEF), [] RHF, [] Pulm HTN, [] Other    [] LALO, [] ATN, [] AIN, [] other  [] CKD1, [] CKD2, [] CKD 3, [] CKD 4, [] CKD 5, []ESRD    Encephalopathy: [] Metabolic, [] Hepatic, [] toxic, [] Neurological, [] Other    Abnormal Nurtitional Status: [] malnurtition (see nutrition note), [ ]underweight: BMI < 19, [] morbid obesity: BMI >40, [] Cachexia    [] Sepsis  [] hypovolemic shock,[] cardiogenic shock, [] hemorrhagic shock, [] neuogenic shock  [] Acute Respiratory Failure  []Cerebral edema, [] Brain compression/ herniation,   [] Functional quadriplegia  [] Acute blood loss anemia

## 2022-03-23 LAB
ANION GAP SERPL CALC-SCNC: 8 MMOL/L — SIGNIFICANT CHANGE UP (ref 5–17)
APTT BLD: 30.2 SEC — SIGNIFICANT CHANGE UP (ref 27.5–35.5)
APTT BLD: 38.5 SEC — HIGH (ref 27.5–35.5)
BUN SERPL-MCNC: 27 MG/DL — HIGH (ref 7–23)
BUN SERPL-MCNC: 30 MG/DL — HIGH (ref 7–23)
BUN SERPL-MCNC: 31 MG/DL — HIGH (ref 7–23)
CALCIUM SERPL-MCNC: 8.9 MG/DL — SIGNIFICANT CHANGE UP (ref 8.4–10.5)
CHLORIDE SERPL-SCNC: 101 MMOL/L — SIGNIFICANT CHANGE UP (ref 96–108)
CHLORIDE SERPL-SCNC: 103 MMOL/L — SIGNIFICANT CHANGE UP (ref 96–108)
CHLORIDE SERPL-SCNC: 106 MMOL/L — SIGNIFICANT CHANGE UP (ref 96–108)
CO2 SERPL-SCNC: 24 MMOL/L — SIGNIFICANT CHANGE UP (ref 22–31)
CO2 SERPL-SCNC: 24 MMOL/L — SIGNIFICANT CHANGE UP (ref 22–31)
CO2 SERPL-SCNC: 25 MMOL/L — SIGNIFICANT CHANGE UP (ref 22–31)
CREAT SERPL-MCNC: 0.77 MG/DL — SIGNIFICANT CHANGE UP (ref 0.5–1.3)
CREAT SERPL-MCNC: 0.81 MG/DL — SIGNIFICANT CHANGE UP (ref 0.5–1.3)
CREAT SERPL-MCNC: 0.82 MG/DL — SIGNIFICANT CHANGE UP (ref 0.5–1.3)
EGFR: 90 ML/MIN/1.73M2 — SIGNIFICANT CHANGE UP
EGFR: 90 ML/MIN/1.73M2 — SIGNIFICANT CHANGE UP
EGFR: 92 ML/MIN/1.73M2 — SIGNIFICANT CHANGE UP
GLUCOSE SERPL-MCNC: 123 MG/DL — HIGH (ref 70–99)
GLUCOSE SERPL-MCNC: 131 MG/DL — HIGH (ref 70–99)
GLUCOSE SERPL-MCNC: 137 MG/DL — HIGH (ref 70–99)
HCT VFR BLD CALC: 43.9 % — SIGNIFICANT CHANGE UP (ref 39–50)
HGB BLD-MCNC: 15.2 G/DL — SIGNIFICANT CHANGE UP (ref 13–17)
INR BLD: 1.42 — HIGH (ref 0.88–1.16)
MAGNESIUM SERPL-MCNC: 2.1 MG/DL — SIGNIFICANT CHANGE UP (ref 1.6–2.6)
MCHC RBC-ENTMCNC: 32.3 PG — SIGNIFICANT CHANGE UP (ref 27–34)
MCHC RBC-ENTMCNC: 34.6 GM/DL — SIGNIFICANT CHANGE UP (ref 32–36)
MCV RBC AUTO: 93.2 FL — SIGNIFICANT CHANGE UP (ref 80–100)
NRBC # BLD: 0 /100 WBCS — SIGNIFICANT CHANGE UP (ref 0–0)
PHOSPHATE SERPL-MCNC: 2.7 MG/DL — SIGNIFICANT CHANGE UP (ref 2.5–4.5)
PLATELET # BLD AUTO: 247 K/UL — SIGNIFICANT CHANGE UP (ref 150–400)
POTASSIUM SERPL-MCNC: 4.4 MMOL/L — SIGNIFICANT CHANGE UP (ref 3.5–5.3)
POTASSIUM SERPL-MCNC: 4.6 MMOL/L — SIGNIFICANT CHANGE UP (ref 3.5–5.3)
POTASSIUM SERPL-MCNC: 4.9 MMOL/L — SIGNIFICANT CHANGE UP (ref 3.5–5.3)
POTASSIUM SERPL-SCNC: 4.4 MMOL/L — SIGNIFICANT CHANGE UP (ref 3.5–5.3)
POTASSIUM SERPL-SCNC: 4.6 MMOL/L — SIGNIFICANT CHANGE UP (ref 3.5–5.3)
POTASSIUM SERPL-SCNC: 4.9 MMOL/L — SIGNIFICANT CHANGE UP (ref 3.5–5.3)
PROTHROM AB SERPL-ACNC: 16.9 SEC — HIGH (ref 10.5–13.4)
RBC # BLD: 4.71 M/UL — SIGNIFICANT CHANGE UP (ref 4.2–5.8)
RBC # FLD: 12.7 % — SIGNIFICANT CHANGE UP (ref 10.3–14.5)
SODIUM SERPL-SCNC: 133 MMOL/L — LOW (ref 135–145)
SODIUM SERPL-SCNC: 136 MMOL/L — SIGNIFICANT CHANGE UP (ref 135–145)
SODIUM SERPL-SCNC: 138 MMOL/L — SIGNIFICANT CHANGE UP (ref 135–145)
WBC # BLD: 15.54 K/UL — HIGH (ref 3.8–10.5)
WBC # FLD AUTO: 15.54 K/UL — HIGH (ref 3.8–10.5)

## 2022-03-23 PROCEDURE — 43246 EGD PLACE GASTROSTOMY TUBE: CPT

## 2022-03-23 PROCEDURE — 99232 SBSQ HOSP IP/OBS MODERATE 35: CPT

## 2022-03-23 PROCEDURE — 93970 EXTREMITY STUDY: CPT | Mod: 26

## 2022-03-23 RX ORDER — FENTANYL CITRATE 50 UG/ML
50 INJECTION INTRAVENOUS ONCE
Refills: 0 | Status: DISCONTINUED | OUTPATIENT
Start: 2022-03-23 | End: 2022-03-23

## 2022-03-23 RX ORDER — MIDAZOLAM HYDROCHLORIDE 1 MG/ML
1 INJECTION, SOLUTION INTRAMUSCULAR; INTRAVENOUS ONCE
Refills: 0 | Status: DISCONTINUED | OUTPATIENT
Start: 2022-03-23 | End: 2022-03-23

## 2022-03-23 RX ORDER — POTASSIUM PHOSPHATE, MONOBASIC POTASSIUM PHOSPHATE, DIBASIC 236; 224 MG/ML; MG/ML
15 INJECTION, SOLUTION INTRAVENOUS ONCE
Refills: 0 | Status: COMPLETED | OUTPATIENT
Start: 2022-03-23 | End: 2022-03-23

## 2022-03-23 RX ORDER — SODIUM CHLORIDE 5 G/100ML
500 INJECTION, SOLUTION INTRAVENOUS
Refills: 0 | Status: DISCONTINUED | OUTPATIENT
Start: 2022-03-23 | End: 2022-03-24

## 2022-03-23 RX ORDER — HEPARIN SODIUM 5000 [USP'U]/ML
1100 INJECTION INTRAVENOUS; SUBCUTANEOUS
Qty: 25000 | Refills: 0 | Status: DISCONTINUED | OUTPATIENT
Start: 2022-03-23 | End: 2022-03-24

## 2022-03-23 RX ORDER — SODIUM CHLORIDE 9 MG/ML
1000 INJECTION INTRAMUSCULAR; INTRAVENOUS; SUBCUTANEOUS
Refills: 0 | Status: DISCONTINUED | OUTPATIENT
Start: 2022-03-23 | End: 2022-03-24

## 2022-03-23 RX ADMIN — Medication 3 MILLILITER(S): at 21:01

## 2022-03-23 RX ADMIN — HEPARIN SODIUM 11 UNIT(S)/HR: 5000 INJECTION INTRAVENOUS; SUBCUTANEOUS at 19:27

## 2022-03-23 RX ADMIN — AMLODIPINE BESYLATE 10 MILLIGRAM(S): 2.5 TABLET ORAL at 05:13

## 2022-03-23 RX ADMIN — Medication 4 MILLILITER(S): at 17:17

## 2022-03-23 RX ADMIN — POLYETHYLENE GLYCOL 3350 17 GRAM(S): 17 POWDER, FOR SOLUTION ORAL at 18:33

## 2022-03-23 RX ADMIN — FENTANYL CITRATE 50 MICROGRAM(S): 50 INJECTION INTRAVENOUS at 09:35

## 2022-03-23 RX ADMIN — Medication 10 MILLIGRAM(S): at 18:34

## 2022-03-23 RX ADMIN — CHLORHEXIDINE GLUCONATE 1 APPLICATION(S): 213 SOLUTION TOPICAL at 08:26

## 2022-03-23 RX ADMIN — Medication 4 MILLILITER(S): at 21:01

## 2022-03-23 RX ADMIN — LACTULOSE 20 GRAM(S): 10 SOLUTION ORAL at 13:20

## 2022-03-23 RX ADMIN — SODIUM CHLORIDE 30 MILLILITER(S): 5 INJECTION, SOLUTION INTRAVENOUS at 08:29

## 2022-03-23 RX ADMIN — SENNA PLUS 2 TABLET(S): 8.6 TABLET ORAL at 21:00

## 2022-03-23 RX ADMIN — Medication 3 MILLILITER(S): at 05:42

## 2022-03-23 RX ADMIN — Medication 4 MILLILITER(S): at 13:20

## 2022-03-23 RX ADMIN — Medication 10 MILLIGRAM(S): at 21:07

## 2022-03-23 RX ADMIN — Medication 4 MILLILITER(S): at 02:00

## 2022-03-23 RX ADMIN — Medication 3 MILLILITER(S): at 17:17

## 2022-03-23 RX ADMIN — CARVEDILOL PHOSPHATE 6.25 MILLIGRAM(S): 80 CAPSULE, EXTENDED RELEASE ORAL at 18:34

## 2022-03-23 RX ADMIN — LISINOPRIL 20 MILLIGRAM(S): 2.5 TABLET ORAL at 18:34

## 2022-03-23 RX ADMIN — SODIUM CHLORIDE 70 MILLILITER(S): 9 INJECTION INTRAMUSCULAR; INTRAVENOUS; SUBCUTANEOUS at 08:30

## 2022-03-23 RX ADMIN — CARVEDILOL PHOSPHATE 6.25 MILLIGRAM(S): 80 CAPSULE, EXTENDED RELEASE ORAL at 05:13

## 2022-03-23 RX ADMIN — Medication 3 MILLILITER(S): at 02:00

## 2022-03-23 RX ADMIN — POTASSIUM PHOSPHATE, MONOBASIC POTASSIUM PHOSPHATE, DIBASIC 62.5 MILLIMOLE(S): 236; 224 INJECTION, SOLUTION INTRAVENOUS at 10:52

## 2022-03-23 RX ADMIN — MIDAZOLAM HYDROCHLORIDE 1 MILLIGRAM(S): 1 INJECTION, SOLUTION INTRAMUSCULAR; INTRAVENOUS at 09:40

## 2022-03-23 RX ADMIN — POLYETHYLENE GLYCOL 3350 17 GRAM(S): 17 POWDER, FOR SOLUTION ORAL at 05:13

## 2022-03-23 RX ADMIN — Medication 3 MILLILITER(S): at 13:20

## 2022-03-23 RX ADMIN — Medication 4 MILLILITER(S): at 05:41

## 2022-03-23 NOTE — PROGRESS NOTE ADULT - ASSESSMENT
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, hemiplegia  diffuse idiopathic skeletal hyperostosis  Hypertension dyslipidemia  h/o CVA 2020, minimal R sided weakness  Atrial fibrillation with controlled ventricular rate    PLAN:   NEURO: Neurochecks q4h, VS q2h, PRN pain meds with acetaminophen, no opiates / benzos  no plans for intervention for basilar artery aneurysm  REHAB:  physical therapy evaluation and management    EARLY MOB:  OOB to chair    PULM:  PRN O2 support to keep sats >/=92%, incentive spirometry; nebs, ENT consulted, f/u vocal cord injection plans  CARDIO:  SBP goal 100-160mm Hg, cont carvedilol, amlodipine, lisinopril   ENDO:  Blood sugar goals 140-180 mg/dL  GI:  bowel regimen miralax  DIET: NPO for  PEG 3/23  RENAL:  IVF once NPO  HEM/ONC:  Hb stable  VTE Prophylaxis: SCDs, heparin gtt goal PTT 40-60 - hold heparin prior to PEG  ID: no fever, leukocytosis improving;  Social: Rafiq Forrester  is HCP - updated yesterday       Patient at high risk for neurological deterioration or death due to:  ICU delirium, aspiration PNA, DVT / PE.  Critical care time:  I have personally provided 45 minutes of critical care time, excluding time spent on separate procedures.      Plan discussed with RN, house staff. 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  CARDIO:  SBP goal 100-160mm Hg, cont carvedilol, amlodipine, lisinopril   ENDO:  Blood sugar goals 140-180 mg/dL  GI:  Bowel regimen miralax, senna,  LBM: 3/20  DIET: S/P PEG 3/23  RENAL:  3% at 30. Monitor BMPs- next at noon  HEM/ONC:  Hb stable  VTE Prophylaxis: SCDs, heparin gtt goal PTT 40-60 (on hold for PEG)  ID: no fever, mild leukocytosis   Social: Rafiq Forrester  is HCP - updated yesterday       Plan discussed with RN, house staff.

## 2022-03-23 NOTE — PROGRESS NOTE ADULT - SUBJECTIVE AND OBJECTIVE BOX
=================================  NEUROCRITICAL CARE ATTENDING NOTE  =================================    BREANNA MCCORMACK   MRN-[id number]  Summary:  76y/M with HTN, HLD, afib (on eliquis, unknown if last taken today,) CVA in  (with minimal residual right-sided weakness per girlfriend,) last known normal at 3pm when girlfriend spoke to him. 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 Upon arrival to [facility], BP noted to be 171/101. Stroke code was called. CTH without findings of hemorrhage. 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 NIHSS 20. No TPA was administered as Pt is out of window. Per patient's girlfriend, the basilar artery aneurysm is known and was diagnosed in  at [facility] when he suffered from a CVA. At that time, he underwent a "treatment for the aneurysm," which the girlfriend reports was too risky and was aborted. Patient never attended his follow-up appointments due to fear per girlfriend.  (10 Mar 2022 22:52)    COURSE IN THE HOSPITAL:  03/10 admitted to [facility]   Tmax 38.3; s/p angio showing extremely tortuous and elongated aortic arch with dolichoectatic vertebrobasilar system, giant partially thrombosed vertebrobasilar aneurysm   Tmax 37.9 tachypneic overnight; apixaban restarted; NGT inserted, tube feeds started at night   Tmax 38.3, desaturation to 88% this morning - now requiring 5L/min, tube feeds held; started on HFNC, improved   Tmax 37.8.  No significant events overnight.     : [facility]  : POD11   : PEG placement    Past Medical History:   Allergies:  No Known Allergies  Home meds:   ·	amLODIPine 5 mg oral tablet: 1 tab(s) orally once a day  ·	captopril 100 mg oral tablet:   ·	carvedilol 6.25 mg oral tablet:   ·	Eliquis 5 mg oral tablet:   ·	hydroCHLOROthiazide 25 mg oral tablet:       ICU Vital Signs Last 24 Hrs  T(C): 38 (23 Mar 2022 05:03), Max: 38.2 (23 Mar 2022 00:51)  T(F): 100.4 (23 Mar 2022 05:03), Max: 100.8 (23 Mar 2022 00:51)  HR: 74 (23 Mar 2022 05:00) (62 - 89)  BP: 154/74 (23 Mar 2022 05:00) (114/58 - 165/81)  BP(mean): 107 (23 Mar 2022 05:00) (80 - 115)  RR: 24 (23 Mar 2022 05:00) (19 - 34)  SpO2: 97% (23 Mar 2022 05:00) (93% - 99%)      22 @ 07:01  -  22 @ 07:00  --------------------------------------------------------  IN: 1580 mL / OUT: 1825 mL / NET: -245 mL    22 @ 07:01  -  22 @ 06:49  --------------------------------------------------------  IN: 760 mL / OUT: 1075 mL / NET: -315 mL      PHYSICAL EXAMINATION  NEUROLOGIC EXAMINATION:  Patient is awake, alert, oriented x3 with choices, hypophonic dysarthric, following commands, CARISSA, L facial droop, R UE/LE 5/5, L UE 0/5, unable to make a fist, withdraws, L LE brisk withdrawal  GENERAL: 2L NC  EENT:  anicteric  CARDIOVASCULAR: (+) S1 S2, regular rate and regular rhythm  PULMONARY: clear to auscultation  ABDOMEN: soft, nontender with normoactive bowel sounds  EXTREMITIES: no edema, no groin hematoma, pulses good  SKIN: no rash      MEDICATIONS:   acetaminophen    Suspension .. 650 milliGRAM(s) Oral every 6 hours PRN  acetylcysteine 20%  Inhalation 4 milliLiter(s) Inhalation every 4 hours  albuterol/ipratropium for Nebulization 3 milliLiter(s) Nebulizer every 4 hours  amLODIPine   Tablet 10 milliGRAM(s) Oral daily  bisacodyl Suppository 10 milliGRAM(s) Rectal daily  carvedilol 6.25 milliGRAM(s) Oral every 12 hours  chlorhexidine 2% Cloths 1 Application(s) Topical <User Schedule>  glucagon  Injectable 1 milliGRAM(s) IntraMuscular once  hydrALAZINE Injectable 10 milliGRAM(s) IV Push every 4 hours PRN  lactulose Syrup 20 Gram(s) Oral daily  lisinopril 20 milliGRAM(s) Oral every 24 hours  polyethylene glycol 3350 17 Gram(s) Oral every 12 hours  senna 2 Tablet(s) Oral at bedtime  sodium chloride 0.9%. 1000 milliLiter(s) (70 mL/Hr) IV Continuous <Continuous>  sodium chloride 3%. 500 milliLiter(s) (30 mL/Hr) IV Continuous <Continuous>    LABS:                   15.2   15.54 )-----------( 247      ( 23 Mar 2022 05:09 )             43.9         133<L>  |  101  |  27<H>  ----------------------------<  131<H>  4.6   |  24  |  0.77    Ca    8.9      23 Mar 2022 05:09  Phos  2.7       Mg     2.1         HbA1C = 5.4 ()  LDL = 108 ()   HDL = 43 ()  TG = 58 ()  TSH = 3.050 ()    Bacteriology:   Blood CS NG1D  CSF studies:  EEG:  Neuroimagin/11 MRI no acute infarcts:  3cm giant aneurysm basilar artery, partially thrombosed, marked compression of brainstem, distortion of IV but patent  03/10 CTA:  dolichoectasia of basilar artery, partially thrombosed aneurysm; diffuse idiopathic skeletal hyperostosis  Other imaging:      IV FLUIDS: IVL   DRIPS:  DIET: Jevity @ 70  Lines:   Drains:      CODE STATUS:  Full Code                       GOALS OF CARE:  aggressive                      DISPOSITION:  ICU =================================  NEUROCRITICAL CARE ATTENDING NOTE  =================================    BREANNA MCCORMACK   MRN-[id number]  Summary:  76y/M with HTN, HLD, afib (on eliquis, unknown if last taken today,) CVA in  (with minimal residual right-sided weakness per girlfriend,) last known normal at 3pm when girlfriend spoke to him. 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 Upon arrival to [facility], BP noted to be 171/101. Stroke code was called. CTH without findings of hemorrhage. 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 NIHSS 20. No TPA was administered as Pt is out of window. Per patient's girlfriend, the basilar artery aneurysm is known and was diagnosed in  at [facility] when he suffered from a CVA. At that time, he underwent a "treatment for the aneurysm," which the girlfriend reports was too risky and was aborted. Patient never attended his follow-up appointments due to fear per girlfriend.  (10 Mar 2022 22:52)    COURSE IN THE HOSPITAL:  03/10 admitted to [facility]   Tmax 38.3; s/p angio showing extremely tortuous and elongated aortic arch with dolichoectatic vertebrobasilar system, giant partially thrombosed vertebrobasilar aneurysm   Tmax 37.9 tachypneic overnight; apixaban restarted; NGT inserted, tube feeds started at night   Tmax 38.3, desaturation to 88% this morning - now requiring 5L/min, tube feeds held; started on HFNC, improved   Tmax 37.8.  No significant events overnight.     : [facility] [facility] [place]  : POD11   : PEG placement    Past Medical History:   Allergies:  No Known Allergies  Home meds:   ·	amLODIPine 5 mg oral tablet: 1 tab(s) orally once a day  ·	captopril 100 mg oral tablet:   ·	carvedilol 6.25 mg oral tablet:   ·	Eliquis 5 mg oral tablet:   ·	hydroCHLOROthiazide 25 mg oral tablet:       ICU Vital Signs Last 24 Hrs  T(C): 38 (23 Mar 2022 05:03), Max: 38.2 (23 Mar 2022 00:51)  T(F): 100.4 (23 Mar 2022 05:03), Max: 100.8 (23 Mar 2022 00:51)  HR: 74 (23 Mar 2022 05:00) (62 - 89)  BP: 154/74 (23 Mar 2022 05:00) (114/58 - 165/81)  BP(mean): 107 (23 Mar 2022 05:00) (80 - 115)  RR: 24 (23 Mar 2022 05:00) (19 - 34)  SpO2: 97% (23 Mar 2022 05:00) (93% - 99%)      22 @ 07:01  -  22 @ 07:00  --------------------------------------------------------  IN: 1580 mL / OUT: 1825 mL / NET: -245 mL    22 @ 07:01  -  22 @ 06:49  --------------------------------------------------------  IN: 760 mL / OUT: 1075 mL / NET: -315 mL      PHYSICAL EXAMINATION  NEUROLOGIC EXAMINATION:  Patient is awake, alert, oriented x3 with choices, hypophonic, dysarthric, following commands, CARISSA, L facial droop, R UE/LE 5/5, L UE 0/5, unable to make a fist or wiggles fingers, LUE 0/5, LLE TF  GENERAL: 3L NC  EENT: Anicteric  CARDIOVASCULAR: (+) S1 S2, regular rate and regular rhythm  PULMONARY: Coarse breath sounds   ABDOMEN: Soft, nontender with normoactive bowel sounds  EXTREMITIES: No edema, no groin hematoma, pulses good  SKIN: No rash      MEDICATIONS:   acetaminophen    Suspension .. 650 milliGRAM(s) Oral every 6 hours PRN  acetylcysteine 20%  Inhalation 4 milliLiter(s) Inhalation every 4 hours  albuterol/ipratropium for Nebulization 3 milliLiter(s) Nebulizer every 4 hours  amLODIPine   Tablet 10 milliGRAM(s) Oral daily  bisacodyl Suppository 10 milliGRAM(s) Rectal daily  carvedilol 6.25 milliGRAM(s) Oral every 12 hours  chlorhexidine 2% Cloths 1 Application(s) Topical <User Schedule>  glucagon  Injectable 1 milliGRAM(s) IntraMuscular once  hydrALAZINE Injectable 10 milliGRAM(s) IV Push every 4 hours PRN  lactulose Syrup 20 Gram(s) Oral daily  lisinopril 20 milliGRAM(s) Oral every 24 hours  polyethylene glycol 3350 17 Gram(s) Oral every 12 hours  senna 2 Tablet(s) Oral at bedtime  sodium chloride 0.9%. 1000 milliLiter(s) (70 mL/Hr) IV Continuous <Continuous>  sodium chloride 3%. 500 milliLiter(s) (30 mL/Hr) IV Continuous <Continuous>    LABS:                   15.2   15.54 )-----------( 247      ( 23 Mar 2022 05:09 )             43.9         133<L>  |  101  |  27<H>  ----------------------------<  131<H>  4.6   |  24  |  0.77    Ca    8.9      23 Mar 2022 05:09  Phos  2.7       Mg     2.1         HbA1C = 5.4 ()  LDL = 108 ()   HDL = 43 ()  TG = 58 ()  TSH = 3.050 ()    Bacteriology:   Blood CS NG1D  CSF studies:  EEG:  Neuroimagin/11 MRI no acute infarcts:  3cm giant aneurysm basilar artery, partially thrombosed, marked compression of brainstem, distortion of IV but patent  03/10 CTA:  dolichoectasia of basilar artery, partially thrombosed aneurysm; diffuse idiopathic skeletal hyperostosis  Other imaging:      IV FLUIDS: IVL   DRIPS:  DIET: NPO  Lines: Peripheral IVs       CODE STATUS:  Full Code                       GOALS OF CARE:  aggressive                      DISPOSITION:  ICU

## 2022-03-23 NOTE — BRIEF OPERATIVE NOTE - NSICDXBRIEFPREOP_GEN_ALL_CORE_FT
PRE-OP DIAGNOSIS:  Brain aneurysm 11-Mar-2022 16:28:54  Darrell Maguire  
PRE-OP DIAGNOSIS:  Brain aneurysm 11-Mar-2022 16:28:54  Darrell Maguire

## 2022-03-23 NOTE — PROGRESS NOTE ADULT - SUBJECTIVE AND OBJECTIVE BOX
** incomplete note** Subjective: unable to participate    MEDICATIONS  (STANDING):  acetylcysteine 20%  Inhalation 4 milliLiter(s) Inhalation every 4 hours  albuterol/ipratropium for Nebulization 3 milliLiter(s) Nebulizer every 4 hours  amLODIPine   Tablet 10 milliGRAM(s) Oral daily  bisacodyl Suppository 10 milliGRAM(s) Rectal daily  carvedilol 6.25 milliGRAM(s) Oral every 12 hours  chlorhexidine 2% Cloths 1 Application(s) Topical <User Schedule>  glucagon  Injectable 1 milliGRAM(s) IntraMuscular once  heparin  Infusion 1100 Unit(s)/Hr (11 mL/Hr) IV Continuous <Continuous>  lactulose Syrup 20 Gram(s) Oral daily  lisinopril 20 milliGRAM(s) Oral every 24 hours  polyethylene glycol 3350 17 Gram(s) Oral every 12 hours  senna 2 Tablet(s) Oral at bedtime  sodium chloride 0.9%. 1000 milliLiter(s) (70 mL/Hr) IV Continuous <Continuous>  sodium chloride 3%. 500 milliLiter(s) (30 mL/Hr) IV Continuous <Continuous>    MEDICATIONS  (PRN):  acetaminophen    Suspension .. 650 milliGRAM(s) Oral every 6 hours PRN Temp greater or equal to 38C (100.4F), Mild Pain (1 - 3)  hydrALAZINE Injectable 10 milliGRAM(s) IV Push every 4 hours PRN SBP>160      Vital Signs Last 24 Hrs  T(C): 37.2 (23 Mar 2022 18:00), Max: 38.2 (23 Mar 2022 00:51)  T(F): 99 (23 Mar 2022 18:00), Max: 100.8 (23 Mar 2022 00:51)  HR: 63 (23 Mar 2022 18:00) (62 - 89)  BP: 146/70 (23 Mar 2022 18:00) (110/55 - 165/81)  BP(mean): 100 (23 Mar 2022 18:00) (77 - 115)  RR: 19 (23 Mar 2022 18:00) (14 - 34)  SpO2: 98% (23 Mar 2022 18:00) (91% - 98%)    Physical Exam  General: NAD, resting comfortably in bed  Pulm: Nonlabored breathing, no respiratory distress  Abd: soft, ND, NT, PEG site with no hematoma  Extrem: WWP, edematous L > R arm      I&O's Summary    22 Mar 2022 07:01  -  23 Mar 2022 07:00  --------------------------------------------------------  IN: 760 mL / OUT: 1075 mL / NET: -315 mL    23 Mar 2022 07:01  -  23 Mar 2022 20:17  --------------------------------------------------------  IN: 1350 mL / OUT: 250 mL / NET: 1100 mL                              15.2   15.54 )-----------( 247      ( 23 Mar 2022 05:09 )             43.9       03-23    138  |  106  |  31<H>  ----------------------------<  123<H>  4.9   |  24  |  0.81    Ca    8.9      23 Mar 2022 18:22  Phos  2.7     03-23  Mg     2.1     03-23        Micro: no    Imaging: no abdominal imaging

## 2022-03-23 NOTE — BRIEF OPERATIVE NOTE - NSICDXBRIEFPROCEDURE_GEN_ALL_CORE_FT
PROCEDURES:  Angiogram, carotid and cerebral, bilateral 11-Mar-2022 16:28:43  Darrell Maguire  
PROCEDURES:  EGD, with PEG 23-Mar-2022 10:06:59  Jennifer العراقي

## 2022-03-23 NOTE — PROGRESS NOTE ADULT - SUBJECTIVE AND OBJECTIVE BOX
HPI:  75y/o M with PMHx sig for HTN, HLD, afib (on eliquis, unknown if last taken today,) CVA in 2020 (with minimal residual right-sided weakness per girlfriend,) last known normal at 3pm when girlfriend spoke to him. 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 Upon arrival to [facility], BP noted to be 171/101. Stroke code was called. CTH without findings of hemorrhage. 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 NIHSS 20. No TPA was administered as Pt is out of window. Per patient's girlfriend, the basilar artery aneurysm is known and was diagnosed in 2020 at [facility] when he suffered from a CVA. At that time, he underwent a "treatment for the aneurysm," which the girlfriend reports was too risky and was aborted. Patient never attended his follow-up appointments due to fear per girlfriend.  (10 Mar 2022 22:52)    OVERNIGHT EVENTS: GM    Hospital Course:   3/10: Admitted for further workup of stroke symptoms and basilar artery aneurysm.  3/11: Karen placed overnight. 3% hypertonic saline started. Neuro exam stable. Started on vEEG for aphasia  3/12: GM overnight. Neuro exam stable. 3% d/c. home eliquis 5 bid. failed s/s. started on TF via NGT, spiked fever 101, f/u panculture   3/13: GM overnight, neuro stable, veeg negative. Vanc/zosyn started for empiric PNA.   3/14: GM o/n neuro stable. on HFNC. ENT plan for laryngoscope today with . failed s/s, pend repeat eval. dc'd captopril, started lisinopril. dc'd vanc. Laryngoscopy: No masses visualized, hypomobility of L sided vocal cords, dysarthric, hypophonic, risk for aspiration, good cough reflex.Dr. Onofre to discuss with Dr. Peterson regarding a possible vocal cord injection.   3/15: GM overnight, neuro stable, on HFNC 30/30 overnight, transitioned to 4LNC  3/16: GM overnight, neuro stable, tolerating NC and satting well    3/17: POD6 dx angio. GM o/n neuro stable. trickle feeds via NGT  3/18: POD7 GM o/n, saturating well in RA. tolerating TF   03/19: POD8 GM o/n. gen surg consulted for PEG placement.  03/20: POD9 GM o/n. Transferred back to ICU for heparin gtt in preparation for PEG placement Wed.   3/21: POD10 GM o/n, neuro exam stable. Transferred to ICU in preparation for hep gtt to start tomorrow, plan for PEG on Wednesday. F/u am coags.   3/22: POD11. GM o/n neuro stable. on heparin gtt ptt goal 40-60. o/n ptt >200c, held for 2 hours. on heparin @11. pre-op for PEG   3/23: POD12. o/n new R facial droop, CTH stable. heparin gtt d/c. preop PEG today    Vital Signs Last 24 Hrs  T(C): 37.5 (22 Mar 2022 20:49), Max: 38 (22 Mar 2022 14:00)  T(F): 99.5 (22 Mar 2022 20:49), Max: 100.4 (22 Mar 2022 14:00)  HR: 83 (22 Mar 2022 23:00) (62 - 89)  BP: 143/81 (22 Mar 2022 23:00) (114/58 - 167/81)  BP(mean): 106 (22 Mar 2022 23:00) (80 - 115)  RR: 32 (22 Mar 2022 23:00) (19 - 34)  SpO2: 94% (22 Mar 2022 23:00) (94% - 99%)    I&O's Summary    21 Mar 2022 07:01  -  22 Mar 2022 07:00  --------------------------------------------------------  IN: 1580 mL / OUT: 1825 mL / NET: -245 mL    22 Mar 2022 07:01  -  23 Mar 2022 00:35  --------------------------------------------------------  IN: 760 mL / OUT: 650 mL / NET: 110 mL        PHYSICAL EXAM:  GEN: laying in bed, appears well, NAD  NEURO: AOx3. FC, OE spont, +hypophonia/dysarthria,+R facial. PERRL, EOMI. RUE/RLE 5/5. LUE 1/5 HG, 0/5 proximally. LLE w/d to noxious.  CV: RRR +S1/S2  PULM: CTAB  GI: Abd soft, NT/ND  EXT: ext warm, dry, nontender. L 5th toe ecchymotic, +/- dislocated    TUBES/LINES:  [] Arredondo  [] Lumbar Drain  [] Wound Drains  [] Others      DIET:  [x] NPO  [] Mechanical  [] Tube feeds    LABS:                        15.0   11.75 )-----------( 208      ( 22 Mar 2022 05:40 )             44.6     03-22    137  |  104  |  25<H>  ----------------------------<  124<H>  4.4   |  23  |  0.85    Ca    8.7      22 Mar 2022 05:40  Phos  2.6     03-22  Mg     2.0     03-22      PT/INR - ( 21 Mar 2022 05:28 )   PT: 18.7 sec;   INR: 1.56          PTT - ( 22 Mar 2022 16:24 )  PTT:44.3 sec        CAPILLARY BLOOD GLUCOSE      POCT Blood Glucose.: 128 mg/dL (22 Mar 2022 18:19)  POCT Blood Glucose.: 125 mg/dL (22 Mar 2022 11:10)      Drug Levels: [] N/A    CSF Analysis: [] N/A      Allergies    No Known Allergies    Intolerances      MEDICATIONS:  Antibiotics:    Neuro:  acetaminophen    Suspension .. 650 milliGRAM(s) Oral every 6 hours PRN    Anticoagulation:    OTHER:  acetylcysteine 20%  Inhalation 4 milliLiter(s) Inhalation every 4 hours  albuterol/ipratropium for Nebulization 3 milliLiter(s) Nebulizer every 4 hours  amLODIPine   Tablet 10 milliGRAM(s) Oral daily  bisacodyl Suppository 10 milliGRAM(s) Rectal daily  carvedilol 6.25 milliGRAM(s) Oral every 12 hours  chlorhexidine 2% Cloths 1 Application(s) Topical <User Schedule>  glucagon  Injectable 1 milliGRAM(s) IntraMuscular once  hydrALAZINE Injectable 10 milliGRAM(s) IV Push every 4 hours PRN  lactulose Syrup 20 Gram(s) Oral daily  lisinopril 20 milliGRAM(s) Oral every 24 hours  polyethylene glycol 3350 17 Gram(s) Oral every 12 hours  senna 2 Tablet(s) Oral at bedtime    IVF:  sodium chloride 0.9%. 1000 milliLiter(s) IV Continuous <Continuous>    CULTURES:  Culture Results:   No growth at 5 days. (03-12 @ 18:54)    RADIOLOGY & ADDITIONAL TESTS:      ASSESSMENT:  75y/o M with PMHx sig for HTN, HLD, afib (on eliquis, unknown if last taken today,) CVA in 2020 (with minimal residual right-sided weakness per girlfriend) p/w dysarthria, left facial droop, and left-sided weakness. CTH without findings of hemorrhage. 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 NIHSS 20. Pt is not a tpa candidate as Pt is out of window. Patient now s/p diagnostic angio with findings of partially thrombosed basilar artery aneurysm 3/11/22. course c/b resp insufficiency weaned off HFNC.     BASILAR ARTERY ANEURYSM    Handoff    MEWS Score    Brain aneurysm    Brain aneurysm    Basilar artery aneurysm    Angiogram, carotid and cerebral, bilateral    STROKE    [email or web address]        PLAN:  Neuro:   - neuro checks q 4/vital signs q 2   - MR head 3/11: no acute infarcts   - stroke core measures   - EEG negative 3/13, dc'd   - no plan for further neurosurgical intervention     Cardio:  - SBP goal 100-160   - cont home norvasc, carvedilol (home HCTZ held), Lisinopril substituted for Captopril   - echo 3/11: mild LVH, EF 65-70%   - home dose Eliquis for Afib --> transition from Eliquis to heparin gtt for PEG planning     Pulm:  - satting well on RA  - standing mucomyst and duonebs  - Chest PT     ENT:   - ENT consulted, s/p laryngoscopy 3/14: No masses, hypomobility of L sided vocal cord  - f/u with Dr. Onofre/Dr. Peterson for possible vocal cord injection.     Renal:  - Na goal 135-145  - IVL     GI:  - NPO for PEG  - pend PEG with Eleni in NSICU 3/23  - bowel regimen, BM 3/20    Endo:  - A1C 5.4    Heme;  - SCDs for DVT ppx  - hold Eliquis 3/21, heparin gtt held for PEG  - LE dopplers 3/11: neg for DVT   - LUE US 3/22: superficial thrombus L basilic/cephalic vein    ID:  - zosyn empirically for PNA completed 3/17  - f/u pan cx 3/12; NGTD    Dispo:  - ICU status, full code   - Pending AR  - Family updated with plan  - OOB to chair     Assessment and plan discussed with Dr. Peterson      Assessment:  Present when checked    []  GCS  E   V  M     Heart Failure: []Acute, [] acute on chronic , []chronic  Heart Failure:  [] Diastolic (HFpEF), [] Systolic (HFrEF), []Combined (HFpEF and HFrEF), [] RHF, [] Pulm HTN, [] Other    [] LALO, [] ATN, [] AIN, [] other  [] CKD1, [] CKD2, [] CKD 3, [] CKD 4, [] CKD 5, []ESRD    Encephalopathy: [] Metabolic, [] Hepatic, [] toxic, [] Neurological, [] Other    Abnormal Nurtitional Status: [] malnurtition (see nutrition note), [ ]underweight: BMI < 19, [] morbid obesity: BMI >40, [] Cachexia    [] Sepsis  [] hypovolemic shock,[] cardiogenic shock, [] hemorrhagic shock, [] neuogenic shock  [] Acute Respiratory Failure  []Cerebral edema, [] Brain compression/ herniation,   [] Functional quadriplegia  [] Acute blood loss anemia

## 2022-03-23 NOTE — PROGRESS NOTE ADULT - ASSESSMENT
** incomplete note**    - s/p PEG placement   - surgery team will check around 2 PM  - if no hematoma on check this afternoon, will be able to resume heparin with no bolus  - ok to use tube for medications  - can began feeds via tube at 10am tomorrow, start 20cc/hr and advance to goal as tolerated  - surgery team 4c  - discussed with Dr. Knowles 76M PMH AF on Eliquis, HTN, HLD, CVA 2020 with minimal residual right sided weakness, and large basilar artery aneurysm with brain stem compression. He was admitted for new left sided weakness, hypophonia and inability to swallow related to brain stem compression by the aneurysm. We are consulted for PEG placement. Now s/p PEG placement today.     - s/p PEG placement   - surgery team checked at 2PM and no hematoma  - no hematoma, ok to resume heparin with no bolus  - ok to use tube for medications  - can began feeds via tube at 10am tomorrow, start 20cc/hr and advance to goal as tolerated  - surgery team 4c  - discussed with Dr. Knowles

## 2022-03-23 NOTE — BRIEF OPERATIVE NOTE - NSICDXBRIEFPOSTOP_GEN_ALL_CORE_FT
POST-OP DIAGNOSIS:  Brain aneurysm 11-Mar-2022 16:29:03  Darrell Maguire  
POST-OP DIAGNOSIS:  Brain aneurysm 11-Mar-2022 16:29:03  Darrell Maguire

## 2022-03-23 NOTE — CHART NOTE - NSCHARTNOTEFT_GEN_A_CORE
Admitting Diagnosis:   Patient is a 76y old  Male who presents with a chief complaint of CVA, left facial, left-sided weakness (23 Mar 2022 06:47)    PAST MEDICAL & SURGICAL HISTORY:  HTN, HLD, afib (on eliquis, unknown if last taken today,) CVA in 2020    Current Nutrition Order: NPO diet     PO Intake: Good (%) [   ]  Fair (50-75%) [   ] Poor (<25%) [   ]- N/A    GI Issues:   WDL, No n/v/d noted  +constipation noted- currently on bowel regimen.  No abd distention    Pain:  No pain noted at this time    Skin Integrity:  Surgical incision, marichuy score 14  No edema noted at this time  No pressure ulcers noted    Labs:   03-23    133<L>  |  101  |  27<H>  ----------------------------<  131<H>  4.6   |  24  |  0.77    Ca    8.9      23 Mar 2022 05:09  Phos  2.7     03-23  Mg     2.1     03-23    CAPILLARY BLOOD GLUCOSE    POCT Blood Glucose.: 128 mg/dL (22 Mar 2022 18:19)  POCT Blood Glucose.: 125 mg/dL (22 Mar 2022 11:10)    Medications:  MEDICATIONS  (STANDING):  acetylcysteine 20%  Inhalation 4 milliLiter(s) Inhalation every 4 hours  albuterol/ipratropium for Nebulization 3 milliLiter(s) Nebulizer every 4 hours  amLODIPine   Tablet 10 milliGRAM(s) Oral daily  bisacodyl Suppository 10 milliGRAM(s) Rectal daily  carvedilol 6.25 milliGRAM(s) Oral every 12 hours  chlorhexidine 2% Cloths 1 Application(s) Topical <User Schedule>  glucagon  Injectable 1 milliGRAM(s) IntraMuscular once  lactulose Syrup 20 Gram(s) Oral daily  lisinopril 20 milliGRAM(s) Oral every 24 hours  polyethylene glycol 3350 17 Gram(s) Oral every 12 hours  potassium phosphate IVPB 15 milliMole(s) IV Intermittent once  senna 2 Tablet(s) Oral at bedtime  sodium chloride 0.9%. 1000 milliLiter(s) (70 mL/Hr) IV Continuous <Continuous>  sodium chloride 3%. 500 milliLiter(s) (30 mL/Hr) IV Continuous <Continuous>    MEDICATIONS  (PRN):  acetaminophen    Suspension .. 650 milliGRAM(s) Oral every 6 hours PRN Temp greater or equal to 38C (100.4F), Mild Pain (1 - 3)  fentaNYL    Injectable 50 MICROGram(s) IV Push once PRN PEG placement  hydrALAZINE Injectable 10 milliGRAM(s) IV Push every 4 hours PRN SBP>160  midazolam Injectable 1 milliGRAM(s) IV Push once PRN PEG placement    Admitting Anthropometrics:  · Height for BMI (FEET)	6 Feet  · Height for BMI (INCHES)	3 Inch(s)  · Height for BMI (CENTIMETERS)	190.5 Centimeter(s)  · Weight for BMI (lbs)	260 lb  · Weight for BMI (kg)	117.9 kg  · Body Mass Index	32.4  · Ideal Body Weight (lbs)	196  · Ideal Body Weight (kg)	88.9    Weight:  3/10 260lbs  3/14 176lbs (?accuracy- pt does not appear to be this weight)    Weight Change:   Please obtain new weight when feasible and trend biweekly.     Nutrition Focused Physical Exam: Completed [   ]  Not Pertinent [ x  ]    Estimated energy needs:   IBW used for calculations as pt >120% of IBW (133%). Needs adjusted for advanced age. **Adjust fluids per team.  Kcal (20-25 kcal/kg): 4054-9072 kcal  Protein (1.0-1.2 g/kg pro): 88-106g pro  Fluids (30-35 ml/kg): 8814-4038 ml    Subjective:   76M with PMHx sig for HTN, HLD, afib (on eliquis, unknown if last taken today,) CVA in 2020 (with minimal residual right-sided weakness per girlfriend) p/w dysarthria, left facial droop, and left-sided weakness. CTH without findings of hemorrhage. 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 NIHSS 20. Pt is not a tpa candidate as pt is out of window. Transferred to NSICU for further assessment/ monitoring. S/p diagnostic angio with findings of partially thrombosed basilar artery aneurysm 3/11/22. SLP eval 3/12 and follow up 3/14 both with recs for NPO with NGT placement for initiation of EN. Pt with new right facial droop o/n 3/23- CTH stable. Pt now s/p EGD with PEG placement 3/23.     On assessment, pt resting in bed without complaints. Currently NPO 2/2 PEG placement 3/23- plan to restart EN feeds when feasible. No n/v/d/c. No s/sx of aspiration on EN feeds. No abd distention. Education deferred at this time. Please see recs below.     Previous Nutrition Diagnosis: Inadequate energy intake RT inability to meet est needs on current diet AEB NPO, meeting 0% of est needs.     Active [ x  ]  Resolved [   ]    If resolved, new PES:     Goal/Expected Outcome 1. Diet will advance within 24-48hrs 2. Consistently meet >75% est needs.    Recommendations:  1. NPO  2. When feasible, recommend restarting EN; Jevity 1.2 @ 70 ml/hr x24hrs via NGT providing 1680 ml TV, 2016 kcal, 93g pro, 1356 ml free water.   >>Start at 20 ml/hr and advance 20 ml q6hrs until goal rate is met  >>FWF per team  >>Maintain aspiration precautions at all times  3. Pain and bowel regimen per team   4. Cont to monitor lytes and replete prn   5. RD diet edu prn    Education: Deferred    Risk Level: High [ x  ] Moderate [   ] Low [   ].

## 2022-03-23 NOTE — PROCEDURAL SAFETY CHECKLIST WITH OR WITHOUT SEDATION - NSSIDESITEMARKD_GEN_ALL_CORE
not applicable [Chills] : chills [Fever] : fever [Heartburn] : heartburn [Feeling Tired] : feeling tired [Wake up at night to urinate  How many times?  ___] : wakes up to urinate [unfilled] times during the night [Painful Charco] : painful Charco [Told you have blood in urine on a urine test] : told blood was present in a urine test [Bladder fullness after urinating] : bladder fullness after urinating [Anxiety] : anxiety [Negative] : Heme/Lymph [FreeTextEntry1] : vaginitis

## 2022-03-24 DIAGNOSIS — D72.829 ELEVATED WHITE BLOOD CELL COUNT, UNSPECIFIED: ICD-10-CM

## 2022-03-24 DIAGNOSIS — J96.01 ACUTE RESPIRATORY FAILURE WITH HYPOXIA: ICD-10-CM

## 2022-03-24 DIAGNOSIS — E83.51 HYPOCALCEMIA: ICD-10-CM

## 2022-03-24 DIAGNOSIS — G93.5 COMPRESSION OF BRAIN: ICD-10-CM

## 2022-03-24 DIAGNOSIS — E87.2 ACIDOSIS: ICD-10-CM

## 2022-03-24 DIAGNOSIS — E78.5 HYPERLIPIDEMIA, UNSPECIFIED: ICD-10-CM

## 2022-03-24 DIAGNOSIS — I10 ESSENTIAL (PRIMARY) HYPERTENSION: ICD-10-CM

## 2022-03-24 DIAGNOSIS — R13.10 DYSPHAGIA, UNSPECIFIED: ICD-10-CM

## 2022-03-24 DIAGNOSIS — I48.11 LONGSTANDING PERSISTENT ATRIAL FIBRILLATION: ICD-10-CM

## 2022-03-24 DIAGNOSIS — I72.5 ANEURYSM OF OTHER PRECEREBRAL ARTERIES: ICD-10-CM

## 2022-03-24 DIAGNOSIS — E83.39 OTHER DISORDERS OF PHOSPHORUS METABOLISM: ICD-10-CM

## 2022-03-24 LAB
ANION GAP SERPL CALC-SCNC: 13 MMOL/L — SIGNIFICANT CHANGE UP (ref 5–17)
APTT BLD: 41.5 SEC — HIGH (ref 27.5–35.5)
APTT BLD: 45.2 SEC — HIGH (ref 27.5–35.5)
BUN SERPL-MCNC: 30 MG/DL — HIGH (ref 7–23)
CALCIUM SERPL-MCNC: 8.3 MG/DL — LOW (ref 8.4–10.5)
CHLORIDE SERPL-SCNC: 107 MMOL/L — SIGNIFICANT CHANGE UP (ref 96–108)
CO2 SERPL-SCNC: 20 MMOL/L — LOW (ref 22–31)
CREAT SERPL-MCNC: 0.75 MG/DL — SIGNIFICANT CHANGE UP (ref 0.5–1.3)
EGFR: 92 ML/MIN/1.73M2 — SIGNIFICANT CHANGE UP
GLUCOSE SERPL-MCNC: 111 MG/DL — HIGH (ref 70–99)
HCT VFR BLD CALC: 40.7 % — SIGNIFICANT CHANGE UP (ref 39–50)
HGB BLD-MCNC: 14.1 G/DL — SIGNIFICANT CHANGE UP (ref 13–17)
MAGNESIUM SERPL-MCNC: 1.8 MG/DL — SIGNIFICANT CHANGE UP (ref 1.6–2.6)
MCHC RBC-ENTMCNC: 32.2 PG — SIGNIFICANT CHANGE UP (ref 27–34)
MCHC RBC-ENTMCNC: 34.6 GM/DL — SIGNIFICANT CHANGE UP (ref 32–36)
MCV RBC AUTO: 92.9 FL — SIGNIFICANT CHANGE UP (ref 80–100)
NRBC # BLD: 0 /100 WBCS — SIGNIFICANT CHANGE UP (ref 0–0)
PHOSPHATE SERPL-MCNC: 2.4 MG/DL — LOW (ref 2.5–4.5)
PLATELET # BLD AUTO: 254 K/UL — SIGNIFICANT CHANGE UP (ref 150–400)
POTASSIUM SERPL-MCNC: 3.7 MMOL/L — SIGNIFICANT CHANGE UP (ref 3.5–5.3)
POTASSIUM SERPL-SCNC: 3.7 MMOL/L — SIGNIFICANT CHANGE UP (ref 3.5–5.3)
RBC # BLD: 4.38 M/UL — SIGNIFICANT CHANGE UP (ref 4.2–5.8)
RBC # FLD: 12.7 % — SIGNIFICANT CHANGE UP (ref 10.3–14.5)
SODIUM SERPL-SCNC: 140 MMOL/L — SIGNIFICANT CHANGE UP (ref 135–145)
WBC # BLD: 14.23 K/UL — HIGH (ref 3.8–10.5)
WBC # FLD AUTO: 14.23 K/UL — HIGH (ref 3.8–10.5)

## 2022-03-24 PROCEDURE — 73620 X-RAY EXAM OF FOOT: CPT | Mod: 26,LT

## 2022-03-24 PROCEDURE — 99232 SBSQ HOSP IP/OBS MODERATE 35: CPT

## 2022-03-24 PROCEDURE — 99233 SBSQ HOSP IP/OBS HIGH 50: CPT

## 2022-03-24 RX ORDER — APIXABAN 2.5 MG/1
5 TABLET, FILM COATED ORAL EVERY 12 HOURS
Refills: 0 | Status: DISCONTINUED | OUTPATIENT
Start: 2022-03-25 | End: 2022-03-30

## 2022-03-24 RX ORDER — HEPARIN SODIUM 5000 [USP'U]/ML
5000 INJECTION INTRAVENOUS; SUBCUTANEOUS EVERY 8 HOURS
Refills: 0 | Status: DISCONTINUED | OUTPATIENT
Start: 2022-03-24 | End: 2022-03-24

## 2022-03-24 RX ORDER — SODIUM,POTASSIUM PHOSPHATES 278-250MG
1 POWDER IN PACKET (EA) ORAL ONCE
Refills: 0 | Status: COMPLETED | OUTPATIENT
Start: 2022-03-24 | End: 2022-03-24

## 2022-03-24 RX ORDER — ATORVASTATIN CALCIUM 80 MG/1
80 TABLET, FILM COATED ORAL AT BEDTIME
Refills: 0 | Status: DISCONTINUED | OUTPATIENT
Start: 2022-03-24 | End: 2022-04-08

## 2022-03-24 RX ORDER — POTASSIUM CHLORIDE 20 MEQ
40 PACKET (EA) ORAL ONCE
Refills: 0 | Status: COMPLETED | OUTPATIENT
Start: 2022-03-24 | End: 2022-03-24

## 2022-03-24 RX ORDER — HEPARIN SODIUM 5000 [USP'U]/ML
5000 INJECTION INTRAVENOUS; SUBCUTANEOUS ONCE
Refills: 0 | Status: COMPLETED | OUTPATIENT
Start: 2022-03-24 | End: 2022-03-24

## 2022-03-24 RX ADMIN — Medication 4 MILLILITER(S): at 00:39

## 2022-03-24 RX ADMIN — POLYETHYLENE GLYCOL 3350 17 GRAM(S): 17 POWDER, FOR SOLUTION ORAL at 05:11

## 2022-03-24 RX ADMIN — CARVEDILOL PHOSPHATE 6.25 MILLIGRAM(S): 80 CAPSULE, EXTENDED RELEASE ORAL at 17:24

## 2022-03-24 RX ADMIN — Medication 3 MILLILITER(S): at 00:39

## 2022-03-24 RX ADMIN — Medication 3 MILLILITER(S): at 10:21

## 2022-03-24 RX ADMIN — Medication 4 MILLILITER(S): at 14:35

## 2022-03-24 RX ADMIN — HEPARIN SODIUM 5000 UNIT(S): 5000 INJECTION INTRAVENOUS; SUBCUTANEOUS at 22:05

## 2022-03-24 RX ADMIN — Medication 4 MILLILITER(S): at 03:28

## 2022-03-24 RX ADMIN — POLYETHYLENE GLYCOL 3350 17 GRAM(S): 17 POWDER, FOR SOLUTION ORAL at 17:24

## 2022-03-24 RX ADMIN — Medication 3 MILLILITER(S): at 22:05

## 2022-03-24 RX ADMIN — Medication 650 MILLIGRAM(S): at 03:05

## 2022-03-24 RX ADMIN — HEPARIN SODIUM 5000 UNIT(S): 5000 INJECTION INTRAVENOUS; SUBCUTANEOUS at 14:17

## 2022-03-24 RX ADMIN — Medication 3 MILLILITER(S): at 03:29

## 2022-03-24 RX ADMIN — Medication 3 MILLILITER(S): at 17:24

## 2022-03-24 RX ADMIN — Medication 4 MILLILITER(S): at 19:19

## 2022-03-24 RX ADMIN — Medication 650 MILLIGRAM(S): at 03:27

## 2022-03-24 RX ADMIN — Medication 1 PACKET(S): at 11:48

## 2022-03-24 RX ADMIN — Medication 3 MILLILITER(S): at 14:35

## 2022-03-24 RX ADMIN — Medication 40 MILLIEQUIVALENT(S): at 11:48

## 2022-03-24 RX ADMIN — LISINOPRIL 20 MILLIGRAM(S): 2.5 TABLET ORAL at 17:24

## 2022-03-24 RX ADMIN — Medication 4 MILLILITER(S): at 10:21

## 2022-03-24 RX ADMIN — Medication 1 PACKET(S): at 19:18

## 2022-03-24 RX ADMIN — CARVEDILOL PHOSPHATE 6.25 MILLIGRAM(S): 80 CAPSULE, EXTENDED RELEASE ORAL at 05:03

## 2022-03-24 RX ADMIN — SENNA PLUS 2 TABLET(S): 8.6 TABLET ORAL at 22:05

## 2022-03-24 RX ADMIN — ATORVASTATIN CALCIUM 80 MILLIGRAM(S): 80 TABLET, FILM COATED ORAL at 22:44

## 2022-03-24 RX ADMIN — CHLORHEXIDINE GLUCONATE 1 APPLICATION(S): 213 SOLUTION TOPICAL at 05:03

## 2022-03-24 NOTE — PROGRESS NOTE ADULT - ASSESSMENT
per Neurosurgery    77 y/o M with PMHx sig for HTN, HLD, afib (on eliquis, unknown if last taken today,) CVA in 2020 (with minimal residual right-sided weakness per girlfriend) p/w dysarthria, left facial droop, and left-sided weakness. CTH without findings of hemorrhage. 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 NIHSS 20. Pt is not a tpa candidate as Pt is out of window. Patient now s/p diagnostic angio with findings of partially thrombosed basilar artery aneurysm 3/11/22. course c/b resp insufficiency weaned off HFNC.     PLAN:   Neuro:   - neuro checks q 4/vital signs q 2   - MR head 3/11: no acute infarcts   - stroke core measures   - EEG negative 3/13, dc'd   - no plan for further neurosurgical intervention   - CTH 3/22 stable     Cardio:  - SBP goal 100-160   - cont home norvasc, carvedilol (home HCTZ held), Lisinopril substituted for Captopril   - echo 3/11: mild LVH, EF 65-70%   - home dose Eliquis for Afib --> transition from Eliquis to heparin gtt for PEG planning     Pulm:  - satting well on RA  - standing mucomyst and duonebs  - Chest PT     ENT:   - ENT consulted, s/p laryngoscopy 3/14: No masses, hypomobility of L sided vocal cord  - f/u with Dr. Onofre/Dr. Peterson for possible vocal cord injection.     Renal:  - Na goal 135-145  - IVL   - 3% @ 30    GI:  - NPO except meds through PEG (placed 3/23)  - gen surg to assess patient at 2pm, if no hematoma, can resume heparin gtt at previous rate (NO BOLUS)   - can start TF 24 hrs after PEG (3/24 @10am)   - bowel regimen, BM 3/20, 3/23    Endo:  - A1C 5.4    Heme;  - SCDs for DVT ppx  - hold Eliquis 3/21, heparin gtt held for PEG  - LE dopplers 3/11: neg for DVT   - LUE US 3/22: superficial thrombus L basilic/cephalic vein    ID:  - zosyn empirically for PNA completed 3/17  - f/u pan cx 3/12; NGTD    Dispo:  - ICU status, full code

## 2022-03-24 NOTE — PROGRESS NOTE ADULT - SUBJECTIVE AND OBJECTIVE BOX
HOSPITALIST INITIAL CONSULT NOTE    CHIEF COMPLAINT:      HPI:  77y/o M with PMHx sig for HTN, HLD, afib (on eliquis, unknown if last taken today,) CVA in 2020 (with minimal residual right-sided weakness per girlfriend,) last known normal at 3pm when girlfriend spoke to him. 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 Upon arrival to [facility], BP noted to be 171/101. Stroke code was called. CTH without findings of hemorrhage. 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 NIHSS 20. No TPA was administered as Pt is out of window. Per patient's girlfriend, the basilar artery aneurysm is known and was diagnosed in 2020 at [facility] when he suffered from a CVA. At that time, he underwent a "treatment for the aneurysm," which the girlfriend reports was too risky and was aborted. Patient never attended his follow-up appointments due to fear per girlfriend.  (10 Mar 2022 22:52)      PAST MEDICAL & SURGICAL HISTORY: reviewed from H&P (HTN, HLD, Afib)      REVIEW OF SYSTEMS:  As per HPI, otherwise negative for Constitutional, Eyes, Ears/Nose/Mouth/Throat, Neck, Cardiovascular, Respiratory, Gastrointestinal, Genitourinary, Skin, Endocrine, Musculoskeletal, Psychiatric, and Hematologic/Lymphatic.    MEDICATIONS  (STANDING):  acetylcysteine 20%  Inhalation 4 milliLiter(s) Inhalation every 4 hours  albuterol/ipratropium for Nebulization 3 milliLiter(s) Nebulizer every 4 hours  amLODIPine   Tablet 10 milliGRAM(s) Oral daily  atorvastatin 80 milliGRAM(s) Oral at bedtime  carvedilol 6.25 milliGRAM(s) Oral every 12 hours  chlorhexidine 2% Cloths 1 Application(s) Topical <User Schedule>  glucagon  Injectable 1 milliGRAM(s) IntraMuscular once  heparin  Infusion 1100 Unit(s)/Hr (11 mL/Hr) IV Continuous <Continuous>  lisinopril 20 milliGRAM(s) Oral every 24 hours  polyethylene glycol 3350 17 Gram(s) Oral every 12 hours  potassium chloride   Powder 40 milliEquivalent(s) Enteral Tube once  potassium phosphate / sodium phosphate Powder (PHOS-NaK) 1 Packet(s) Oral once  senna 2 Tablet(s) Oral at bedtime  sodium chloride 0.9%. 1000 milliLiter(s) (70 mL/Hr) IV Continuous <Continuous>    MEDICATIONS  (PRN):  acetaminophen    Suspension .. 650 milliGRAM(s) Oral every 6 hours PRN Temp greater or equal to 38C (100.4F), Mild Pain (1 - 3)  hydrALAZINE Injectable 10 milliGRAM(s) IV Push every 4 hours PRN SBP>160      Allergies  No Known Allergies  Intolerances        FAMILY HISTORY: no pertinent FH      SOCIAL HISTORY: denies toxic habits    VITALS  Vital Signs Last 24 Hrs  T(C): 37.2 (24 Mar 2022 07:00), Max: 38.1 (24 Mar 2022 01:47)  T(F): 98.9 (24 Mar 2022 07:00), Max: 100.6 (24 Mar 2022 01:47)  HR: 66 (24 Mar 2022 08:00) (62 - 91)  BP: 117/56 (24 Mar 2022 08:00) (100/55 - 174/80)  BP(mean): 81 (24 Mar 2022 08:00) (73 - 115)  RR: 18 (24 Mar 2022 08:00) (10 - 30)  SpO2: 95% (24 Mar 2022 08:00) (91% - 98%)    I&O's Summary    23 Mar 2022 07:01  -  24 Mar 2022 07:00  --------------------------------------------------------  IN: 2922 mL / OUT: 500 mL / NET: 2422 mL        CAPILLARY BLOOD GLUCOSE    PHYSICAL EXAM  General: A&Ox3; NAD, prefers to respond with hand gestures than speaking, dysarthric   Head: NC/AT; PERRL; EOMI; anicteric sclera  Neck: Supple; no JVD  Respiratory: CTA B/L; no wheezes/crackles/rales auscultated w/ good air movement  Cardiovascular: Regular rhythm/rate; S1/S2; no gallops or murmurs auscultated  Gastrointestinal: Soft; NTND w/out rebound tenderness or guarding; bowel sounds normal  Extremities: WWP; mild non pitting edema of LE, no cyanosis; radial/pedal pulses palpable  Neurological:  CNII-XII grossly intact; Left sided weakness  Skin: Ecchymosis of distal toe, no other rash   Psych: Appropriate affect  Vasc: +2 pulses bilaterally distally      LABS:                        14.1   14.23 )-----------( 254      ( 24 Mar 2022 02:53 )             40.7     03-24    140  |  107  |  30<H>  ----------------------------<  111<H>  3.7   |  20<L>  |  0.75    Ca    8.3<L>      24 Mar 2022 02:53  Phos  2.4     03-24  Mg     1.8     03-24      PT/INR - ( 23 Mar 2022 05:09 )   PT: 16.9 sec;   INR: 1.42          PTT - ( 24 Mar 2022 02:53 )  PTT:41.5 sec      RADIOLOGY & ADDITIONAL STUDIES:  reviewed

## 2022-03-24 NOTE — PROGRESS NOTE ADULT - SUBJECTIVE AND OBJECTIVE BOX
Physical Medicine and Rehabilitation Progress Note:    Patient is a 76y old  Male who presents with a chief complaint of CVA, left facial, left-sided weakness (24 Mar 2022 10:39)      HPI:  77y/o M with PMHx sig for HTN, HLD, afib (on eliquis, unknown if last taken today,) CVA in 2020 (with minimal residual right-sided weakness per girlfriend,) last known normal at 3pm when girlfriend spoke to him. 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 Upon arrival to [facility], BP noted to be 171/101. Stroke code was called. CTH without findings of hemorrhage. 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 NIHSS 20. No TPA was administered as Pt is out of window. Per patient's girlfriend, the basilar artery aneurysm is known and was diagnosed in 2020 at [facility] when he suffered from a CVA. At that time, he underwent a "treatment for the aneurysm," which the girlfriend reports was too risky and was aborted. Patient never attended his follow-up appointments due to fear per girlfriend.  (10 Mar 2022 22:52)                            14.1   14.23 )-----------( 254      ( 24 Mar 2022 02:53 )             40.7       03-24    140  |  107  |  30<H>  ----------------------------<  111<H>  3.7   |  20<L>  |  0.75    Ca    8.3<L>      24 Mar 2022 02:53  Phos  2.4     03-24  Mg     1.8     03-24      Vital Signs Last 24 Hrs  T(C): 36.9 (24 Mar 2022 09:11), Max: 38.1 (24 Mar 2022 01:47)  T(F): 98.4 (24 Mar 2022 09:11), Max: 100.6 (24 Mar 2022 01:47)  HR: 65 (24 Mar 2022 12:00) (62 - 91)  BP: 114/57 (24 Mar 2022 12:00) (100/55 - 174/80)  BP(mean): 80 (24 Mar 2022 12:00) (73 - 115)  RR: 16 (24 Mar 2022 12:00) (10 - 30)  SpO2: 95% (24 Mar 2022 12:00) (93% - 98%)    MEDICATIONS  (STANDING):  acetylcysteine 20%  Inhalation 4 milliLiter(s) Inhalation every 4 hours  albuterol/ipratropium for Nebulization 3 milliLiter(s) Nebulizer every 4 hours  amLODIPine   Tablet 10 milliGRAM(s) Oral daily  atorvastatin 80 milliGRAM(s) Oral at bedtime  carvedilol 6.25 milliGRAM(s) Oral every 12 hours  chlorhexidine 2% Cloths 1 Application(s) Topical <User Schedule>  glucagon  Injectable 1 milliGRAM(s) IntraMuscular once  heparin   Injectable 5000 Unit(s) SubCutaneous every 8 hours  lisinopril 20 milliGRAM(s) Oral every 24 hours  polyethylene glycol 3350 17 Gram(s) Oral every 12 hours  senna 2 Tablet(s) Oral at bedtime    MEDICATIONS  (PRN):  acetaminophen    Suspension .. 650 milliGRAM(s) Oral every 6 hours PRN Temp greater or equal to 38C (100.4F), Mild Pain (1 - 3)  hydrALAZINE Injectable 10 milliGRAM(s) IV Push every 4 hours PRN SBP>160    Currently Undergoing Physical/ Occupational Therapy at bedside.    PT/OT Functional Status Assessment:   3/23/2022      Cognitive/Neuro/Behavioral  Level of Consciousness: alert  Arousal Level: arouses to voice  Orientation: disoriented to;  time  Speech: garbled;  hypophonic  Mood/Behavior: calm    Language Assistance  Preferred Language to Address Healthcare Preferred Language to Address Healthcare: English    Therapeutic Interventions      Bed Mobility  Bed Mobility Training Rolling/Turning: minimum assist (75% patient effort);  2 person assist;  verbal cues  Bed Mobility Training Scooting: maximum assist (25% patient effort);  2 person assist;  verbal cues  Bed Mobility Training Sit-to-Supine: maximum assist (25% patient effort);  2 person assist;  verbal cues  Bed Mobility Training Supine-to-Sit: moderate assist (50% patient effort);  2 person assist;  verbal cues  Bed Mobility Training Limitations: decreased ability to use arms for pushing/pulling;  decreased ability to use legs for bridging/pushing;  impaired ability to control trunk for mobility;  decreased strength;  impaired balance;  impaired postural control;  impaired motor control;  pt able to assist with trunk, RUE, and RLE    Therapeutic Exercise  Therapeutic Exercise Detail: Pt tolerated sitting at EOB for ~15 minutes, mod/max A x 1 to maintain sitting balance // LLE PROM hip flexion, knee extension/flexion, ankle PF/DF x5 each // Pull to sit from reclined position with RUE x3 // Seated manually resisted RLE adduction x5     Neuromuscular Re-education  Neuromuscular Re-education Detail: Leaning onto LUE to promote GH joint WBing with assisted push up into upright posture in sitting x3 // Pt demo intact 2-point discrimination in LUE and LLE, pt able to verbalize by motioning 1, 2, or 3 fingers // RUE reaching with min A at trunk to initiate truncal movement x3 in 4 directions (left, right, forward, posterior)       Grooming Training  Grooming Training Assistance: minimum assist (75% patient effort);  verbal cues;  nonverbal cues (demo/gestures);  1 person assist;  Patient sat at EOB and engaged in wiping face and LUE with wet cloth utilizing RUE with Min A x 1 to maintain sitting balance,;  decreased flexibility;  abnormal muscle tone;  decreased ROM;  decreased strength;  impaired balance;  impaired postural control          PM&R Impression: as above    Current Disposition Plan Recommendations:    acute rehab placement

## 2022-03-24 NOTE — PROGRESS NOTE ADULT - ASSESSMENT
76M PMH AF on Eliquis, HTN, HLD, CVA 2020 with minimal residual right sided weakness, and large basilar artery aneurysm with brain stem compression. He was admitted for new left sided weakness, hypophonia and inability to swallow related to brain stem compression by the aneurysm. We are consulted for PEG placement. Now s/p PEG placement 3/23.     - s/p PEG placement   - ok to continue heparin drip  - ok to use tube for medications  - can began feeds via tube at 10am, start 20cc/hr and advance to goal as tolerated  - had normal BM this AM  - surgery team 4c  - discussed with Dr. Knowles

## 2022-03-24 NOTE — PROGRESS NOTE ADULT - SUBJECTIVE AND OBJECTIVE BOX
Subjective: unable to participate  per nurse, had nonbloody BM this AM    MEDICATIONS  (STANDING):  acetylcysteine 20%  Inhalation 4 milliLiter(s) Inhalation every 4 hours  albuterol/ipratropium for Nebulization 3 milliLiter(s) Nebulizer every 4 hours  amLODIPine   Tablet 10 milliGRAM(s) Oral daily  bisacodyl Suppository 10 milliGRAM(s) Rectal daily  carvedilol 6.25 milliGRAM(s) Oral every 12 hours  chlorhexidine 2% Cloths 1 Application(s) Topical <User Schedule>  glucagon  Injectable 1 milliGRAM(s) IntraMuscular once  heparin  Infusion 1100 Unit(s)/Hr (11 mL/Hr) IV Continuous <Continuous>  lactulose Syrup 20 Gram(s) Oral daily  lisinopril 20 milliGRAM(s) Oral every 24 hours  polyethylene glycol 3350 17 Gram(s) Oral every 12 hours  senna 2 Tablet(s) Oral at bedtime  sodium chloride 0.9%. 1000 milliLiter(s) (70 mL/Hr) IV Continuous <Continuous>    MEDICATIONS  (PRN):  acetaminophen    Suspension .. 650 milliGRAM(s) Oral every 6 hours PRN Temp greater or equal to 38C (100.4F), Mild Pain (1 - 3)  hydrALAZINE Injectable 10 milliGRAM(s) IV Push every 4 hours PRN SBP>160      Vital Signs Last 24 Hrs  T(C): 37.2 (24 Mar 2022 07:00), Max: 38.1 (24 Mar 2022 01:47)  T(F): 98.9 (24 Mar 2022 07:00), Max: 100.6 (24 Mar 2022 01:47)  HR: 66 (24 Mar 2022 08:00) (62 - 91)  BP: 117/56 (24 Mar 2022 08:00) (100/55 - 174/80)  BP(mean): 81 (24 Mar 2022 08:00) (73 - 115)  RR: 18 (24 Mar 2022 08:00) (10 - 30)  SpO2: 95% (24 Mar 2022 08:00) (91% - 98%)    Physical Exam  General: NAD, resting comfortably in bed  Pulm: Nonlabored breathing, no respiratory distress  Abd: soft, ND, NT, PEG site with no hematoma  Extrem: WWP, edematous L > R arm      I&O's Summary    23 Mar 2022 07:01  -  24 Mar 2022 07:00  --------------------------------------------------------  IN: 2922 mL / OUT: 500 mL / NET: 2422 mL                              14.1   14.23 )-----------( 254      ( 24 Mar 2022 02:53 )             40.7       03-24    140  |  107  |  30<H>  ----------------------------<  111<H>  3.7   |  20<L>  |  0.75    Ca    8.3<L>      24 Mar 2022 02:53  Phos  2.4     03-24  Mg     1.8     03-24        Micro: no new    Imaging: no new

## 2022-03-24 NOTE — PROGRESS NOTE ADULT - ASSESSMENT
77y/o M with PMHx sig for HTN, HLD, afib (on eliquis, unknown if last taken today,) CVA in 2020 (with minimal residual right-sided weakness per girlfriend) p/w dysarthria, left facial droop, and left-sided weakness. CTH without findings of hemorrhage. 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 NIHSS 20. Pt is not a tpa candidate as Pt is out of window. Patient now s/p diagnostic angio with findings of partially thrombosed basilar artery aneurysm 3/11/22. course c/b resp insufficiency weaned off HFNC.

## 2022-03-24 NOTE — PROGRESS NOTE ADULT - SUBJECTIVE AND OBJECTIVE BOX
=================================  NEUROCRITICAL CARE ATTENDING NOTE  =================================    BREANNA MCCORMACK   MRN-[id number]  Summary:  76y/M with HTN, HLD, afib (on eliquis, unknown if last taken today,) CVA in  (with minimal residual right-sided weakness per girlfriend,) last known normal at 3pm when girlfriend spoke to him. 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 Upon arrival to [facility], BP noted to be 171/101. Stroke code was called. CTH without findings of hemorrhage. 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 NIHSS 20. No TPA was administered as Pt is out of window. Per patient's girlfriend, the basilar artery aneurysm is known and was diagnosed in  at [facility] when he suffered from a CVA. At that time, he underwent a "treatment for the aneurysm," which the girlfriend reports was too risky and was aborted. Patient never attended his follow-up appointments due to fear per girlfriend.  (10 Mar 2022 22:52)    COURSE IN THE HOSPITAL:  03/10 admitted to [facility]   Tmax 38.3; s/p angio showing extremely tortuous and elongated aortic arch with dolichoectatic vertebrobasilar system, giant partially thrombosed vertebrobasilar aneurysm   Tmax 37.9 tachypneic overnight; apixaban restarted; NGT inserted, tube feeds started at night   Tmax 38.3, desaturation to 88% this morning - now requiring 5L/min, tube feeds held; started on HFNC, improved   Tmax 37.8.  No significant events overnight.     : [facility]  : POD11   : PEG placed. Stable neuroexam. On 3LNC  Past Medical History:   Allergies:  No Known Allergies  Home meds:   ·	amLODIPine 5 mg oral tablet: 1 tab(s) orally once a day  ·	captopril 100 mg oral tablet:   ·	carvedilol 6.25 mg oral tablet:   ·	Eliquis 5 mg oral tablet:   ·	hydroCHLOROthiazide 25 mg oral tablet:       ICU Vital Signs Last 24 Hrs  T(C): 37.6 (24 Mar 2022 04:31), Max: 38.1 (24 Mar 2022 01:47)  T(F): 99.7 (24 Mar 2022 04:31), Max: 100.6 (24 Mar 2022 01:47)  HR: 67 (24 Mar 2022 06:00) (62 - 91)  BP: 113/56 (24 Mar 2022 06:00) (100/55 - 174/80)  BP(mean): 80 (24 Mar 2022 06:00) (73 - 115)  RR: 14 (24 Mar 2022 06:00) (10 - 30)  SpO2: 96% (24 Mar 2022 06:00) (91% - 98%)      22 @ 07:01  -  22 @ 07:00  --------------------------------------------------------  IN: 760 mL / OUT: 1075 mL / NET: -315 mL    22 @ 07:01  -  22 @ 06:47  --------------------------------------------------------  IN: 2922 mL / OUT: 500 mL / NET: 2422 mL      PHYSICAL EXAMINATION  NEUROLOGIC EXAMINATION:  Patient is awake, alert, oriented x3 with choices, hypophonic, dysarthric, following commands, CARISSA, L facial droop, R UE/LE 5/5, L UE 0/5, unable to make a fist or wiggles fingers, LUE 0/5, LLE TF  GENERAL: 3L NC  EENT: Anicteric  CARDIOVASCULAR: (+) S1 S2, regular rate and regular rhythm  PULMONARY: Coarse breath sounds   ABDOMEN: Soft, nontender with normoactive bowel sounds  EXTREMITIES: No edema, no groin hematoma, pulses good  SKIN: No rash      MEDICATIONS:   acetaminophen    Suspension .. 650 milliGRAM(s) Oral every 6 hours PRN  acetylcysteine 20%  Inhalation 4 milliLiter(s) Inhalation every 4 hours  albuterol/ipratropium for Nebulization 3 milliLiter(s) Nebulizer every 4 hours  amLODIPine   Tablet 10 milliGRAM(s) Oral daily  bisacodyl Suppository 10 milliGRAM(s) Rectal daily  carvedilol 6.25 milliGRAM(s) Oral every 12 hours  chlorhexidine 2% Cloths 1 Application(s) Topical <User Schedule>  glucagon  Injectable 1 milliGRAM(s) IntraMuscular once  heparin  Infusion 1100 Unit(s)/Hr (11 mL/Hr) IV Continuous <Continuous>  hydrALAZINE Injectable 10 milliGRAM(s) IV Push every 4 hours PRN  lactulose Syrup 20 Gram(s) Oral daily  lisinopril 20 milliGRAM(s) Oral every 24 hours  polyethylene glycol 3350 17 Gram(s) Oral every 12 hours  senna 2 Tablet(s) Oral at bedtime  sodium chloride 0.9%. 1000 milliLiter(s) (70 mL/Hr) IV Continuous <Continuous>                          14.1   14.23 )-----------( 254      ( 24 Mar 2022 02:53 )             40.7     -    140  |  107  |  30<H>  ----------------------------<  111<H>  3.7   |  20<L>  |  0.75    Ca    8.3<L>      24 Mar 2022 02:53  Phos  2.4       Mg     1.8           HbA1C = 5.4 ()  LDL = 108 ()   HDL = 43 ()  TG = 58 ()  TSH = 3.050 ()    Bacteriology:   Blood CS NG1D  CSF studies:  EEG:  Neuroimagin/11 MRI no acute infarcts:  3cm giant aneurysm basilar artery, partially thrombosed, marked compression of brainstem, distortion of IV but patent  03/10 CTA:  dolichoectasia of basilar artery, partially thrombosed aneurysm; diffuse idiopathic skeletal hyperostosis  Other imaging:      IV FLUIDS: IVL   DRIPS:  DIET: NPO  Lines: Peripheral IVs       CODE STATUS:  Full Code                       GOALS OF CARE:  aggressive                      DISPOSITION:  ICU =================================  NEUROCRITICAL CARE ATTENDING NOTE  =================================    BREANNA MCCORMACK   MRN-[id number]  Summary:  76y/M with HTN, HLD, afib (on eliquis, unknown if last taken today,) CVA in  (with minimal residual right-sided weakness per girlfriend,) last known normal at 3pm when girlfriend spoke to him. 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 Upon arrival to [facility], BP noted to be 171/101. Stroke code was called. CTH without findings of hemorrhage. 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 NIHSS 20. No TPA was administered as Pt is out of window. Per patient's girlfriend, the basilar artery aneurysm is known and was diagnosed in  at [facility] when he suffered from a CVA. At that time, he underwent a "treatment for the aneurysm," which the girlfriend reports was too risky and was aborted. Patient never attended his follow-up appointments due to fear per girlfriend.  (10 Mar 2022 22:52)    COURSE IN THE HOSPITAL:  03/10 admitted to [facility]   Tmax 38.3; s/p angio showing extremely tortuous and elongated aortic arch with dolichoectatic vertebrobasilar system, giant partially thrombosed vertebrobasilar aneurysm   Tmax 37.9 tachypneic overnight; apixaban restarted; NGT inserted, tube feeds started at night   Tmax 38.3, desaturation to 88% this morning - now requiring 5L/min, tube feeds held; started on HFNC, improved   Tmax 37.8.  No significant events overnight.     : [facility]  : POD11   : PEG placed. Stable neuroexam. On 3LNC  Past Medical History:   Allergies:  No Known Allergies  Home meds:   ·	amLODIPine 5 mg oral tablet: 1 tab(s) orally once a day  ·	captopril 100 mg oral tablet:   ·	carvedilol 6.25 mg oral tablet:   ·	Eliquis 5 mg oral tablet:   ·	hydroCHLOROthiazide 25 mg oral tablet:       ICU Vital Signs Last 24 Hrs  T(C): 37.6 (24 Mar 2022 04:31), Max: 38.1 (24 Mar 2022 01:47)  T(F): 99.7 (24 Mar 2022 04:31), Max: 100.6 (24 Mar 2022 01:47)  HR: 67 (24 Mar 2022 06:00) (62 - 91)  BP: 113/56 (24 Mar 2022 06:00) (100/55 - 174/80)  BP(mean): 80 (24 Mar 2022 06:00) (73 - 115)  RR: 14 (24 Mar 2022 06:00) (10 - 30)  SpO2: 96% (24 Mar 2022 06:00) (91% - 98%)      22 @ 07:01  -  22 @ 07:00  --------------------------------------------------------  IN: 760 mL / OUT: 1075 mL / NET: -315 mL    22 @ 07:01  -  22 @ 06:47  --------------------------------------------------------  IN: 2922 mL / OUT: 500 mL / NET: 2422 mL      PHYSICAL EXAMINATION  NEUROLOGIC EXAMINATION:  Patient is awake, alert, oriented x3 with choices, hypophonic, dysarthric, following commands, CARISSA, L facial droop, R UE/LE 5/5, L UE 1/5 handgrip, otherwise 0/5, LLE TF  GENERAL: Calm  EENT: Anicteric  CARDIOVASCULAR: (+) S1 S2, regular rate and regular rhythm  PULMONARY: Coarse breath sounds   ABDOMEN: Soft, nontender with normoactive bowel sounds  EXTREMITIES: No edema, no groin hematoma, pulses good  SKIN: No rash      MEDICATIONS:   acetaminophen    Suspension .. 650 milliGRAM(s) Oral every 6 hours PRN  acetylcysteine 20%  Inhalation 4 milliLiter(s) Inhalation every 4 hours  albuterol/ipratropium for Nebulization 3 milliLiter(s) Nebulizer every 4 hours  amLODIPine   Tablet 10 milliGRAM(s) Oral daily  bisacodyl Suppository 10 milliGRAM(s) Rectal daily  carvedilol 6.25 milliGRAM(s) Oral every 12 hours  chlorhexidine 2% Cloths 1 Application(s) Topical <User Schedule>  glucagon  Injectable 1 milliGRAM(s) IntraMuscular once  heparin  Infusion 1100 Unit(s)/Hr (11 mL/Hr) IV Continuous <Continuous>  hydrALAZINE Injectable 10 milliGRAM(s) IV Push every 4 hours PRN  lactulose Syrup 20 Gram(s) Oral daily  lisinopril 20 milliGRAM(s) Oral every 24 hours  polyethylene glycol 3350 17 Gram(s) Oral every 12 hours  senna 2 Tablet(s) Oral at bedtime  sodium chloride 0.9%. 1000 milliLiter(s) (70 mL/Hr) IV Continuous <Continuous>                          14.1   14.23 )-----------( 254      ( 24 Mar 2022 02:53 )             40.7         140  |  107  |  30<H>  ----------------------------<  111<H>  3.7   |  20<L>  |  0.75    Ca    8.3<L>      24 Mar 2022 02:53  Phos  2.4       Mg     1.8           HbA1C = 5.4 ()  LDL = 108 ()   HDL = 43 ()  TG = 58 ()  TSH = 3.050 ()    Bacteriology:   Blood CS NGTD  CSF studies:  EEG:  Neuroimagin/11 MRI no acute infarcts:  3cm giant aneurysm basilar artery, partially thrombosed, marked compression of brainstem, distortion of IV but patent  03/10 CTA:  dolichoectasia of basilar artery, partially thrombosed aneurysm; diffuse idiopathic skeletal hyperostosis  Other imaging:      IV FLUIDS: IVL  DRIPS:  DIET: NPO  Lines: Peripheral IVs       CODE STATUS:  Full Code                       GOALS OF CARE:  aggressive                      DISPOSITION:  ICU

## 2022-03-24 NOTE — PROGRESS NOTE ADULT - SUBJECTIVE AND OBJECTIVE BOX
HPI:  75y/o M with PMHx sig for HTN, HLD, afib (on eliquis, unknown if last taken today,) CVA in 2020 (with minimal residual right-sided weakness per girlfriend,) last known normal at 3pm when girlfriend spoke to him. 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 Upon arrival to [facility], BP noted to be 171/101. Stroke code was called. CTH without findings of hemorrhage. 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 NIHSS 20. No TPA was administered as Pt is out of window. Per patient's girlfriend, the basilar artery aneurysm is known and was diagnosed in 2020 at [facility] when he suffered from a CVA. At that time, he underwent a "treatment for the aneurysm," which the girlfriend reports was too risky and was aborted. Patient never attended his follow-up appointments due to fear per girlfriend.  (10 Mar 2022 22:52)      Hospital Course:   3/10: Admitted for further workup of stroke symptoms and basilar artery aneurysm.  3/11: Thurmond placed overnight. 3% hypertonic saline started. Neuro exam stable. Started on vEEG for aphasia  3/12: GM overnight. Neuro exam stable. 3% d/c. home eliquis 5 bid. failed s/s. started on TF via NGT, spiked fever 101, f/u panculture   3/13: GM overnight, neuro stable, veeg negative. Vanc/zosyn started for empiric PNA.   3/14: GM o/n neuro stable. on HFNC. ENT plan for laryngoscope today with . failed s/s, pend repeat eval. dc'd captopril, started lisinopril. dc'd vanc. Laryngoscopy: No masses visualized, hypomobility of L sided vocal cords, dysarthric, hypophonic, risk for aspiration, good cough reflex.Dr. Onofre to discuss with Dr. Peterson regarding a possible vocal cord injection.   3/15: GM overnight, neuro stable, on HFNC 30/30 overnight, transitioned to 4LNC  3/16: GM overnight, neuro stable, tolerating NC and satting well    3/17: POD6 dx angio. GM o/n neuro stable. trickle feeds via NGT  3/18: POD7 GM o/n, saturating well in RA. tolerating TF   03/19: POD8 GM o/n. gen surg consulted for PEG placement.  03/20: POD9 GM o/n. Transferred back to ICU for heparin gtt in preparation for PEG placement Wed.   3/21: POD10 GM o/n, neuro exam stable. Transferred to ICU in preparation for hep gtt to start tomorrow, plan for PEG on Wednesday. F/u am coags.   3/22: POD11. GM o/n neuro stable. on heparin gtt ptt goal 40-60. o/n ptt >200c, held for 2 hours. on heparin @11. pre-op for PEG   3/23: POD12. o/n new R facial droop, CTH stable. heparin gtt d/c. preop PEG today  3/24: POD13, POD1 PEG placement, GM overnight, neuro stable, +BM        Vital Signs Last 24 Hrs  T(C): 38.1 (24 Mar 2022 01:47), Max: 38.1 (24 Mar 2022 01:47)  T(F): 100.6 (24 Mar 2022 01:47), Max: 100.6 (24 Mar 2022 01:47)  HR: 82 (24 Mar 2022 03:00) (62 - 91)  BP: 136/63 (24 Mar 2022 03:00) (110/55 - 174/80)  BP(mean): 90 (24 Mar 2022 03:00) (77 - 115)  RR: 22 (24 Mar 2022 03:00) (10 - 30)  SpO2: 95% (24 Mar 2022 03:00) (91% - 98%)    I&O's Detail    22 Mar 2022 07:01  -  23 Mar 2022 07:00  --------------------------------------------------------  IN:    Enteral Tube Flush: 200 mL    Jevity 1.2: 560 mL  Total IN: 760 mL    OUT:    Incontinent per Condom Catheter (mL): 650 mL    Voided (mL): 425 mL  Total OUT: 1075 mL    Total NET: -315 mL      23 Mar 2022 07:01  -  24 Mar 2022 04:00  --------------------------------------------------------  IN:    Enteral Tube Flush: 120 mL    Heparin: 88 mL    IV PiggyBack: 250 mL    sodium chloride 0.9%: 1330 mL    sodium chloride 3%: 570 mL  Total IN: 2358 mL    OUT:    Incontinent per Condom Catheter (mL): 250 mL    Voided (mL): 250 mL  Total OUT: 500 mL    Total NET: 1858 mL        I&O's Summary    22 Mar 2022 07:01  -  23 Mar 2022 07:00  --------------------------------------------------------  IN: 760 mL / OUT: 1075 mL / NET: -315 mL    23 Mar 2022 07:01  -  24 Mar 2022 04:00  --------------------------------------------------------  IN: 2358 mL / OUT: 500 mL / NET: 1858 mL        PHYSICAL EXAM:  GEN: laying in bed, appears well, NAD  NEURO: AOx3. FC, OE spont, +hypophonia/dysarthria,+L facial. PERRL, EOMI. RUE/RLE 5/5. LUE 1/5 HG, 0/5 proximally. LLE w/d to noxious.  CV: RRR +S1/S2  PULM: CTAB  GI: Abd soft, NT/ND  EXT: ext warm, dry, nontender. L 5th toe ecchymotic, +/- dislocated      TUBES/LINES:  [] CVC  [] A-line  [] Lumbar Drain  [] Ventriculostomy  [] EMERY  [] Arredondo  [] NGT   [] Other    DIET:  [] NPO  [] Mechanical  [] Tube feeds    LABS:                        14.1   14.23 )-----------( 254      ( 24 Mar 2022 02:53 )             40.7     03-24    140  |  107  |  30<H>  ----------------------------<  111<H>  3.7   |  20<L>  |  0.75    Ca    8.3<L>      24 Mar 2022 02:53  Phos  2.4     03-24  Mg     1.8     03-24      PT/INR - ( 23 Mar 2022 05:09 )   PT: 16.9 sec;   INR: 1.42          PTT - ( 24 Mar 2022 02:53 )  PTT:41.5 sec        CAPILLARY BLOOD GLUCOSE          Drug Levels: [] N/A    CSF Analysis: [] N/A      Allergies    No Known Allergies    Intolerances        Home Medications:  amLODIPine 5 mg oral tablet: 1 tab(s) orally once a day (14 Mar 2022 11:26)  captopril 100 mg oral tablet: 1  orally 3 times a day (14 Mar 2022 11:26)  carvedilol 6.25 mg oral tablet: orally 2 times a day (14 Mar 2022 11:26)  Eliquis 5 mg oral tablet:  (14 Mar 2022 11:26)  hydroCHLOROthiazide 25 mg oral tablet:  (14 Mar 2022 11:26)  Lipitor 80 mg oral tablet: 1 tab(s) orally once a day (14 Mar 2022 11:26)      MEDICATIONS:  Antibiotics:    Neuro:  acetaminophen    Suspension .. 650 milliGRAM(s) Oral every 6 hours PRN    Anticoagulation:  heparin  Infusion 1100 Unit(s)/Hr IV Continuous <Continuous>    OTHER:  acetylcysteine 20%  Inhalation 4 milliLiter(s) Inhalation every 4 hours  albuterol/ipratropium for Nebulization 3 milliLiter(s) Nebulizer every 4 hours  amLODIPine   Tablet 10 milliGRAM(s) Oral daily  bisacodyl Suppository 10 milliGRAM(s) Rectal daily  carvedilol 6.25 milliGRAM(s) Oral every 12 hours  chlorhexidine 2% Cloths 1 Application(s) Topical <User Schedule>  glucagon  Injectable 1 milliGRAM(s) IntraMuscular once  hydrALAZINE Injectable 10 milliGRAM(s) IV Push every 4 hours PRN  lactulose Syrup 20 Gram(s) Oral daily  lisinopril 20 milliGRAM(s) Oral every 24 hours  polyethylene glycol 3350 17 Gram(s) Oral every 12 hours  senna 2 Tablet(s) Oral at bedtime    IVF:  sodium chloride 0.9%. 1000 milliLiter(s) IV Continuous <Continuous>  sodium chloride 3%. 500 milliLiter(s) IV Continuous <Continuous>    CULTURES:  Culture Results:   No growth at 5 days. (03-12 @ 18:54)    RADIOLOGY & ADDITIONAL TESTS:      ASSESSMENT:  75y/o M with PMHx sig for HTN, HLD, afib (on eliquis, unknown if last taken today,) CVA in 2020 (with minimal residual right-sided weakness per girlfriend) p/w dysarthria, left facial droop, and left-sided weakness. CTH without findings of hemorrhage. 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 NIHSS 20. Pt is not a tpa candidate as Pt is out of window. Patient now s/p diagnostic angio with findings of partially thrombosed basilar artery aneurysm 3/11/22. course c/b resp insufficiency weaned off HFNC.     PLAN:   Neuro:   - neuro checks q 4/vital signs q 2   - MR head 3/11: no acute infarcts   - stroke core measures   - EEG negative 3/13, dc'd   - no plan for further neurosurgical intervention   - CTH 3/22 stable     Cardio:  - SBP goal 100-160   - cont home norvasc, carvedilol (home HCTZ held), Lisinopril substituted for Captopril   - echo 3/11: mild LVH, EF 65-70%   - home dose Eliquis for Afib --> transition from Eliquis to heparin gtt for PEG planning     Pulm:  - satting well on RA  - standing mucomyst and duonebs  - Chest PT     ENT:   - ENT consulted, s/p laryngoscopy 3/14: No masses, hypomobility of L sided vocal cord  - f/u with Dr. Onofre/Dr. Peterson for possible vocal cord injection.     Renal:  - Na goal 135-145  - IVL   - 3% @ 30    GI:  - NPO except meds through PEG (placed 3/23)  - gen surg to assess patient at 2pm, if no hematoma, can resume heparin gtt at previous rate (NO BOLUS)   - can start TF 24 hrs after PEG (3/24 @10am)   - bowel regimen, BM 3/20, 3/23    Endo:  - A1C 5.4    Heme;  - SCDs for DVT ppx  - hold Eliquis 3/21, heparin gtt held for PEG  - LE dopplers 3/11: neg for DVT   - LUE US 3/22: superficial thrombus L basilic/cephalic vein    ID:  - [email or web address] empirically for PNA completed 3/17  - f/u pan cx 3/12; NGTD    Dispo:  - ICU status, full code   - Pending AR  - Family updated with plan  - OOB to chair     Assessment and plan discussed with Dr. Peterson      Assessment:  Present when checked    []  GCS  E   V  M     Heart Failure: []Acute, [] acute on chronic , []chronic  Heart Failure:  [] Diastolic (HFpEF), [] Systolic (HFrEF), []Combined (HFpEF and HFrEF), [] RHF, [] Pulm HTN, [] Other    [] LALO, [] ATN, [] AIN, [] other  [] CKD1, [] CKD2, [] CKD 3, [] CKD 4, [] CKD 5, []ESRD    Encephalopathy: [] Metabolic, [] Hepatic, [] toxic, [] Neurological, [] Other    Abnormal Nurtitional Status: [] malnurtition (see nutrition note), [ ]underweight: BMI < 19, [] morbid obesity: BMI >40, [] Cachexia    [] Sepsis  [] hypovolemic shock,[] cardiogenic shock, [] hemorrhagic shock, [] neuogenic shock  [] Acute Respiratory Failure  []Cerebral edema, [] Brain compression/ herniation,   [] Functional quadriplegia  [] Acute blood loss anemia

## 2022-03-24 NOTE — PROGRESS NOTE ADULT - ASSESSMENT
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  CARDIO:  SBP goal 100-160mm Hg, cont carvedilol, amlodipine, lisinopril   ENDO:  Blood sugar goals 140-180 mg/dL  GI:  Bowel regimen miralax, senna,  LBM: 3/24  DIET: S/P PEG 3/23. Resume TFs  RENAL:  3% at 30. Monitor BMPs- next at noon  HEM/ONC:  Hb stable  VTE Prophylaxis: SCDs, heparin gtt goal PTT 40-60 (on hold for PEG)  ID: no fever, mild leukocytosis   Social: Rafiq Forrester  is HCP - updated 3/23      Plan discussed with RN, house staff. 75 y/o M with:  Giant basilar artery aneurysm with brainstem compression, hemiplegia  Diffuse idiopathic skeletal hyperostosis  Hypertension dyslipidemia  History of CVA 2020, minimal R sided weakness  Atrial fibrillation with controlled ventricular rate    PLAN:   NEURO: Neurochecks Q4h, VS Q4, PRN pain meds with acetaminophen, no opiates / benzos  No plans for intervention for basilar artery aneurysm  REHAB: Physical therapy evaluation and management    EARLY MOB:  OOB to chair    PULM: PRN O2 support to keep sats >/=92%, incentive spirometry; nebs, ENT consulted, f/u vocal cord injection plans.  CARDIO: SBP goal 100-160mm Hg, cont carvedilol, amlodipine, lisinopril. Resume home lipitor.   ENDO: Blood sugar goals 140-180 mg/dL  GI:  Bowel regimen miralax, senna. DC lactulose.   LBM: 3/24  DIET: S/P PEG 3/23. Resume TFs 3/24 am  RENAL: NS at 70. IVL once at goal feeds. Na goal 135-145  HEM/ONC:  Hb stable  VTE Prophylaxis: SCDs, heparin gtt goal PTT 40-60. Resume eliquis on 3/24 per gen surgery  Lower extremity dopplers 3/23 negative for DVT. UE dopplers 3/21: superficial thrombophlebitis- keep warm and elevated  ID: No fever, mild leukocytosis -> down trending  Social: Rafiq Forrester  is HCP - updated 3/23    Patient     Plan discussed with RN, house staff. 77 y/o M with:  Giant basilar artery aneurysm with brainstem compression, hemiplegia  Diffuse idiopathic skeletal hyperostosis  Hypertension dyslipidemia  History of CVA 2020, minimal R sided weakness  Atrial fibrillation with controlled ventricular rate    PLAN:   NEURO: Neurochecks Q4h, VS Q4, PRN pain meds with acetaminophen, no opiates / benzos  No plans for intervention for basilar artery aneurysm  REHAB: Physical therapy evaluation and management    EARLY MOB:  OOB to chair    PULM: PRN O2 support to keep sats >/=92%, incentive spirometry; nebs, ENT consulted, f/u vocal cord injection plans.  CARDIO: SBP goal 100-160mm Hg, cont carvedilol, amlodipine, lisinopril. Resume home lipitor.   ENDO: Blood sugar goals 140-180 mg/dL  GI:  Bowel regimen miralax, senna. DC lactulose.   LBM: 3/24  DIET: S/P PEG 3/23. Resume TFs 3/24 am  RENAL: NS at 70. IVL once at goal feeds. Na goal 135-145  HEM/ONC:  Hb stable  VTE Prophylaxis: SCDs, S/P heparin gtt goal PTT 40-60. Resume eliquis on 3/24 per gen surgery  Lower extremity dopplers 3/23 negative for DVT. UE dopplers 3/21: superficial thrombophlebitis- keep warm compresses and elevate  ID: No fever, mild leukocytosis -> downtrending  Social: Rafiq Forrester  is HCP - updated 3/23    ICU stepdown Checklist:    Completed: 03-24 @ 10:29    [X] hemodynamically stable – VS WNL and stable x 24hours, UO adequate  [n/a ] if  previously on HDA - off pressors x 24h with stable neuro exam    [X] no new symptoms x 24h (i.e. new fever, new-onset nausea/vomiting)  [X] stable labs: (i.e. WBC not rising, sodium not dropping)  [X] patient not at high risk for aspiration, if high risk then:                  [ ] should have definitive plans for trach/PEG (alternative option is to discharge from ICU to facilty)                  [ ] stepdown to bed close to nurse’s station  [n/a] low suctioning requirements (i.e. q4h or less)  [X] sign-off from primary RN*  [X] drains do not require ICU level of care  [X] if patient previously agitated or with behavioral issues – controlled   [X] pain controlled      Plan discussed with RN, house staff.

## 2022-03-25 LAB
ANION GAP SERPL CALC-SCNC: 8 MMOL/L — SIGNIFICANT CHANGE UP (ref 5–17)
APPEARANCE UR: CLEAR — SIGNIFICANT CHANGE UP
BACTERIA # UR AUTO: PRESENT /HPF
BILIRUB UR-MCNC: NEGATIVE — SIGNIFICANT CHANGE UP
BUN SERPL-MCNC: 36 MG/DL — HIGH (ref 7–23)
CALCIUM SERPL-MCNC: 9 MG/DL — SIGNIFICANT CHANGE UP (ref 8.4–10.5)
CHLORIDE SERPL-SCNC: 109 MMOL/L — HIGH (ref 96–108)
CO2 SERPL-SCNC: 24 MMOL/L — SIGNIFICANT CHANGE UP (ref 22–31)
COLOR SPEC: YELLOW — SIGNIFICANT CHANGE UP
COMMENT - URINE: SIGNIFICANT CHANGE UP
CREAT SERPL-MCNC: 0.74 MG/DL — SIGNIFICANT CHANGE UP (ref 0.5–1.3)
DIFF PNL FLD: ABNORMAL
EGFR: 93 ML/MIN/1.73M2 — SIGNIFICANT CHANGE UP
EPI CELLS # UR: SIGNIFICANT CHANGE UP /HPF (ref 0–5)
GLUCOSE SERPL-MCNC: 134 MG/DL — HIGH (ref 70–99)
GLUCOSE UR QL: NEGATIVE — SIGNIFICANT CHANGE UP
HCT VFR BLD CALC: 41.7 % — SIGNIFICANT CHANGE UP (ref 39–50)
HGB BLD-MCNC: 13.8 G/DL — SIGNIFICANT CHANGE UP (ref 13–17)
KETONES UR-MCNC: NEGATIVE — SIGNIFICANT CHANGE UP
LEUKOCYTE ESTERASE UR-ACNC: ABNORMAL
MAGNESIUM SERPL-MCNC: 2.2 MG/DL — SIGNIFICANT CHANGE UP (ref 1.6–2.6)
MCHC RBC-ENTMCNC: 31.2 PG — SIGNIFICANT CHANGE UP (ref 27–34)
MCHC RBC-ENTMCNC: 33.1 GM/DL — SIGNIFICANT CHANGE UP (ref 32–36)
MCV RBC AUTO: 94.1 FL — SIGNIFICANT CHANGE UP (ref 80–100)
NITRITE UR-MCNC: POSITIVE
NRBC # BLD: 0 /100 WBCS — SIGNIFICANT CHANGE UP (ref 0–0)
PH UR: 5.5 — SIGNIFICANT CHANGE UP (ref 5–8)
PHOSPHATE SERPL-MCNC: 2.6 MG/DL — SIGNIFICANT CHANGE UP (ref 2.5–4.5)
PLATELET # BLD AUTO: 274 K/UL — SIGNIFICANT CHANGE UP (ref 150–400)
POTASSIUM SERPL-MCNC: 4.1 MMOL/L — SIGNIFICANT CHANGE UP (ref 3.5–5.3)
POTASSIUM SERPL-SCNC: 4.1 MMOL/L — SIGNIFICANT CHANGE UP (ref 3.5–5.3)
PROT UR-MCNC: NEGATIVE MG/DL — SIGNIFICANT CHANGE UP
RBC # BLD: 4.43 M/UL — SIGNIFICANT CHANGE UP (ref 4.2–5.8)
RBC # FLD: 12.7 % — SIGNIFICANT CHANGE UP (ref 10.3–14.5)
RBC CASTS # UR COMP ASSIST: < 5 /HPF — SIGNIFICANT CHANGE UP
SODIUM SERPL-SCNC: 141 MMOL/L — SIGNIFICANT CHANGE UP (ref 135–145)
SP GR SPEC: 1.02 — SIGNIFICANT CHANGE UP (ref 1–1.03)
UROBILINOGEN FLD QL: 1 E.U./DL — SIGNIFICANT CHANGE UP
WBC # BLD: 12.71 K/UL — HIGH (ref 3.8–10.5)
WBC # FLD AUTO: 12.71 K/UL — HIGH (ref 3.8–10.5)
WBC UR QL: ABNORMAL /HPF

## 2022-03-25 PROCEDURE — 99024 POSTOP FOLLOW-UP VISIT: CPT

## 2022-03-25 PROCEDURE — 99233 SBSQ HOSP IP/OBS HIGH 50: CPT

## 2022-03-25 PROCEDURE — 36415 COLL VENOUS BLD VENIPUNCTURE: CPT

## 2022-03-25 PROCEDURE — 71045 X-RAY EXAM CHEST 1 VIEW: CPT | Mod: 26

## 2022-03-25 RX ORDER — CEFTRIAXONE 500 MG/1
1000 INJECTION, POWDER, FOR SOLUTION INTRAMUSCULAR; INTRAVENOUS EVERY 24 HOURS
Refills: 0 | Status: COMPLETED | OUTPATIENT
Start: 2022-03-25 | End: 2022-03-27

## 2022-03-25 RX ADMIN — AMLODIPINE BESYLATE 10 MILLIGRAM(S): 2.5 TABLET ORAL at 06:25

## 2022-03-25 RX ADMIN — Medication 4 MILLILITER(S): at 16:22

## 2022-03-25 RX ADMIN — Medication 4 MILLILITER(S): at 00:41

## 2022-03-25 RX ADMIN — Medication 4 MILLILITER(S): at 14:20

## 2022-03-25 RX ADMIN — Medication 4 MILLILITER(S): at 06:01

## 2022-03-25 RX ADMIN — POLYETHYLENE GLYCOL 3350 17 GRAM(S): 17 POWDER, FOR SOLUTION ORAL at 16:23

## 2022-03-25 RX ADMIN — Medication 3 MILLILITER(S): at 02:37

## 2022-03-25 RX ADMIN — Medication 3 MILLILITER(S): at 14:20

## 2022-03-25 RX ADMIN — Medication 3 MILLILITER(S): at 11:12

## 2022-03-25 RX ADMIN — Medication 4 MILLILITER(S): at 21:31

## 2022-03-25 RX ADMIN — ATORVASTATIN CALCIUM 80 MILLIGRAM(S): 80 TABLET, FILM COATED ORAL at 21:32

## 2022-03-25 RX ADMIN — Medication 3 MILLILITER(S): at 21:31

## 2022-03-25 RX ADMIN — Medication 3 MILLILITER(S): at 16:23

## 2022-03-25 RX ADMIN — LISINOPRIL 20 MILLIGRAM(S): 2.5 TABLET ORAL at 16:24

## 2022-03-25 RX ADMIN — POLYETHYLENE GLYCOL 3350 17 GRAM(S): 17 POWDER, FOR SOLUTION ORAL at 06:00

## 2022-03-25 RX ADMIN — CARVEDILOL PHOSPHATE 6.25 MILLIGRAM(S): 80 CAPSULE, EXTENDED RELEASE ORAL at 06:26

## 2022-03-25 RX ADMIN — APIXABAN 5 MILLIGRAM(S): 2.5 TABLET, FILM COATED ORAL at 06:01

## 2022-03-25 RX ADMIN — CEFTRIAXONE 100 MILLIGRAM(S): 500 INJECTION, POWDER, FOR SOLUTION INTRAMUSCULAR; INTRAVENOUS at 19:16

## 2022-03-25 RX ADMIN — CARVEDILOL PHOSPHATE 6.25 MILLIGRAM(S): 80 CAPSULE, EXTENDED RELEASE ORAL at 16:22

## 2022-03-25 RX ADMIN — CHLORHEXIDINE GLUCONATE 1 APPLICATION(S): 213 SOLUTION TOPICAL at 06:10

## 2022-03-25 RX ADMIN — Medication 650 MILLIGRAM(S): at 17:57

## 2022-03-25 RX ADMIN — SENNA PLUS 2 TABLET(S): 8.6 TABLET ORAL at 21:30

## 2022-03-25 RX ADMIN — Medication 3 MILLILITER(S): at 06:00

## 2022-03-25 RX ADMIN — Medication 4 MILLILITER(S): at 02:42

## 2022-03-25 RX ADMIN — APIXABAN 5 MILLIGRAM(S): 2.5 TABLET, FILM COATED ORAL at 16:23

## 2022-03-25 RX ADMIN — Medication 4 MILLILITER(S): at 11:12

## 2022-03-25 NOTE — PROCEDURE NOTE - ADDITIONAL PROCEDURE DETAILS
blood cultures x2 collected in sterile fashion via venipuncture
Procedure performed with no complications, post procedure radiograph ordered to confirm placement
Blood cultures obtained for fever w/u, Only able to attain 1 set of cultures from the right foot, uncomplicated, site prepped w/ chlorhexidine, gauze dressing applied
Procedure performed with no complications, post procedure radiograph ordered to confirm placement.

## 2022-03-25 NOTE — PROVIDER CONTACT NOTE (OTHER) - ASSESSMENT
pt desat without oxygen, placed on 3 l nc, dao land aware , oral care and suctioned pt , pt monitored closely, denies difficulty breathing. pts wife at bedside.

## 2022-03-25 NOTE — PROGRESS NOTE ADULT - SUBJECTIVE AND OBJECTIVE BOX
HPI:  75y/o M with PMHx sig for HTN, HLD, afib (on eliquis, unknown if last taken today,) CVA in 2020 (with minimal residual right-sided weakness per girlfriend,) last known normal at 3pm when girlfriend spoke to him. 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 Upon arrival to [facility], BP noted to be 171/101. Stroke code was called. CTH without findings of hemorrhage. 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 NIHSS 20. No TPA was administered as Pt is out of window. Per patient's girlfriend, the basilar artery aneurysm is known and was diagnosed in 2020 at [facility] when he suffered from a CVA. At that time, he underwent a "treatment for the aneurysm," which the girlfriend reports was too risky and was aborted. Patient never attended his follow-up appointments due to fear per girlfriend.  (10 Mar 2022 22:52)      Subjective:  Patient denies any complaints.     Hospital course:  3/10: Admitted for further workup of stroke symptoms and basilar artery aneurysm.  3/11: [place] placed overnight. 3% hypertonic saline started. Neuro exam stable. Started on vEEG for aphasia  3/12: GM overnight. Neuro exam stable. 3% d/c. home eliquis 5 bid. failed s/s. started on TF via NGT, spiked fever 101, f/u panculture   3/13: GM overnight, neuro stable, veeg negative. Vanc/zosyn started for empiric PNA.   3/14: GM o/n neuro stable. on HFNC. ENT plan for laryngoscope today with . failed s/s, pend repeat eval. dc'd captopril, started lisinopril. dc'd vanc. Laryngoscopy: No masses visualized, hypomobility of L sided vocal cords, dysarthric, hypophonic, risk for aspiration, good cough reflex.Dr. Onofre to discuss with Dr. Peterson regarding a possible vocal cord injection.   3/15: GM overnight, neuro stable, on HFNC 30/30 overnight, transitioned to 4LNC  3/16: GM overnight, neuro stable, tolerating NC and satting well    3/17: POD6 dx angio. GM o/n neuro stable. trickle feeds via NGT  3/18: POD7 GM o/n, saturating well in RA. tolerating TF   03/19: POD8 GM o/n. gen surg consulted for PEG placement.  03/20: POD9 GM o/n. Transferred back to ICU for heparin gtt in preparation for PEG placement Wed.   3/21: POD10 GM o/n, neuro exam stable. Transferred to ICU in preparation for hep gtt to start tomorrow, plan for PEG on Wednesday. F/u am coags.   3/22: POD11. GM o/n neuro stable. on heparin gtt ptt goal 40-60. o/n ptt >200c, held for 2 hours. on heparin @11. pre-op for PEG   3/23: POD12. o/n new R facial droop, CTH stable. heparin gtt d/c. preop PEG today  3/24: POD13, POD1 PEG placement, GM overnight, neuro stable, +BM  2/25: POD14, GM o/n, neuro stable, eliquis restarted    Vital Signs Last 24 Hrs  T(C): 37.4 (24 Mar 2022 21:47), Max: 38.1 (24 Mar 2022 01:47)  T(F): 99.3 (24 Mar 2022 21:47), Max: 100.6 (24 Mar 2022 01:47)  HR: 66 (24 Mar 2022 23:45) (62 - 91)  BP: 130/62 (24 Mar 2022 23:45) (100/55 - 150/69)  BP(mean): 89 (24 Mar 2022 23:45) (73 - 99)  RR: 18 (24 Mar 2022 23:45) (12 - 28)  SpO2: 94% (24 Mar 2022 23:45) (93% - 98%)    I&O's Summary    23 Mar 2022 07:01  -  24 Mar 2022 07:00  --------------------------------------------------------  IN: 2922 mL / OUT: 500 mL / NET: 2422 mL    24 Mar 2022 07:01  -  25 Mar 2022 00:19  --------------------------------------------------------  IN: 230 mL / OUT: 700 mL / NET: -470 mL        PHYSICAL EXAM:  General: patient seen laying supine in bed in NAD  Neuro: AAOx3, FC, OEs to voice, expressive aphasia, L facial droop, hypophonic and dysarthric speech. RUE 5/5 throughout, RLE HF/KF/KE 2/5, DF/PF 3/5, LUE 1/5 hand  0/5 shoulder/biceps/triceps, LLE w/d to noxious.  sensation intact to light touch throughout  HEENT: PERRL, EOMI  Neck: supple  Cardiac: RRR, S1S2  Pulmonary: chest rise symmetric  Abdomen: soft, nontender, nondistended, +PEG  Ext: perfusing well  Skin: warm, dry          TUBES/LINES:  [] Arredondo  [] Lumbar Drain  [] Wound Drains  [x] Others - PEG      DIET:  [] NPO  [] Mechanical  [x] Tube feeds    LABS:                        14.1   14.23 )-----------( 254      ( 24 Mar 2022 02:53 )             40.7     03-24    143  |  110<H>  |  32<H>  ----------------------------<  109<H>  4.7   |  23  |  0.86    Ca    8.8      24 Mar 2022 16:25  Phos  2.4     03-24  Mg     2.2     03-24    TPro  5.6<L>  /  Alb  2.6<L>  /  TBili  1.2  /  DBili  x   /  AST  13  /  ALT  14  /  AlkPhos  62  03-24    PT/INR - ( 23 Mar 2022 05:09 )   PT: 16.9 sec;   INR: 1.42          PTT - ( 24 Mar 2022 10:41 )  PTT:45.2 sec        CAPILLARY BLOOD GLUCOSE          Drug Levels: [] N/A    CSF Analysis: [] N/A      Allergies    No Known Allergies    Intolerances      MEDICATIONS:  Antibiotics:    Neuro:  acetaminophen    Suspension .. 650 milliGRAM(s) Oral every 6 hours PRN    Anticoagulation:  apixaban 5 milliGRAM(s) Oral every 12 hours    OTHER:  acetylcysteine 20%  Inhalation 4 milliLiter(s) Inhalation every 4 hours  albuterol/ipratropium for Nebulization 3 milliLiter(s) Nebulizer every 4 hours  amLODIPine   Tablet 10 milliGRAM(s) Oral daily  atorvastatin 80 milliGRAM(s) Oral at bedtime  carvedilol 6.25 milliGRAM(s) Oral every 12 hours  chlorhexidine 2% Cloths 1 Application(s) Topical <User Schedule>  glucagon  Injectable 1 milliGRAM(s) IntraMuscular once  hydrALAZINE Injectable 10 milliGRAM(s) IV Push every 4 hours PRN  lisinopril 20 milliGRAM(s) Oral every 24 hours  polyethylene glycol 3350 17 Gram(s) Oral every 12 hours  senna 2 Tablet(s) Oral at bedtime    IVF:    CULTURES:  Culture Results:   No growth at 5 days. (03-12 @ 18:54)    RADIOLOGY & ADDITIONAL TESTS:    BASILAR ARTERY ANEURYSM    Handoff    MEWS Score    Brain aneurysm    Brain aneurysm    Basilar artery aneurysm    Basilar artery aneurysm    Brain stem compression    Longstanding persistent atrial fibrillation    HTN (hypertension)    Hyperlipidemia    Acidosis    Leukocytosis    Dysphagia    Hypocalcemia    Hypophosphatemia    Acute respiratory failure with hypoxia    Angiogram, carotid and cerebral, bilateral    EGD, with PEG    STROKE    [email or web address]        ASSESSMENT:  75y/o M with PMHx sig for HTN, HLD, afib (on eliquis, unknown if last taken today,) CVA in 2020 (with minimal residual right-sided weakness per girlfriend) p/w dysarthria, left facial droop, and left-sided weakness. CTH without findings of hemorrhage. 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 NIHSS 20. Pt is not a tpa candidate as Pt is out of window. Patient now s/p diagnostic angio with findings of partially thrombosed basilar artery aneurysm 3/11/22. course c/b resp insufficiency weaned off HFNC.     PLAN:   Neuro:   - neuro checks q 4/vital signs q 4  - MR head 3/11: no acute infarcts   - stroke core measures   - EEG negative 3/13, dc'd   - no plan for further neurosurgical intervention   - CTH 3/22 stable     Cardio:  - SBP goal 100-160   - cont home norvasc, carvedilol (home HCTZ held), Lisinopril substituted for Captopril   - echo 3/11: mild LVH, EF 65-70%   - restrat Eliquis tomorrow 3/25     Pulm:  - satting well on RA  - standing mucomyst and duonebs  - Chest PT   - aspiration precaution, keep hob up     ENT:   - ENT consulted, s/p laryngoscopy 3/14: No masses, hypomobility of L sided vocal cord  - f/u outpatient with Dr. Onofre/Dr. Peterson for possible vocal cord injection.     Renal:  - Na goal 135-145  - no issues    GI:  - TF via PEG   - bowel regimen, BM 3/23    Endo:  - A1C 5.4    Heme;  - SCDs   - Eliquis 5mg q12  - LE dopplers 3/11, 3/23: neg for DVT   - LUE US 3/22: superficial thrombus L basilic/cephalic vein    ID:  - zosyn empirically for PNA completed 3/17  - f/u pan cx 3/12; NGTD  - trend wbc     ORTHO:  - L foot xray 3/22: chronic 5th digit middle phalanx base corner fracture    Dispo:  - SDU status, full code   - Pending AR  - Family updated with plan  - OOB to chair     Assessment and plan discussed with Dr. Peterson      Assessment:  Present when checked    []  GCS  E   V  M     Heart Failure: []Acute, [] acute on chronic , []chronic  Heart Failure:  [] Diastolic (HFpEF), [] Systolic (HFrEF), []Combined (HFpEF and HFrEF), [] RHF, [] Pulm HTN, [] Other    [] LALO, [] ATN, [] AIN, [] other  [] CKD1, [] CKD2, [] CKD 3, [] CKD 4, [] CKD 5, []ESRD    Encephalopathy: [] Metabolic, [] Hepatic, [] toxic, [] Neurological, [] Other    Abnormal Nurtitional Status: [] malnurtition (see nutrition note), [ ]underweight: BMI < 19, [] morbid obesity: BMI >40, [] Cachexia    [] Sepsis  [] hypovolemic shock,[] cardiogenic shock, [] hemorrhagic shock, [] neuogenic shock  [] Acute Respiratory Failure  []Cerebral edema, [] Brain compression/ herniation,   [] Functional quadriplegia  [] Acute blood loss anemia   HPI:  77y/o M with PMHx sig for HTN, HLD, afib (on eliquis, unknown if last taken today,) CVA in 2020 (with minimal residual right-sided weakness per girlfriend,) last known normal at 3pm when girlfriend spoke to him. 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 Upon arrival to [facility], BP noted to be 171/101. Stroke code was called. CTH without findings of hemorrhage. 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 NIHSS 20. No TPA was administered as Pt is out of window. Per patient's girlfriend, the basilar artery aneurysm is known and was diagnosed in 2020 at [facility] when he suffered from a CVA. At that time, he underwent a "treatment for the aneurysm," which the girlfriend reports was too risky and was aborted. Patient never attended his follow-up appointments due to fear per girlfriend.  (10 Mar 2022 22:52)      Subjective:  Patient denies any complaints.     Hospital course:  3/10: Admitted for further workup of stroke symptoms and basilar artery aneurysm.  3/11: [place] placed overnight. 3% hypertonic saline started. Neuro exam stable. Started on vEEG for aphasia  3/12: GM overnight. Neuro exam stable. 3% d/c. home eliquis 5 bid. failed s/s. started on TF via NGT, spiked fever 101, f/u panculture   3/13: GM overnight, neuro stable, veeg negative. Vanc/zosyn started for empiric PNA.   3/14: GM o/n neuro stable. on HFNC. ENT plan for laryngoscope today with . failed s/s, pend repeat eval. dc'd captopril, started lisinopril. dc'd vanc. Laryngoscopy: No masses visualized, hypomobility of L sided vocal cords, dysarthric, hypophonic, risk for aspiration, good cough reflex.Dr. Onofre to discuss with Dr. Peterson regarding a possible vocal cord injection.   3/15: GM overnight, neuro stable, on HFNC 30/30 overnight, transitioned to 4LNC  3/16: GM overnight, neuro stable, tolerating NC and satting well    3/17: POD6 dx angio. GM o/n neuro stable. trickle feeds via NGT  3/18: POD7 GM o/n, saturating well in RA. tolerating TF   03/19: POD8 GM o/n. gen surg consulted for PEG placement.  03/20: POD9 GM o/n. Transferred back to ICU for heparin gtt in preparation for PEG placement Wed.   3/21: POD10 GM o/n, neuro exam stable. Transferred to ICU in preparation for hep gtt to start tomorrow, plan for PEG on Wednesday. F/u am coags.   3/22: POD11. GM o/n neuro stable. on heparin gtt ptt goal 40-60. o/n ptt >200c, held for 2 hours. on heparin @11. pre-op for PEG   3/23: POD12. o/n new R facial droop, CTH stable. heparin gtt d/c. preop PEG today  3/24: POD13, POD1 PEG placement, GM overnight, neuro stable, +BM  2/25: POD14, GM o/n, neuro stable, eliquis restarted    Vital Signs Last 24 Hrs  T(C): 37.4 (24 Mar 2022 21:47), Max: 38.1 (24 Mar 2022 01:47)  T(F): 99.3 (24 Mar 2022 21:47), Max: 100.6 (24 Mar 2022 01:47)  HR: 66 (24 Mar 2022 23:45) (62 - 91)  BP: 130/62 (24 Mar 2022 23:45) (100/55 - 150/69)  BP(mean): 89 (24 Mar 2022 23:45) (73 - 99)  RR: 18 (24 Mar 2022 23:45) (12 - 28)  SpO2: 94% (24 Mar 2022 23:45) (93% - 98%)    I&O's Summary    23 Mar 2022 07:01  -  24 Mar 2022 07:00  --------------------------------------------------------  IN: 2922 mL / OUT: 500 mL / NET: 2422 mL    24 Mar 2022 07:01  -  25 Mar 2022 00:19  --------------------------------------------------------  IN: 230 mL / OUT: 700 mL / NET: -470 mL        PHYSICAL EXAM:  General: patient seen laying supine in bed in NAD  Neuro: AAOx3, FC, OEs to voice, expressive aphasia, L facial droop, hypophonic and dysarthric speech. RUE 5/5 throughout, RLE HF/KF/KE 2/5, DF/PF 3/5, LUE 1/5 hand  0/5 shoulder/biceps/triceps, LLE w/d to noxious.  sensation intact to light touch throughout  HEENT: PERRL, EOMI  Neck: supple  Cardiac: RRR, S1S2  Pulmonary: chest rise symmetric  Abdomen: soft, nontender, nondistended, +PEG  Ext: perfusing well, L 5th toe ecchymosis  Skin: warm, dry          TUBES/LINES:  [] Raredondo  [] Lumbar Drain  [] Wound Drains  [x] Others - PEG      DIET:  [] NPO  [] Mechanical  [x] Tube feeds    LABS:                        14.1   14.23 )-----------( 254      ( 24 Mar 2022 02:53 )             40.7     03-24    143  |  110<H>  |  32<H>  ----------------------------<  109<H>  4.7   |  23  |  0.86    Ca    8.8      24 Mar 2022 16:25  Phos  2.4     03-24  Mg     2.2     03-24    TPro  5.6<L>  /  Alb  2.6<L>  /  TBili  1.2  /  DBili  x   /  AST  13  /  ALT  14  /  AlkPhos  62  03-24    PT/INR - ( 23 Mar 2022 05:09 )   PT: 16.9 sec;   INR: 1.42          PTT - ( 24 Mar 2022 10:41 )  PTT:45.2 sec        CAPILLARY BLOOD GLUCOSE          Drug Levels: [] N/A    CSF Analysis: [] N/A      Allergies    No Known Allergies    Intolerances      MEDICATIONS:  Antibiotics:    Neuro:  acetaminophen    Suspension .. 650 milliGRAM(s) Oral every 6 hours PRN    Anticoagulation:  apixaban 5 milliGRAM(s) Oral every 12 hours    OTHER:  acetylcysteine 20%  Inhalation 4 milliLiter(s) Inhalation every 4 hours  albuterol/ipratropium for Nebulization 3 milliLiter(s) Nebulizer every 4 hours  amLODIPine   Tablet 10 milliGRAM(s) Oral daily  atorvastatin 80 milliGRAM(s) Oral at bedtime  carvedilol 6.25 milliGRAM(s) Oral every 12 hours  chlorhexidine 2% Cloths 1 Application(s) Topical <User Schedule>  glucagon  Injectable 1 milliGRAM(s) IntraMuscular once  hydrALAZINE Injectable 10 milliGRAM(s) IV Push every 4 hours PRN  lisinopril 20 milliGRAM(s) Oral every 24 hours  polyethylene glycol 3350 17 Gram(s) Oral every 12 hours  senna 2 Tablet(s) Oral at bedtime    IVF:    CULTURES:  Culture Results:   No growth at 5 days. (03-12 @ 18:54)    RADIOLOGY & ADDITIONAL TESTS:    BASILAR ARTERY ANEURYSM    Handoff    MEWS Score    Brain aneurysm    Brain aneurysm    Basilar artery aneurysm    Basilar artery aneurysm    Brain stem compression    Longstanding persistent atrial fibrillation    HTN (hypertension)    Hyperlipidemia    Acidosis    Leukocytosis    Dysphagia    Hypocalcemia    Hypophosphatemia    Acute respiratory failure with hypoxia    Angiogram, carotid and cerebral, bilateral    EGD, with PEG    STROKE    [email or web address]        ASSESSMENT:  77y/o M with PMHx sig for HTN, HLD, afib (on eliquis, unknown if last taken today,) CVA in 2020 (with minimal residual right-sided weakness per girlfriend) p/w dysarthria, left facial droop, and left-sided weakness. CTH without findings of hemorrhage. 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 NIHSS 20. Pt is not a tpa candidate as Pt is out of window. Patient now s/p diagnostic angio with findings of partially thrombosed basilar artery aneurysm 3/11/22. course c/b resp insufficiency weaned off HFNC.     PLAN:   Neuro:   - neuro checks q 4/vital signs q 4  - MR head 3/11: no acute infarcts   - stroke core measures   - EEG negative 3/13, dc'd   - no plan for further neurosurgical intervention   - CTH 3/22 stable     Cardio:  - SBP goal 100-160   - cont home norvasc, carvedilol (home HCTZ held), Lisinopril substituted for Captopril   - echo 3/11: mild LVH, EF 65-70%   - restrat Eliquis tomorrow 3/25     Pulm:  - satting well on RA  - standing mucomyst and duonebs  - Chest PT   - aspiration precaution, keep hob up     ENT:   - ENT consulted, s/p laryngoscopy 3/14: No masses, hypomobility of L sided vocal cord  - f/u outpatient with Dr. Onofre/Dr. Peterson for possible vocal cord injection.     Renal:  - Na goal 135-145  - no issues    GI:  - TF via PEG   - bowel regimen, BM 3/23    Endo:  - A1C 5.4    Heme;  - SCDs   - Eliquis 5mg q12  - LE dopplers 3/11, 3/23: neg for DVT   - LUE US 3/22: superficial thrombus L basilic/cephalic vein    ID:  - zosyn empirically for PNA completed 3/17  - f/u pan cx 3/12; NGTD  - trend wbc     ORTHO:  - L foot xray 3/22: chronic 5th digit middle phalanx base corner fracture    Dispo:  - SDU status, full code   - Pending AR  - Family updated with plan  - OOB to chair     Assessment and plan discussed with Dr. Peterson      Assessment:  Present when checked    []  GCS  E   V  M     Heart Failure: []Acute, [] acute on chronic , []chronic  Heart Failure:  [] Diastolic (HFpEF), [] Systolic (HFrEF), []Combined (HFpEF and HFrEF), [] RHF, [] Pulm HTN, [] Other    [] LALO, [] ATN, [] AIN, [] other  [] CKD1, [] CKD2, [] CKD 3, [] CKD 4, [] CKD 5, []ESRD    Encephalopathy: [] Metabolic, [] Hepatic, [] toxic, [] Neurological, [] Other    Abnormal Nurtitional Status: [] malnurtition (see nutrition note), [ ]underweight: BMI < 19, [] morbid obesity: BMI >40, [] Cachexia    [] Sepsis  [] hypovolemic shock,[] cardiogenic shock, [] hemorrhagic shock, [] neuogenic shock  [] Acute Respiratory Failure  []Cerebral edema, [] Brain compression/ herniation,   [] Functional quadriplegia  [] Acute blood loss anemia

## 2022-03-25 NOTE — PROGRESS NOTE ADULT - ASSESSMENT
76M PMH AF on Eliquis, HTN, HLD, CVA 2020 with minimal residual right sided weakness, and large basilar artery aneurysm with brain stem compression. He was admitted for new left sided weakness, hypophonia and inability to swallow related to brain stem compression by the aneurysm. We are consulted for PEG placement. Now s/p PEG placement 3/23.     - s/p PEG placement   - ok for AC  - tube feeds  - had normal BM yesterday  - surgery team 4c signing off  - reconsult if needed  - discussed with Dr. Knowles

## 2022-03-25 NOTE — PROGRESS NOTE ADULT - ASSESSMENT
75y/o M with PMHx sig for HTN, HLD, afib (on eliquis, unknown if last taken today,) CVA in 2020 (with minimal residual right-sided weakness per girlfriend) p/w dysarthria, left facial droop, and left-sided weakness. CTH without findings of hemorrhage. 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 NIHSS 20. Pt is not a tpa candidate as Pt is out of window. Patient now s/p diagnostic angio with findings of partially thrombosed basilar artery aneurysm 3/11/22. course c/b resp insufficiency weaned off HFNC.

## 2022-03-25 NOTE — PROGRESS NOTE ADULT - SUBJECTIVE AND OBJECTIVE BOX
Subjective: shakes head denying abdominal pain this AM    MEDICATIONS  (STANDING):  acetylcysteine 20%  Inhalation 4 milliLiter(s) Inhalation every 4 hours  albuterol/ipratropium for Nebulization 3 milliLiter(s) Nebulizer every 4 hours  amLODIPine   Tablet 10 milliGRAM(s) Oral daily  apixaban 5 milliGRAM(s) Oral every 12 hours  atorvastatin 80 milliGRAM(s) Oral at bedtime  carvedilol 6.25 milliGRAM(s) Oral every 12 hours  chlorhexidine 2% Cloths 1 Application(s) Topical <User Schedule>  glucagon  Injectable 1 milliGRAM(s) IntraMuscular once  lisinopril 20 milliGRAM(s) Oral every 24 hours  polyethylene glycol 3350 17 Gram(s) Oral every 12 hours  senna 2 Tablet(s) Oral at bedtime    MEDICATIONS  (PRN):  acetaminophen    Suspension .. 650 milliGRAM(s) Oral every 6 hours PRN Temp greater or equal to 38C (100.4F), Mild Pain (1 - 3)  hydrALAZINE Injectable 10 milliGRAM(s) IV Push every 4 hours PRN SBP>160      Vital Signs Last 24 Hrs  T(C): 37.3 (25 Mar 2022 04:30), Max: 37.4 (24 Mar 2022 21:47)  T(F): 99.2 (25 Mar 2022 04:30), Max: 99.3 (24 Mar 2022 21:47)  HR: 72 (25 Mar 2022 03:55) (62 - 72)  BP: 126/61 (25 Mar 2022 03:55) (114/57 - 148/69)  BP(mean): 87 (25 Mar 2022 03:55) (80 - 99)  RR: 17 (25 Mar 2022 03:55) (12 - 18)  SpO2: 96% (25 Mar 2022 03:55) (94% - 98%)    Physical Exam  General: NAD, resting comfortably in bed  Pulm: Nonlabored breathing, no respiratory distress  Abd: soft, ND, NT, PEG site with no hematoma  Extrem: WWP, edematous L > R arm       I&O's Summary    24 Mar 2022 07:01  -  25 Mar 2022 07:00  --------------------------------------------------------  IN: 800 mL / OUT: 1350 mL / NET: -550 mL                              13.8   12.71 )-----------( 274      ( 25 Mar 2022 06:02 )             41.7       03-25    141  |  109<H>  |  36<H>  ----------------------------<  134<H>  4.1   |  24  |  0.74    Ca    9.0      25 Mar 2022 06:02  Phos  2.6     03-25  Mg     2.2     03-25    TPro  5.6<L>  /  Alb  2.6<L>  /  TBili  1.2  /  DBili  x   /  AST  13  /  ALT  14  /  AlkPhos  62  03-24      Micro: no new    Imaging: no new

## 2022-03-25 NOTE — PROGRESS NOTE ADULT - SUBJECTIVE AND OBJECTIVE BOX
O/N Events: GM  Subjective/ROS: No complaints. Denies HA, CP, SOB, n/v, changes in bowel/urinary habits.  12pt ROS otherwise negative.    VITALS  Vital Signs Last 24 Hrs  T(C): 37.6 (25 Mar 2022 10:20), Max: 37.6 (25 Mar 2022 10:20)  T(F): 99.6 (25 Mar 2022 10:20), Max: 99.6 (25 Mar 2022 10:20)  HR: 78 (25 Mar 2022 12:20) (62 - 80)  BP: 150/71 (25 Mar 2022 12:20) (126/61 - 150/71)  BP(mean): 102 (25 Mar 2022 12:20) (87 - 103)  RR: 18 (25 Mar 2022 12:20) (12 - 18)  SpO2: 91% (25 Mar 2022 12:20) (91% - 98%)    I&O's Summary    24 Mar 2022 07:01  -  25 Mar 2022 07:00  --------------------------------------------------------  IN: 800 mL / OUT: 1350 mL / NET: -550 mL    25 Mar 2022 07:01  -  25 Mar 2022 13:59  --------------------------------------------------------  IN: 0 mL / OUT: 300 mL / NET: -300 mL        CAPILLARY BLOOD GLUCOSE    PHYSICAL EXAM  General: A&Ox3; NAD, prefers to respond with hand gestures than speaking, dysarthric   Head: NC/AT; PERRL; EOMI; anicteric sclera  Neck: Supple; no JVD  Respiratory: CTA B/L; no wheezes/crackles/rales auscultated w/ good air movement  Cardiovascular: Regular rhythm/rate; S1/S2; no gallops or murmurs auscultated  Gastrointestinal: Soft; NTND w/out rebound tenderness or guarding; bowel sounds normal  Extremities: WWP; mild non pitting edema of LE, no cyanosis; radial/pedal pulses palpable  Neurological:  CNII-XII grossly intact; Left sided weakness  Skin: Ecchymosis of distal toe, no other rash   Psych: Appropriate affect  Vasc: +2 pulses bilaterally distally    MEDICATIONS  (STANDING):  acetylcysteine 20%  Inhalation 4 milliLiter(s) Inhalation every 4 hours  albuterol/ipratropium for Nebulization 3 milliLiter(s) Nebulizer every 4 hours  amLODIPine   Tablet 10 milliGRAM(s) Oral daily  apixaban 5 milliGRAM(s) Oral every 12 hours  atorvastatin 80 milliGRAM(s) Oral at bedtime  carvedilol 6.25 milliGRAM(s) Oral every 12 hours  chlorhexidine 2% Cloths 1 Application(s) Topical <User Schedule>  glucagon  Injectable 1 milliGRAM(s) IntraMuscular once  lisinopril 20 milliGRAM(s) Oral every 24 hours  polyethylene glycol 3350 17 Gram(s) Oral every 12 hours  senna 2 Tablet(s) Oral at bedtime    MEDICATIONS  (PRN):  acetaminophen    Suspension .. 650 milliGRAM(s) Oral every 6 hours PRN Temp greater or equal to 38C (100.4F), Mild Pain (1 - 3)  hydrALAZINE Injectable 10 milliGRAM(s) IV Push every 4 hours PRN SBP>160      No Known Allergies      LABS                        13.8   12.71 )-----------( 274      ( 25 Mar 2022 06:02 )             41.7     03-25    141  |  109<H>  |  36<H>  ----------------------------<  134<H>  4.1   |  24  |  0.74    Ca    9.0      25 Mar 2022 06:02  Phos  2.6     03-25  Mg     2.2     03-25    TPro  5.6<L>  /  Alb  2.6<L>  /  TBili  1.2  /  DBili  x   /  AST  13  /  ALT  14  /  AlkPhos  62  03-24    PTT - ( 24 Mar 2022 10:41 )  PTT:45.2 sec          IMAGING/EKG/ETC: reviewed

## 2022-03-26 LAB
ANION GAP SERPL CALC-SCNC: 8 MMOL/L — SIGNIFICANT CHANGE UP (ref 5–17)
ANION GAP SERPL CALC-SCNC: 9 MMOL/L — SIGNIFICANT CHANGE UP (ref 5–17)
BUN SERPL-MCNC: 35 MG/DL — HIGH (ref 7–23)
BUN SERPL-MCNC: 35 MG/DL — HIGH (ref 7–23)
CALCIUM SERPL-MCNC: 8.7 MG/DL — SIGNIFICANT CHANGE UP (ref 8.4–10.5)
CALCIUM SERPL-MCNC: 8.8 MG/DL — SIGNIFICANT CHANGE UP (ref 8.4–10.5)
CHLORIDE SERPL-SCNC: 108 MMOL/L — SIGNIFICANT CHANGE UP (ref 96–108)
CHLORIDE SERPL-SCNC: 110 MMOL/L — HIGH (ref 96–108)
CO2 SERPL-SCNC: 23 MMOL/L — SIGNIFICANT CHANGE UP (ref 22–31)
CO2 SERPL-SCNC: 26 MMOL/L — SIGNIFICANT CHANGE UP (ref 22–31)
CREAT SERPL-MCNC: 0.74 MG/DL — SIGNIFICANT CHANGE UP (ref 0.5–1.3)
CREAT SERPL-MCNC: 0.79 MG/DL — SIGNIFICANT CHANGE UP (ref 0.5–1.3)
EGFR: 91 ML/MIN/1.73M2 — SIGNIFICANT CHANGE UP
EGFR: 93 ML/MIN/1.73M2 — SIGNIFICANT CHANGE UP
GLUCOSE SERPL-MCNC: 150 MG/DL — HIGH (ref 70–99)
GLUCOSE SERPL-MCNC: 155 MG/DL — HIGH (ref 70–99)
HCT VFR BLD CALC: 41.4 % — SIGNIFICANT CHANGE UP (ref 39–50)
HGB BLD-MCNC: 13.2 G/DL — SIGNIFICANT CHANGE UP (ref 13–17)
MAGNESIUM SERPL-MCNC: 2.2 MG/DL — SIGNIFICANT CHANGE UP (ref 1.6–2.6)
MCHC RBC-ENTMCNC: 30.8 PG — SIGNIFICANT CHANGE UP (ref 27–34)
MCHC RBC-ENTMCNC: 31.9 GM/DL — LOW (ref 32–36)
MCV RBC AUTO: 96.7 FL — SIGNIFICANT CHANGE UP (ref 80–100)
NRBC # BLD: 0 /100 WBCS — SIGNIFICANT CHANGE UP (ref 0–0)
PHOSPHATE SERPL-MCNC: 2.9 MG/DL — SIGNIFICANT CHANGE UP (ref 2.5–4.5)
PLATELET # BLD AUTO: 264 K/UL — SIGNIFICANT CHANGE UP (ref 150–400)
POTASSIUM SERPL-MCNC: 4.1 MMOL/L — SIGNIFICANT CHANGE UP (ref 3.5–5.3)
POTASSIUM SERPL-MCNC: 4.2 MMOL/L — SIGNIFICANT CHANGE UP (ref 3.5–5.3)
POTASSIUM SERPL-SCNC: 4.1 MMOL/L — SIGNIFICANT CHANGE UP (ref 3.5–5.3)
POTASSIUM SERPL-SCNC: 4.2 MMOL/L — SIGNIFICANT CHANGE UP (ref 3.5–5.3)
PROCALCITONIN SERPL-MCNC: 0.11 NG/ML — HIGH (ref 0.02–0.1)
RBC # BLD: 4.28 M/UL — SIGNIFICANT CHANGE UP (ref 4.2–5.8)
RBC # FLD: 12.8 % — SIGNIFICANT CHANGE UP (ref 10.3–14.5)
SODIUM SERPL-SCNC: 142 MMOL/L — SIGNIFICANT CHANGE UP (ref 135–145)
SODIUM SERPL-SCNC: 142 MMOL/L — SIGNIFICANT CHANGE UP (ref 135–145)
WBC # BLD: 10.39 K/UL — SIGNIFICANT CHANGE UP (ref 3.8–10.5)
WBC # FLD AUTO: 10.39 K/UL — SIGNIFICANT CHANGE UP (ref 3.8–10.5)

## 2022-03-26 PROCEDURE — 99232 SBSQ HOSP IP/OBS MODERATE 35: CPT

## 2022-03-26 PROCEDURE — 99233 SBSQ HOSP IP/OBS HIGH 50: CPT

## 2022-03-26 RX ADMIN — CARVEDILOL PHOSPHATE 6.25 MILLIGRAM(S): 80 CAPSULE, EXTENDED RELEASE ORAL at 17:39

## 2022-03-26 RX ADMIN — Medication 4 MILLILITER(S): at 14:47

## 2022-03-26 RX ADMIN — Medication 3 MILLILITER(S): at 14:47

## 2022-03-26 RX ADMIN — CARVEDILOL PHOSPHATE 6.25 MILLIGRAM(S): 80 CAPSULE, EXTENDED RELEASE ORAL at 07:23

## 2022-03-26 RX ADMIN — AMLODIPINE BESYLATE 10 MILLIGRAM(S): 2.5 TABLET ORAL at 07:21

## 2022-03-26 RX ADMIN — Medication 3 MILLILITER(S): at 00:32

## 2022-03-26 RX ADMIN — Medication 4 MILLILITER(S): at 17:36

## 2022-03-26 RX ADMIN — Medication 4 MILLILITER(S): at 10:46

## 2022-03-26 RX ADMIN — Medication 3 MILLILITER(S): at 17:34

## 2022-03-26 RX ADMIN — Medication 4 MILLILITER(S): at 07:21

## 2022-03-26 RX ADMIN — CEFTRIAXONE 100 MILLIGRAM(S): 500 INJECTION, POWDER, FOR SOLUTION INTRAMUSCULAR; INTRAVENOUS at 18:45

## 2022-03-26 RX ADMIN — Medication 3 MILLILITER(S): at 10:46

## 2022-03-26 RX ADMIN — Medication 4 MILLILITER(S): at 21:07

## 2022-03-26 RX ADMIN — Medication 3 MILLILITER(S): at 07:22

## 2022-03-26 RX ADMIN — POLYETHYLENE GLYCOL 3350 17 GRAM(S): 17 POWDER, FOR SOLUTION ORAL at 07:22

## 2022-03-26 RX ADMIN — POLYETHYLENE GLYCOL 3350 17 GRAM(S): 17 POWDER, FOR SOLUTION ORAL at 17:39

## 2022-03-26 RX ADMIN — SENNA PLUS 2 TABLET(S): 8.6 TABLET ORAL at 21:07

## 2022-03-26 RX ADMIN — Medication 3 MILLILITER(S): at 21:07

## 2022-03-26 RX ADMIN — LISINOPRIL 20 MILLIGRAM(S): 2.5 TABLET ORAL at 17:37

## 2022-03-26 RX ADMIN — APIXABAN 5 MILLIGRAM(S): 2.5 TABLET, FILM COATED ORAL at 07:21

## 2022-03-26 RX ADMIN — Medication 4 MILLILITER(S): at 00:32

## 2022-03-26 RX ADMIN — CHLORHEXIDINE GLUCONATE 1 APPLICATION(S): 213 SOLUTION TOPICAL at 07:22

## 2022-03-26 RX ADMIN — APIXABAN 5 MILLIGRAM(S): 2.5 TABLET, FILM COATED ORAL at 17:37

## 2022-03-26 RX ADMIN — ATORVASTATIN CALCIUM 80 MILLIGRAM(S): 80 TABLET, FILM COATED ORAL at 21:07

## 2022-03-26 NOTE — PROGRESS NOTE ADULT - PROBLEM SELECTOR PLAN 11
could be a component of vocal cord paralysis  Continue to monitor  wean O2 as tolerated, maintain sat >92%

## 2022-03-26 NOTE — PROGRESS NOTE ADULT - SUBJECTIVE AND OBJECTIVE BOX
HPI:  75y/o M with PMHx sig for HTN, HLD, afib (on eliquis, unknown if last taken today,) CVA in  (with minimal residual right-sided weakness per girlfriend,) last known normal at 3pm when girlfriend spoke to him. 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 Upon arrival to [facility], BP noted to be 171/101. Stroke code was called. CTH without findings of hemorrhage. 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 NIHSS 20. No TPA was administered as Pt is out of window. Per patient's girlfriend, the basilar artery aneurysm is known and was diagnosed in  at [facility] when he suffered from a CVA. At that time, he underwent a "treatment for the aneurysm," which the girlfriend reports was too risky and was aborted. Patient never attended his follow-up appointments due to fear per girlfriend.  (10 Mar 2022 22:52)      Subjective:  Patient denies any complaints.     Hospital course:  3/10: Admitted for further workup of stroke symptoms and basilar artery aneurysm.  3/11: Wells placed overnight. 3% hypertonic saline started. Neuro exam stable. Started on vEEG for aphasia  3/12: GM overnight. Neuro exam stable. 3% d/c. home eliquis 5 bid. failed s/s. started on TF via NGT, spiked fever 101, f/u panculture   3/13: GM overnight, neuro stable, veeg negative. Vanc/zosyn started for empiric PNA.   3/14: GM o/n neuro stable. on HFNC. ENT plan for laryngoscope today with . failed s/s, pend repeat eval. dc'd captopril, started lisinopril. dc'd vanc. Laryngoscopy: No masses visualized, hypomobility of L sided vocal cords, dysarthric, hypophonic, risk for aspiration, good cough reflex.Dr. Onofre to discuss with Dr. Peterson regarding a possible vocal cord injection.   3/15: GM overnight, neuro stable, on HFNC 30/30 overnight, transitioned to 4LNC  3/16: GM overnight, neuro stable, tolerating NC and satting well    3/17: POD6 dx angio. GM o/n neuro stable. trickle feeds via NGT  3/18: POD7 GM o/n, saturating well in RA. tolerating TF   : POD8 GM o/n. gen surg consulted for PEG placement.  : POD9 GM o/n. Transferred back to ICU for heparin gtt in preparation for PEG placement Wed.   3/21: POD10 GM o/n, neuro exam stable. Transferred to ICU in preparation for hep gtt to start tomorrow, plan for PEG on Wednesday. F/u am coags.   3/22: POD11. GM o/n neuro stable. on heparin gtt ptt goal 40-60. o/n ptt >200c, held for 2 hours. on heparin @11. pre-op for PEG   3/23: POD12. o/n new R facial droop, CTH stable. heparin gtt d/c. preop PEG today  3/24: POD13, POD1 PEG placement, GM overnight, neuro stable, +BM  3/25: POD14, GM o/n, neuro stable, eliquis restarted  3/26: POD 15. GM overnight. exam stable. on abx until 3/26 for UTI. On eliquis for afib.       Vital Signs Last 24 Hrs  T(C): 37.7 (25 Mar 2022 21:26), Max: 38.6 (25 Mar 2022 16:50)  T(F): 99.8 (25 Mar 2022 21:26), Max: 101.5 (25 Mar 2022 16:50)  HR: 68 (26 Mar 2022 00:28) (68 - 84)  BP: 147/70 (26 Mar 2022 00:28) (126/61 - 162/76)  BP(mean): 101 (26 Mar 2022 00:28) (87 - 109)  RR: 16 (26 Mar 2022 00:28) (16 - 18)  SpO2: 97% (26 Mar 2022 00:28) (91% - 97%)    I&O's Detail    24 Mar 2022 07:01  -  25 Mar 2022 07:00  --------------------------------------------------------  IN:    Enteral Tube Flush: 340 mL    Jevity 1.2: 460 mL  Total IN: 800 mL    OUT:    Incontinent per Condom Catheter (mL): 500 mL    Intermittent Catheterization - Urethral (mL): 850 mL  Total OUT: 1350 mL    Total NET: -550 mL      25 Mar 2022 07:  -  26 Mar 2022 02:20  --------------------------------------------------------  IN:    Jevity 1.2: 490 mL  Total IN: 490 mL    OUT:    Incontinent per Condom Catheter (mL): 850 mL  Total OUT: 850 mL    Total NET: -360 mL        I&O's Summary    24 Mar 2022 07:  -  25 Mar 2022 07:00  --------------------------------------------------------  IN: 800 mL / OUT: 1350 mL / NET: -550 mL    25 Mar 2022 07:  -  26 Mar 2022 02:20  --------------------------------------------------------  IN: 490 mL / OUT: 850 mL / NET: -360 mL      PHYSICAL EXAM:  General: patient seen laying supine in bed in NAD  Neuro: AAOx3, FC, OEs to voice, expressive aphasia, L facial droop, hypophonic and dysarthric speech. RUE 5/5 throughout, RLE HF/KF/KE 2/5, DF/PF 3/5, LUE 1/5 hand  0/5 shoulder/biceps/triceps, LLE w/d to noxious.  sensation intact to light touch throughout  HEENT: PERRL, EOMI  Neck: supple  Cardiac: RRR, S1S2  Pulmonary: chest rise symmetric  Abdomen: soft, nontender, nondistended, +PEG  Ext: perfusing well, L 5th toe ecchymosis  Skin: warm, dry      TUBES/LINES:  [] CVC  [] A-line  [] Lumbar Drain  [] Ventriculostomy  [] Other    DIET:  [] NPO  [] Mechanical  [] Tube feeds    LABS:                        13.8   12.71 )-----------( 274      ( 25 Mar 2022 06:02 )             41.7     03-25    141  |  109<H>  |  36<H>  ----------------------------<  134<H>  4.1   |  24  |  0.74    Ca    9.0      25 Mar 2022 06:02  Phos  2.6     03-25  Mg     2.2     03-25    TPro  5.6<L>  /  Alb  2.6<L>  /  TBili  1.2  /  DBili  x   /  AST  13  /  ALT  14  /  AlkPhos  62  03-24    PTT - ( 24 Mar 2022 10:41 )  PTT:45.2 sec  Urinalysis Basic - ( 25 Mar 2022 18:06 )    Color: Yellow / Appearance: Clear / S.025 / pH: x  Gluc: x / Ketone: NEGATIVE  / Bili: Negative / Urobili: 1.0 E.U./dL   Blood: x / Protein: NEGATIVE mg/dL / Nitrite: POSITIVE   Leuk Esterase: Trace / RBC: < 5 /HPF / WBC 5-10 /HPF   Sq Epi: x / Non Sq Epi: 0-5 /HPF / Bacteria: Present /HPF          CAPILLARY BLOOD GLUCOSE          Drug Levels: [] N/A    CSF Analysis: [] N/A      Allergies    No Known Allergies    Intolerances      MEDICATIONS:  Antibiotics:  cefTRIAXone   IVPB 1000 milliGRAM(s) IV Intermittent every 24 hours    Neuro:  acetaminophen    Suspension .. 650 milliGRAM(s) Oral every 6 hours PRN    Anticoagulation:  apixaban 5 milliGRAM(s) Oral every 12 hours    OTHER:  acetylcysteine 20%  Inhalation 4 milliLiter(s) Inhalation every 4 hours  albuterol/ipratropium for Nebulization 3 milliLiter(s) Nebulizer every 4 hours  amLODIPine   Tablet 10 milliGRAM(s) Oral daily  atorvastatin 80 milliGRAM(s) Oral at bedtime  carvedilol 6.25 milliGRAM(s) Oral every 12 hours  chlorhexidine 2% Cloths 1 Application(s) Topical <User Schedule>  glucagon  Injectable 1 milliGRAM(s) IntraMuscular once  hydrALAZINE Injectable 10 milliGRAM(s) IV Push every 4 hours PRN  lisinopril 20 milliGRAM(s) Oral every 24 hours  polyethylene glycol 3350 17 Gram(s) Oral every 12 hours  senna 2 Tablet(s) Oral at bedtime    IVF:    CULTURES:  Culture Results:   No growth at 5 days. ( @ 18:54)    RADIOLOGY & ADDITIONAL TESTS:      ASSESSMENT:  75y/o M with PMHx sig for HTN, HLD, afib (on eliquis, unknown if last taken today,) CVA in  (with minimal residual right-sided weakness per girlfriend) p/w dysarthria, left facial droop, and left-sided weakness. CTH without findings of hemorrhage. 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 NIHSS 20. Pt is not a tpa candidate as Pt is out of window. Patient now s/p diagnostic angio with findings of partially thrombosed basilar artery aneurysm 3/11/22. course c/b resp insufficiency weaned off HFNC    BASILAR ARTERY ANEURYSM    Handoff    MEWS Score    Brain aneurysm    Brain aneurysm    Basilar artery aneurysm    Basilar artery aneurysm    Brain stem compression    Longstanding persistent atrial fibrillation    HTN (hypertension)    Hyperlipidemia    Acidosis    Leukocytosis    Dysphagia    Hypocalcemia    Hypophosphatemia    Acute respiratory failure with hypoxia    Angiogram, carotid and cerebral, bilateral    EGD, with PEG    STROKE    [email or web address]        PLAN:  Neuro:   - neuro checks q 4/vital signs q 4  - MR head 3/11: no acute infarcts   - stroke core measures   - EEG negative 3/13, dc'd   - no plan for further neurosurgical intervention   - CTH 3/22 stable     Cardio:  - SBP goal 100-160   - cont home norvasc, carvedilol (home HCTZ held), Lisinopril substituted for Captopril   - echo 3/11: mild LVH, EF 65-70%   - Eliquis 5mg BID restarted 3/25 for afib     Pulm:  - satting well on RA, desatted to 91 put on 3L nasal canula   - standing mucomyst and duonebs  - Chest PT   - aspiration precaution, keep hob up     ENT:   - ENT consulted, s/p laryngoscopy 3/14: No masses, hypomobility of L sided vocal cord  - f/u outpatient with Dr. Onofre/Dr. Peterson for possible vocal cord injection.     Renal:  - Na goal 135-145  - no issues    GI:  - TF via PEG   - bowel regimen, BM 3/23    Endo:  - A1C 5.4    Heme;  - SCDs   - Eliquis 5mg q12  - LE dopplers 3/11, 3/23: neg for DVT   - LUE US 3/22: superficial thrombus L basilic/cephalic vein    ID:  - zosyn empirically for PNA completed 3/17  - febrile 3/25, +UA, f/u UCx and BCx, started Ceftriaxone 3/25    ORTHO:  - L foot xray 3/22: chronic 5th digit middle phalanx base corner fracture    Dispo:  - SDU status, full code   - Pending AR  - Family updated with plan  - OOB to chair     Assessment and plan discussed with Dr. Peterson      Assessment:  Present when checked    []  GCS  E   V  M     Heart Failure: []Acute, [] acute on chronic , []chronic  Heart Failure:  [] Diastolic (HFpEF), [] Systolic (HFrEF), []Combined (HFpEF and HFrEF), [] RHF, [] Pulm HTN, [] Other    [] LALO, [] ATN, [] AIN, [] other  [] CKD1, [] CKD2, [] CKD 3, [] CKD 4, [] CKD 5, []ESRD    Encephalopathy: [] Metabolic, [] Hepatic, [] toxic, [] Neurological, [] Other    Abnormal Nurtitional Status: [] malnurtition (see nutrition note), [ ]underweight: BMI < 19, [] morbid obesity: BMI >40, [] Cachexia    [] Sepsis  [] hypovolemic shock,[] cardiogenic shock, [] hemorrhagic shock, [] neuogenic shock  [] Acute Respiratory Failure  []Cerebral edema, [] Brain compression/ herniation,   [] Functional quadriplegia  [] Acute blood loss anemia

## 2022-03-26 NOTE — CONSULT NOTE ADULT - ATTENDING COMMENTS
I agree with hx, exam and plan as outlined above.    On exam there is a L vocal fold paresis vs paralysis, favor paralysis.  Pt would likely benefit from Left vocal fold injection medialization, however I don't believe he can participate in the procedure at the moment.  Will re-evaluate in 48 hours.   Cont DHT for now, and consider peg for long term nutritional needed  Findings discussed with Dr. Nicholas venegas.
Patient seen and examined at bedside on 3/23/2022.  For EGD, PEG placement at bedside.  Eliquis held, heparin held for procedure.
PVCs - Pt. w/ NSR and occasional PVCs on burden, though insignificant PVC burden overall and patient asymptomatic  -c/w Coreg 6.25 BID  -Monitor and replete electrolytes if needed; K>4, Mg>2    Nat Jarvis MD  Cardiology Attending

## 2022-03-26 NOTE — CONSULT NOTE ADULT - ASSESSMENT
75y/o M with PMHx sig for HTN, HLD, afib (on eliquis, unknown if last taken today,) CVA in 2020 (with minimal residual right-sided weakness per girlfriend) p/w dysarthria, left facial droop, and left-sided weakness. CTH without findings of hemorrhage. 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 NIHSS 20. Pt is not a tpa candidate as Pt is out of window. Patient now s/p diagnostic angio with findings of partially thrombosed basilar artery aneurysm 3/11/22. course c/b resp insufficiency weaned off HFNC, now on NC 2L. Cardiology was consulted for PVCs seen on telemetry.    #Occasional PVCs on telemetry   EKG: NSR, incomplete RBBB   77y/o M with PMHx sig for HTN, HLD, afib (on eliquis, unknown if last taken today,) CVA in 2020 (with minimal residual right-sided weakness per girlfriend) p/w dysarthria, left facial droop, and left-sided weakness. CTH without findings of hemorrhage. 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 NIHSS 20. Pt is not a tpa candidate as Pt is out of window. Patient now s/p diagnostic angio with findings of partially thrombosed basilar artery aneurysm 3/11/22. course c/b resp insufficiency weaned off HFNC, now on NC 2L. Cardiology was consulted for PVCs seen on telemetry.    #Occasional PVCs on telemetry   EKG: NSR, incomplete RBBB  Tele: occasional PVCs  Echo: normal LV and RV size and funxn., mild LVH, aortic sclerosis, PASP 35 mmHg, bubble study: small intracardiac shunt vs small pulmonary AV malformation  - PVCs/pAF: c/w carvedilol 6.25 mg bid; c/w Apixabam 5 mg bid  - HTN/HLD: c/w Ampodipine 10 mg daily, Lisinopril 20 mg q24h, Atorvastatin 80 mg daily.

## 2022-03-26 NOTE — CONSULT NOTE ADULT - SUBJECTIVE AND OBJECTIVE BOX
HPI:  75y/o M with PMHx sig for HTN, HLD, afib (on eliquis, unknown if last taken today,) CVA in 2020 (with minimal residual right-sided weakness per girlfriend,) last known normal at 3pm when girlfriend spoke to him. 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 Upon arrival to [facility], BP noted to be 171/101. Stroke code was called. CTH without findings of hemorrhage. 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 NIHSS 20. No TPA was administered as Pt is out of window. Per patient's girlfriend, the basilar artery aneurysm is known and was diagnosed in 2020 at [facility] when he suffered from a CVA. At that time, he underwent a "treatment for the aneurysm," which the girlfriend reports was too risky and was aborted. Patient never attended his follow-up appointments due to fear per girlfriend.  (10 Mar 2022 22:52)      ROS: A 10-point review of systems was otherwise negative.    PAST MEDICAL & SURGICAL HISTORY:    SOCIAL HISTORY:  FAMILY HISTORY:    ALLERGIES: 	  No Known Allergies          MEDICATIONS:  acetaminophen    Suspension .. 650 milliGRAM(s) Oral every 6 hours PRN  acetylcysteine 20%  Inhalation 4 milliLiter(s) Inhalation every 4 hours  albuterol/ipratropium for Nebulization 3 milliLiter(s) Nebulizer every 4 hours  amLODIPine   Tablet 10 milliGRAM(s) Oral daily  apixaban 5 milliGRAM(s) Oral every 12 hours  atorvastatin 80 milliGRAM(s) Oral at bedtime  carvedilol 6.25 milliGRAM(s) Oral every 12 hours  cefTRIAXone   IVPB 1000 milliGRAM(s) IV Intermittent every 24 hours  chlorhexidine 2% Cloths 1 Application(s) Topical <User Schedule>  glucagon  Injectable 1 milliGRAM(s) IntraMuscular once  hydrALAZINE Injectable 10 milliGRAM(s) IV Push every 4 hours PRN  lisinopril 20 milliGRAM(s) Oral every 24 hours  polyethylene glycol 3350 17 Gram(s) Oral every 12 hours  senna 2 Tablet(s) Oral at bedtime      PHYSICAL EXAM:  T(C): 37.1 (03-26-22 @ 09:25), Max: 38.6 (03-25-22 @ 16:50)  HR: 74 (03-26-22 @ 12:12) (68 - 84)  BP: 138/69 (03-26-22 @ 12:12) (126/66 - 162/76)  RR: 17 (03-26-22 @ 12:12) (16 - 18)  SpO2: 96% (03-26-22 @ 12:12) (94% - 97%)  Wt(kg): --    General: NAD, unable to fully communicate likely 2/2 expressive aphasia  HEENT: PERRL  Cardiac: RRR, S1S2  Pulmonary: CTBL  Abdomen: soft  Ext: no edema, ecchymosis on the L toe   Neuro: expressive aphasia, L facial droop. Unable to move left upper extremity     I&O's Summary    25 Mar 2022 07:01  -  26 Mar 2022 07:00  --------------------------------------------------------  IN: 840 mL / OUT: 1250 mL / NET: -410 mL    26 Mar 2022 07:01  -  26 Mar 2022 13:28  --------------------------------------------------------  IN: 350 mL / OUT: 400 mL / NET: -50 mL        LABS:	 	                        13.2   10.39 )-----------( 264      ( 26 Mar 2022 05:58 )             41.4     03-26    142  |  110<H>  |  35<H>  ----------------------------<  155<H>  4.2   |  23  |  0.74    Ca    8.7      26 Mar 2022 05:58  Phos  2.9     03-26  Mg     2.2     03-26    TPro  5.6<L>  /  Alb  2.6<L>  /  TBili  1.2  /  DBili  x   /  AST  13  /  ALT  14  /  AlkPhos  62  03-24     HPI:  75y/o M with PMHx sig for HTN, HLD, afib (on eliquis, unknown if last taken today,) CVA in 2020 (with minimal residual right-sided weakness per girlfriend,) last known normal at 3pm when girlfriend spoke to him. 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 Upon arrival to [facility], BP noted to be 171/101. Stroke code was called. CTH without findings of hemorrhage. 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 NIHSS 20. No TPA was administered as Pt is out of window. Per patient's girlfriend, the basilar artery aneurysm is known and was diagnosed in 2020 at [facility] when he suffered from a CVA. At that time, he underwent a "treatment for the aneurysm," which the girlfriend reports was too risky and was aborted. Patient never attended his follow-up appointments due to fear per girlfriend.  (10 Mar 2022 22:52)      ROS: A 10-point review of systems was otherwise negative.    PAST MEDICAL & SURGICAL HISTORY:    SOCIAL HISTORY:  FAMILY HISTORY:    ALLERGIES: 	  No Known Allergies          MEDICATIONS:  acetaminophen    Suspension .. 650 milliGRAM(s) Oral every 6 hours PRN  acetylcysteine 20%  Inhalation 4 milliLiter(s) Inhalation every 4 hours  albuterol/ipratropium for Nebulization 3 milliLiter(s) Nebulizer every 4 hours  amLODIPine   Tablet 10 milliGRAM(s) Oral daily  apixaban 5 milliGRAM(s) Oral every 12 hours  atorvastatin 80 milliGRAM(s) Oral at bedtime  carvedilol 6.25 milliGRAM(s) Oral every 12 hours  cefTRIAXone   IVPB 1000 milliGRAM(s) IV Intermittent every 24 hours  chlorhexidine 2% Cloths 1 Application(s) Topical <User Schedule>  glucagon  Injectable 1 milliGRAM(s) IntraMuscular once  hydrALAZINE Injectable 10 milliGRAM(s) IV Push every 4 hours PRN  lisinopril 20 milliGRAM(s) Oral every 24 hours  polyethylene glycol 3350 17 Gram(s) Oral every 12 hours  senna 2 Tablet(s) Oral at bedtime      PHYSICAL EXAM:  T(C): 37.1 (03-26-22 @ 09:25), Max: 38.6 (03-25-22 @ 16:50)  HR: 74 (03-26-22 @ 12:12) (68 - 84)  BP: 138/69 (03-26-22 @ 12:12) (126/66 - 162/76)  RR: 17 (03-26-22 @ 12:12) (16 - 18)  SpO2: 96% (03-26-22 @ 12:12) (94% - 97%)  Wt(kg): --    General: NAD, unable to fully communicate likely 2/2 expressive aphasia  HEENT: PERRL  Cardiac: RRR, S1S2  Pulmonary: CTBL  Abdomen: soft  Ext: no edema, ecchymosis on the L toe   Neuro: expressive aphasia, L facial droop. Unable to move left upper extremity     I&O's Summary    25 Mar 2022 07:01  -  26 Mar 2022 07:00  --------------------------------------------------------  IN: 840 mL / OUT: 1250 mL / NET: -410 mL    26 Mar 2022 07:01  -  26 Mar 2022 13:28  --------------------------------------------------------  IN: 350 mL / OUT: 400 mL / NET: -50 mL        LABS:	 	                        13.2   10.39 )-----------( 264      ( 26 Mar 2022 05:58 )             41.4     03-26    142  |  110<H>  |  35<H>  ----------------------------<  155<H>  4.2   |  23  |  0.74    Ca    8.7      26 Mar 2022 05:58  Phos  2.9     03-26  Mg     2.2     03-26    TPro  5.6<L>  /  Alb  2.6<L>  /  TBili  1.2  /  DBili  x   /  AST  13  /  ALT  14  /  AlkPhos  62  03-24    echo:    1. Normal left and right ventricular size and systolic function.   2. Mild symmetric left ventricular hypertrophy.   3. Aortic sclerosis without significant stenosis.   4. Pulmonary artery systolic pressure is 35 mmHg.   5. No pericardial effusion.   6. Injection with agitated saline reveals late bubbles (after 3 heart   beats) - difficult to determine small intracardiac shunt vs small   pulmonary AV malformation

## 2022-03-26 NOTE — PROGRESS NOTE ADULT - SUBJECTIVE AND OBJECTIVE BOX
INTERVAL HPI/OVERNIGHT EVENTS: GM o/n. No new complaints. 12 point ROS otherwise negative.     VITAL SIGNS:  T(F): 99.6 (22 @ 14:13)  HR: 74 (22 @ 12:12)  BP: 138/69 (22 @ 12:12)  RR: 17 (22 @ 12:12)  SpO2: 96% (22 @ 12:12)  Wt(kg): --    PHYSICAL EXAM:      Constitutional: NAD  HEENT: PERRLA, EOMI, Normal Hearing, MMM  Neck: No LAD, No JVD  Back: Normal spine flexure, No CVA tenderness  Respiratory: CTABCardiovascular: S1 and S2, RRR, no M/G/R  Gastrointestinal: BS+, soft, NT/ND. PEG c/d/i   Extremities: No peripheral edema  Vascular: 2+ peripheral pulses  Neurological: A/O x 3, left sided hemiplegia   Psychiatric: Normal mood, normal affect  Musculoskeletal: 5/5 strength b/l upper and lower extremities  Skin: No rashes        MEDICATIONS  (STANDING):  acetylcysteine 20%  Inhalation 4 milliLiter(s) Inhalation every 4 hours  albuterol/ipratropium for Nebulization 3 milliLiter(s) Nebulizer every 4 hours  amLODIPine   Tablet 10 milliGRAM(s) Oral daily  apixaban 5 milliGRAM(s) Oral every 12 hours  atorvastatin 80 milliGRAM(s) Oral at bedtime  carvedilol 6.25 milliGRAM(s) Oral every 12 hours  cefTRIAXone   IVPB 1000 milliGRAM(s) IV Intermittent every 24 hours  chlorhexidine 2% Cloths 1 Application(s) Topical <User Schedule>  glucagon  Injectable 1 milliGRAM(s) IntraMuscular once  lisinopril 20 milliGRAM(s) Oral every 24 hours  polyethylene glycol 3350 17 Gram(s) Oral every 12 hours  senna 2 Tablet(s) Oral at bedtime    MEDICATIONS  (PRN):  acetaminophen    Suspension .. 650 milliGRAM(s) Oral every 6 hours PRN Temp greater or equal to 38C (100.4F), Mild Pain (1 - 3)  hydrALAZINE Injectable 10 milliGRAM(s) IV Push every 4 hours PRN SBP>160      Allergies    No Known Allergies    Intolerances        LABS:                        13.2   10.39 )-----------( 264      ( 26 Mar 2022 05:58 )             41.4     -    142  |  110<H>  |  35<H>  ----------------------------<  155<H>  4.2   |  23  |  0.74    Ca    8.7      26 Mar 2022 05:58  Phos  2.9       Mg     2.2         TPro  5.6<L>  /  Alb  2.6<L>  /  TBili  1.2  /  DBili  x   /  AST  13  /  ALT  14  /  AlkPhos  62  -24      Urinalysis Basic - ( 25 Mar 2022 18:06 )    Color: Yellow / Appearance: Clear / S.025 / pH: x  Gluc: x / Ketone: NEGATIVE  / Bili: Negative / Urobili: 1.0 E.U./dL   Blood: x / Protein: NEGATIVE mg/dL / Nitrite: POSITIVE   Leuk Esterase: Trace / RBC: < 5 /HPF / WBC 5-10 /HPF   Sq Epi: x / Non Sq Epi: 0-5 /HPF / Bacteria: Present /HPF        RADIOLOGY & ADDITIONAL TESTS:

## 2022-03-27 LAB
ANION GAP SERPL CALC-SCNC: 9 MMOL/L — SIGNIFICANT CHANGE UP (ref 5–17)
BUN SERPL-MCNC: 33 MG/DL — HIGH (ref 7–23)
CALCIUM SERPL-MCNC: 9 MG/DL — SIGNIFICANT CHANGE UP (ref 8.4–10.5)
CHLORIDE SERPL-SCNC: 108 MMOL/L — SIGNIFICANT CHANGE UP (ref 96–108)
CO2 SERPL-SCNC: 25 MMOL/L — SIGNIFICANT CHANGE UP (ref 22–31)
CREAT SERPL-MCNC: 0.74 MG/DL — SIGNIFICANT CHANGE UP (ref 0.5–1.3)
EGFR: 93 ML/MIN/1.73M2 — SIGNIFICANT CHANGE UP
GLUCOSE SERPL-MCNC: 170 MG/DL — HIGH (ref 70–99)
HCT VFR BLD CALC: 40 % — SIGNIFICANT CHANGE UP (ref 39–50)
HGB BLD-MCNC: 13.1 G/DL — SIGNIFICANT CHANGE UP (ref 13–17)
MAGNESIUM SERPL-MCNC: 2 MG/DL — SIGNIFICANT CHANGE UP (ref 1.6–2.6)
MCHC RBC-ENTMCNC: 31.1 PG — SIGNIFICANT CHANGE UP (ref 27–34)
MCHC RBC-ENTMCNC: 32.8 GM/DL — SIGNIFICANT CHANGE UP (ref 32–36)
MCV RBC AUTO: 95 FL — SIGNIFICANT CHANGE UP (ref 80–100)
NRBC # BLD: 0 /100 WBCS — SIGNIFICANT CHANGE UP (ref 0–0)
PHOSPHATE SERPL-MCNC: 2.9 MG/DL — SIGNIFICANT CHANGE UP (ref 2.5–4.5)
PLATELET # BLD AUTO: 290 K/UL — SIGNIFICANT CHANGE UP (ref 150–400)
POTASSIUM SERPL-MCNC: 4.4 MMOL/L — SIGNIFICANT CHANGE UP (ref 3.5–5.3)
POTASSIUM SERPL-SCNC: 4.4 MMOL/L — SIGNIFICANT CHANGE UP (ref 3.5–5.3)
RBC # BLD: 4.21 M/UL — SIGNIFICANT CHANGE UP (ref 4.2–5.8)
RBC # FLD: 12.8 % — SIGNIFICANT CHANGE UP (ref 10.3–14.5)
SODIUM SERPL-SCNC: 142 MMOL/L — SIGNIFICANT CHANGE UP (ref 135–145)
WBC # BLD: 8.38 K/UL — SIGNIFICANT CHANGE UP (ref 3.8–10.5)
WBC # FLD AUTO: 8.38 K/UL — SIGNIFICANT CHANGE UP (ref 3.8–10.5)

## 2022-03-27 PROCEDURE — 99024 POSTOP FOLLOW-UP VISIT: CPT

## 2022-03-27 RX ORDER — SODIUM CHLORIDE 0.65 %
2 AEROSOL, SPRAY (ML) NASAL EVERY 4 HOURS
Refills: 0 | Status: DISCONTINUED | OUTPATIENT
Start: 2022-03-27 | End: 2022-03-30

## 2022-03-27 RX ORDER — OXYMETAZOLINE HYDROCHLORIDE 0.5 MG/ML
2 SPRAY NASAL ONCE
Refills: 0 | Status: COMPLETED | OUTPATIENT
Start: 2022-03-27 | End: 2022-03-27

## 2022-03-27 RX ORDER — SODIUM CHLORIDE 9 MG/ML
4 INJECTION INTRAMUSCULAR; INTRAVENOUS; SUBCUTANEOUS ONCE
Refills: 0 | Status: COMPLETED | OUTPATIENT
Start: 2022-03-27 | End: 2022-03-27

## 2022-03-27 RX ORDER — SODIUM CHLORIDE 0.65 %
1 AEROSOL, SPRAY (ML) NASAL EVERY 4 HOURS
Refills: 0 | Status: DISCONTINUED | OUTPATIENT
Start: 2022-03-27 | End: 2022-03-27

## 2022-03-27 RX ORDER — MUPIROCIN 20 MG/G
1 OINTMENT TOPICAL
Refills: 0 | Status: DISCONTINUED | OUTPATIENT
Start: 2022-03-27 | End: 2022-04-08

## 2022-03-27 RX ADMIN — AMLODIPINE BESYLATE 10 MILLIGRAM(S): 2.5 TABLET ORAL at 05:01

## 2022-03-27 RX ADMIN — Medication 1 SPRAY(S): at 15:17

## 2022-03-27 RX ADMIN — SODIUM CHLORIDE 4 MILLILITER(S): 9 INJECTION INTRAMUSCULAR; INTRAVENOUS; SUBCUTANEOUS at 04:58

## 2022-03-27 RX ADMIN — Medication 4 MILLILITER(S): at 02:24

## 2022-03-27 RX ADMIN — Medication 4 MILLILITER(S): at 12:20

## 2022-03-27 RX ADMIN — OXYMETAZOLINE HYDROCHLORIDE 2 SPRAY(S): 0.5 SPRAY NASAL at 02:43

## 2022-03-27 RX ADMIN — Medication 3 MILLILITER(S): at 12:21

## 2022-03-27 RX ADMIN — Medication 4 MILLILITER(S): at 18:23

## 2022-03-27 RX ADMIN — Medication 3 MILLILITER(S): at 05:01

## 2022-03-27 RX ADMIN — Medication 2 SPRAY(S): at 21:14

## 2022-03-27 RX ADMIN — CARVEDILOL PHOSPHATE 6.25 MILLIGRAM(S): 80 CAPSULE, EXTENDED RELEASE ORAL at 05:01

## 2022-03-27 RX ADMIN — MUPIROCIN 1 APPLICATION(S): 20 OINTMENT TOPICAL at 12:25

## 2022-03-27 RX ADMIN — Medication 3 MILLILITER(S): at 18:22

## 2022-03-27 RX ADMIN — POLYETHYLENE GLYCOL 3350 17 GRAM(S): 17 POWDER, FOR SOLUTION ORAL at 05:01

## 2022-03-27 RX ADMIN — Medication 2 SPRAY(S): at 18:21

## 2022-03-27 RX ADMIN — CHLORHEXIDINE GLUCONATE 1 APPLICATION(S): 213 SOLUTION TOPICAL at 05:07

## 2022-03-27 RX ADMIN — Medication 1 SPRAY(S): at 12:21

## 2022-03-27 RX ADMIN — APIXABAN 5 MILLIGRAM(S): 2.5 TABLET, FILM COATED ORAL at 18:22

## 2022-03-27 RX ADMIN — Medication 3 MILLILITER(S): at 21:14

## 2022-03-27 RX ADMIN — CEFTRIAXONE 100 MILLIGRAM(S): 500 INJECTION, POWDER, FOR SOLUTION INTRAMUSCULAR; INTRAVENOUS at 18:22

## 2022-03-27 RX ADMIN — CARVEDILOL PHOSPHATE 6.25 MILLIGRAM(S): 80 CAPSULE, EXTENDED RELEASE ORAL at 18:34

## 2022-03-27 RX ADMIN — Medication 4 MILLILITER(S): at 15:17

## 2022-03-27 RX ADMIN — LISINOPRIL 20 MILLIGRAM(S): 2.5 TABLET ORAL at 18:22

## 2022-03-27 RX ADMIN — Medication 3 MILLILITER(S): at 15:17

## 2022-03-27 RX ADMIN — Medication 4 MILLILITER(S): at 21:14

## 2022-03-27 RX ADMIN — Medication 3 MILLILITER(S): at 02:24

## 2022-03-27 RX ADMIN — ATORVASTATIN CALCIUM 80 MILLIGRAM(S): 80 TABLET, FILM COATED ORAL at 21:14

## 2022-03-27 RX ADMIN — APIXABAN 5 MILLIGRAM(S): 2.5 TABLET, FILM COATED ORAL at 05:01

## 2022-03-27 RX ADMIN — Medication 4 MILLILITER(S): at 05:01

## 2022-03-27 RX ADMIN — MUPIROCIN 1 APPLICATION(S): 20 OINTMENT TOPICAL at 18:34

## 2022-03-27 NOTE — PROVIDER CONTACT NOTE (OTHER) - ASSESSMENT
Pt bleeding from nose into mouth. Pt VS taken. SPO2 was at 95 with 3LPM NC. Pt was coughing and bleeding from nose. Blood was half dried blood. Pt able to breathe without labor.

## 2022-03-27 NOTE — CONSULT NOTE ADULT - SUBJECTIVE AND OBJECTIVE BOX
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 BP on admission was 171/101. CTH without hemorrhage. CTA with known 3cm basilar artery aneurysm that was partially thrombosed and with brainstem compression. This was initially identified in 2020 after prior CVA, for which patient has residual right sided weakness from that event. Prior treatment of aneurysm aborted due to risk. MRI head with no acute infarcts. On home Eliquis dose. Was not candidate for TPA. Patient now s/p diagnostic angio with findings of partially thrombosed basilar artery aneurysm 3/11/22. Course c/b resp insufficiency weaned off HFNC. Pt remains on NC. Early this AM noted to have episode of epistaxis, unknown which side that resolved with afrin and pressure. Per team o/n nurse was suctioning patient through nares. Currently no evidence of bleeding.         Allergies    No Known Allergies    Intolerances        PAST MEDICAL & SURGICAL HISTORY:      MEDICATIONS:  Antiinfectives:   cefTRIAXone   IVPB 1000 milliGRAM(s) IV Intermittent every 24 hours    Hematologic/Anticoagulation:  apixaban 5 milliGRAM(s) Oral every 12 hours    Pain medications/Neuro:  acetaminophen    Suspension .. 650 milliGRAM(s) Oral every 6 hours PRN    IV fluids:    Endocrine Medications:   atorvastatin 80 milliGRAM(s) Oral at bedtime  glucagon  Injectable 1 milliGRAM(s) IntraMuscular once    All other standing medications:   acetylcysteine 20%  Inhalation 4 milliLiter(s) Inhalation every 4 hours  albuterol/ipratropium for Nebulization 3 milliLiter(s) Nebulizer every 4 hours  amLODIPine   Tablet 10 milliGRAM(s) Oral daily  carvedilol 6.25 milliGRAM(s) Oral every 12 hours  chlorhexidine 2% Cloths 1 Application(s) Topical <User Schedule>  lisinopril 20 milliGRAM(s) Oral every 24 hours  mupirocin 2% Nasal 1 Application(s) Both Nostrils two times a day  polyethylene glycol 3350 17 Gram(s) Oral every 12 hours  senna 2 Tablet(s) Oral at bedtime  sodium chloride 0.65% Nasal 1 Spray(s) Both Nostrils every 4 hours    All other PRN medications:  hydrALAZINE Injectable 10 milliGRAM(s) IV Push every 4 hours PRN      SOCIAL HISTORY:  Tobacco History:  ETOH Use:   Drug Use:     FAMILY HISTORY:      REVIEW OF SYSTEMS:     LABS:  CBC-                        13.1   8.38  )-----------( 290      ( 27 Mar 2022 07:48 )             40.0         03-27    142  |  108  |  33<H>  ----------------------------<  170<H>  4.4   |  25  |  0.74    Ca    9.0      27 Mar 2022 07:48  Phos  2.9     03-27  Mg     2.0     03-27      Coagulation Studies-    Endocrine Panel-  --  --  9.0 mg/dL  --  --  8.7 mg/dL  --  --  8.8 mg/dL      Vital Signs Last 24 Hrs  T(C): 37.2 (27 Mar 2022 09:16), Max: 37.6 (26 Mar 2022 14:13)  T(F): 98.9 (27 Mar 2022 09:16), Max: 99.6 (26 Mar 2022 14:13)  HR: 72 (27 Mar 2022 08:26) (72 - 78)  BP: 157/79 (27 Mar 2022 08:26) (135/66 - 157/79)  BP(mean): 111 (27 Mar 2022 08:26) (93 - 111)  RR: 18 (27 Mar 2022 08:26) (17 - 18)  SpO2: 94% (27 Mar 2022 08:26) (91% - 96%)  PHYSICAL EXAM:  ENT EXAM-   Constitutional: Well-developed, well-nourished.  difficult to arouse  Head:  normocephalic, atraumatic.   Nose:  Septum intact, dried blood noted along R anterior nasal septum, no active bleeding; NC in place. Absorbable packing placed b/l.   OC/OP:  Floor of mouth, buccal mucosa, lips, hard palate, soft palate, uvula, posterior pharyngeal wall normal.  Mucosa moist. Some dried blood noted along palate.   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   - Recommend strict blood pressure control  - Bactroban application via Q-tip to each nare twice daily  - Nasal saline, 2 sprays to both nares 4 times a day  - Avoid nasal trauma: no nose rubbing, blowing, sneeze with mouth open  - Afrin sprays PRN if re-bleeds with 20 mins pressure  - Avoid bending with head below the waist  - No significant straining for next couple days        Thank you for the consult, please page ENT at [phone] with any questions/concerns.  ----------------------------------------------------    Nadia Montague MD  Department of Otolaryngology - Head and Neck Surgery  [facility]

## 2022-03-27 NOTE — PROVIDER CONTACT NOTE (OTHER) - ACTION/TREATMENT ORDERED:
ADRIEL Chacon came to bedside and suctioned the pts nostrils and mouth. SPO2 saturation without NC was 92-93. ADRIEL Chacon advised to place pt on humidified air NC and to monitor. Order to be placed.

## 2022-03-27 NOTE — PROGRESS NOTE ADULT - SUBJECTIVE AND OBJECTIVE BOX
HPI:  77y/o M with PMHx sig for HTN, HLD, afib (on eliquis, unknown if last taken today,) CVA in  (with minimal residual right-sided weakness per girlfriend,) last known normal at 3pm when girlfriend spoke to him. 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 Upon arrival to [facility], BP noted to be 171/101. Stroke code was called. CTH without findings of hemorrhage. 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 NIHSS 20. No TPA was administered as Pt is out of window. Per patient's girlfriend, the basilar artery aneurysm is known and was diagnosed in  at [facility] when he suffered from a CVA. At that time, he underwent a "treatment for the aneurysm," which the girlfriend reports was too risky and was aborted. Patient never attended his follow-up appointments due to fear per girlfriend.       Subjective:  Patient denies any complaints.     Hospital course:  3/10: Admitted for further workup of stroke symptoms and basilar artery aneurysm.  3/11: Karen placed overnight. 3% hypertonic saline started. Neuro exam stable. Started on vEEG for aphasia  3/12: GM overnight. Neuro exam stable. 3% d/c. home eliquis 5 bid. failed s/s. started on TF via NGT, spiked fever 101, f/u panculture   3/13: GM overnight, neuro stable, veeg negative. Vanc/zosyn started for empiric PNA.   3/14: GM o/n neuro stable. on HFNC. ENT plan for laryngoscope today with . failed s/s, pend repeat eval. dc'd captopril, started lisinopril. dc'd vanc. Laryngoscopy: No masses visualized, hypomobility of L sided vocal cords, dysarthric, hypophonic, risk for aspiration, good cough reflex.Dr. Onofre to discuss with Dr. Peterson regarding a possible vocal cord injection.   3/15: GM overnight, neuro stable, on HFNC 30/30 overnight, transitioned to 4LNC  3/16: GM overnight, neuro stable, tolerating NC and satting well    3/17: POD6 dx angio. GM o/n neuro stable. trickle feeds via NGT  3/18: POD7 GM o/n, saturating well in RA. tolerating TF   : POD8 GM o/n. gen surg consulted for PEG placement.  : POD9 GM o/n. Transferred back to ICU for heparin gtt in preparation for PEG placement Wed.   3/21: POD10 GM o/n, neuro exam stable. Transferred to ICU in preparation for hep gtt to start tomorrow, plan for PEG on Wednesday. F/u am coags.   3/22: POD11. GM o/n neuro stable. on heparin gtt ptt goal 40-60. o/n ptt >200c, held for 2 hours. on heparin @11. pre-op for PEG   3/23: POD12. o/n new R facial droop, CTH stable. heparin gtt d/c. preop PEG today  3/24: POD13, POD1 PEG placement, GM overnight, neuro stable, +BM  3/25: POD14, GM o/n, neuro stable, eliquis restarted  3/26: POD 15. GM overnight. exam stable. on abx until 3/26 for UTI. On eliquis for afib.   3/17: POD 16. GM overnight. exam stable. abx completed for UTI. pending rehab.      Vital Signs Last 24 Hrs  T(C): 36.4 (26 Mar 2022 21:46), Max: 37.6 (26 Mar 2022 14:13)  T(F): 97.6 (26 Mar 2022 21:46), Max: 99.6 (26 Mar 2022 14:13)  HR: 76 (26 Mar 2022 23:40) (70 - 78)  BP: 141/70 (26 Mar 2022 23:40) (135/66 - 149/71)  BP(mean): 99 (26 Mar 2022 23:40) (93 - 99)  RR: 17 (26 Mar 2022 23:40) (17 - 18)  SpO2: 93% (26 Mar 2022 23:40) (93% - 97%)    I&O's Detail    25 Mar 2022 07:01  -  26 Mar 2022 07:00  --------------------------------------------------------  IN:    Jevity 1.2: 840 mL  Total IN: 840 mL    OUT:    Incontinent per Condom Catheter (mL): 1250 mL  Total OUT: 1250 mL    Total NET: -410 mL      26 Mar 2022 07:  -  27 Mar 2022 01:41  --------------------------------------------------------  IN:    Enteral Tube Flush: 60 mL    Jevity 1.2: 1260 mL  Total IN: 1320 mL    OUT:    Incontinent per Condom Catheter (mL): 1050 mL  Total OUT: 1050 mL    Total NET: 270 mL        I&O's Summary    25 Mar 2022 07:  -  26 Mar 2022 07:00  --------------------------------------------------------  IN: 840 mL / OUT: 1250 mL / NET: -410 mL    26 Mar 2022 07:  -  27 Mar 2022 01:41  --------------------------------------------------------  IN: 1320 mL / OUT: 1050 mL / NET: 270 mL        PHYSICAL EXAM:  General: patient seen laying supine in bed in NAD  Neuro: AAOx3, FC, OEs to voice, expressive aphasia, L facial droop, hypophonic and dysarthric speech. RUE 5/5 throughout, RLE HF/KF/KE 2/5, DF/PF 3/5, LUE 1/5 hand  0/5 shoulder/biceps/triceps, LLE w/d to noxious.  sensation intact to light touch throughout  HEENT: PERRL, EOMI  Neck: supple  Cardiac: RRR, S1S2  Pulmonary: chest rise symmetric  Abdomen: soft, nontender, nondistended, +PEG  Ext: perfusing well, L 5th toe ecchymosis  Skin: warm, dry    TUBES/LINES:  [] CVC  [] A-line  [] Lumbar Drain  [] Ventriculostomy  [] Other    DIET:  [] NPO  [] Mechanical  [] Tube feeds    LABS:                        13.2   10.39 )-----------( 264      ( 26 Mar 2022 05:58 )             41.4     03-    142  |  110<H>  |  35<H>  ----------------------------<  155<H>  4.2   |  23  |  0.74    Ca    8.7      26 Mar 2022 05:58  Phos  2.9       Mg     2.2             Urinalysis Basic - ( 25 Mar 2022 18:06 )    Color: Yellow / Appearance: Clear / S.025 / pH: x  Gluc: x / Ketone: NEGATIVE  / Bili: Negative / Urobili: 1.0 E.U./dL   Blood: x / Protein: NEGATIVE mg/dL / Nitrite: POSITIVE   Leuk Esterase: Trace / RBC: < 5 /HPF / WBC 5-10 /HPF   Sq Epi: x / Non Sq Epi: 0-5 /HPF / Bacteria: Present /HPF          CAPILLARY BLOOD GLUCOSE          Drug Levels: [] N/A    CSF Analysis: [] N/A      Allergies    No Known Allergies    Intolerances      MEDICATIONS:  Antibiotics:  cefTRIAXone   IVPB 1000 milliGRAM(s) IV Intermittent every 24 hours    Neuro:  acetaminophen    Suspension .. 650 milliGRAM(s) Oral every 6 hours PRN    Anticoagulation:  apixaban 5 milliGRAM(s) Oral every 12 hours    OTHER:  acetylcysteine 20%  Inhalation 4 milliLiter(s) Inhalation every 4 hours  albuterol/ipratropium for Nebulization 3 milliLiter(s) Nebulizer every 4 hours  amLODIPine   Tablet 10 milliGRAM(s) Oral daily  atorvastatin 80 milliGRAM(s) Oral at bedtime  carvedilol 6.25 milliGRAM(s) Oral every 12 hours  chlorhexidine 2% Cloths 1 Application(s) Topical <User Schedule>  glucagon  Injectable 1 milliGRAM(s) IntraMuscular once  hydrALAZINE Injectable 10 milliGRAM(s) IV Push every 4 hours PRN  lisinopril 20 milliGRAM(s) Oral every 24 hours  polyethylene glycol 3350 17 Gram(s) Oral every 12 hours  senna 2 Tablet(s) Oral at bedtime    IVF:    CULTURES:  Culture Results:   No growth at 1 day. ( @ 18:00)  Culture Results:   No growth at 1 day. ( @ 18:00)    RADIOLOGY & ADDITIONAL TESTS:      ASSESSMENT:  77y/o M with PMHx sig for HTN, HLD, afib (on eliquis, unknown if last taken today,) CVA in  (with minimal residual right-sided weakness per girlfriend) p/w dysarthria, left facial droop, and left-sided weakness. CTH without findings of hemorrhage. 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 NIHSS 20. Pt is not a tpa candidate as Pt is out of window. Patient now s/p diagnostic angio with findings of partially thrombosed basilar artery aneurysm 3/11/22. course c/b resp insufficiency weaned off HFNC    BASILAR ARTERY ANEURYSM    Handoff    MEWS Score    Brain aneurysm    Brain aneurysm    Basilar artery aneurysm    Basilar artery aneurysm    Brain stem compression    Longstanding persistent atrial fibrillation    HTN (hypertension)    Hyperlipidemia    Acidosis    Leukocytosis    Dysphagia    Hypocalcemia    Hypophosphatemia    Acute respiratory failure with hypoxia    Angiogram, carotid and cerebral, bilateral    EGD, with PEG    STROKE    SysAdmin_VisitLink        PLAN:  Neuro:   - neuro checks q 4/vital signs q 4  - MR head 3/11: no acute infarcts   - stroke core measures   - EEG negative 3/13, dc'd   - no plan for further neurosurgical intervention   - CTH 3/22 stable     Cardio:  - SBP goal 100-160   - cont home norvasc, carvedilol (home HCTZ held), Lisinopril substituted for Captopril   - echo 3/11: mild LVH, EF 65-70%   - Eliquis 5mg BID restarted 3/25 for afib   -PVCs- 3/26 overnight, per Nicholas chicas consulted      Pulm:  - satting well on RA, desatted to 91 put on 3L nasal canula   - standing mucomyst and duonebs  - Chest PT   - aspiration precaution, keep hob up     ENT:   - ENT consulted, s/p laryngoscopy 3/14: No masses, hypomobility of L sided vocal cord  - f/u outpatient with Dr. Onofre/Dr. Peterson for possible vocal cord injection.     Renal:  - Na goal 135-145  - no issues    GI:  - TF via PEG   - bowel regimen, BM 3/23    Endo:  - A1C 5.4    Heme;  - SCDs   - Eliquis 5mg q12  - LE dopplers 3/11, 3/23: neg for DVT   - LUE US 3/22: superficial thrombus L basilic/cephalic vein    ID:  - zosyn empirically for PNA completed 3/17  - febrile 3/25, +UA, f/u UCx and BCx, started Ceftriaxone 3/25    ORTHO:  - L foot xray 3/22: chronic 5th digit middle phalanx base corner fracture    Dispo:  - SDU status, full code   - Pending AR  - Family updated with plan  - OOB to chair     Assessment and plan discussed with Dr. Peterson        Assessment:  Present when checked    []  GCS  E   V  M     Heart Failure: []Acute, [] acute on chronic , []chronic  Heart Failure:  [] Diastolic (HFpEF), [] Systolic (HFrEF), []Combined (HFpEF and HFrEF), [] RHF, [] Pulm HTN, [] Other    [] LALO, [] ATN, [] AIN, [] other  [] CKD1, [] CKD2, [] CKD 3, [] CKD 4, [] CKD 5, []ESRD    Encephalopathy: [] Metabolic, [] Hepatic, [] toxic, [] Neurological, [] Other    Abnormal Nurtitional Status: [] malnurtition (see nutrition note), [ ]underweight: BMI < 19, [] morbid obesity: BMI >40, [] Cachexia    [] Sepsis  [] hypovolemic shock,[] cardiogenic shock, [] hemorrhagic shock, [] neuogenic shock  [] Acute Respiratory Failure  []Cerebral edema, [] Brain compression/ herniation,   [] Functional quadriplegia  [] Acute blood loss anemia

## 2022-03-27 NOTE — PROVIDER CONTACT NOTE (OTHER) - RECOMMENDATIONS
For provider to come to bedside to assess the patient. Request for medication to help nose bleed stop.

## 2022-03-28 LAB
ANION GAP SERPL CALC-SCNC: 7 MMOL/L — SIGNIFICANT CHANGE UP (ref 5–17)
BUN SERPL-MCNC: 33 MG/DL — HIGH (ref 7–23)
CALCIUM SERPL-MCNC: 9.2 MG/DL — SIGNIFICANT CHANGE UP (ref 8.4–10.5)
CHLORIDE SERPL-SCNC: 107 MMOL/L — SIGNIFICANT CHANGE UP (ref 96–108)
CO2 SERPL-SCNC: 27 MMOL/L — SIGNIFICANT CHANGE UP (ref 22–31)
CREAT SERPL-MCNC: 0.68 MG/DL — SIGNIFICANT CHANGE UP (ref 0.5–1.3)
EGFR: 95 ML/MIN/1.73M2 — SIGNIFICANT CHANGE UP
GLUCOSE SERPL-MCNC: 162 MG/DL — HIGH (ref 70–99)
HCT VFR BLD CALC: 41.7 % — SIGNIFICANT CHANGE UP (ref 39–50)
HGB BLD-MCNC: 13.5 G/DL — SIGNIFICANT CHANGE UP (ref 13–17)
MAGNESIUM SERPL-MCNC: 2.1 MG/DL — SIGNIFICANT CHANGE UP (ref 1.6–2.6)
MCHC RBC-ENTMCNC: 31.8 PG — SIGNIFICANT CHANGE UP (ref 27–34)
MCHC RBC-ENTMCNC: 32.4 GM/DL — SIGNIFICANT CHANGE UP (ref 32–36)
MCV RBC AUTO: 98.3 FL — SIGNIFICANT CHANGE UP (ref 80–100)
NRBC # BLD: 0 /100 WBCS — SIGNIFICANT CHANGE UP (ref 0–0)
PHOSPHATE SERPL-MCNC: 3.1 MG/DL — SIGNIFICANT CHANGE UP (ref 2.5–4.5)
PLATELET # BLD AUTO: 282 K/UL — SIGNIFICANT CHANGE UP (ref 150–400)
POTASSIUM SERPL-MCNC: 4.1 MMOL/L — SIGNIFICANT CHANGE UP (ref 3.5–5.3)
POTASSIUM SERPL-SCNC: 4.1 MMOL/L — SIGNIFICANT CHANGE UP (ref 3.5–5.3)
RBC # BLD: 4.24 M/UL — SIGNIFICANT CHANGE UP (ref 4.2–5.8)
RBC # FLD: 12.7 % — SIGNIFICANT CHANGE UP (ref 10.3–14.5)
SODIUM SERPL-SCNC: 141 MMOL/L — SIGNIFICANT CHANGE UP (ref 135–145)
WBC # BLD: 7.25 K/UL — SIGNIFICANT CHANGE UP (ref 3.8–10.5)
WBC # FLD AUTO: 7.25 K/UL — SIGNIFICANT CHANGE UP (ref 3.8–10.5)

## 2022-03-28 PROCEDURE — 99233 SBSQ HOSP IP/OBS HIGH 50: CPT | Mod: GC

## 2022-03-28 PROCEDURE — 99024 POSTOP FOLLOW-UP VISIT: CPT

## 2022-03-28 PROCEDURE — 99233 SBSQ HOSP IP/OBS HIGH 50: CPT

## 2022-03-28 RX ADMIN — APIXABAN 5 MILLIGRAM(S): 2.5 TABLET, FILM COATED ORAL at 19:31

## 2022-03-28 RX ADMIN — Medication 2 SPRAY(S): at 13:23

## 2022-03-28 RX ADMIN — Medication 4 MILLILITER(S): at 11:13

## 2022-03-28 RX ADMIN — Medication 2 SPRAY(S): at 11:15

## 2022-03-28 RX ADMIN — CARVEDILOL PHOSPHATE 6.25 MILLIGRAM(S): 80 CAPSULE, EXTENDED RELEASE ORAL at 19:29

## 2022-03-28 RX ADMIN — Medication 4 MILLILITER(S): at 02:43

## 2022-03-28 RX ADMIN — LISINOPRIL 20 MILLIGRAM(S): 2.5 TABLET ORAL at 19:30

## 2022-03-28 RX ADMIN — Medication 4 MILLILITER(S): at 23:20

## 2022-03-28 RX ADMIN — POLYETHYLENE GLYCOL 3350 17 GRAM(S): 17 POWDER, FOR SOLUTION ORAL at 05:24

## 2022-03-28 RX ADMIN — Medication 3 MILLILITER(S): at 23:19

## 2022-03-28 RX ADMIN — Medication 4 MILLILITER(S): at 13:54

## 2022-03-28 RX ADMIN — Medication 4 MILLILITER(S): at 05:24

## 2022-03-28 RX ADMIN — MUPIROCIN 1 APPLICATION(S): 20 OINTMENT TOPICAL at 19:35

## 2022-03-28 RX ADMIN — Medication 2 SPRAY(S): at 23:20

## 2022-03-28 RX ADMIN — Medication 3 MILLILITER(S): at 11:12

## 2022-03-28 RX ADMIN — APIXABAN 5 MILLIGRAM(S): 2.5 TABLET, FILM COATED ORAL at 05:25

## 2022-03-28 RX ADMIN — Medication 3 MILLILITER(S): at 13:24

## 2022-03-28 RX ADMIN — Medication 4 MILLILITER(S): at 20:02

## 2022-03-28 RX ADMIN — Medication 3 MILLILITER(S): at 19:43

## 2022-03-28 RX ADMIN — Medication 2 SPRAY(S): at 05:23

## 2022-03-28 RX ADMIN — CHLORHEXIDINE GLUCONATE 1 APPLICATION(S): 213 SOLUTION TOPICAL at 05:23

## 2022-03-28 RX ADMIN — CARVEDILOL PHOSPHATE 6.25 MILLIGRAM(S): 80 CAPSULE, EXTENDED RELEASE ORAL at 05:26

## 2022-03-28 RX ADMIN — MUPIROCIN 1 APPLICATION(S): 20 OINTMENT TOPICAL at 05:26

## 2022-03-28 RX ADMIN — Medication 2 SPRAY(S): at 20:01

## 2022-03-28 RX ADMIN — AMLODIPINE BESYLATE 10 MILLIGRAM(S): 2.5 TABLET ORAL at 05:26

## 2022-03-28 RX ADMIN — ATORVASTATIN CALCIUM 80 MILLIGRAM(S): 80 TABLET, FILM COATED ORAL at 23:19

## 2022-03-28 RX ADMIN — Medication 2 SPRAY(S): at 02:43

## 2022-03-28 RX ADMIN — Medication 3 MILLILITER(S): at 02:43

## 2022-03-28 RX ADMIN — Medication 3 MILLILITER(S): at 06:00

## 2022-03-28 NOTE — PROGRESS NOTE ADULT - SUBJECTIVE AND OBJECTIVE BOX
HPI:  75y/o M with PMHx sig for HTN, HLD, afib (on eliquis, unknown if last taken today,) CVA in 2020 (with minimal residual right-sided weakness per girlfriend,) last known normal at 3pm when girlfriend spoke to him. EMS found patient in his office at [employer] (he is a neuroscience professor,) with dysarthria, left facial droop, and left-sided weakness. Upon arrival to [facility], BP noted to be 171/101. Stroke code was called. CTH without findings of hemorrhage. 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 NIHSS 20. No TPA was administered as Pt is out of window. Per patient's girlfriend, the basilar artery aneurysm is known and was diagnosed in 2020 at [facility] when he suffered from a CVA. At that time, he underwent a "treatment for the aneurysm," which the girlfriend reports was too risky and was aborted. Patient never attended his follow-up appointments due to fear per girlfriend.  (10 Mar 2022 22:52)    OVERNIGHT EVENTS: Patient examined at bedside. No acute events or neurological changes overnight.     HOSPITAL COURSE:   3/10: Admitted for further workup of stroke symptoms and basilar artery aneurysm.  3/11: Osteen placed overnight. 3% hypertonic saline started. Neuro exam stable. Started on vEEG for aphasia  3/12: GM overnight. Neuro exam stable. 3% d/c. home eliquis 5 bid. failed s/s. started on TF via NGT, spiked fever 101, f/u panculture   3/13: GM overnight, neuro stable, veeg negative. Vanc/zosyn started for empiric PNA.   3/14: GM o/n neuro stable. on HFNC. ENT plan for laryngoscope today with . failed s/s, pend repeat eval. dc'd captopril, started lisinopril. dc'd vanc. Laryngoscopy: No masses visualized, hypomobility of L sided vocal cords, dysarthric, hypophonic, risk for aspiration, good cough reflex.Dr. Onofre to discuss with Dr. Peterson regarding a possible vocal cord injection.   3/15: GM overnight, neuro stable, on HFNC 30/30 overnight, transitioned to 4LNC  3/16: GM overnight, neuro stable, tolerating NC and satting well    3/17: POD6 dx angio. GM o/n neuro stable. trickle feeds via NGT  3/18: POD7 GM o/n, saturating well in RA. tolerating TF   03/19: POD8 GM o/n. gen surg consulted for PEG placement.  03/20: POD9 GM o/n. Transferred back to ICU for heparin gtt in preparation for PEG placement Wed.   3/21: POD10 GM o/n, neuro exam stable. Transferred to ICU in preparation for hep gtt to start tomorrow, plan for PEG on Wednesday. F/u am coags.   3/22: POD11. GM o/n neuro stable. on heparin gtt ptt goal 40-60. o/n ptt >200c, held for 2 hours. on heparin @11. pre-op for PEG   3/23: POD12. o/n new R facial droop, CTH stable. heparin gtt d/c. preop PEG today  3/24: POD13, POD1 PEG placement, GM overnight, neuro stable, +BM  3/25: POD14, GM o/n, neuro stable, eliquis restarted. Started ceftriaxone for UTI  3/26: POD 15. GM overnight. exam stable. on abx until 3/26 for UTI. On eliquis for afib.   3/27: POD 16. o/n episode of epistaxis, aftrin administered. exam stable. abx completed for UTI. pending rehab. ENT consulted for epistaxis, recommended nasal saline and bactroban.  3/28: POD17. GM o/n, neuro stable.      Vital Signs Last 24 Hrs  T(C): 36.9 (27 Mar 2022 21:21), Max: 37.7 (27 Mar 2022 16:41)  T(F): 98.4 (27 Mar 2022 21:21), Max: 99.9 (27 Mar 2022 16:41)  HR: 68 (27 Mar 2022 23:40) (66 - 78)  BP: 147/72 (27 Mar 2022 23:40) (138/68 - 161/80)  BP(mean): 103 (27 Mar 2022 23:40) (97 - 114)  RR: 17 (27 Mar 2022 23:40) (17 - 18)  SpO2: 95% (27 Mar 2022 23:40) (91% - 96%)    I&O's Summary    26 Mar 2022 07:01  -  27 Mar 2022 07:00  --------------------------------------------------------  IN: 1800 mL / OUT: 1150 mL / NET: 650 mL    27 Mar 2022 07:01  -  28 Mar 2022 00:14  --------------------------------------------------------  IN: 1180 mL / OUT: 650 mL / NET: 530 mL        PHYSICAL EXAM:  General: patient seen laying supine in bed in NAD  Neuro: AAOx3, FC, OEs to voice, expressive aphasia  L facial droop, hypophonic and dysarthric speech.   RUE 5/5 throughout, RLE 3/5, LUE 1/5 HG, otherwise 0/5, LLE WD   HEENT: PERRL, EOMI  Cardiac: regular rate and rhythm  Pulmonary: chest rise symmetric, nonlabored breathing  Abdomen: soft, nontender, nondistended, +PEG  Ext: perfusing well, pulses 2+ b/l LE   Skin: warm, dry    TUBES/LINES:  [] CVC  [] A-line  [] Lumbar Drain  [] Ventriculostomy  [] Other  [x] PEG    DIET:  [] NPO  [] Mechanical  [x] Tube feeds    LABS:                        13.1   8.38  )-----------( 290      ( 27 Mar 2022 07:48 )             40.0     03-27    142  |  108  |  33<H>  ----------------------------<  170<H>  4.4   |  25  |  0.74    Ca    9.0      27 Mar 2022 07:48  Phos  2.9     03-27  Mg     2.0     03-27              CAPILLARY BLOOD GLUCOSE          Drug Levels: [] N/A    CSF Analysis: [] N/A      Allergies    No Known Allergies    Intolerances      MEDICATIONS:  Antibiotics:    Neuro:  acetaminophen    Suspension .. 650 milliGRAM(s) Oral every 6 hours PRN    Anticoagulation:  apixaban 5 milliGRAM(s) Oral every 12 hours    OTHER:  acetylcysteine 20%  Inhalation 4 milliLiter(s) Inhalation every 4 hours  albuterol/ipratropium for Nebulization 3 milliLiter(s) Nebulizer every 4 hours  amLODIPine   Tablet 10 milliGRAM(s) Oral daily  atorvastatin 80 milliGRAM(s) Oral at bedtime  carvedilol 6.25 milliGRAM(s) Oral every 12 hours  chlorhexidine 2% Cloths 1 Application(s) Topical <User Schedule>  glucagon  Injectable 1 milliGRAM(s) IntraMuscular once  hydrALAZINE Injectable 10 milliGRAM(s) IV Push every 4 hours PRN  lisinopril 20 milliGRAM(s) Oral every 24 hours  mupirocin 2% Nasal 1 Application(s) Both Nostrils two times a day  polyethylene glycol 3350 17 Gram(s) Oral every 12 hours  senna 2 Tablet(s) Oral at bedtime  sodium chloride 0.65% Nasal 2 Spray(s) Both Nostrils every 4 hours    IVF:    CULTURES:  Culture Results:   No growth at 2 days. (03-25 @ 18:00)  Culture Results:   No growth at 2 days. (03-25 @ 18:00)    RADIOLOGY & ADDITIONAL TESTS:  < from: Xray Chest 1 View- PORTABLE-Routine (Xray Chest 1 View- PORTABLE-Routine .) (03.25.22 @ 19:13) >  IMPRESSION: Limited inspiration. Left effusion    < from: Xray Foot AP + Lateral, Left (03.24.22 @ 12:11) >  IMPRESSION: No interval change. Chronic fifth digit middle phalanx base   corner fracture. Moderate degenerative changes, as above. Osteopenia.    < from: US Duplex Venous Lower Ext Complete, Bilateral (03.23.22 @ 12:14) >  IMPRESSION:  Since 3/11/2022, again no evidence of deep vein thrombosis in either lower extremity.    < from: CT Head No Cont (03.22.22 @ 22:29) >  IMPRESSION:  1. No intracranial hemorrhage.  2. Large aneurysm in the caudal half of the basilar artery measuring 2.9   x 2.83 x 3.3 cm.  3. Fusiform ectasia/aneurysm cruzito tubular appearance of the upper half of the basilar artery, withdiameter of up to 8.6 mm, and basilar tip diameter of up to 8.8 mm.  4. Underlying symmetrical volume loss and microangiopathic white matter changes.  5. Vascular calcifications.        ASSESSMENT:  75y/o M with PMHx sig for HTN, HLD, afib (on eliquis, unknown if last taken today,) CVA in 2020 (with minimal residual right-sided weakness per girlfriend) p/w dysarthria, left facial droop, and left-sided weakness. CTH without findings of hemorrhage. 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 NIHSS 20. Pt is not a tpa candidate as Pt is out of window. Patient now s/p diagnostic angio with findings of partially thrombosed basilar artery aneurysm 3/11/22. course c/b resp insufficiency weaned off HFNC.       BASILAR ARTERY ANEURYSM    Handoff    MEWS Score    Brain aneurysm    Brain aneurysm    Basilar artery aneurysm    Basilar artery aneurysm    Brain stem compression    Longstanding persistent atrial fibrillation    HTN (hypertension)    Hyperlipidemia    Acidosis    Leukocytosis    Dysphagia    Hypocalcemia    Hypophosphatemia    Acute respiratory failure with hypoxia    Angiogram, carotid and cerebral, bilateral    EGD, with PEG    STROKE    [email or web address]        PLAN:  Neuro:   - neuro checks/vital signs q4hrs  - MR head 3/11: no acute infarcts   - CTH 3/22: stable   - EEG negative 3/13, dc'd   - no plan for further neurosurgical intervention   - stroke core measures     Cardio:  - SBP goal 100-160   - Amlodipine 10mg, carvedilol 6.25mg BID, Lisinopril 20mg (substituted for Captopril)   - home HCTZ held, Hydralazine 10q4 PRN   echo 3/11: mild LVH, EF 65-70%   - Eliquis 5mg BID restarted 3/25 for afib   -PVCs 3/26, per cardiology NTD  - cont home lipitor 80mg    Pulm:  - satting well on RA  - standing mucomyst and duonebs  - Chest PT   - aspiration precaution, HOB up     ENT:   - ENT consulted, s/p laryngoscopy 3/14: No masses, hypomobility of L sided vocal cord  - f/u outpatient with Dr. Onofre/Dr. Peterson for possible vocal cord injection.   - s/p epistaxis episode 3/26: intranasal bactroban BID and saline spray q4 to moisturize, can use afrin and manual pressure x 20 minutes if bleeds again     Renal:  - Na goal 135-145  - no issues    GI:  - TF via PEG   - bowel regimen  - last BM 3/27    Endo:  - A1C 5.4    Heme;  - SCDs for DVT ppx  - Eliquis 5mg BID  - LE dopplers 3/11, 3/23: neg for DVT   - LUE US 3/22: superficial thrombus L basilic/cephalic vein    ID:  - zosyn empirically for PNA completed 3/17  - pancx 3/25: +UA, neg blood cx, completed empiric Ceftriaxone x3 days for UTI (3/25-3/27)    ORTHO:  - L foot xray 3/22: chronic 5th digit middle phalanx base corner fracture    Dispo: SDU status, full code, Pending AR, Family updated    Assessment and plan discussed with Dr. Peterson

## 2022-03-28 NOTE — PROGRESS NOTE ADULT - ASSESSMENT
per Neurology    77 y/o M with PMHx sig for HTN, HLD, afib (on eliquis, unknown if last taken today,) CVA in 2020 (with minimal residual right-sided weakness per girlfriend) p/w dysarthria, left facial droop, and left-sided weakness. CTH without findings of hemorrhage. 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 NIHSS 20. Pt is not a tpa candidate as Pt is out of window. Patient now s/p diagnostic angio with findings of partially thrombosed basilar artery aneurysm 3/11/22. course c/b resp insufficiency weaned off HFNC.     PLAN:  Neuro:   - neuro checks/vital signs q4hrs  - MR head 3/11: no acute infarcts   - CTH 3/22: stable   - EEG negative 3/13, dc'd   - no plan for further neurosurgical intervention   - stroke core measures     Cardio:  - SBP goal 100-160   - Amlodipine 10mg, carvedilol 6.25mg BID, Lisinopril 20mg (substituted for Captopril)   - home HCTZ held, Hydralazine 10q4 PRN   echo 3/11: mild LVH, EF 65-70%   - Eliquis 5mg BID restarted 3/25 for afib   -PVCs 3/26, per cardiology NTD  - cont home lipitor 80mg    Pulm:  - satting well on RA  - standing mucomyst and duonebs  - Chest PT   - aspiration precaution, HOB up     ENT:   - ENT consulted, s/p laryngoscopy 3/14: No masses, hypomobility of L sided vocal cord  - f/u outpatient with Dr. Onofre/Dr. Peterson for possible vocal cord injection.   - s/p epistaxis episode 3/26: intranasal bactroban BID and saline spray q4 to moisturize, can use afrin and manual pressure x 20 minutes if bleeds again     Renal:  - Na goal 135-145  - no issues    GI:  - TF via PEG   - bowel regimen  - last BM 3/27    Endo:  - A1C 5.4    Heme;  - SCDs for DVT ppx  - Eliquis 5mg BID  - LE dopplers 3/11, 3/23: neg for DVT   - LUE US 3/22: superficial thrombus L basilic/cephalic vein    ID:  - zosyn empirically for PNA completed 3/17  - pancx 3/25: +UA, neg blood cx, completed empiric Ceftriaxone x3 days for UTI (3/25-3/27)    ORTHO:  - L foot xray 3/22: chronic 5th digit middle phalanx base corner fracture    Dispo: SDU status, full code, Pending AR, Family updated

## 2022-03-28 NOTE — PROGRESS NOTE ADULT - SUBJECTIVE AND OBJECTIVE BOX
INTERVAL EVENTS:  tele: rare pvcs      MEDICATIONS  (STANDING):  acetylcysteine 20%  Inhalation 4 milliLiter(s) Inhalation every 4 hours  albuterol/ipratropium for Nebulization 3 milliLiter(s) Nebulizer every 4 hours  amLODIPine   Tablet 10 milliGRAM(s) Oral daily  apixaban 5 milliGRAM(s) Oral every 12 hours  atorvastatin 80 milliGRAM(s) Oral at bedtime  carvedilol 6.25 milliGRAM(s) Oral every 12 hours  chlorhexidine 2% Cloths 1 Application(s) Topical <User Schedule>  glucagon  Injectable 1 milliGRAM(s) IntraMuscular once  lisinopril 20 milliGRAM(s) Oral every 24 hours  mupirocin 2% Nasal 1 Application(s) Both Nostrils two times a day  polyethylene glycol 3350 17 Gram(s) Oral every 12 hours  senna 2 Tablet(s) Oral at bedtime  sodium chloride 0.65% Nasal 2 Spray(s) Both Nostrils every 4 hours    MEDICATIONS  (PRN):  acetaminophen    Suspension .. 650 milliGRAM(s) Oral every 6 hours PRN Temp greater or equal to 38C (100.4F), Mild Pain (1 - 3)  hydrALAZINE Injectable 10 milliGRAM(s) IV Push every 4 hours PRN SBP>160      Home Medications:  amLODIPine 5 mg oral tablet: 1 tab(s) orally once a day (14 Mar 2022 11:26)  captopril 100 mg oral tablet: 1  orally 3 times a day (14 Mar 2022 11:26)  carvedilol 6.25 mg oral tablet: orally 2 times a day (14 Mar 2022 11:26)  Eliquis 5 mg oral tablet:  (14 Mar 2022 11:26)  hydroCHLOROthiazide 25 mg oral tablet:  (14 Mar 2022 11:26)  Lipitor 80 mg oral tablet: 1 tab(s) orally once a day (14 Mar 2022 11:26)      Vital Signs Last 24 Hrs  T(C): 37 (28 Mar 2022 13:46), Max: 37.7 (27 Mar 2022 16:41)  T(F): 98.6 (28 Mar 2022 13:46), Max: 99.9 (27 Mar 2022 16:41)  HR: 64 (28 Mar 2022 13:31) (64 - 70)  BP: 138/77 (28 Mar 2022 13:31) (132/68 - 161/80)  BP(mean): 102 (28 Mar 2022 13:31) (96 - 114)  RR: 18 (28 Mar 2022 13:31) (17 - 18)  SpO2: 97% (28 Mar 2022 13:31) (85% - 97%)     PHYSICAL EXAM:  NAD, appears lethargic but responsive  RRR nl s1 s2 no m/r/g  CTAB  No edema    LABS:                        13.5   7.25  )-----------( 282      ( 28 Mar 2022 06:16 )             41.7     03-28    141  |  107  |  33<H>  ----------------------------<  162<H>  4.1   |  27  |  0.68    Ca    9.2      28 Mar 2022 06:16  Phos  3.1     03-28  Mg     2.1     03-28              I&O's Summary    27 Mar 2022 07:01  -  28 Mar 2022 07:00  --------------------------------------------------------  IN: 1800 mL / OUT: 1150 mL / NET: 650 mL    28 Mar 2022 07:01  -  28 Mar 2022 13:47  --------------------------------------------------------  IN: 0 mL / OUT: 400 mL / NET: -400 mL      77y/o M with PMHx sig for HTN, HLD, afib (on eliquis, unknown if last taken today,) CVA in 2020 (with minimal residual right-sided weakness per girlfriend) p/w dysarthria, left facial droop, and left-sided weakness. CTH without findings of hemorrhage. 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 NIHSS 20. Pt is not a tpa candidate as Pt is out of window. Patient now s/p diagnostic angio with findings of partially thrombosed basilar artery aneurysm 3/11/22. course c/b resp insufficiency weaned off HFNC, now on NC 2L. Cardiology was consulted for PVCs seen on telemetry.    #Occasional PVCs on telemetry   EKG: NSR, incomplete RBBB  Tele: occasional PVCs  Echo: normal LV and RV size and funxn., mild LVH, aortic sclerosis, PASP 35 mmHg, bubble study: small intracardiac shunt vs small pulmonary AV malformation  - PVCs/pAF: c/w carvedilol 6.25 mg bid; c/w Apixaban 5 mg bid  - HTN/HLD: c/w Ampodipine 10 mg daily, Lisinopril 20 mg q24h, Atorvastatin 80 mg daily.    Cardiology will sign off, please reconsult as needed    D/w cardiology attending  Curt Rodriguez MD PGY5

## 2022-03-28 NOTE — PROGRESS NOTE ADULT - SUBJECTIVE AND OBJECTIVE BOX
Subjective/Interval events:  -Nose bleeding over the wknd, nasal saline per ENT no other recs, no further bleeding reported   -This am pt no acute complaints, per nursing doing well no sig secretion burden  -Sating 95% on RA with me this am    MEDICATIONS  (STANDING):  acetylcysteine 20%  Inhalation 4 milliLiter(s) Inhalation every 4 hours  albuterol/ipratropium for Nebulization 3 milliLiter(s) Nebulizer every 4 hours  amLODIPine   Tablet 10 milliGRAM(s) Oral daily  apixaban 5 milliGRAM(s) Oral every 12 hours  atorvastatin 80 milliGRAM(s) Oral at bedtime  carvedilol 6.25 milliGRAM(s) Oral every 12 hours  chlorhexidine 2% Cloths 1 Application(s) Topical <User Schedule>  glucagon  Injectable 1 milliGRAM(s) IntraMuscular once  lisinopril 20 milliGRAM(s) Oral every 24 hours  mupirocin 2% Nasal 1 Application(s) Both Nostrils two times a day  polyethylene glycol 3350 17 Gram(s) Oral every 12 hours  senna 2 Tablet(s) Oral at bedtime  sodium chloride 0.65% Nasal 2 Spray(s) Both Nostrils every 4 hours    MEDICATIONS  (PRN):  acetaminophen    Suspension .. 650 milliGRAM(s) Oral every 6 hours PRN Temp greater or equal to 38C (100.4F), Mild Pain (1 - 3)  hydrALAZINE Injectable 10 milliGRAM(s) IV Push every 4 hours PRN SBP>160      Vital Signs Last 24 Hrs  T(C): 36.8 (28 Mar 2022 09:35), Max: 37.7 (27 Mar 2022 16:41)  T(F): 98.2 (28 Mar 2022 09:35), Max: 99.9 (27 Mar 2022 16:41)  HR: 68 (28 Mar 2022 10:25) (64 - 70)  BP: 140/67 (28 Mar 2022 10:25) (132/68 - 161/80)  BP(mean): 96 (28 Mar 2022 10:25) (96 - 114)  RR: 17 (28 Mar 2022 04:40) (17 - 18)  SpO2: 85% (28 Mar 2022 10:25) (85% - 96%)    PHYSICAL EXAM:  GENERAL: pleasant, NAD  NEURO: Aox2 to person/place and eyes closed and somnolent at times, LE hemiplegia   HEENT: clear op, mmm  NECK:  No JVD, no lymphadenopathy  CHEST/LUNG: Clear to auscultation bilaterally; No rales, rhonchi, wheezing. Normal work of breathing, not tachypneic  HEART: Regular rate and rhythm; No murmurs, rubs, or gallops  ABDOMEN: Soft, Nontender, Nondistended. Normoactive bowel sounds  EXTREMITIES:  No sig le edema     LABS (reviewed) CBC and BMP stable, noted elevated bicarb    ASSESSMENT AND PLAN: 75y/o M with PMHx sig for HTN, HLD, afib (on eliquis), CVA in 2020 p/w dysarthria and left-sided weakness with thrombosed basilar artery aneurysm with brainstem compression s/p angio course c/b resp insufficiency 2/2 PNA, s/p PEG for dysphagia   #Basilar artery aneurysm and brain stem compression- care per primary team  #Afib on eliquis- c/w eliquis and coreg   #HTN, HL- c/w coreg, lisinopril, amlodipine   #Dsyphagia s/p PEG- tolerating feedings no high residuals noted   #S/p PNA with hypoxic resp failure- c/w nebs standing, switch to PRN as able  #Epistaxis- c/w nasal spray as per ENT recs     #DVT px- eliquis  #Dispo- likely [facility] mid week     Patient was seen and examined by me at bedside    Greater than 35 minutes spent on total encounter; more than 50% of the visit was spent counseling and/or coordinating care by the attending physician.    Berlin Zhou MD [phone]

## 2022-03-28 NOTE — CHART NOTE - NSCHARTNOTEFT_GEN_A_CORE
Admitting Diagnosis:   Patient is a 76y old  Male who presents with a chief complaint of CVA, left facial, left-sided weakness (28 Mar 2022 13:45)    PAST MEDICAL & SURGICAL HISTORY:    Current Nutrition Order: NPO  EN regimen:: Jevity 1.2 @ 70 ml/hr x24hrs via PEG providing 1680 ml TV, 2016 kcal, 93g pro, 1356 ml free water.     PO Intake: Good (%) [   ]  Fair (50-75%) [   ] Poor (<25%) [   ]- N/A    GI Issues:   WDL, No n/v/c noted  +fecal incontinence  No abd distention    Pain:  No pain noted at this time    Skin Integrity:  Surgical incision, marichuy score 15  +1 generalized pitting edema, +4 pitting edema left arm  No pressure ulcers noted    Labs:   03-28    141  |  107  |  33<H>  ----------------------------<  162<H>  4.1   |  27  |  0.68    Ca    9.2      28 Mar 2022 06:16  Phos  3.1     03-28  Mg     2.1     03-28    Medications:  MEDICATIONS  (STANDING):  acetylcysteine 20%  Inhalation 4 milliLiter(s) Inhalation every 4 hours  albuterol/ipratropium for Nebulization 3 milliLiter(s) Nebulizer every 4 hours  amLODIPine   Tablet 10 milliGRAM(s) Oral daily  apixaban 5 milliGRAM(s) Oral every 12 hours  atorvastatin 80 milliGRAM(s) Oral at bedtime  carvedilol 6.25 milliGRAM(s) Oral every 12 hours  chlorhexidine 2% Cloths 1 Application(s) Topical <User Schedule>  glucagon  Injectable 1 milliGRAM(s) IntraMuscular once  lisinopril 20 milliGRAM(s) Oral every 24 hours  mupirocin 2% Nasal 1 Application(s) Both Nostrils two times a day  polyethylene glycol 3350 17 Gram(s) Oral every 12 hours  senna 2 Tablet(s) Oral at bedtime  sodium chloride 0.65% Nasal 2 Spray(s) Both Nostrils every 4 hours    MEDICATIONS  (PRN):  acetaminophen    Suspension .. 650 milliGRAM(s) Oral every 6 hours PRN Temp greater or equal to 38C (100.4F), Mild Pain (1 - 3)  hydrALAZINE Injectable 10 milliGRAM(s) IV Push every 4 hours PRN SBP>160    Admitting Anthropometrics:  · Height for BMI (FEET)	6 Feet  · Height for BMI (INCHES)	3 Inch(s)  · Height for BMI (CENTIMETERS)	190.5 Centimeter(s)  · Weight for BMI (lbs)	260 lb  · Weight for BMI (kg)	117.9 kg  · Body Mass Index	32.4  · Ideal Body Weight (lbs)	196  · Ideal Body Weight (kg)	88.9    Weight:  3/10 260lbs  3/14 176lbs (?accuracy- pt does not appear to be this weight)    Weight Change:   Please obtain new weight when feasible and trend biweekly.     Nutrition Focused Physical Exam: Completed [   ]  Not Pertinent [ x  ]    Estimated energy needs:   IBW used for calculations as pt >120% of IBW (133%). Needs adjusted for advanced age. **Adjust fluids per team.  Kcal (20-25 kcal/kg): 4199-8370 kcal  Protein (1.0-1.2 g/kg pro): 88-106g pro  Fluids (30-35 ml/kg): 8358-0206 ml    Subjective:   76M with PMHx sig for HTN, HLD, afib (on eliquis, unknown if last taken today,) CVA in 2020 (with minimal residual right-sided weakness per girlfriend) p/w dysarthria, left facial droop, and left-sided weakness. CTH without findings of hemorrhage. 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 NIHSS 20. Pt is not a tpa candidate as pt is out of window. Transferred to NSICU for further assessment/ monitoring. S/p diagnostic angio with findings of partially thrombosed basilar artery aneurysm 3/11/22. SLP eval 3/12 and follow up 3/14 both with recs for NPO with NGT placement for initiation of EN. Pt with new right facial droop o/n 3/23- CTH stable. Pt now s/p EGD with PEG placement 3/23. Now transferred to SDU for further monitoring. Plan for d/c pending rehab.     On assessment, pt resting in bed without complaints. Cont to remain NPO with EN feeds via PEG tolerating well. No n/v/d/c. No s/sx of aspiration on EN feeds. No abd distention. Education deferred at this time. Please see recs below.     Previous Nutrition Diagnosis: Inadequate energy intake RT inability to meet est needs on current diet AEB NPO, meeting 0% of est needs.     Active [   ]  Resolved [ x  ]    If resolved, new PES: Inadequate oral intake RT inability to meet est needs via PO AEB NPO, reliant on EN to meet 100% of est needs    Goal/Expected Outcome 1. Consistently meet >75% est needs via feasible route    Recommendations:  1. NPO  2. Cont with current EN regimen; Jevity 1.2 @ 70 ml/hr x24hrs via NGT providing 1680 ml TV, 2016 kcal, 93g pro, 1356 ml free water.   >>FWF per team  >>Maintain aspiration precautions at all times  3. Pain and bowel regimen per team   4. Cont to monitor lytes and replete prn   5. RD diet edu prn    Education: Deferred    Risk Level: High [   ] Moderate [ x  ] Low [   ].

## 2022-03-28 NOTE — PROGRESS NOTE ADULT - SUBJECTIVE AND OBJECTIVE BOX
Physical Medicine and Rehabilitation Progress Note:    Patient is a 76y old  Male who presents with a chief complaint of CVA, left facial, left-sided weakness (28 Mar 2022 13:21)      HPI:  75y/o M with PMHx sig for HTN, HLD, afib (on eliquis, unknown if last taken today,) CVA in 2020 (with minimal residual right-sided weakness per girlfriend,) last known normal at 3pm when girlfriend spoke to him. 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 Upon arrival to [facility], BP noted to be 171/101. Stroke code was called. CTH without findings of hemorrhage. 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 NIHSS 20. No TPA was administered as Pt is out of window. Per patient's girlfriend, the basilar artery aneurysm is known and was diagnosed in 2020 at [facility] when he suffered from a CVA. At that time, he underwent a "treatment for the aneurysm," which the girlfriend reports was too risky and was aborted. Patient never attended his follow-up appointments due to fear per girlfriend.  (10 Mar 2022 22:52)                            13.5   7.25  )-----------( 282      ( 28 Mar 2022 06:16 )             41.7       03-28    141  |  107  |  33<H>  ----------------------------<  162<H>  4.1   |  27  |  0.68    Ca    9.2      28 Mar 2022 06:16  Phos  3.1     03-28  Mg     2.1     03-28      Vital Signs Last 24 Hrs  T(C): 36.8 (28 Mar 2022 09:35), Max: 37.7 (27 Mar 2022 16:41)  T(F): 98.2 (28 Mar 2022 09:35), Max: 99.9 (27 Mar 2022 16:41)  HR: 64 (28 Mar 2022 13:31) (64 - 70)  BP: 138/77 (28 Mar 2022 13:31) (132/68 - 161/80)  BP(mean): 102 (28 Mar 2022 13:31) (96 - 114)  RR: 18 (28 Mar 2022 13:31) (17 - 18)  SpO2: 97% (28 Mar 2022 13:31) (85% - 97%)    MEDICATIONS  (STANDING):  acetylcysteine 20%  Inhalation 4 milliLiter(s) Inhalation every 4 hours  albuterol/ipratropium for Nebulization 3 milliLiter(s) Nebulizer every 4 hours  amLODIPine   Tablet 10 milliGRAM(s) Oral daily  apixaban 5 milliGRAM(s) Oral every 12 hours  atorvastatin 80 milliGRAM(s) Oral at bedtime  carvedilol 6.25 milliGRAM(s) Oral every 12 hours  chlorhexidine 2% Cloths 1 Application(s) Topical <User Schedule>  glucagon  Injectable 1 milliGRAM(s) IntraMuscular once  lisinopril 20 milliGRAM(s) Oral every 24 hours  mupirocin 2% Nasal 1 Application(s) Both Nostrils two times a day  polyethylene glycol 3350 17 Gram(s) Oral every 12 hours  senna 2 Tablet(s) Oral at bedtime  sodium chloride 0.65% Nasal 2 Spray(s) Both Nostrils every 4 hours    MEDICATIONS  (PRN):  acetaminophen    Suspension .. 650 milliGRAM(s) Oral every 6 hours PRN Temp greater or equal to 38C (100.4F), Mild Pain (1 - 3)  hydrALAZINE Injectable 10 milliGRAM(s) IV Push every 4 hours PRN SBP>160    Currently Undergoing Physical/ Occupational Therapy at bedside.    PT/OT Functional Status Assessment:         Cognitive/Neuro/Behavioral  Cognitive/Neuro/Behavioral [WDL Definition: Alert; opens eyes spontaneously; arouses to voice or touch; oriented x 4; follows commands; speech spontaneous, logical; purposeful motor response; behavior appropriate to situation]: WDL except  Level of Consciousness: lethargic;  alert    Language Assistance  Preferred Language to Address Healthcare Preferred Language to Address Healthcare: English    Therapeutic Interventions      Bed Mobility  Bed Mobility Training Rolling/Turning: maximum assist (25% patient effort);  2 person assist;  nonverbal cues (demo/gestures);  verbal cues  Bed Mobility Training Scooting: maximum assist (25% patient effort);  2 person assist;  nonverbal cues (demo/gestures);  verbal cues  Bed Mobility Training Sit-to-Supine: maximum assist (25% patient effort);  2 person assist;  nonverbal cues (demo/gestures);  verbal cues  Bed Mobility Training Supine-to-Sit: maximum assist (25% patient effort);  2 person assist;  verbal cues;  nonverbal cues (demo/gestures)  Bed Mobility Training Limitations: decreased ability to use arms for pushing/pulling;  decreased ability to use legs for bridging/pushing;  impaired ability to control trunk for mobility;  decreased strength;  impaired balance;  impaired postural control;  cognitive, decreased safety awareness;  abnormal muscle tone;  impaired motor control    Therapeutic Exercise  Therapeutic Exercise Detail: sitting on EOB x15 min w/ maxA for trunk control AAROM modA RLE sitting on EOB x10: LAQ, marchingPROM LLE sitting on EOB x10: LAQ, marching lateral weight shifting in sitting w/ modA x10ant/post trunk lean x10 w/ modA reaching for objects outside of DANILO w/ LUE x5scap retraction x10           PM&R Impression: as above    Current Disposition Plan Recommendations:    acute rehab placement

## 2022-03-29 LAB
ANION GAP SERPL CALC-SCNC: 9 MMOL/L — SIGNIFICANT CHANGE UP (ref 5–17)
BUN SERPL-MCNC: 33 MG/DL — HIGH (ref 7–23)
CALCIUM SERPL-MCNC: 9.2 MG/DL — SIGNIFICANT CHANGE UP (ref 8.4–10.5)
CHLORIDE SERPL-SCNC: 106 MMOL/L — SIGNIFICANT CHANGE UP (ref 96–108)
CO2 SERPL-SCNC: 25 MMOL/L — SIGNIFICANT CHANGE UP (ref 22–31)
CREAT SERPL-MCNC: 0.71 MG/DL — SIGNIFICANT CHANGE UP (ref 0.5–1.3)
EGFR: 94 ML/MIN/1.73M2 — SIGNIFICANT CHANGE UP
GLUCOSE SERPL-MCNC: 164 MG/DL — HIGH (ref 70–99)
HCT VFR BLD CALC: 42 % — SIGNIFICANT CHANGE UP (ref 39–50)
HGB BLD-MCNC: 13.8 G/DL — SIGNIFICANT CHANGE UP (ref 13–17)
MAGNESIUM SERPL-MCNC: 2.2 MG/DL — SIGNIFICANT CHANGE UP (ref 1.6–2.6)
MCHC RBC-ENTMCNC: 31.7 PG — SIGNIFICANT CHANGE UP (ref 27–34)
MCHC RBC-ENTMCNC: 32.9 GM/DL — SIGNIFICANT CHANGE UP (ref 32–36)
MCV RBC AUTO: 96.3 FL — SIGNIFICANT CHANGE UP (ref 80–100)
NRBC # BLD: 0 /100 WBCS — SIGNIFICANT CHANGE UP (ref 0–0)
PHOSPHATE SERPL-MCNC: 3.4 MG/DL — SIGNIFICANT CHANGE UP (ref 2.5–4.5)
PLATELET # BLD AUTO: 310 K/UL — SIGNIFICANT CHANGE UP (ref 150–400)
POTASSIUM SERPL-MCNC: 4.3 MMOL/L — SIGNIFICANT CHANGE UP (ref 3.5–5.3)
POTASSIUM SERPL-SCNC: 4.3 MMOL/L — SIGNIFICANT CHANGE UP (ref 3.5–5.3)
RBC # BLD: 4.36 M/UL — SIGNIFICANT CHANGE UP (ref 4.2–5.8)
RBC # FLD: 12.6 % — SIGNIFICANT CHANGE UP (ref 10.3–14.5)
SODIUM SERPL-SCNC: 140 MMOL/L — SIGNIFICANT CHANGE UP (ref 135–145)
WBC # BLD: 7.01 K/UL — SIGNIFICANT CHANGE UP (ref 3.8–10.5)
WBC # FLD AUTO: 7.01 K/UL — SIGNIFICANT CHANGE UP (ref 3.8–10.5)

## 2022-03-29 PROCEDURE — 99233 SBSQ HOSP IP/OBS HIGH 50: CPT | Mod: GC

## 2022-03-29 PROCEDURE — 99024 POSTOP FOLLOW-UP VISIT: CPT

## 2022-03-29 RX ORDER — CEPHALEXIN 500 MG
250 CAPSULE ORAL EVERY 6 HOURS
Refills: 0 | Status: DISCONTINUED | OUTPATIENT
Start: 2022-03-29 | End: 2022-03-29

## 2022-03-29 RX ADMIN — Medication 2 SPRAY(S): at 10:56

## 2022-03-29 RX ADMIN — Medication 4 MILLILITER(S): at 07:30

## 2022-03-29 RX ADMIN — Medication 2 SPRAY(S): at 18:51

## 2022-03-29 RX ADMIN — AMLODIPINE BESYLATE 10 MILLIGRAM(S): 2.5 TABLET ORAL at 07:37

## 2022-03-29 RX ADMIN — Medication 3 MILLILITER(S): at 21:07

## 2022-03-29 RX ADMIN — Medication 4 MILLILITER(S): at 04:13

## 2022-03-29 RX ADMIN — Medication 2 SPRAY(S): at 04:14

## 2022-03-29 RX ADMIN — Medication 4 MILLILITER(S): at 14:48

## 2022-03-29 RX ADMIN — CARVEDILOL PHOSPHATE 6.25 MILLIGRAM(S): 80 CAPSULE, EXTENDED RELEASE ORAL at 18:36

## 2022-03-29 RX ADMIN — CARVEDILOL PHOSPHATE 6.25 MILLIGRAM(S): 80 CAPSULE, EXTENDED RELEASE ORAL at 07:37

## 2022-03-29 RX ADMIN — Medication 2 SPRAY(S): at 14:35

## 2022-03-29 RX ADMIN — Medication 3 MILLILITER(S): at 10:57

## 2022-03-29 RX ADMIN — SENNA PLUS 2 TABLET(S): 8.6 TABLET ORAL at 21:07

## 2022-03-29 RX ADMIN — Medication 3 MILLILITER(S): at 04:14

## 2022-03-29 RX ADMIN — LISINOPRIL 20 MILLIGRAM(S): 2.5 TABLET ORAL at 18:37

## 2022-03-29 RX ADMIN — Medication 2 SPRAY(S): at 07:33

## 2022-03-29 RX ADMIN — APIXABAN 5 MILLIGRAM(S): 2.5 TABLET, FILM COATED ORAL at 18:35

## 2022-03-29 RX ADMIN — Medication 3 MILLILITER(S): at 14:36

## 2022-03-29 RX ADMIN — MUPIROCIN 1 APPLICATION(S): 20 OINTMENT TOPICAL at 18:52

## 2022-03-29 RX ADMIN — Medication 2 SPRAY(S): at 21:07

## 2022-03-29 RX ADMIN — ATORVASTATIN CALCIUM 80 MILLIGRAM(S): 80 TABLET, FILM COATED ORAL at 21:07

## 2022-03-29 RX ADMIN — MUPIROCIN 1 APPLICATION(S): 20 OINTMENT TOPICAL at 07:34

## 2022-03-29 RX ADMIN — Medication 4 MILLILITER(S): at 21:07

## 2022-03-29 RX ADMIN — Medication 3 MILLILITER(S): at 07:31

## 2022-03-29 RX ADMIN — Medication 3 MILLILITER(S): at 18:54

## 2022-03-29 RX ADMIN — CHLORHEXIDINE GLUCONATE 1 APPLICATION(S): 213 SOLUTION TOPICAL at 07:35

## 2022-03-29 RX ADMIN — APIXABAN 5 MILLIGRAM(S): 2.5 TABLET, FILM COATED ORAL at 07:31

## 2022-03-29 RX ADMIN — Medication 4 MILLILITER(S): at 19:14

## 2022-03-29 NOTE — PROGRESS NOTE ADULT - SUBJECTIVE AND OBJECTIVE BOX
Subjective/Interval events:  -No events overnight, confirmed with nursing no further nose bleeding, remains with nasal packing by ENT, had BM yest  -PT this am no new complaints, asymptomatic    MEDICATIONS  (STANDING):  acetylcysteine 20%  Inhalation 4 milliLiter(s) Inhalation every 4 hours  albuterol/ipratropium for Nebulization 3 milliLiter(s) Nebulizer every 4 hours  amLODIPine   Tablet 10 milliGRAM(s) Oral daily  apixaban 5 milliGRAM(s) Oral every 12 hours  atorvastatin 80 milliGRAM(s) Oral at bedtime  carvedilol 6.25 milliGRAM(s) Oral every 12 hours  chlorhexidine 2% Cloths 1 Application(s) Topical <User Schedule>  glucagon  Injectable 1 milliGRAM(s) IntraMuscular once  lisinopril 20 milliGRAM(s) Oral every 24 hours  mupirocin 2% Nasal 1 Application(s) Both Nostrils two times a day  polyethylene glycol 3350 17 Gram(s) Oral every 12 hours  senna 2 Tablet(s) Oral at bedtime  sodium chloride 0.65% Nasal 2 Spray(s) Both Nostrils every 4 hours    MEDICATIONS  (PRN):  acetaminophen    Suspension .. 650 milliGRAM(s) Oral every 6 hours PRN Temp greater or equal to 38C (100.4F), Mild Pain (1 - 3)  hydrALAZINE Injectable 10 milliGRAM(s) IV Push every 4 hours PRN SBP>160      Vital Signs Last 24 Hrs  T(C): 37.2 (29 Mar 2022 09:02), Max: 37.2 (29 Mar 2022 09:02)  T(F): 98.9 (29 Mar 2022 09:02), Max: 98.9 (29 Mar 2022 09:02)  HR: 64 (29 Mar 2022 08:56) (64 - 68)  BP: 132/72 (29 Mar 2022 08:56) (132/72 - 158/77)  BP(mean): 97 (29 Mar 2022 08:56) (97 - 107)  RR: 18 (29 Mar 2022 08:56) (18 - 18)  SpO2: 94% (29 Mar 2022 08:56) (94% - 99%)    PHYSICAL EXAM:  GENERAL: pleasant, NAD  NEURO: Aox3, intact strength/sensation all 4 ext   HEENT: clear op, mmm  NECK:  No JVD, no lymphadenopathy  CHEST/LUNG: Clear to auscultation bilaterally; No rales, rhonchi, wheezing. Normal work of breathing, not tachypneic  HEART: Regular rate and rhythm; No murmurs, rubs, or gallops  ABDOMEN: Soft, Nontender, Nondistended. Normoactive bowel sounds  EXTREMITIES:  No sig le edema, wwp   LINES/DEVICES:     LABS (reviewed) no new labs       ASSESSMENT AND PLAN:  75y/o M with PMHx sig for HTN, HLD, afib (on eliquis), CVA in 2020 p/w dysarthria and left-sided weakness with thrombosed basilar artery aneurysm with brainstem compression s/p angio course c/b resp insufficiency 2/2 PNA, s/p PEG for dysphagia   #Basilar artery aneurysm and brain stem compression- care per primary team  #Afib on eliquis- c/w eliquis and coreg   #HTN, HL- c/w coreg, lisinopril, amlodipine   #Dsyphagia s/p PEG- tolerating feedings no high residuals noted   #S/p PNA with hypoxic resp failure- c/w nebs standing, switch to PRN as able  #Epistaxis- c/w nasal spray as per ENT recs, f/u with ENT regarding removing packing    #DVT px- eliquis  #Dispo- to acute rehab pending auth     Patient was seen and examined by me at bedside    Greater than 35 minutes spent on total encounter; more than 50% of the visit was spent counseling and/or coordinating care by the attending physician.    Berlin Zhou MD [phone]

## 2022-03-29 NOTE — PROGRESS NOTE ADULT - SUBJECTIVE AND OBJECTIVE BOX
HPI:  75y/o M with PMHx sig for HTN, HLD, afib (on eliquis, unknown if last taken today,) CVA in 2020 (with minimal residual right-sided weakness per girlfriend,) last known normal at 3pm when girlfriend spoke to him. 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 Upon arrival to [facility], BP noted to be 171/101. Stroke code was called. CTH without findings of hemorrhage. 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 NIHSS 20. No TPA was administered as Pt is out of window. Per patient's girlfriend, the basilar artery aneurysm is known and was diagnosed in 2020 at [facility] when he suffered from a CVA. At that time, he underwent a "treatment for the aneurysm," which the girlfriend reports was too risky and was aborted. Patient never attended his follow-up appointments due to fear per girlfriend.  (10 Mar 2022 22:52)    OVERNIGHT EVENTS: GM. Vitals stable. Neuro stable.     Hospital Course:   3/10: Admitted for further workup of stroke symptoms and basilar artery aneurysm.  3/11: Mcgregor placed overnight. 3% hypertonic saline started. Neuro exam stable. Started on vEEG for aphasia  3/12: GM overnight. Neuro exam stable. 3% d/c. home eliquis 5 bid. failed s/s. started on TF via NGT, spiked fever 101, f/u panculture   3/13: GM overnight, neuro stable, veeg negative. Vanc/zosyn started for empiric PNA.   3/14: GM o/n neuro stable. on HFNC. ENT plan for laryngoscope today with . failed s/s, pend repeat eval. dc'd captopril, started lisinopril. dc'd vanc. Laryngoscopy: No masses visualized, hypomobility of L sided vocal cords, dysarthric, hypophonic, risk for aspiration, good cough reflex.Dr. Onofre to discuss with Dr. Peterson regarding a possible vocal cord injection.   3/15: GM overnight, neuro stable, on HFNC 30/30 overnight, transitioned to 4LNC  3/16: GM overnight, neuro stable, tolerating NC and satting well    3/17: POD6 dx angio. GM o/n neuro stable. trickle feeds via NGT  3/18: POD7 GM o/n, saturating well in RA. tolerating TF   03/19: POD8 GM o/n. gen surg consulted for PEG placement.  03/20: POD9 GM o/n. Transferred back to ICU for heparin gtt in preparation for PEG placement Wed.   3/21: POD10 GM o/n, neuro exam stable. Transferred to ICU in preparation for hep gtt to start tomorrow, plan for PEG on Wednesday. F/u am coags.   3/22: POD11. GM o/n neuro stable. on heparin gtt ptt goal 40-60. o/n ptt >200c, held for 2 hours. on heparin @11. pre-op for PEG   3/23: POD12. o/n new R facial droop, CTH stable. heparin gtt d/c. preop PEG today  3/24: POD13, POD1 PEG placement, GM overnight, neuro stable, +BM  3/25: POD14, GM o/n, neuro stable, eliquis restarted. Started ceftriaxone for UTI  3/26: POD 15. GM overnight. exam stable. on abx until 3/26 for UTI. On eliquis for afib.   3/27: POD 16. o/n episode of epistaxis, aftrin administered. exam stable. abx completed for UTI. pending rehab. ENT consulted for epistaxis, recommended nasal saline and bactroban.  3/28: POD17. GM o/n, neuro stable.  3/29: POD18. GM o/n, neuro stable.    Vital Signs Last 24 Hrs  T(C): 36.8 (28 Mar 2022 17:16), Max: 37 (28 Mar 2022 13:46)  T(F): 98.2 (28 Mar 2022 17:16), Max: 98.6 (28 Mar 2022 13:46)  HR: 68 (28 Mar 2022 20:24) (64 - 68)  BP: 148/71 (28 Mar 2022 20:24) (132/68 - 148/71)  BP(mean): 102 (28 Mar 2022 20:24) (96 - 107)  RR: 18 (28 Mar 2022 20:24) (17 - 18)  SpO2: 94% (28 Mar 2022 20:24) (85% - 97%)    I&O's Summary    27 Mar 2022 07:01  -  28 Mar 2022 07:00  --------------------------------------------------------  IN: 1800 mL / OUT: 1150 mL / NET: 650 mL    28 Mar 2022 07:01  -  29 Mar 2022 00:10  --------------------------------------------------------  IN: 210 mL / OUT: 750 mL / NET: -540 mL        PHYSICAL EXAM:  General: NAD, pt is comfortably lying in bed  HEENT: OE to voice, PERRL 3mm reactive, EOMI b/l, mild L facial  Cardiovascular: RRR  Respiratory: chest rise symmetric, nonlabored breathing  GI: abd soft, NTND   Neuro: A&Ox3, hypophonic & dysarthric, Follows commands.  RUE 5/5 strength, RLE 2/5 proximally, 4/5 PF/DF. L side trace movement throughout to command.   Extremities: warm & well perfused  Wound/incision: PEG incision site w/ small amounts of serosanguinous fluid on abdominal binder and around insertion site      TUBES/LINES:  [] Arredondo  [] Lumbar Drain  [] Wound Drains  [] Others      DIET:  [] NPO  [] Mechanical  [x] Tube feeds- via PEG    LABS:                        13.5   7.25  )-----------( 282      ( 28 Mar 2022 06:16 )             41.7     03-28    141  |  107  |  33<H>  ----------------------------<  162<H>  4.1   |  27  |  0.68    Ca    9.2      28 Mar 2022 06:16  Phos  3.1     03-28  Mg     2.1     03-28      Allergies    No Known Allergies    Intolerances      MEDICATIONS:  Antibiotics:    Neuro:  acetaminophen    Suspension .. 650 milliGRAM(s) Oral every 6 hours PRN    Anticoagulation:  apixaban 5 milliGRAM(s) Oral every 12 hours    OTHER:  acetylcysteine 20%  Inhalation 4 milliLiter(s) Inhalation every 4 hours  albuterol/ipratropium for Nebulization 3 milliLiter(s) Nebulizer every 4 hours  amLODIPine   Tablet 10 milliGRAM(s) Oral daily  atorvastatin 80 milliGRAM(s) Oral at bedtime  carvedilol 6.25 milliGRAM(s) Oral every 12 hours  chlorhexidine 2% Cloths 1 Application(s) Topical <User Schedule>  glucagon  Injectable 1 milliGRAM(s) IntraMuscular once  hydrALAZINE Injectable 10 milliGRAM(s) IV Push every 4 hours PRN  lisinopril 20 milliGRAM(s) Oral every 24 hours  mupirocin 2% Nasal 1 Application(s) Both Nostrils two times a day  polyethylene glycol 3350 17 Gram(s) Oral every 12 hours  senna 2 Tablet(s) Oral at bedtime  sodium chloride 0.65% Nasal 2 Spray(s) Both Nostrils every 4 hours    IVF:    CULTURES:  Culture Results:   No growth at 3 days. (03-25 @ 18:00)  Culture Results:   No growth at 3 days. (03-25 @ 18:00)        ASSESSMENT:  75y/o M with PMHx sig for HTN, HLD, afib (on eliquis, unknown if last taken today,) CVA in 2020 (with minimal residual right-sided weakness per girlfriend) p/w dysarthria, left facial droop, and left-sided weakness. CTH without findings of hemorrhage. 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 NIHSS 20. Pt is not a tpa candidate as Pt is out of window. Patient now s/p diagnostic angio with findings of partially thrombosed basilar artery aneurysm 3/11/22. course c/b resp insufficiency weaned off HFNC.     BASILAR ARTERY ANEURYSM    Handoff    MEWS Score    Brain aneurysm    Brain aneurysm    Angiogram, carotid and cerebral, bilateral    Basilar artery aneurysm    Basilar artery aneurysm    Brain stem compression    Longstanding persistent atrial fibrillation    HTN (hypertension)    Hyperlipidemia    Acidosis    Leukocytosis    Dysphagia    Hypocalcemia    Hypophosphatemia    Acute respiratory failure with hypoxia    Angiogram, carotid and cerebral, bilateral    EGD, with PEG    STROKE    EGD, with PEG    Brain stem compression    Longstanding persistent atrial fibrillation    HTN (hypertension)    Hyperlipidemia    Acidosis    Leukocytosis    Dysphagia    Hypocalcemia    Hypophosphatemia    Acute respiratory failure with hypoxia    History of CVA (cerebrovascular accident)    [email or web address]      PLAN:  Neuro:   - neuro checks/vital signs q4hrs  - MR head 3/11: no acute infarcts   - CTH 3/22: stable   - EEG negative 3/13, dc'd   - no plan for further neurosurgical intervention   - stroke core measures     Cardio:  - SBP goal 100-160   - Amlodipine 10mg, carvedilol 6.25mg BID, Lisinopril 20mg (substituted for Captopril)   - home HCTZ held, Hydralazine 10q4 PRN   echo 3/11: mild LVH, EF 65-70%   - Eliquis 5mg BID restarted 3/25 for afib   -PVCs 3/26, per cardiology NTD  - cont home lipitor 80mg    Pulm:  - satting well on RA  - standing mucomyst and duonebs  - Chest PT   - aspiration precaution, HOB up     ENT:   - ENT consulted, s/p laryngoscopy 3/14: No masses, hypomobility of L sided vocal cord  - f/u outpatient with Dr. Onofre/Dr. Peterson for possible vocal cord injection.   - s/p epistaxis episode 3/26: intranasal bactroban BID and saline spray q4 to moisturize, can use afrin and manual pressure x 20 minutes if bleeds again     Renal:  - Na goal 135-145  - no issues    GI:  - TF via PEG, seroganguiness d/c on gauze   - bowel regimen  - last BM 3/27      Endo:  - A1C 5.4    Heme;  - SCDs for DVT ppx  - Eliquis 5mg BID  - LE dopplers 3/11, 3/23: neg for DVT   - LUE US 3/22: superficial thrombus L basilic/cephalic vein    ID:  - zosyn empirically for PNA completed 3/17  - pancx 3/25: +UA, neg blood cx, completed empiric Ceftriaxone x3 days for UTI (3/25-3/27)    ORTHO:  - L foot xray 3/22: chronic 5th digit middle phalanx base corner fracture    Dispo: SDU status, full code, Pending AR, Family updated    Assessment and plan discussed with Dr. Peterson

## 2022-03-30 LAB
ANION GAP SERPL CALC-SCNC: 8 MMOL/L — SIGNIFICANT CHANGE UP (ref 5–17)
BLD GP AB SCN SERPL QL: NEGATIVE — SIGNIFICANT CHANGE UP
BUN SERPL-MCNC: 31 MG/DL — HIGH (ref 7–23)
CALCIUM SERPL-MCNC: 9 MG/DL — SIGNIFICANT CHANGE UP (ref 8.4–10.5)
CHLORIDE SERPL-SCNC: 104 MMOL/L — SIGNIFICANT CHANGE UP (ref 96–108)
CO2 SERPL-SCNC: 27 MMOL/L — SIGNIFICANT CHANGE UP (ref 22–31)
CREAT SERPL-MCNC: 0.83 MG/DL — SIGNIFICANT CHANGE UP (ref 0.5–1.3)
CULTURE RESULTS: SIGNIFICANT CHANGE UP
CULTURE RESULTS: SIGNIFICANT CHANGE UP
EGFR: 90 ML/MIN/1.73M2 — SIGNIFICANT CHANGE UP
GLUCOSE SERPL-MCNC: 156 MG/DL — HIGH (ref 70–99)
HCT VFR BLD CALC: 42.2 % — SIGNIFICANT CHANGE UP (ref 39–50)
HCT VFR BLD CALC: 46.9 % — SIGNIFICANT CHANGE UP (ref 39–50)
HGB BLD-MCNC: 13.7 G/DL — SIGNIFICANT CHANGE UP (ref 13–17)
HGB BLD-MCNC: 15.4 G/DL — SIGNIFICANT CHANGE UP (ref 13–17)
MAGNESIUM SERPL-MCNC: 2.2 MG/DL — SIGNIFICANT CHANGE UP (ref 1.6–2.6)
MCHC RBC-ENTMCNC: 30.9 PG — SIGNIFICANT CHANGE UP (ref 27–34)
MCHC RBC-ENTMCNC: 31.6 PG — SIGNIFICANT CHANGE UP (ref 27–34)
MCHC RBC-ENTMCNC: 32.5 GM/DL — SIGNIFICANT CHANGE UP (ref 32–36)
MCHC RBC-ENTMCNC: 32.8 GM/DL — SIGNIFICANT CHANGE UP (ref 32–36)
MCV RBC AUTO: 94 FL — SIGNIFICANT CHANGE UP (ref 80–100)
MCV RBC AUTO: 97.2 FL — SIGNIFICANT CHANGE UP (ref 80–100)
NRBC # BLD: 0 /100 WBCS — SIGNIFICANT CHANGE UP (ref 0–0)
NRBC # BLD: 0 /100 WBCS — SIGNIFICANT CHANGE UP (ref 0–0)
PHOSPHATE SERPL-MCNC: 3.2 MG/DL — SIGNIFICANT CHANGE UP (ref 2.5–4.5)
PLATELET # BLD AUTO: 296 K/UL — SIGNIFICANT CHANGE UP (ref 150–400)
PLATELET # BLD AUTO: 431 K/UL — HIGH (ref 150–400)
POTASSIUM SERPL-MCNC: 4.4 MMOL/L — SIGNIFICANT CHANGE UP (ref 3.5–5.3)
POTASSIUM SERPL-SCNC: 4.4 MMOL/L — SIGNIFICANT CHANGE UP (ref 3.5–5.3)
RBC # BLD: 4.34 M/UL — SIGNIFICANT CHANGE UP (ref 4.2–5.8)
RBC # BLD: 4.99 M/UL — SIGNIFICANT CHANGE UP (ref 4.2–5.8)
RBC # FLD: 12.5 % — SIGNIFICANT CHANGE UP (ref 10.3–14.5)
RBC # FLD: 12.7 % — SIGNIFICANT CHANGE UP (ref 10.3–14.5)
RH IG SCN BLD-IMP: NEGATIVE — SIGNIFICANT CHANGE UP
SODIUM SERPL-SCNC: 139 MMOL/L — SIGNIFICANT CHANGE UP (ref 135–145)
SPECIMEN SOURCE: SIGNIFICANT CHANGE UP
SPECIMEN SOURCE: SIGNIFICANT CHANGE UP
WBC # BLD: 11.54 K/UL — HIGH (ref 3.8–10.5)
WBC # BLD: 6.01 K/UL — SIGNIFICANT CHANGE UP (ref 3.8–10.5)
WBC # FLD AUTO: 11.54 K/UL — HIGH (ref 3.8–10.5)
WBC # FLD AUTO: 6.01 K/UL — SIGNIFICANT CHANGE UP (ref 3.8–10.5)

## 2022-03-30 PROCEDURE — 99233 SBSQ HOSP IP/OBS HIGH 50: CPT | Mod: GC

## 2022-03-30 PROCEDURE — 31575 DIAGNOSTIC LARYNGOSCOPY: CPT | Mod: 59

## 2022-03-30 PROCEDURE — 71045 X-RAY EXAM CHEST 1 VIEW: CPT | Mod: 26

## 2022-03-30 PROCEDURE — 99024 POSTOP FOLLOW-UP VISIT: CPT

## 2022-03-30 PROCEDURE — 99223 1ST HOSP IP/OBS HIGH 75: CPT | Mod: 25

## 2022-03-30 PROCEDURE — 99291 CRITICAL CARE FIRST HOUR: CPT

## 2022-03-30 RX ORDER — OXYMETAZOLINE HYDROCHLORIDE 0.5 MG/ML
1 SPRAY NASAL ONCE
Refills: 0 | Status: COMPLETED | OUTPATIENT
Start: 2022-03-30 | End: 2022-03-30

## 2022-03-30 RX ORDER — HYDRALAZINE HCL 50 MG
10 TABLET ORAL ONCE
Refills: 0 | Status: COMPLETED | OUTPATIENT
Start: 2022-03-30 | End: 2022-03-30

## 2022-03-30 RX ORDER — THROMBIN (BOVINE) 10000 UNIT
5000 KIT TOPICAL ONCE
Refills: 0 | Status: DISCONTINUED | OUTPATIENT
Start: 2022-03-30 | End: 2022-04-06

## 2022-03-30 RX ORDER — IPRATROPIUM/ALBUTEROL SULFATE 18-103MCG
1 AEROSOL WITH ADAPTER (GRAM) INHALATION
Refills: 0 | Status: DISCONTINUED | OUTPATIENT
Start: 2022-03-30 | End: 2022-03-30

## 2022-03-30 RX ORDER — IPRATROPIUM/ALBUTEROL SULFATE 18-103MCG
3 AEROSOL WITH ADAPTER (GRAM) INHALATION EVERY 6 HOURS
Refills: 0 | Status: DISCONTINUED | OUTPATIENT
Start: 2022-03-30 | End: 2022-04-08

## 2022-03-30 RX ORDER — CEFAZOLIN SODIUM 1 G
1000 VIAL (EA) INJECTION EVERY 8 HOURS
Refills: 0 | Status: DISCONTINUED | OUTPATIENT
Start: 2022-03-30 | End: 2022-03-30

## 2022-03-30 RX ORDER — SODIUM CHLORIDE 9 MG/ML
1000 INJECTION INTRAMUSCULAR; INTRAVENOUS; SUBCUTANEOUS
Refills: 0 | Status: DISCONTINUED | OUTPATIENT
Start: 2022-03-30 | End: 2022-03-31

## 2022-03-30 RX ORDER — CEFAZOLIN SODIUM 1 G
1000 VIAL (EA) INJECTION EVERY 8 HOURS
Refills: 0 | Status: DISCONTINUED | OUTPATIENT
Start: 2022-03-30 | End: 2022-04-01

## 2022-03-30 RX ADMIN — POLYETHYLENE GLYCOL 3350 17 GRAM(S): 17 POWDER, FOR SOLUTION ORAL at 17:46

## 2022-03-30 RX ADMIN — CARVEDILOL PHOSPHATE 6.25 MILLIGRAM(S): 80 CAPSULE, EXTENDED RELEASE ORAL at 07:10

## 2022-03-30 RX ADMIN — Medication 650 MILLIGRAM(S): at 16:58

## 2022-03-30 RX ADMIN — Medication 650 MILLIGRAM(S): at 15:58

## 2022-03-30 RX ADMIN — LISINOPRIL 20 MILLIGRAM(S): 2.5 TABLET ORAL at 17:46

## 2022-03-30 RX ADMIN — POLYETHYLENE GLYCOL 3350 17 GRAM(S): 17 POWDER, FOR SOLUTION ORAL at 05:07

## 2022-03-30 RX ADMIN — Medication 10 MILLIGRAM(S): at 09:31

## 2022-03-30 RX ADMIN — Medication 2 SPRAY(S): at 05:08

## 2022-03-30 RX ADMIN — CARVEDILOL PHOSPHATE 6.25 MILLIGRAM(S): 80 CAPSULE, EXTENDED RELEASE ORAL at 17:46

## 2022-03-30 RX ADMIN — MUPIROCIN 1 APPLICATION(S): 20 OINTMENT TOPICAL at 19:48

## 2022-03-30 RX ADMIN — Medication 4 MILLILITER(S): at 02:09

## 2022-03-30 RX ADMIN — SENNA PLUS 2 TABLET(S): 8.6 TABLET ORAL at 21:39

## 2022-03-30 RX ADMIN — Medication 3 MILLILITER(S): at 02:09

## 2022-03-30 RX ADMIN — AMLODIPINE BESYLATE 10 MILLIGRAM(S): 2.5 TABLET ORAL at 05:07

## 2022-03-30 RX ADMIN — Medication 3 MILLILITER(S): at 22:24

## 2022-03-30 RX ADMIN — OXYMETAZOLINE HYDROCHLORIDE 1 SPRAY(S): 0.5 SPRAY NASAL at 07:07

## 2022-03-30 RX ADMIN — Medication 100 MILLIGRAM(S): at 14:26

## 2022-03-30 RX ADMIN — MUPIROCIN 1 APPLICATION(S): 20 OINTMENT TOPICAL at 05:08

## 2022-03-30 RX ADMIN — Medication 4 MILLILITER(S): at 05:07

## 2022-03-30 RX ADMIN — Medication 10 MILLIGRAM(S): at 11:19

## 2022-03-30 RX ADMIN — Medication 2 SPRAY(S): at 02:09

## 2022-03-30 RX ADMIN — Medication 1000 MILLIGRAM(S): at 21:39

## 2022-03-30 RX ADMIN — APIXABAN 5 MILLIGRAM(S): 2.5 TABLET, FILM COATED ORAL at 05:07

## 2022-03-30 RX ADMIN — CHLORHEXIDINE GLUCONATE 1 APPLICATION(S): 213 SOLUTION TOPICAL at 05:05

## 2022-03-30 RX ADMIN — SODIUM CHLORIDE 100 MILLILITER(S): 9 INJECTION INTRAMUSCULAR; INTRAVENOUS; SUBCUTANEOUS at 14:25

## 2022-03-30 RX ADMIN — Medication 3 MILLILITER(S): at 05:07

## 2022-03-30 RX ADMIN — ATORVASTATIN CALCIUM 80 MILLIGRAM(S): 80 TABLET, FILM COATED ORAL at 21:39

## 2022-03-30 NOTE — PROGRESS NOTE ADULT - SUBJECTIVE AND OBJECTIVE BOX
Physical Medicine and Rehabilitation Progress Note:    Patient is a 76y old  Male who presents with a chief complaint of CVA, left facial, left-sided weakness (30 Mar 2022 12:28)      HPI:  77y/o M with PMHx sig for HTN, HLD, afib (on eliquis, unknown if last taken today,) CVA in 2020 (with minimal residual right-sided weakness per girlfriend,) last known normal at 3pm when girlfriend spoke to him. EMS found patient in his office at [employer] (he is a neuroscience professor,) with dysarthria, left facial droop, and left-sided weakness. Upon arrival to [facility], BP noted to be 171/101. Stroke code was called. CTH without findings of hemorrhage. 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 NIHSS 20. No TPA was administered as Pt is out of window. Per patient's girlfriend, the basilar artery aneurysm is known and was diagnosed in 2020 at [facility] when he suffered from a CVA. At that time, he underwent a "treatment for the aneurysm," which the girlfriend reports was too risky and was aborted. Patient never attended his follow-up appointments due to fear per girlfriend.  (10 Mar 2022 22:52)                            15.4   11.54 )-----------( 431      ( 30 Mar 2022 10:55 )             46.9       03-30    139  |  104  |  31<H>  ----------------------------<  156<H>  4.4   |  27  |  0.83    Ca    9.0      30 Mar 2022 06:03  Phos  3.2     03-30  Mg     2.2     03-30      Vital Signs Last 24 Hrs  T(C): 37.1 (30 Mar 2022 05:02), Max: 37.3 (29 Mar 2022 21:35)  T(F): 98.7 (30 Mar 2022 05:02), Max: 99.2 (29 Mar 2022 21:35)  HR: 98 (30 Mar 2022 13:02) (64 - 106)  BP: 133/73 (30 Mar 2022 13:02) (123/67 - 169/87)  BP(mean): 97 (30 Mar 2022 13:02) (89 - 119)  RR: 18 (30 Mar 2022 13:02) (16 - 20)  SpO2: 91% (30 Mar 2022 13:02) (87% - 100%)    MEDICATIONS  (STANDING):  acetylcysteine 20%  Inhalation 4 milliLiter(s) Inhalation every 4 hours  albuterol/ipratropium for Nebulization 3 milliLiter(s) Nebulizer every 4 hours  amLODIPine   Tablet 10 milliGRAM(s) Oral daily  atorvastatin 80 milliGRAM(s) Oral at bedtime  carvedilol 6.25 milliGRAM(s) Oral every 12 hours  chlorhexidine 2% Cloths 1 Application(s) Topical <User Schedule>  glucagon  Injectable 1 milliGRAM(s) IntraMuscular once  lisinopril 20 milliGRAM(s) Oral every 24 hours  mupirocin 2% Nasal 1 Application(s) Both Nostrils two times a day  polyethylene glycol 3350 17 Gram(s) Oral every 12 hours  senna 2 Tablet(s) Oral at bedtime  sodium chloride 0.65% Nasal 2 Spray(s) Both Nostrils every 4 hours  thrombin epistaxis Kit (Thrombin-JMI) 5000 International Unit(s) Both Nostrils once    MEDICATIONS  (PRN):  acetaminophen    Suspension .. 650 milliGRAM(s) Oral every 6 hours PRN Temp greater or equal to 38C (100.4F), Mild Pain (1 - 3)  hydrALAZINE Injectable 10 milliGRAM(s) IV Push every 4 hours PRN SBP>160    Currently Undergoing Physical/ Occupational Therapy at bedside.    PT/OT Functional Status Assessment:   3/29/2022    Cognitive/Neuro/Behavioral  Level of Consciousness: alert  Arousal Level: arouses to voice;  arouses to touch/gentle shaking  Orientation: oriented x 4  Speech: garbled;  hypophonic  Mood/Behavior: calm;  cooperative    Language Assistance  Preferred Language to Address Healthcare Preferred Language to Address Healthcare: English    Therapeutic Interventions      Bed Mobility  Bed Mobility Training Symptoms Noted During/After Treatment: none  Bed Mobility Training Rolling/Turning: maximum assist (25% patient effort);  2 person assist;  nonverbal cues (demo/gestures);  verbal cues;  rolling to (R) side  Bed Mobility Training Scooting: maximum assist (25% patient effort);  2 person assist;  nonverbal cues (demo/gestures);  verbal cues;  scooting to EOB in sitting with quan-pad assist  Bed Mobility Training Sit-to-Supine: maximum assist (25% patient effort);  2 person assist;  nonverbal cues (demo/gestures);  verbal cues;  HOB < 30 degrees  Bed Mobility Training Supine-to-Sit: maximum assist (25% patient effort);  2 person assist;  nonverbal cues (demo/gestures);  verbal cues;  able to bring B/L LEs to EOB with ~25% effort; **significantly increased assist for trunk mobility  Bed Mobility Training Limitations: decreased ROM;  decreased flexibility;  decreased strength;  impaired balance;  impaired coordination;  impaired motor control;  impaired postural control    Sit-Stand Transfer Training  Sit-to-Stand Transfer Training Symptoms Noted During/After Treatment: none  Transfer Training Sit-to-Stand Transfer: maximum assist (25% patient effort);  2 person assist;  nonverbal cues (demo/gestures);  verbal cues;  3rd person for B/L knee block; *unable to achieve upright truncal position despite max/dependent assist (maintained squat position over bed surface);  rolling walker;  B/L axillary and trunk support  Transfer Training Stand-to-Sit Transfer: maximum assist (25% patient effort);  2 person assist;  nonverbal cues (demo/gestures);  verbal cues;  poor eccentric control upon descent;  B/L axillary and trunk support  Sit-to-Stand Transfer Training Transfer Safety Analysis: decreased flexibility;  decreased ROM;  decreased strength;  impaired balance;  impaired coordination;  impaired motor control;  impaired postural control    Therapeutic Exercise  Therapeutic Exercise Detail: MMT performed: (R)shld flex 3-/5, (R)elbow flx 4-/5, (R)elbow ext 4-/5, (R)wrist flx 4-/5, (R)wrist ext 4-/5; (R)hip flx 3-/5, (R)knee ext 3-/5, (R) knee flex 3-/5, (R)ankle DF 3/5, (R)ankle PF 3/5 ; (L)shld flex 0/5, (L)elbow flx 1/5, (L)elbow ext 1/5, (L)wrist flx 0/5, (L)wrist ext 0/5; (L)hip flx 0/5, (L) knee ext 0/5, (L) knee flex 0/5, (L)ankle DF 0/5, (L)ankle PF 0/5 // *significant (L)ankle deformity noted     Functional Endurance  Functional Endurance Detail: EOB x ~20 minutes with max assist x 1 to maintain truncal postural control (bilateral feet support); demo fair effort through (R)UE to maintain balance     Neuromuscular Re-education  Neuromuscular Re-education Detail: Performed functional reaching with (R)UE through all functional planes x 10 reps // Alternating isometrics to trunk x 10-12 reps to maintain truncal position at midline // Seated, (R)LE: hip flexion, long arc quads (through ~50% of full range), ankle pumps (1 set, 10 reps). // Therapist performed PROM through (L)UE/LE for all major pivots x 10-12 reps (trace contraction noted through elbow flexion and elbow supination/pronation) // Performed passive cervical extension into neutral for 3 reps x 30 second hold       Neuromuscular Re-education  Neuromuscular Re-education Detail: Pt engaged in anterior/posterior leaning with verbal cues to "push" into therapist's hands, performed x 3 reps respectively in order to increase abdominal muscles and postural control.     Upper Body Dressing Training  Upper Body Dressing Training Assistance: moderate assist (50% patient effort);  1 person assist;  verbal cues;  nonverbal cues (demo/gestures);  to cynthia gown around back seated EOB, with RUE educated on hemidressing technique.;  decreased strength;  impaired balance;  impaired coordination;  impaired motor control    Grooming Training  Grooming Training Assistance: contact guard;  verbal cues;  nonverbal cues (demo/gestures);  1 person assist;  to wash face seated EOB;  decreased strength;  impaired coordination;  impaired balance;  impaired motor control            PM&R Impression: as above    Current Disposition Plan :  [facility] acute rehab placement

## 2022-03-30 NOTE — CONSULT NOTE ADULT - CONSULT REQUESTED DATE/TIME
11-Mar-2022 05:45
19-Mar-2022 12:44
27-Mar-2022 11:09
10-Mar-2022 21:09
14-Mar-2022 14:39
26-Mar-2022
30-Mar-2022 12:29
19-Mar-2022

## 2022-03-30 NOTE — PROGRESS NOTE ADULT - SUBJECTIVE AND OBJECTIVE BOX
Subjective/Interval events:  -Severe nasal bleeding from R side this am, ENT at beside, bed pooling into throat with coughing requiring frequent suctioning. RR stable, 02 sat in high 80's. Personally spoke with NSGY attending who came bedside and ICU team aware and saw patient to move to ICU for airway monitoring, ENT working to stabilize bleeding still active after several nasal rockets, stopped eliquis (discussed with NSGY team) and we discussed reversal but given patient hx of CVA and thrombosis avoided     MEDICATIONS  (STANDING):  acetylcysteine 20%  Inhalation 4 milliLiter(s) Inhalation every 4 hours  albuterol/ipratropium for Nebulization 3 milliLiter(s) Nebulizer every 4 hours  amLODIPine   Tablet 10 milliGRAM(s) Oral daily  atorvastatin 80 milliGRAM(s) Oral at bedtime  carvedilol 6.25 milliGRAM(s) Oral every 12 hours  chlorhexidine 2% Cloths 1 Application(s) Topical <User Schedule>  glucagon  Injectable 1 milliGRAM(s) IntraMuscular once  lisinopril 20 milliGRAM(s) Oral every 24 hours  mupirocin 2% Nasal 1 Application(s) Both Nostrils two times a day  polyethylene glycol 3350 17 Gram(s) Oral every 12 hours  senna 2 Tablet(s) Oral at bedtime  sodium chloride 0.65% Nasal 2 Spray(s) Both Nostrils every 4 hours  thrombin epistaxis Kit (Thrombin-JMI) 5000 International Unit(s) Both Nostrils once    MEDICATIONS  (PRN):  acetaminophen    Suspension .. 650 milliGRAM(s) Oral every 6 hours PRN Temp greater or equal to 38C (100.4F), Mild Pain (1 - 3)  hydrALAZINE Injectable 10 milliGRAM(s) IV Push every 4 hours PRN SBP>160      Vital Signs Last 24 Hrs  T(C): 37.1 (30 Mar 2022 05:02), Max: 37.3 (29 Mar 2022 13:00)  T(F): 98.7 (30 Mar 2022 05:02), Max: 99.2 (29 Mar 2022 21:35)  HR: 96 (30 Mar 2022 11:48) (64 - 96)  BP: 140/70 (30 Mar 2022 11:48) (123/67 - 169/87)  BP(mean): 119 (30 Mar 2022 11:06) (89 - 119)  RR: 20 (30 Mar 2022 11:48) (16 - 20)  SpO2: 89% (30 Mar 2022 11:48) (87% - 100%)    PHYSICAL EXAM:  GENERAL: pleasant, NAD  NEURO: Aox2 to person/place, not time, eyes mostly closed, dysarthric at baseline   HEENT: 2 nasal rockets in place, continued bleeding, blood pooling into oropharynx requiring suctioning   NECK:  No JVD, no lymphadenopathy  CHEST/LUNG: Clear to auscultation anteriorly   HEART: Regular rate and rhythm; No murmurs, rubs, or gallops  ABDOMEN: PEG in place c/d/i   EXTREMITIES:  No sig le edema, wwp   LINES/DEVICES:     LABS (reviewed)   -CBC with Hg stable x2, BMP wnl. Confirmed type and screen sent     ASSESSMENT AND PLAN:  77y/o M with PMHx sig for HTN, HLD, afib (on eliquis), CVA in 2020 p/w dysarthria and left-sided weakness with thrombosed basilar artery aneurysm with brainstem compression s/p angio course c/b resp insufficiency 2/2 PNA, s/p PEG for dysphagia   #Severe epistaxis likely 2/2 anticoag and nasal trauma earlier- being moved to ICU for airway monitoring, f/u additional ENT recs, stopped eliquis and no reversal plan at this time per NSGY  #Basilar artery aneurysm and brain stem compression- care per primary team  #Afib on eliquis- c/w coreg, holding eliquis   #HTN, HL- c/w coreg, lisinopril, amlodipine. s/p pushes of IV hydral today x2 for BP reduction by ENT  #Dsyphagia s/p PEG- tolerating feedings no high residuals noted   #S/p PNA with hypoxic resp failure- c/w nebs standing     #DVT px- eliquis  #Dispo- to acute rehab when stable    Patient was seen and examined by me at bedside    Greater than 35 minutes spent on total encounter; more than 50% of the visit was spent counseling and/or coordinating care by the attending physician.    Berlin Zhou MD [phone]

## 2022-03-30 NOTE — CONSULT NOTE ADULT - REASON FOR ADMISSION
CVA, left facial, left-sided weakness
Monitor.
CVA, left facial, left-sided weakness

## 2022-03-30 NOTE — PROGRESS NOTE ADULT - ASSESSMENT
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, hemiplegia  diffuse idiopathic skeletal hyperostosis  Hypertension dyslipidemia  h/o CVA 2020, minimal R sided weakness  Atrial fibrillation with controlled ventricular rate    PLAN:   NEURO: neurochecks q4h, VS q2h, PRN pain meds with acetaminophen, no opiates / benzos  no plans for intervention for basilar artery aneurysm  REHAB:  physical therapy evaluation and management    EARLY MOB:  OOB to chair    PULM:  PRN O2 support to keep sats >/=92%, incentive spirometry; nebs, ENT consulted, f/u vocal cord injection plans  CARDIO:  SBP goal 100-160mm Hg, cont carvedilol, amlodipine, lisinopril   ENDO:  Blood sugar goals 140-180 mg/dL  GI:  bowel regimen miralax  DIET: TF at goal; PEG on Wed; hold TF at midnight   RENAL:  IVF once NPO  HEM/ONC:  Hb stable  VTE Prophylaxis: SCDs, heparin gtt goal PTT 40-60 - hold heparin prior to PEG  ID: no fever, leukocytosis improving;  Social: Rafiq Forrester  is HCP - updated yesterday     ATTENDING ATTESTATION:  I was physically present for the key portions of the evaluation and management (E/M) service provided.  I agree with the above history, physical and plan, which I have reviewed and edited where appropriate.    Patient at high risk for neurological deterioration or death due to:  ICU delirium, aspiration PNA, DVT / PE.  Critical care time:  I have personally provided 45 minutes of critical care time, excluding time spent on separate procedures.      Plan discussed with RN, house staff. 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, hemiplegia  diffuse idiopathic skeletal hyperostosis  Hypertension dyslipidemia  h/o CVA 2020, minimal R sided weakness  Atrial fibrillation with controlled ventricular rate    PLAN:   NEURO: neurochecks q4h, VS q1h, PRN pain meds with acetaminophen, no opiates / benzos  no plans for intervention for basilar artery aneurysm  REHAB:  physical therapy evaluation and management    EARLY MOB:  OOB to chair    PULM:  PRN O2 support to keep sats >/=92% (face tent); ENT consulted for epistaxis  CARDIO:  SBP goal 100-160mm Hg, cont carvedilol, amlodipine, lisinopril   ENDO:  Blood sugar goals 140-180 mg/dL  GI:  bowel regimen miralax  DIET: hold TF for now  RENAL:  IVF  HEM/ONC:  Hb stable  VTE Prophylaxis: SCDs, off eliquis  ID: no fever, leukocytosis  Social: Rafiq Forrester  is HCP - updated, discussed with patient's wife - high risk for aspiration pneumonia    ATTENDING ATTESTATION:  I was physically present for the key portions of the evaluation and management (E/M) service provided.  I agree with the above history, physical and plan, which I have reviewed and edited where appropriate.    Patient at high risk for neurological deterioration or death due to:  ICU delirium, aspiration PNA, DVT / PE.  Critical care time:  I have personally provided 45 minutes of critical care time, excluding time spent on separate procedures.      Plan discussed with RN, house staff. 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, hemiplegia  diffuse idiopathic skeletal hyperostosis  Hypertension dyslipidemia  h/o CVA 2020, minimal R sided weakness  Atrial fibrillation with controlled ventricular rate    PLAN:   NEURO: neurochecks q4h, VS q1h, PRN pain meds with acetaminophen, no opiates / benzos  no plans for intervention for basilar artery aneurysm  REHAB:  physical therapy evaluation and management    EARLY MOB:  OOB to chair    PULM:  PRN O2 support to keep sats >/=92% (face tent); ENT consulted for epistaxis  CARDIO:  SBP goal 100-160mm Hg, cont carvedilol, amlodipine, lisinopril   ENDO:  Blood sugar goals 140-180 mg/dL  GI:  bowel regimen miralax  DIET: hold TF for now  RENAL:  IVF  HEM/ONC:  Hb stable  VTE Prophylaxis: SCDs, off eliquis  ID: no fever, leukocytosis  Social: Rafiq Forrester  is HCP - updated, discussed with patient's wife - high risk for aspiration pneumonia    ATTENDING ATTESTATION:  I was physically present for the key portions of the evaluation and management (E/M) service provided.  I agree with the above history, physical and plan, which I have reviewed and edited where appropriate.    Patient at high risk for neurological deterioration or death due to:  ICU delirium, aspiration PNA, DVT / PE.  Critical care time:  I have personally provided 45 minutes of critical care time, excluding time spent on separate procedures.      Plan discussed with RN, house staff.    LATERAL TRANSFER CHECKLIST    I have reviewed the patient’s history, performed a clinical examination and assessed the patient to be stable for transfer to a non-neurosurgical intensive care unit.      Specifically, the patient:  [X] does not have cerebrovascular insufficiency or require hemodynamic augmentation  [X] does not have vasospasm or delayed cerebral ischemia with subarachnoid hemorrhage  [X] does not require active titration of meds for uncontrolled sympathetic storming  [X] does not have an external ventricular drain (except lateral transfers to [facility])  [X] does not have a lumbar drain  [X] did not have a complex skull base surgery or complex intracranial tumor in the immediate post-operative period

## 2022-03-30 NOTE — PROGRESS NOTE ADULT - ASSESSMENT
per Neurosurgery    75 y/o M with PMHx sig for HTN, HLD, afib (on eliquis, unknown if last taken today,) CVA in 2020 (with minimal residual right-sided weakness per girlfriend) p/w dysarthria, left facial droop, and left-sided weakness. CTH without findings of hemorrhage. 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 NIHSS 20. Pt is not a tpa candidate as Pt is out of window. Patient now s/p diagnostic angio with findings of partially thrombosed basilar artery aneurysm 3/11/22. course c/b resp insufficiency weaned off HFNC.     PLAN:  Neuro:   - neuro checks/vital signs q4hrs  - MR head 3/11: no acute infarcts   - CTH 3/22: stable   - EEG negative 3/13, dc'd   - no plan for further neurosurgical intervention   - stroke core measures     Cardio:  - SBP goal 100-160   - Amlodipine 10mg, carvedilol 6.25mg BID, Lisinopril 20mg (substituted for Captopril)   - home HCTZ held, Hydralazine 10q4 PRN   echo 3/11: mild LVH, EF 65-70%   - Eliquis 5mg BID restarted 3/25 for afib   - PVCs 3/26, per cardiology NTD  - cont home lipitor 80mg    Pulm:  - satting well on RA  - standing mucomyst and duonebs  - Chest PT   - aspiration precaution, HOB up     ENT:   - ENT consulted, s/p laryngoscopy 3/14: No masses, hypomobility of L sided vocal cord  - f/u outpatient with Dr. Onofre/Dr. Peterson for possible vocal cord injection.   - s/p epistaxis episode 3/26: intranasal bactroban BID and saline spray q4 to moisturize can use afrin and manual pressure x 20 minutes if bleeds again   - Nasal packing placed 3/27 - absorbable- does not need abx or need to be removed    Renal:  - Na goal 135-145   - no issues     GI:   - TF via PEG, seroganguiness d/c on gauze   - bowel regimen   - last BM 3/29     Endo:  - A1C 5.4     Heme:  - SCDs for DVT ppx   - Eliquis 5mg BID   - LE dopplers 3/11, 3/23: neg for DVT   - LUE US 3/22: superficial thrombus L basilic/cephalic vein     ID:  - zosyn empirically for PNA completed 3/17   - pancx 3/25: +UA, neg blood cx, completed empiric Ceftriaxone x3 days for UTI  (3/25-3/27)     ORTHO:  - L foot xray 3/22: chronic 5th digit middle phalanx base corner fracture     Dispo:   - SDU status  - full code

## 2022-03-30 NOTE — CONSULT NOTE ADULT - CONSULT REASON
PEG tube placement
epistaxis
PVCs
dysphonia
epistaxis
stroke code
PM&R evaluation
medical comanagement

## 2022-03-30 NOTE — PROGRESS NOTE ADULT - SUBJECTIVE AND OBJECTIVE BOX
HPI:  75y/o M with PMHx sig for HTN, HLD, afib (on eliquis, unknown if last taken today,) CVA in 2020 (with minimal residual right-sided weakness per girlfriend,) last known normal at 3pm when girlfriend spoke to him. 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 Upon arrival to [facility], BP noted to be 171/101. Stroke code was called. CTH without findings of hemorrhage. 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 NIHSS 20. No TPA was administered as Pt is out of window. Per patient's girlfriend, the basilar artery aneurysm is known and was diagnosed in 2020 at [facility] when he suffered from a CVA. At that time, he underwent a "treatment for the aneurysm," which the girlfriend reports was too risky and was aborted. Patient never attended his follow-up appointments due to fear per girlfriend.  (10 Mar 2022 22:52)    OVERNIGHT EVENTS: GM overnight. Vitals stable. Neuro stable.     Hospital Course:   3/10: Admitted for further workup of stroke symptoms and basilar artery aneurysm.  3/11: [place] placed overnight. 3% hypertonic saline started. Neuro exam stable. Started on vEEG for aphasia  3/12: GM overnight. Neuro exam stable. 3% d/c. home eliquis 5 bid. failed s/s. started on TF via NGT, spiked fever 101, f/u panculture   3/13: GM overnight, neuro stable, veeg negative. Vanc/zosyn started for empiric PNA.   3/14: GM o/n neuro stable. on HFNC. ENT plan for laryngoscope today with . failed s/s, pend repeat eval. dc'd captopril, started lisinopril. dc'd vanc. Laryngoscopy: No masses visualized, hypomobility of L sided vocal cords, dysarthric, hypophonic, risk for aspiration, good cough reflex.Dr. Onofre to discuss with Dr. Peterson regarding a possible vocal cord injection.   3/15: GM overnight, neuro stable, on HFNC 30/30 overnight, transitioned to 4LNC  3/16: GM overnight, neuro stable, tolerating NC and satting well    3/17: POD6 dx angio. GM o/n neuro stable. trickle feeds via NGT  3/18: POD7 GM o/n, saturating well in RA. tolerating TF   03/19: POD8 GM o/n. gen surg consulted for PEG placement.  03/20: POD9 GM o/n. Transferred back to ICU for heparin gtt in preparation for PEG placement Wed.   3/21: POD10 GM o/n, neuro exam stable. Transferred to ICU in preparation for hep gtt to start tomorrow, plan for PEG on Wednesday. F/u am coags.   3/22: POD11. GM o/n neuro stable. on heparin gtt ptt goal 40-60. o/n ptt >200c, held for 2 hours. on heparin @11. pre-op for PEG   3/23: POD12. o/n new R facial droop, CTH stable. heparin gtt d/c. preop PEG today  3/24: POD13, POD1 PEG placement, GM overnight, neuro stable, +BM  3/25: POD14, GM o/n, neuro stable, eliquis restarted. Started ceftriaxone for UTI  3/26: POD 15. GM overnight. exam stable. on abx until 3/26 for UTI. On eliquis for afib.   3/27: POD 16. o/n episode of epistaxis, aftrin administered. exam stable. abx completed for UTI. pending rehab. ENT consulted for epistaxis, recommended nasal saline and bactroban.  3/28: POD17. GM o/n, neuro stable.  3/29: POD18. GM o/n, neuro stable   3/30: POD18. GM o/n. neuro stable.     Vital Signs Last 24 Hrs  T(C): 37.3 (29 Mar 2022 21:35), Max: 37.3 (29 Mar 2022 13:00)  T(F): 99.2 (29 Mar 2022 21:35), Max: 99.2 (29 Mar 2022 21:35)  HR: 64 (29 Mar 2022 23:40) (64 - 68)  BP: 123/67 (29 Mar 2022 23:40) (123/67 - 159/77)  BP(mean): 89 (29 Mar 2022 23:40) (89 - 110)  RR: 17 (29 Mar 2022 23:40) (17 - 18)  SpO2: 96% (29 Mar 2022 23:40) (93% - 100%)    I&O's Summary    28 Mar 2022 07:01  -  29 Mar 2022 07:00  --------------------------------------------------------  IN: 630 mL / OUT: 1150 mL / NET: -520 mL    29 Mar 2022 07:01  -  30 Mar 2022 00:35  --------------------------------------------------------  IN: 410 mL / OUT: 1500 mL / NET: -1090 mL        PHYSICAL EXAM:  General: NAD, pt is comfortably lying in bed  HEENT: OE to voice, PERRL 3mm reactive, EOMI b/l, mild L facial, absorbable nasal packing in place for epistaxis  Cardiovascular: RRR  Respiratory: chest rise symmetric, nonlabored breathing  GI: abd soft, NTND   Neuro: A&Ox3, hypophonic & dysarthric, Follows commands.  RUE 5/5 strength, RLE 2/5 proximally, 4/5 PF/DF. L side trace movement throughout to command.   Extremities: warm & well perfused  Wound/incision: PEG incision site c/d/i     TUBES/LINES:  [] Arredondo  [] Lumbar Drain  [] Wound Drains  [] Others      DIET:  [] NPO  [] Mechanical  [x] Tube feeds - PEG    LABS:                        13.8   7.01  )-----------( 310      ( 29 Mar 2022 11:34 )             42.0     03-29    140  |  106  |  33<H>  ----------------------------<  164<H>  4.3   |  25  |  0.71    Ca    9.2      29 Mar 2022 11:34  Phos  3.4     03-29  Mg     2.2     03-29              CAPILLARY BLOOD GLUCOSE          Drug Levels: [] N/A    CSF Analysis: [] N/A      Allergies    No Known Allergies    Intolerances      MEDICATIONS:  Antibiotics:    Neuro:  acetaminophen    Suspension .. 650 milliGRAM(s) Oral every 6 hours PRN    Anticoagulation:  apixaban 5 milliGRAM(s) Oral every 12 hours    OTHER:  acetylcysteine 20%  Inhalation 4 milliLiter(s) Inhalation every 4 hours  albuterol/ipratropium for Nebulization 3 milliLiter(s) Nebulizer every 4 hours  amLODIPine   Tablet 10 milliGRAM(s) Oral daily  atorvastatin 80 milliGRAM(s) Oral at bedtime  carvedilol 6.25 milliGRAM(s) Oral every 12 hours  chlorhexidine 2% Cloths 1 Application(s) Topical <User Schedule>  glucagon  Injectable 1 milliGRAM(s) IntraMuscular once  hydrALAZINE Injectable 10 milliGRAM(s) IV Push every 4 hours PRN  lisinopril 20 milliGRAM(s) Oral every 24 hours  mupirocin 2% Nasal 1 Application(s) Both Nostrils two times a day  polyethylene glycol 3350 17 Gram(s) Oral every 12 hours  senna 2 Tablet(s) Oral at bedtime  sodium chloride 0.65% Nasal 2 Spray(s) Both Nostrils every 4 hours    IVF:    CULTURES:  Culture Results:   No growth at 4 days. (03-25 @ 18:00)  Culture Results:   No growth at 4 days. (03-25 @ 18:00)    RADIOLOGY & ADDITIONAL TESTS:      ASSESSMENT:  75y/o M with PMHx sig for HTN, HLD, afib (on eliquis, unknown if last taken today,) CVA in 2020 (with minimal residual right-sided weakness per girlfriend) p/w dysarthria, left facial droop, and left-sided weakness. CTH without findings of hemorrhage. 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 NIHSS 20. Pt is not a tpa candidate as Pt is out of window. Patient now s/p diagnostic angio with findings of partially thrombosed basilar artery aneurysm 3/11/22. course c/b resp insufficiency weaned off HFNC.     BASILAR ARTERY ANEURYSM    Handoff    MEWS Score    Brain aneurysm    Brain aneurysm    Angiogram, carotid and cerebral, bilateral    Basilar artery aneurysm    Basilar artery aneurysm    Brain stem compression    Longstanding persistent atrial fibrillation    HTN (hypertension)    Hyperlipidemia    Acidosis    Leukocytosis    Dysphagia    Hypocalcemia    Hypophosphatemia    Acute respiratory failure with hypoxia    Angiogram, carotid and cerebral, bilateral    EGD, with PEG    STROKE    EGD, with PEG    Brain stem compression    Longstanding persistent atrial fibrillation    HTN (hypertension)    Hyperlipidemia    Acidosis    Leukocytosis    Dysphagia    Hypocalcemia    Hypophosphatemia    Acute respiratory failure with hypoxia    History of CVA (cerebrovascular accident)    [email or web address]        PLAN:  Neuro:   - neuro checks/vital signs q4hrs  - MR head 3/11: no acute infarcts   - CTH 3/22: stable   - EEG negative 3/13, dc'd   - no plan for further neurosurgical intervention   - stroke core measures     Cardio:  - SBP goal 100-160   - Amlodipine 10mg, carvedilol 6.25mg BID, Lisinopril 20mg (substituted for Captopril)   - home HCTZ held, Hydralazine 10q4 PRN   echo 3/11: mild LVH, EF 65-70%   - Eliquis 5mg BID restarted 3/25 for afib   - PVCs 3/26, per cardiology NTD  - cont home lipitor 80mg    Pulm:  - satting well on RA  - standing mucomyst and duonebs  - Chest PT   - aspiration precaution, HOB up     ENT:   - ENT consulted, s/p laryngoscopy 3/14: No masses, hypomobility of L sided vocal cord  - f/u outpatient with Dr. Onofre/Dr. Peterson for possible vocal cord injection.   - s/p epistaxis episode 3/26: intranasal bactroban BID and saline spray q4 to moisturize can use afrin and manual pressure x 20 minutes if bleeds again   - Nasal packing placed 3/27 - absorbable- does not need abx or need to be removed    Renal:  - Na goal 135-145   - no issues     GI:   - TF via PEG, seroganguiness d/c on gauze   - bowel regimen   - last BM 3/29     Endo:  - A1C 5.4     Heme:  - SCDs for DVT ppx   - Eliquis 5mg BID   - LE dopplers 3/11, 3/23: neg for DVT   - LUE US 3/22: superficial thrombus L basilic/cephalic vein     ID:  - zosyn empirically for PNA completed 3/17   - pancx 3/25: +UA, neg blood cx, completed empiric Ceftriaxone x3 days for UTI  (3/25-3/27)     ORTHO:  - L foot xray 3/22: chronic 5th digit middle phalanx base corner fracture     Dispo:   - SDU status  - full code  - Pending AR  - Family updated     Assessment and plan discussed with Dr. Peterson

## 2022-03-30 NOTE — PROGRESS NOTE ADULT - SUBJECTIVE AND OBJECTIVE BOX
=================================  NEUROCRITICAL CARE ATTENDING NOTE  =================================    BREANNA MCCORMACK   MRN-[id number]  Summary:  76y/M with HTN, HLD, afib (on eliquis, unknown if last taken today,) CVA in  (with minimal residual right-sided weakness per girlfriend,) last known normal at 3pm when girlfriend spoke to him. 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 Upon arrival to [facility], BP noted to be 171/101. Stroke code was called. CTH without findings of hemorrhage. 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 NIHSS 20. No TPA was administered as Pt is out of window. Per patient's girlfriend, the basilar artery aneurysm is known and was diagnosed in  at [facility] when he suffered from a CVA. At that time, he underwent a "treatment for the aneurysm," which the girlfriend reports was too risky and was aborted. Patient never attended his follow-up appointments due to fear per girlfriend.  (10 Mar 2022 22:52)    COURSE IN THE HOSPITAL:  03/10 admitted to [facility]   Tmax 38.3; s/p angio showing extremely tortuous and elongated aortic arch with dolichoectatic vertebrobasilar system, giant partially thrombosed vertebrobasilar aneurysm   Tmax 37.9 tachypneic overnight; apixaban restarted; NGT inserted, tube feeds started at night   Tmax 38.3, desaturation to 88% this morning - now requiring 5L/min, tube feeds held; started on HFNC, improved   Tmax 37.8.  No significant events overnight.      transferred back to NSICU overnight   POD#11      severe epistaxis this morning, desaturation to 88% (off oxygen).  ENT scoped - bleeding controlled, Eliquis not reversed, but held due to high risk of thrombosis    Past Medical History:   Allergies:  No Known Allergies  Home meds:   ·	amLODIPine 5 mg oral tablet: 1 tab(s) orally once a day  ·	captopril 100 mg oral tablet:   ·	carvedilol 6.25 mg oral tablet:   ·	Eliquis 5 mg oral tablet:   ·	hydroCHLOROthiazide 25 mg oral tablet:     PHYSICAL EXAMINATION    NEUROLOGIC EXAMINATION:  Patient is awake, alert, oriented x3 with choices, hypophonic dysarthric, following commands, CARISSA, L facial droop, R UE/LE 5/5, L UE 0/5, unable to make a fist, withdraws, L LE brisk withdrawal  GENERAL: 2L NC  EENT:  anicteric  CARDIOVASCULAR: (+) S1 S2, regular rate and regular rhythm  PULMONARY: clear to auscultation  ABDOMEN: soft, nontender with normoactive bowel sounds  EXTREMITIES: no edema, no groin hematoma, pulses good  SKIN: no rash    LABS:     (15.51) 15.0 (15.0)  11.75 )-----------( 208      ( 22 Mar 2022 05:40 )             44.6   (134, 133)  137  |  104  |  25<H>  ----------------------------<  124<H>  4.4   |  23  |  0.85    Ca    8.7      22 Mar 2022 05:40  Phos  2.6       Mg     2.0      @ 07:  -  - @ 07:00  IN: 1840 mL / OUT: 2425 mL / NET: -585 mL    PT/INR - ( 21 Mar 2022 05:28 )   PT: 18.7 sec;   INR: 1.56   PTT - ( 22 Mar 2022 00:03 )  PTT:>200.0 sec     @ 07:01  -  - @ 07:00  IN: 1680 mL / OUT: 1326 mL / NET: 354 mL    HbA1C = 5.4 ()  LDL = 108 ()   HDL = 43 ()  TG = 58 ()  TSH = 3.050 ()    Bacteriology:   Blood CS NG1D  CSF studies:  EEG:  Neuroimagin/11 MRI no acute infarcts:  3cm giant aneurysm basilar artery, partially thrombosed, marked compression of brainstem, distortion of IV but patent  03/10 CTA:  dolichoectasia of basilar artery, partially thrombosed aneurysm; diffuse idiopathic skeletal hyperostosis  Other imaging:    MEDICATIONS:     ·	amLODIPine   Tablet 10 milliGRAM(s) Oral daily  ·	carvedilol 6.25 milliGRAM(s) Oral every 12 hours  ·	lisinopril 20 milliGRAM(s) Oral every 24 hours  ·	acetylcysteine 20%  Inhalation 4 Inhalation every 4 hours  ·	albuterol/ipratropium for Nebulization 3 Nebulizer every 4 hours  ·	bisacodyl Suppository 10 Rectal daily  ·	polyethylene glycol 3350 17 Oral every 12 hours  ·	senna 2 Oral at bedtime  ·	acetaminophen    Suspension .. 650 Oral every 6 hours PRN  ·	hydrALAZINE Injectable 10 IV Push every 4 hours PRN    Antibiotic History  ·	piperacillin/tazobactam IVPB.:  until    ·	vancomycin 03/13 x1    IV FLUIDS: IVL   DRIPS:  - heparin drip - held for  (falsely high?) - now back to 11  DIET: Jevity @ 70  Lines:   Drains:      CODE STATUS:  Full Code                       GOALS OF CARE:  aggressive                      DISPOSITION:  ICU =================================  NEUROCRITICAL CARE ATTENDING NOTE  =================================    BREANNA MCCORMACK   MRN-[id number]  Summary:  76y/M with HTN, HLD, afib (on eliquis, unknown if last taken today,) CVA in  (with minimal residual right-sided weakness per girlfriend,) last known normal at 3pm when girlfriend spoke to him. 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 Upon arrival to [facility], BP noted to be 171/101. Stroke code was called. CTH without findings of hemorrhage. 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 NIHSS 20. No TPA was administered as Pt is out of window. Per patient's girlfriend, the basilar artery aneurysm is known and was diagnosed in  at [facility] when he suffered from a CVA. At that time, he underwent a "treatment for the aneurysm," which the girlfriend reports was too risky and was aborted. Patient never attended his follow-up appointments due to fear per girlfriend.  (10 Mar 2022 22:52)    COURSE IN THE HOSPITAL:  03/10 admitted to [facility]   Tmax 38.3; s/p angio showing extremely tortuous and elongated aortic arch with dolichoectatic vertebrobasilar system, giant partially thrombosed vertebrobasilar aneurysm   Tmax 37.9 tachypneic overnight; apixaban restarted; NGT inserted, tube feeds started at night   Tmax 38.3, desaturation to 88% this morning - now requiring 5L/min, tube feeds held; started on HFNC, improved   Tmax 37.8.  No significant events overnight.      transferred back to NSICU overnight   POD#11      severe epistaxis this morning, desaturation to 88% (off oxygen).  ENT scoped - bleeding controlled, Eliquis not reversed, but held due to high risk of thrombosis    Past Medical History:   Allergies:  No Known Allergies  Home meds:   ·	amLODIPine 5 mg oral tablet: 1 tab(s) orally once a day  ·	captopril 100 mg oral tablet:   ·	carvedilol 6.25 mg oral tablet:   ·	Eliquis 5 mg oral tablet:   ·	hydroCHLOROthiazide 25 mg oral tablet:     PHYSICAL EXAMINATION  T(C): 37.1 ( @ 05:02), Max: 37.3 ( @ 21:35) HR: 96 ( @ 13:15) (64 - 106) BP: 129/66 ( @ 13:15) (123/67 - 169/87) RR: 25 ( @ 13:15) (16 - 25) SpO2: 92% ( @ 13:15) (87% - 100%)  NEUROLOGIC EXAMINATION:  Patient is awake, alert, oriented x3 with choices, hypophonic dysarthric, following commands, CARISSA, L facial droop, R UE/LE 5/5, L UE ?trace movements, unable to make a fist, withdraws, L LE brisk withdrawal  GENERAL: room air  EENT:  (+) nasal packing   CARDIOVASCULAR: (+) S1 S2, regular rate and regular rhythm  PULMONARY: clear to auscultation  ABDOMEN: soft, nontender with normoactive bowel sounds  EXTREMITIES: no edema, no groin hematoma, pulses good  SKIN: no rash    LABS:     (6.01)  15.4 (13.7)  11.54 )-----------( 431      ( 30 Mar 2022 10:55 )             46.9   (140)  139  |  104  |  31<H>  ----------------------------<  156<H>  4.4   |  27  |  0.83    Ca    9.0      30 Mar 2022 06:03  Phos  3.2       Mg     2.2      @ 07:01  -   @ 07:00  IN: 960 mL / OUT: 2100 mL / NET: -1140 mL    HbA1C = 5.4 ()  LDL = 108 ()   HDL = 43 ()  TG = 58 ()  TSH = 3.050 ()    Bacteriology:   Blood CS NG1D  CSF studies:  EEG:  Neuroimagin/11 MRI no acute infarcts:  3cm giant aneurysm basilar artery, partially thrombosed, marked compression of brainstem, distortion of IV but patent  03/10 CTA:  dolichoectasia of basilar artery, partially thrombosed aneurysm; diffuse idiopathic skeletal hyperostosis  Other imaging:    MEDICATIONS:     ·	amLODIPine   Tablet 10 milliGRAM(s) Oral daily  ·	carvedilol 6.25 milliGRAM(s) Oral every 12 hours  ·	lisinopril 20 milliGRAM(s) Oral every 24 hours  ·	acetylcysteine 20%  Inhalation 4 Inhalation every 4 hours  ·	albuterol/ipratropium for Nebulization 3 Nebulizer every 4 hours  ·	polyethylene glycol 3350 17 Oral every 12 hours  ·	senna 2 Oral at bedtime  ·	atorvastatin 80 Oral at bedtime  ·	mupirocin 2% Nasal 1 Both Nostrils two times a day  ·	acetaminophen    Suspension .. 650 Oral every 6 hours PRN  ·	hydrALAZINE Injectable 10 IV Push every 4 hours PRN    IV FLUIDS: IVL   DRIPS:  - heparin drip - held for  (falsely high?) - now back to 11  DIET: Jevity @ 70  Lines:   Drains:      CODE STATUS:  Full Code                       GOALS OF CARE:  aggressive                      DISPOSITION:  ICU =================================  NEUROCRITICAL CARE ATTENDING NOTE  =================================    BREANNA MCCORMACK   MRN-[id number]  Summary:  76y/M with HTN, HLD, afib (on eliquis, unknown if last taken today,) CVA in  (with minimal residual right-sided weakness per girlfriend,) last known normal at 3pm when girlfriend spoke to him. 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 Upon arrival to [facility], BP noted to be 171/101. Stroke code was called. CTH without findings of hemorrhage. 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 NIHSS 20. No TPA was administered as Pt is out of window. Per patient's girlfriend, the basilar artery aneurysm is known and was diagnosed in  at [facility] when he suffered from a CVA. At that time, he underwent a "treatment for the aneurysm," which the girlfriend reports was too risky and was aborted. Patient never attended his follow-up appointments due to fear per girlfriend.  (10 Mar 2022 22:52)    COURSE IN THE HOSPITAL:  03/10 admitted to [facility]   Tmax 38.3; s/p angio showing extremely tortuous and elongated aortic arch with dolichoectatic vertebrobasilar system, giant partially thrombosed vertebrobasilar aneurysm   Tmax 37.9 tachypneic overnight; apixaban restarted; NGT inserted, tube feeds started at night   Tmax 38.3, desaturation to 88% this morning - now requiring 5L/min, tube feeds held; started on HFNC, improved   Tmax 37.8.  No significant events overnight.      transferred back to NSICU overnight   POD#11      severe epistaxis this morning, desaturation to 88% (off oxygen).  ENT scoped - bleeding controlled, Eliquis not reversed, but held due to high risk of thrombosis    Past Medical History:   Allergies:  No Known Allergies  Home meds:   ·	amLODIPine 5 mg oral tablet: 1 tab(s) orally once a day  ·	captopril 100 mg oral tablet:   ·	carvedilol 6.25 mg oral tablet:   ·	Eliquis 5 mg oral tablet:   ·	hydroCHLOROthiazide 25 mg oral tablet:     PHYSICAL EXAMINATION  T(C): 37.1 ( @ 05:02), Max: 37.3 ( @ 21:35) HR: 96 ( @ 13:15) (64 - 106) BP: 129/66 ( @ 13:15) (123/67 - 169/87) RR: 25 ( @ 13:15) (16 - 25) SpO2: 92% ( @ 13:15) (87% - 100%)  NEUROLOGIC EXAMINATION:  Patient is awake, alert, oriented x3 with choices, hypophonic dysarthric, following commands, CARISSA, L facial droop, R UE/LE 5/5, L UE ?trace movements, unable to make a fist, withdraws, L LE brisk withdrawal  GENERAL: room air  EENT:  (+) nasal packing   CARDIOVASCULAR: (+) S1 S2, regular rate and regular rhythm  PULMONARY: clear to auscultation  ABDOMEN: soft, nontender with normoactive bowel sounds  EXTREMITIES: no edema, no groin hematoma, pulses good  SKIN: no rash    LABS:     (6.01)  15.4 (13.7)  11.54 )-----------( 431      ( 30 Mar 2022 10:55 )             46.9   (140)  139  |  104  |  31<H>  ----------------------------<  156<H>  4.4   |  27  |  0.83    Ca    9.0      30 Mar 2022 06:03  Phos  3.2       Mg     2.2      @ 07:01  -   @ 07:00  IN: 960 mL / OUT: 2100 mL / NET: -1140 mL    HbA1C = 5.4 ()  LDL = 108 ()   HDL = 43 ()  TG = 58 ()  TSH = 3.050 ()    Bacteriology:   Blood CS NG1D  CSF studies:  EEG:  Neuroimagin/11 MRI no acute infarcts:  3cm giant aneurysm basilar artery, partially thrombosed, marked compression of brainstem, distortion of IV but patent  03/10 CTA:  dolichoectasia of basilar artery, partially thrombosed aneurysm; diffuse idiopathic skeletal hyperostosis  Other imaging:    MEDICATIONS:     ·	amLODIPine   Tablet 10 milliGRAM(s) Oral daily  ·	carvedilol 6.25 milliGRAM(s) Oral every 12 hours  ·	lisinopril 20 milliGRAM(s) Oral every 24 hours  ·	acetylcysteine 20%  Inhalation 4 Inhalation every 4 hours  ·	albuterol/ipratropium for Nebulization 3 Nebulizer every 4 hours  ·	polyethylene glycol 3350 17 Oral every 12 hours  ·	senna 2 Oral at bedtime  ·	atorvastatin 80 Oral at bedtime  ·	mupirocin 2% Nasal 1 Both Nostrils two times a day  ·	acetaminophen    Suspension .. 650 Oral every 6 hours PRN  ·	hydrALAZINE Injectable 10 IV Push every 4 hours PRN    IV FLUIDS: IVL   DRIPS:  DIET: TF held  Lines:   Drains:      CODE STATUS:  Full Code                       GOALS OF CARE:  aggressive                      DISPOSITION:  ICU

## 2022-03-30 NOTE — CONSULT NOTE ADULT - SUBJECTIVE AND OBJECTIVE BOX
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 BP on admission was 171/101. CTH without hemorrhage. CTA with known 3cm basilar artery aneurysm that was partially thrombosed and with brainstem compression. This was initially identified in  after prior CVA, for which patient has residual right sided weakness from that event. Prior treatment of aneurysm aborted due to risk. MRI head with no acute infarcts. On home Eliquis dose. Was not candidate for TPA. Patient now s/p diagnostic angio with findings of partially thrombosed basilar artery aneurysm 3/11/22. Course c/b resp insufficiency weaned off HFNC. patient had an episode of epistaxis over the weekend  that was controlled with absorbable nasal packing. This morning patient started to have break through bleeding and ENT was called for management.     PAST MEDICAL HISTORY: HTN, HLD, AFIB (Eliquis), CVA () admitted to [facility] on 3/10/22      CURRENT MEDICATIONS   acetaminophen    Suspension .. 650 milliGRAM(s) Oral every 6 hours PRN  acetylcysteine 20%  Inhalation 4 milliLiter(s) Inhalation every 4 hours  albuterol/ipratropium for Nebulization 3 milliLiter(s) Nebulizer every 4 hours  amLODIPine   Tablet 10 milliGRAM(s) Oral daily  atorvastatin 80 milliGRAM(s) Oral at bedtime  carvedilol 6.25 milliGRAM(s) Oral every 12 hours  chlorhexidine 2% Cloths 1 Application(s) Topical <User Schedule>  glucagon  Injectable 1 milliGRAM(s) IntraMuscular once  hydrALAZINE Injectable 10 milliGRAM(s) IV Push every 4 hours PRN  lisinopril 20 milliGRAM(s) Oral every 24 hours  mupirocin 2% Nasal 1 Application(s) Both Nostrils two times a day  polyethylene glycol 3350 17 Gram(s) Oral every 12 hours  senna 2 Tablet(s) Oral at bedtime  sodium chloride 0.65% Nasal 2 Spray(s) Both Nostrils every 4 hours  thrombin epistaxis Kit (Thrombin-JMI) 5000 International Unit(s) Both Nostrils once      HOME MEDICATIONS:  amLODIPine 5 mg oral tablet: 1 tab(s) orally once a day  captopril 100 mg oral tablet: 1  orally 3 times a day  carvedilol 6.25 mg oral tablet: orally 2 times a day  Eliquis 5 mg oral tablet:   hydroCHLOROthiazide 25 mg oral tablet:   Lipitor 80 mg oral tablet: 1 tab(s) orally once a day    ALLERGIES:  No Known Allergies      FAMILY HISTORY: No recorded history     SURGICAL HISTORY see above  REVIEW OF SYSTEMS: Unable to evaluate due to patients mental status   PHYSICAL EXAMINATION:    The pertinent positive and negative examination findings were:  Gen: patient laying in bed, lethargic, unable to fully cooperate with exam  Neuro: Difficult to arouse   Nose: Patient actively bleeding from right nasal cavity, septum intact, absorbable packing removed, clots suctioned, attempted to pack with merocel and surgiflow. Hemostasis achieved with double lumen posterior nasal pack. Left nasal cavity examined, no active bleed.   Oral: clot removed from posterior pharynx, suctioned blood through out evaluation. Once hemostasis was achieved posterior pharynx assessed again, no evidence of active bleed.  gag reflex absent   Resp: Patient stating low 90's on room air, patient unable to protect his airway, No acute respiratory distress   Neck: full ROM  trachea midline      Vital Signs:  T(C): 37.1 (22 @ 05:02), Max: 37.3 (22 @ 13:00)  HR: 96 (22 @ 11:48) (64 - 96)  BP: 140/70 (22 @ 11:48) (123/67 - 169/87)  RR: 20 (22 @ 11:48) (16 - 20)  SpO2: 89% (22 @ 11:48) (87% - 100%)    Constitutional: The patient's developmental and nutritional status was assessed.  Normal   Head and Face: The head and face were inspected for deformities.    Normal   Eyes: Extraocular movements and primary gaze alignment were unable to be assessed   Ears: The external ears were examined for deformities. Normal   Nose: The nose was examined for external deformities.  The nasal septum, mucosa, and turbinates were inspected by anterior rhinoscopy.  See above   Oral cavity: The lips, teeth, and gums were examined for abnormalities.  The oral mucosa was inspected for lesions. Normal   Oropharynx: The tongue, palate, tonsils, lateral and posterior pharynx were inspected for the presence of asymmetry or mucosal lesions.  Normal   Respiratory: The nature of the breathing and chest expansion/symmetry was observed.  See above     PROCEDURE NOTE:  Procedure: Placement of Nasal Packing    Preoperative Diagnosis: Epistaxis    Postoperative Diagnosis: Epistaxis    Indications for Procedure: See above    Details of Procedure: The patient was seated upright in stretcher   Oxymetazoline and 4% lidocaine was applied to the nasal cavity at the region of epistaxis. A pledget containing these solutions was then applied to the area for topical anesthesia.    Nasal Packing:  Double lumen posterior nasal pack was placed in the right nasal cavity,  Surgiflow was inserted around the pack     Estimated blood loss: 400 cc    Complications: None    IMAGING: n/a             Labs             15.4   11.54 )-----------( 431      ( 30 Mar 2022 10:55 )             46.9    03-30    139  |  104  |  31<H>  ----------------------------<  156<H>  4.4   |  27  |  0.83    Ca    9.0      30 Mar 2022 06:03  Phos  3.2     03-30  Mg     2.2     03-30        PROCEDURE NOTE:PROCEDURE NOTE:    Procedure: Flexible laryngoscopy (CPT 12200)     Pre-Procedure Diagnosis: aspiration      Post-Procedure Diagnosis: same      Indications for Procedure: BREANNA MCCORMACK is a76y  Male with history or aspiration, scoped for airway assessment . See above full HPI for further details. A detailed assessment of the nasal cavity, nasopharynx, hypopharynx, oropharynx, and larynx was required. An indirect mirror examination was not sufficient for complete evaluation due to patient discomfort or incomplete view. Therefore flexible laryngoscopy was performed.       Description of Procedure: After obtaining verbal consent, a flexible fiberoptic laryngoscope was inserted into the right nasal cavity. The nasal anatomy was examined for evidence of  nasal cavity obstruction. The septum was examined for clinically significant deviation. The inferior turbinates were examined for clinically significantly enlargement. The laryngoscope was then passed into the nasopharynx, which was examined for adenoid hypertrophy.  Passing the flexible scope into the oropharynx and hypopharynx allowed examination of the base of tongue and vallecula and epiglottis. The piriform sinuses were examined for lesions and pooling of secretions or visible aspiration. The false vocal cords and true vocal cords were examined. The true vocal cords were examined for mobility, symmetry and closure. The visualized portion of the subglottis examined. The pharynx was examined for malacia or visible extrinsic compression. The flexible fiberoptic scope was then removed and passed into the left nasal cavity, which was examined. The patient tolerated the procedure well without complications.      Findings:  Absent cough reflex, moderate oral secretions, , vallecula clear, epiglottis crisp, diffuse laryngeal edema, edematous arytenoids b/l whit hypotonia , limited view of vocal cords ,patient unable to properly protect their airway. Evidence of blood tinges oral sections

## 2022-03-30 NOTE — CONSULT NOTE ADULT - ASSESSMENT
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  During assessment we performed flexible laryngoscope which showed diffuse laryngeal edema, mild/moderate  secretions, absent cough reflex and evidence that the patient was unable to protect his airway.     RECOMMENDATIONS:  - Recommend for the primary team to discuss goals of care with the family,  Depending on results of the conversation recommend intubation for airway protection .  - Hemostasis achieved with double limen posterior nasal pack   -If patient requires oxygen, recommend humidified oxygen via facemask.  - If patient rebleeds: Spray Afrin (oxymetazoline) liberally in both nares, sit patient upright with slight flexion of neck (to decrease aspiration risk) and apply constant external nasal compression on soft part of nose for 15 minutes (set a timer, do not release pressure to check if bleeding has stopped until 15 minutes is complete). If Epistaxis fails to remit, please contact ENT team  - Recommend to hold Eliquis if approved by primary and medicine team  - Recommend anti-staphylococcal antibiotics (Ancef or Keflex; Clindamycin if PCN allergic) while packing is in place  - Recommend maintaining nasal packing for 5 days, if patient is inpatient at that time may call ENT for removal, otherwise may call our clinic   - Patient discussed with Dr Ann who agrees with the plan      Thank you for the kind referral and for allowing me to share in the care of BREANNA MCCORMACK If you have any questions, please do not hesitate to contact me.     Sincerely,  Laura Hawkins PA-C  03-30-22 @ 12:30

## 2022-03-30 NOTE — PROVIDER CONTACT NOTE (OTHER) - SITUATION
Pt start bleeding from his nose,vss, oral care done ,neuro team aware, afrin ordered and given, ENT called to come to see him, no other intervention, waiting for ENT to come, will continue monitoring.

## 2022-03-31 LAB
ANION GAP SERPL CALC-SCNC: 9 MMOL/L — SIGNIFICANT CHANGE UP (ref 5–17)
APTT BLD: 32.8 SEC — SIGNIFICANT CHANGE UP (ref 27.5–35.5)
BUN SERPL-MCNC: 36 MG/DL — HIGH (ref 7–23)
CALCIUM SERPL-MCNC: 8.8 MG/DL — SIGNIFICANT CHANGE UP (ref 8.4–10.5)
CHLORIDE SERPL-SCNC: 109 MMOL/L — HIGH (ref 96–108)
CO2 SERPL-SCNC: 23 MMOL/L — SIGNIFICANT CHANGE UP (ref 22–31)
CREAT SERPL-MCNC: 0.74 MG/DL — SIGNIFICANT CHANGE UP (ref 0.5–1.3)
EGFR: 93 ML/MIN/1.73M2 — SIGNIFICANT CHANGE UP
GLUCOSE SERPL-MCNC: 112 MG/DL — HIGH (ref 70–99)
HCT VFR BLD CALC: 39.6 % — SIGNIFICANT CHANGE UP (ref 39–50)
HGB BLD-MCNC: 13.1 G/DL — SIGNIFICANT CHANGE UP (ref 13–17)
INR BLD: 1.46 — HIGH (ref 0.88–1.16)
MAGNESIUM SERPL-MCNC: 2 MG/DL — SIGNIFICANT CHANGE UP (ref 1.6–2.6)
MCHC RBC-ENTMCNC: 31.2 PG — SIGNIFICANT CHANGE UP (ref 27–34)
MCHC RBC-ENTMCNC: 33.1 GM/DL — SIGNIFICANT CHANGE UP (ref 32–36)
MCV RBC AUTO: 94.3 FL — SIGNIFICANT CHANGE UP (ref 80–100)
NRBC # BLD: 0 /100 WBCS — SIGNIFICANT CHANGE UP (ref 0–0)
PHOSPHATE SERPL-MCNC: 2.8 MG/DL — SIGNIFICANT CHANGE UP (ref 2.5–4.5)
PLATELET # BLD AUTO: 290 K/UL — SIGNIFICANT CHANGE UP (ref 150–400)
POTASSIUM SERPL-MCNC: 4.2 MMOL/L — SIGNIFICANT CHANGE UP (ref 3.5–5.3)
POTASSIUM SERPL-SCNC: 4.2 MMOL/L — SIGNIFICANT CHANGE UP (ref 3.5–5.3)
PROTHROM AB SERPL-ACNC: 17.4 SEC — HIGH (ref 10.5–13.4)
RBC # BLD: 4.2 M/UL — SIGNIFICANT CHANGE UP (ref 4.2–5.8)
RBC # FLD: 12.9 % — SIGNIFICANT CHANGE UP (ref 10.3–14.5)
SODIUM SERPL-SCNC: 141 MMOL/L — SIGNIFICANT CHANGE UP (ref 135–145)
WBC # BLD: 6.9 K/UL — SIGNIFICANT CHANGE UP (ref 3.8–10.5)
WBC # FLD AUTO: 6.9 K/UL — SIGNIFICANT CHANGE UP (ref 3.8–10.5)

## 2022-03-31 PROCEDURE — 99291 CRITICAL CARE FIRST HOUR: CPT

## 2022-03-31 PROCEDURE — 99233 SBSQ HOSP IP/OBS HIGH 50: CPT

## 2022-03-31 RX ORDER — BACITRACIN ZINC 500 UNIT/G
1 OINTMENT IN PACKET (EA) TOPICAL
Refills: 0 | Status: DISCONTINUED | OUTPATIENT
Start: 2022-03-31 | End: 2022-04-08

## 2022-03-31 RX ORDER — SODIUM,POTASSIUM PHOSPHATES 278-250MG
1 POWDER IN PACKET (EA) ORAL ONCE
Refills: 0 | Status: COMPLETED | OUTPATIENT
Start: 2022-03-31 | End: 2022-03-31

## 2022-03-31 RX ADMIN — CHLORHEXIDINE GLUCONATE 1 APPLICATION(S): 213 SOLUTION TOPICAL at 06:17

## 2022-03-31 RX ADMIN — SENNA PLUS 2 TABLET(S): 8.6 TABLET ORAL at 21:47

## 2022-03-31 RX ADMIN — MUPIROCIN 1 APPLICATION(S): 20 OINTMENT TOPICAL at 06:24

## 2022-03-31 RX ADMIN — CARVEDILOL PHOSPHATE 6.25 MILLIGRAM(S): 80 CAPSULE, EXTENDED RELEASE ORAL at 06:16

## 2022-03-31 RX ADMIN — LISINOPRIL 20 MILLIGRAM(S): 2.5 TABLET ORAL at 17:37

## 2022-03-31 RX ADMIN — Medication 1 APPLICATION(S): at 09:53

## 2022-03-31 RX ADMIN — POLYETHYLENE GLYCOL 3350 17 GRAM(S): 17 POWDER, FOR SOLUTION ORAL at 17:37

## 2022-03-31 RX ADMIN — AMLODIPINE BESYLATE 10 MILLIGRAM(S): 2.5 TABLET ORAL at 06:16

## 2022-03-31 RX ADMIN — Medication 1 PACKET(S): at 07:35

## 2022-03-31 RX ADMIN — MUPIROCIN 1 APPLICATION(S): 20 OINTMENT TOPICAL at 18:01

## 2022-03-31 RX ADMIN — SODIUM CHLORIDE 100 MILLILITER(S): 9 INJECTION INTRAMUSCULAR; INTRAVENOUS; SUBCUTANEOUS at 13:33

## 2022-03-31 RX ADMIN — Medication 3 MILLILITER(S): at 21:23

## 2022-03-31 RX ADMIN — Medication 3 MILLILITER(S): at 15:16

## 2022-03-31 RX ADMIN — CARVEDILOL PHOSPHATE 6.25 MILLIGRAM(S): 80 CAPSULE, EXTENDED RELEASE ORAL at 17:37

## 2022-03-31 RX ADMIN — ATORVASTATIN CALCIUM 80 MILLIGRAM(S): 80 TABLET, FILM COATED ORAL at 21:47

## 2022-03-31 RX ADMIN — Medication 3 MILLILITER(S): at 04:46

## 2022-03-31 RX ADMIN — Medication 1000 MILLIGRAM(S): at 21:47

## 2022-03-31 RX ADMIN — Medication 1000 MILLIGRAM(S): at 06:16

## 2022-03-31 RX ADMIN — Medication 1000 MILLIGRAM(S): at 13:33

## 2022-03-31 RX ADMIN — POLYETHYLENE GLYCOL 3350 17 GRAM(S): 17 POWDER, FOR SOLUTION ORAL at 06:16

## 2022-03-31 RX ADMIN — Medication 3 MILLILITER(S): at 10:44

## 2022-03-31 RX ADMIN — Medication 1 APPLICATION(S): at 17:36

## 2022-03-31 NOTE — PROGRESS NOTE ADULT - ASSESSMENT
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, hemiplegia  diffuse idiopathic skeletal hyperostosis  Hypertension dyslipidemia  h/o CVA 2020, minimal R sided weakness  Atrial fibrillation with controlled ventricular rate    PLAN:   NEURO: neurochecks q4h, VS q1h, PRN pain meds with acetaminophen, no opiates / benzos  no plans for intervention for basilar artery aneurysm  REHAB:  physical therapy evaluation and management    EARLY MOB:  OOB to chair    PULM:  PRN O2 support to keep sats >/=92% (face tent); ENT consulted for epistaxis  CARDIO:  SBP goal 100-160mm Hg, cont carvedilol, amlodipine, lisinopril   ENDO:  Blood sugar goals 140-180 mg/dL  GI:  bowel regimen miralax  DIET: hold TF for now  RENAL:  IVF  HEM/ONC:  Hb stable  VTE Prophylaxis: SCDs, off eliquis  ID: no fever, leukocytosis  Social: Rafiq Forrester  is HCP - updated, discussed with patient's wife - high risk for aspiration pneumonia    ATTENDING ATTESTATION:  I was physically present for the key portions of the evaluation and management (E/M) service provided.  I agree with the above history, physical and plan, which I have reviewed and edited where appropriate.    Patient at high risk for neurological deterioration or death due to:  ICU delirium, aspiration PNA, DVT / PE.  Critical care time:  I have personally provided 45 minutes of critical care time, excluding time spent on separate procedures.      Plan discussed with RN, house staff.    LATERAL TRANSFER CHECKLIST    I have reviewed the patient’s history, performed a clinical examination and assessed the patient to be stable for transfer to a non-neurosurgical intensive care unit.      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, hemiplegia  diffuse idiopathic skeletal hyperostosis  Hypertension dyslipidemia  h/o CVA 2020, minimal R sided weakness  Atrial fibrillation with controlled ventricular rate    PLAN:   NEURO: neurochecks q4h, VS q2h, PRN pain meds with acetaminophen, no opiates / benzos  no plans for intervention for basilar artery aneurysm  REHAB:  physical therapy evaluation and management    EARLY MOB:  OOB to chair    PULM:  PRN O2 support to keep sats >/=92% (face tent); ENT consulted for epistaxis  CARDIO:  SBP goal 100-160mm Hg, cont carvedilol, amlodipine, lisinopril   ENDO:  Blood sugar goals 140-180 mg/dL  GI:  bowel regimen polyethylene glycol 17G daily  DIET: start feeds  RENAL:  d/c IVF once feeds at goal  HEM/ONC:  Hb stable  VTE Prophylaxis: SCDs, off eliquis  ID: no fever, no leukocytosis, ancef while nasal packing is in  Social: Rafiq Forrester  is HCP - updated, discussed with patient's wife - high risk for aspiration pneumonia    ATTENDING ATTESTATION:  I was physically present for the key portions of the evaluation and management (E/M) service provided.  I agree with the above history, physical and plan, which I have reviewed and edited where appropriate.    Patient at high risk for neurological deterioration or death due to:  ICU delirium, aspiration PNA, DVT / PE.  Critical care time:  I have personally provided 45 minutes of critical care time, excluding time spent on separate procedures.      Plan discussed with RN, house staff.    LATERAL TRANSFER CHECKLIST    I have reviewed the patient’s history, performed a clinical examination and assessed the patient to be stable for transfer to a non-neurosurgical intensive care unit.      Specifically, the patient:  [X] does not have cerebrovascular insufficiency or require hemodynamic augmentation  [X] does not have vasospasm or delayed cerebral ischemia with subarachnoid hemorrhage  [X] does not require active titration of meds for uncontrolled sympathetic storming  [X] does not have an external ventricular drain (except lateral transfers to [facility])  [X] does not have a lumbar drain  [X] did not have a complex skull base surgery or complex intracranial tumor in the immediate post-operative period

## 2022-03-31 NOTE — PROGRESS NOTE ADULT - ATTENDING COMMENTS
Initial attending contact date  3/28/22   . See fellow note written above for details. I reviewed the fellow documentation. I have personally seen and examined this patient. I reviewed vitals, labs, medications, cardiac studies, and additional imaging. I agree with the above fellow's findings and plans as written above with the following additions/statements.    75y/o M with PMHx sig for HTN, HLD, afib (on eliquis, unknown if last taken today,) CVA in 2020 (with minimal residual right-sided weakness per girlfriend) p/w dysarthria, left facial droop, and left-sided weakness. CTH without findings of hemorrhage. 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 NIHSS 20. Pt is not a tpa candidate as Pt is out of window. Patient now s/p diagnostic angio with findings of partially thrombosed basilar artery aneurysm 3/11/22. course c/b resp insufficiency weaned off HFNC, now on NC 2L. Cardiology was consulted for PVCs seen on telemetry.  -On tele isolated PVCs only, maintaining NSR  -Cont coreg 6.25 mg bid  -Parox A fib, cont eliquis 5 mg bid   -Other meds as noted above  -Pls reconsult as needed
Patient seen and examined 3/20/2022. Exam stable. Per nurse requiring frequent suctioning and he has no cough/gaga reflex. Last dose eliquis today in preparation for PEG. Will transfer patient to ICU for heparin drip while off eliquis.    Vincent Peterson M.D.
Patient seen and examined 3/17/2022. Exam stable. Per ENT, no appropriate for vocal cord injection. Will need PEG placement. PT/OT. Remaine high risk for further deterioration from untreatable vertebrobasilar fusiform aneurysm.    Vincent Peterson M.D.
Patient seen and examined 3/18/2022. I discussed with the patient and his wife that there is currently no good treatment available for his partially thrombosed fusiform vertebrobasilar aneurysm. I explained that there is a significant chance that this aneurysm will bleed and/or continue to expand and result in permanent neurological disability or death. Currently, the patient is hemiplegic, aphonic, and has dysphagia. Plan at this time is to get him to a rehab but he will need a PEG for feeding. Patient and his wife are agreeable to PEG placement.    Vincent Peterson M.D.
Initial attending contact date 3/31/22     . See resident note written above for details. I reviewed the resident documentation. I have personally seen and examined this patient. I reviewed vitals, labs, medications, cardiac studies, and additional imaging. I agree with the above resident's findings and plans as written above with the following additions/statements.    77y/o M with PMHx sig for HTN, HLD, pafib CVA in 2020 (with minimal residual right-sided weakness per girlfriend) p/w dysarthria, left facial droop, and left-sided weakness. 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 Patient now s/p diagnostic angio with findings of partially thrombosed basilar artery aneurysm 3/11/22. No intervention for aneurysm. Patient s/p PEG. Course now c/b significant epistaxis. Cardiology re-consulted given patient on eliquis.    -On tele, maintaining NSR  -However pt wit parox A fib with chadsvasc2 = 5 denoting high CVA risk therefore necessitating AC  -Given recent epistaxis, recommend starting IV heparin with goal PTT 40-60  -If no recurrent bleed on IV heparin, would resume eliquis 5 mg bid given epistaxis was not a spontaneous bleed on eliquis (dry nasal passages, s/p NGT)  -Pls reconsult as needed

## 2022-03-31 NOTE — PROGRESS NOTE ADULT - ASSESSMENT
75y/o M with PMHx sig for HTN, HLD, pafib CVA in 2020 (with minimal residual right-sided weakness per girlfriend) p/w dysarthria, left facial droop, and left-sided weakness. 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 Patient now s/p diagnostic angio with findings of partially thrombosed basilar artery aneurysm 3/11/22. No intervention for aneurysm. Patient s/p PEG. Course now c/b significant epistaxis. Cardiology re-consulted given patient on eliquis.    #pAF on eliquis with epistaxis  -do not recommend holding AC indefinitely given CHADSVASC of 5  -epistaxis appears to be resolving and seems to have been multifactorial (possibly related to NG tube/ dry oxygen) and not truly "spontaneous"  -at this time would recommend trialing patient on heparin gtt and monitoring bleeding/hemoglobin and ultimately transitioning back to eliquis    Pt s/e/d with cardiology attending  Cardiology will sign off; please re-consult if needed

## 2022-03-31 NOTE — PROGRESS NOTE ADULT - SUBJECTIVE AND OBJECTIVE BOX
HPI:  75y/o M with PMHx sig for HTN, HLD, afib (on eliquis, unknown if last taken today,) CVA in 2020 (with minimal residual right-sided weakness per girlfriend,) last known normal at 3pm when girlfriend spoke to him. 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 Upon arrival to [facility], BP noted to be 171/101. Stroke code was called. CTH without findings of hemorrhage. 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 NIHSS 20. No TPA was administered as Pt is out of window. Per patient's girlfriend, the basilar artery aneurysm is known and was diagnosed in 2020 at [facility] when he suffered from a CVA. At that time, he underwent a "treatment for the aneurysm," which the girlfriend reports was too risky and was aborted. Patient never attended his follow-up appointments due to fear per girlfriend.  (10 Mar 2022 22:52)    Hospital Course:  3/10: Admitted for further workup of stroke symptoms and basilar artery aneurysm.  3/11: [place] placed overnight. 3% hypertonic saline started. Neuro exam stable. Started on vEEG for aphasia  3/12: GM overnight. Neuro exam stable. 3% d/c. home eliquis 5 bid. failed s/s. started on TF via NGT, spiked fever 101, f/u panculture   3/13: GM overnight, neuro stable, veeg negative. Vanc/zosyn started for empiric PNA.   3/14: GM o/n neuro stable. on HFNC. ENT plan for laryngoscope today with . failed s/s, pend repeat eval. dc'd captopril, started lisinopril. dc'd vanc. Laryngoscopy: No masses visualized, hypomobility of L sided vocal cords, dysarthric, hypophonic, risk for aspiration, good cough reflex.Dr. Onofre to discuss with Dr. Peterson regarding a possible vocal cord injection.   3/15: GM overnight, neuro stable, on HFNC 30/30 overnight, transitioned to 4LNC  3/16: GM overnight, neuro stable, tolerating NC and satting well    3/17: POD6 dx angio. GM o/n neuro stable. trickle feeds via NGT  3/18: POD7 GM o/n, saturating well in RA. tolerating TF   03/19: POD8 GM o/n. gen surg consulted for PEG placement.  03/20: POD9 GM o/n. Transferred back to ICU for heparin gtt in preparation for PEG placement Wed.   3/21: POD10 GM o/n, neuro exam stable. Transferred to ICU in preparation for hep gtt to start tomorrow, plan for PEG on Wednesday. F/u am coags.   3/22: POD11. GM o/n neuro stable. on heparin gtt ptt goal 40-60. o/n ptt >200c, held for 2 hours. on heparin @11. pre-op for PEG   3/23: POD12. o/n new R facial droop, CTH stable. heparin gtt d/c. preop PEG today  3/24: POD13, POD1 PEG placement, GM overnight, neuro stable, +BM  3/25: POD14, GM o/n, neuro stable, eliquis restarted. Started ceftriaxone for UTI  3/26: POD 15. GM overnight. exam stable. on abx until 3/26 for UTI. On eliquis for afib.   3/27: POD 16. o/n episode of epistaxis, aftrin administered. exam stable. abx completed for UTI. pending rehab. ENT consulted for epistaxis, recommended nasal saline and bactroban.  3/28: POD17. GM o/n, neuro stable.  3/29: POD18. GM o/n, neuro stable   3/30: POD18. GM o/n. neuro stable. upgraded to ICU for intractable epistaxis. ENT placed nasal dressings, bleeding controlled. Ancef while drains in place. Desatted to 80s, on 8L O2 via face tent. intubation held as per girlfriend, will continue to monitor respiratory status  3/31: POD19 GM overnight. Neuro exam stable. Juan episode of epistaxis o/n.    Vital Signs Last 24 Hrs  T(C): 36.6 (31 Mar 2022 01:32), Max: 37.1 (30 Mar 2022 05:02)  T(F): 97.8 (31 Mar 2022 01:32), Max: 98.7 (30 Mar 2022 05:02)  HR: 80 (31 Mar 2022 01:00) (64 - 106)  BP: 143/73 (31 Mar 2022 01:00) (107/64 - 169/87)  BP(mean): 100 (31 Mar 2022 01:00) (86 - 119)  RR: 16 (31 Mar 2022 01:00) (13 - 29)  SpO2: 96% (31 Mar 2022 01:00) (87% - 97%)    I&O's Summary    29 Mar 2022 07:01  -  30 Mar 2022 07:00  --------------------------------------------------------  IN: 960 mL / OUT: 2100 mL / NET: -1140 mL    30 Mar 2022 07:01  -  31 Mar 2022 01:35  --------------------------------------------------------  IN: 960 mL / OUT: 1050 mL / NET: -90 mL      PHYSICAL EXAM:  General: NAD, pt is comfortably laying in bed  HEENT: OE to voice, PERRL 3mm reactive, EOMI b/l, mild L facial, absorbable nasal packing in place for epistaxis  Cardiovascular: RRR  Respiratory: chest rise symmetric, nonlabored breathing  GI: abd soft, NTND   Neuro: A&Ox3, hypophonic & dysarthric, Follows commands.  RUE 5/5 strength, RLE 2/5 proximally, 4/5 PF/DF. L side trace movement throughout to command.   Extremities: warm & well perfused  Wound/incision: PEG incision site c/d/i     TUBES/LINES:  [] Arredondo  [] Lumbar Drain  [] Wound Drains  [] Others    DIET:  [] NPO  [] Mechanical  [x] Tube feeds - PEG    LABS:                        15.4   11.54 )-----------( 431      ( 30 Mar 2022 10:55 )             46.9     03-30    139  |  104  |  31<H>  ----------------------------<  156<H>  4.4   |  27  |  0.83    Ca    9.0      30 Mar 2022 06:03  Phos  3.2     03-30  Mg     2.2     03-30              CAPILLARY BLOOD GLUCOSE      POCT Blood Glucose.: 116 mg/dL (30 Mar 2022 23:52)  POCT Blood Glucose.: 126 mg/dL (30 Mar 2022 18:22)  POCT Blood Glucose.: 192 mg/dL (30 Mar 2022 13:49)      Drug Levels: [] N/A    CSF Analysis: [] N/A      Allergies    No Known Allergies    Intolerances      MEDICATIONS:  Antibiotics:  ceFAZolin  Injectable. 1000 milliGRAM(s) IV Push every 8 hours    Neuro:  acetaminophen    Suspension .. 650 milliGRAM(s) Oral every 6 hours PRN    Anticoagulation:  thrombin epistaxis Kit (Thrombin-JMI) 5000 International Unit(s) Both Nostrils once    OTHER:  albuterol/ipratropium for Nebulization 3 milliLiter(s) Nebulizer every 6 hours  amLODIPine   Tablet 10 milliGRAM(s) Oral daily  atorvastatin 80 milliGRAM(s) Oral at bedtime  carvedilol 6.25 milliGRAM(s) Oral every 12 hours  chlorhexidine 2% Cloths 1 Application(s) Topical <User Schedule>  glucagon  Injectable 1 milliGRAM(s) IntraMuscular once  hydrALAZINE Injectable 10 milliGRAM(s) IV Push every 4 hours PRN  lisinopril 20 milliGRAM(s) Oral every 24 hours  mupirocin 2% Nasal 1 Application(s) Both Nostrils two times a day  polyethylene glycol 3350 17 Gram(s) Oral every 12 hours  senna 2 Tablet(s) Oral at bedtime    IVF:  sodium chloride 0.9%. 1000 milliLiter(s) IV Continuous <Continuous>    CULTURES:  Culture Results:   No growth at 5 days. (03-25 @ 18:00)  Culture Results:   No growth at 5 days. (03-25 @ 18:00)    RADIOLOGY & ADDITIONAL TESTS:      ASSESSMENT:  75y/o M with PMHx sig for HTN, HLD, afib (on eliquis, unknown if last taken today,) CVA in 2020 (with minimal residual right-sided weakness per girlfriend) p/w dysarthria, left facial droop, and left-sided weakness. CTH without findings of hemorrhage. 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 NIHSS 20. Pt is not a tpa candidate as Pt is out of window. Patient now s/p diagnostic angio with findings of partially thrombosed basilar artery aneurysm 3/11/22. course c/b resp insufficiency weaned off HFNC, now complicated by epistaxis    BASILAR ARTERY ANEURYSM    Handoff    MEWS Score    Brain aneurysm    Brain aneurysm    Angiogram, carotid and cerebral, bilateral    Basilar artery aneurysm    Basilar artery aneurysm    Brain stem compression    Longstanding persistent atrial fibrillation    HTN (hypertension)    Hyperlipidemia    Acidosis    Leukocytosis    Dysphagia    Hypocalcemia    Hypophosphatemia    Acute respiratory failure with hypoxia    Angiogram, carotid and cerebral, bilateral    EGD, with PEG    STROKE    EGD, with PEG    Brain stem compression    Longstanding persistent atrial fibrillation    HTN (hypertension)    Hyperlipidemia    Acidosis    Leukocytosis    Dysphagia    Hypocalcemia    Hypophosphatemia    Acute respiratory failure with hypoxia    History of CVA (cerebrovascular accident)    [email or web address]      PLAN:   Neuro:   - neuro checks q4/vital signs q1hrs  - MR head 3/11: no acute infarcts   - CTH 3/22: stable   - EEG negative 3/13, dc'd   - no plan for further neurosurgical intervention   - stroke core measures     Cardio:  - SBP goal 100-160   - Amlodipine 10mg, carvedilol 6.25mg BID, Lisinopril 20mg (substituted for Captopril)   - home HCTZ held, Hydralazine 10q4 PRN   echo 3/11: mild LVH, EF 65-70%   - Eliquis 5mg BID restarted 3/25 for afib, held  - PVCs 3/26, per cardiology NTD  - cont home lipitor 80mg    Pulm:  - satting well on RA  - standing mucomyst and duonebs  - Chest PT   - aspiration precaution, HOB up     ENT:   - ENT consulted, s/p laryngoscopy 3/14: No masses, hypomobility of L sided vocal cord  - f/u outpatient with Dr. Onofre/Dr. Peterson for possible vocal cord injection.   - s/p epistaxis episode 3/26: intranasal bactroban BID and saline spray q4 to moisturize can use afrin and manual pressure x 20 minutes if bleeds again   - Nasal packing placed 3/30 x3days    Renal:  - Na goal 135-145   - no issues     GI:   - NPO due to aspiration risk  - bowel regimen   - last BM 3/29     Endo:  - A1C 5.4     Heme:  - SCDs for DVT ppx   - Eliquis 5mg BID held  - LE dopplers 3/11, 3/23: neg for DVT   - LUE US 3/22: superficial thrombus L basilic/cephalic vein     ID:  - zosyn empirically for PNA completed 3/17   - pancx 3/25: +UA, neg blood cx, completed empiric Ceftriaxone x3 days for UTI  (3/25-3/27)   - Ancef while nasal packings in place     ORTHO:  - L foot xray 3/22: chronic 5th digit middle phalanx base corner fracture     Dispo:   - ICU status  - full code  - Pending AR  - Family updated     Assessment and plan discussed with Dr. Peterson and Dr. Gay

## 2022-03-31 NOTE — PROGRESS NOTE ADULT - ASSESSMENT
76y Male PMH HTN, HLD, afib (Eliquis), CVA (2020) admitted to [facility] on 3/10/22 with partially thrombosed basilar artery aneurysm with brain stem compression p/w epistaxis now controlled with nasal pack.  During assessment we performed flexible laryngoscope which showed diffuse laryngeal edema, mild/moderate  secretions, absent cough reflex and evidence that the patient was unable to protect his airway.     - Hemostasis achieved with double lumen posterior nasal pack in right side  - apply bacitracin to right ala BID  - If patient requires oxygen, recommend humidified oxygen via facemask.  - If patient rebleeds: Spray Afrin (oxymetazoline) liberally in both nares, sit patient upright with slight flexion of neck (to decrease aspiration risk) and apply constant external nasal compression on soft part of nose for 15 minutes (set a timer, do not release pressure to check if bleeding has stopped until 15 minutes is complete). If Epistaxis fails to remit, please contact ENT team  - Recommend to hold Eliquis if approved by primary and medicine team  - Recommend anti-staphylococcal antibiotics (Ancef or Keflex; Clindamycin if PCN allergic) while packing is in place  - Recommend maintaining nasal packing for 5 days, if patient is inpatient at that time may call ENT for removal, otherwise may call our clinic   - Discussed with attending Dr. Ann

## 2022-03-31 NOTE — PROGRESS NOTE ADULT - SUBJECTIVE AND OBJECTIVE BOX
ENT Progress Note    76y Male PMH HTN, HLD, AFIB (Eliquis), CVA (2020) admitted to [facility] on 3/10/22 with partially thrombosed basilar artery aneurysm with brain stem compression. 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 BP on admission was 171/101. CTH without hemorrhage. CTA with known 3cm basilar artery aneurysm that was partially thrombosed and with brainstem compression. This was initially identified in 2020 after prior CVA, for which patient has residual right sided weakness from that event. Prior treatment of aneurysm aborted due to risk. MRI head with no acute infarcts. On home Eliquis dose. Was not candidate for TPA. Patient now s/p diagnostic angio with findings of partially thrombosed basilar artery aneurysm 3/11/22. Course c/b resp insufficiency weaned off HFNC. patient had an episode of epistaxis over the weekend  that was controlled with absorbable nasal packing. This morning patient started to have break through bleeding and ENT was called for management.     03-31 Patient seen and examined at bedside this morning. Patient was transferred to ICU from stepdown yesterday after epistaxis was controlled for poor airway protection. NAEON, no further bleeding. Patient more alert this morning compared to yesterday.     MEDICATIONS  (STANDING):  albuterol/ipratropium for Nebulization 3 milliLiter(s) Nebulizer every 6 hours  amLODIPine   Tablet 10 milliGRAM(s) Oral daily  atorvastatin 80 milliGRAM(s) Oral at bedtime  BACItracin   Ointment 1 Application(s) Topical two times a day  carvedilol 6.25 milliGRAM(s) Oral every 12 hours  ceFAZolin  Injectable. 1000 milliGRAM(s) IV Push every 8 hours  chlorhexidine 2% Cloths 1 Application(s) Topical <User Schedule>  glucagon  Injectable 1 milliGRAM(s) IntraMuscular once  lisinopril 20 milliGRAM(s) Oral every 24 hours  mupirocin 2% Nasal 1 Application(s) Both Nostrils two times a day  polyethylene glycol 3350 17 Gram(s) Oral every 12 hours  senna 2 Tablet(s) Oral at bedtime  sodium chloride 0.9%. 1000 milliLiter(s) (100 mL/Hr) IV Continuous <Continuous>  thrombin epistaxis Kit (Thrombin-JMI) 5000 International Unit(s) Both Nostrils once    MEDICATIONS  (PRN):  acetaminophen    Suspension .. 650 milliGRAM(s) Oral every 6 hours PRN Temp greater or equal to 38C (100.4F), Mild Pain (1 - 3)  hydrALAZINE Injectable 10 milliGRAM(s) IV Push every 4 hours PRN SBP>160    Vital Signs Last 24 Hrs  T(C): 37.1 (31 Mar 2022 09:00), Max: 37.1 (31 Mar 2022 09:00)  T(F): 98.7 (31 Mar 2022 09:00), Max: 98.7 (31 Mar 2022 09:00)  HR: 68 (31 Mar 2022 08:00) (68 - 106)  BP: 117/64 (31 Mar 2022 08:00) (107/64 - 169/87)  BP(mean): 85 (31 Mar 2022 08:00) (85 - 119)  RR: 16 (31 Mar 2022 08:00) (13 - 29)  SpO2: 99% (31 Mar 2022 08:00) (87% - 99%)    Gen: Patient resting comfortably in bed, NAD, responsive to questions  Nose: double lumen packing in right nostril, no oozing, mustache dressing in place with minimal saturation, left nostril with no bleeding  OC/OP: posterior oropharynx visible with no clot or bleeding

## 2022-03-31 NOTE — PROGRESS NOTE ADULT - SUBJECTIVE AND OBJECTIVE BOX
=================================  NEUROCRITICAL CARE ATTENDING NOTE  =================================    BREANNA MCCORMACK   MRN-[id number]  Summary:  76y/M with HTN, HLD, afib (on eliquis, unknown if last taken today,) CVA in  (with minimal residual right-sided weakness per girlfriend,) last known normal at 3pm when girlfriend spoke to him. 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 Upon arrival to [facility], BP noted to be 171/101. Stroke code was called. CTH without findings of hemorrhage. 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 NIHSS 20. No TPA was administered as Pt is out of window. Per patient's girlfriend, the basilar artery aneurysm is known and was diagnosed in  at [facility] when he suffered from a CVA. At that time, he underwent a "treatment for the aneurysm," which the girlfriend reports was too risky and was aborted. Patient never attended his follow-up appointments due to fear per girlfriend.  (10 Mar 2022 22:52)    COURSE IN THE HOSPITAL:  03/10 admitted to [facility]   Tmax 38.3; s/p angio showing extremely tortuous and elongated aortic arch with dolichoectatic vertebrobasilar system, giant partially thrombosed vertebrobasilar aneurysm   Tmax 37.9 tachypneic overnight; apixaban restarted; NGT inserted, tube feeds started at night   Tmax 38.3, desaturation to 88% this morning - now requiring 5L/min, tube feeds held; started on HFNC, improved   Tmax 37.8.  No significant events overnight.      transferred back to NSICU overnight   POD#11      severe epistaxis this morning, desaturation to 88% (off oxygen).  ENT scoped - bleeding controlled, Eliquis not reversed, but held due to high risk of thrombosis       Past Medical History:   Allergies:  No Known Allergies  Home meds:   ·	amLODIPine 5 mg oral tablet: 1 tab(s) orally once a day  ·	captopril 100 mg oral tablet:   ·	carvedilol 6.25 mg oral tablet:   ·	Eliquis 5 mg oral tablet:   ·	hydroCHLOROthiazide 25 mg oral tablet:     PHYSICAL EXAMINATION  T(C): 36.9 ( @ 06:32), Max: 37 ( @ 16:04) HR: 82 ( @ 06:00) (74 - 106) BP: 142/75 ( @ 06:00) (107/64 - 169/87) RR: 17 ( @ 06:00) (13 - 29) SpO2: 97% ( @ 06:00) (87% - 98%)   NEUROLOGIC EXAMINATION:  Patient is awake, alert, oriented x3 with choices, hypophonic dysarthric, following commands, CARISSA, L facial droop, R UE/LE 5/5, L UE ?trace movements, unable to make a fist, withdraws, L LE brisk withdrawal  GENERAL: room air  EENT:  (+) nasal packing   CARDIOVASCULAR: (+) S1 S2, regular rate and regular rhythm  PULMONARY: clear to auscultation  ABDOMEN: soft, nontender with normoactive bowel sounds  EXTREMITIES: no edema, no groin hematoma, pulses good  SKIN: no rash    LABS:   CAPILLARY BLOOD GLUCOSE 116 126 192    (11.54) 13.1  (15.4)  6.90  )-----------( 290      ( 31 Mar 2022 06:08 )             39.6     141  |  109<H>  |  36<H>  ----------------------------<  112<H>  4.2   |  23  |  0.74    Ca    8.8      31 Mar 2022 06:08  Phos  2.8       Mg     2.0      @ 07:01  -   @ 07:00  IN: 1160 mL / OUT: 1230 mL / NET: -70 mL     HbA1C = 5.4 ()  LDL = 108 ()   HDL = 43 ()  TG = 58 ()  TSH = 3.050 ()    Bacteriology:   Blood CS NG1D  CSF studies:  EEG:  Neuroimagin/11 MRI no acute infarcts:  3cm giant aneurysm basilar artery, partially thrombosed, marked compression of brainstem, distortion of IV but patent  03/10 CTA:  dolichoectasia of basilar artery, partially thrombosed aneurysm; diffuse idiopathic skeletal hyperostosis  Other imaging:    MEDICATIONS:     ·	thrombin epistaxis Kit (Thrombin-JMI) 5000 Both Nostrils once  ·	ceFAZolin  Injectable. 1000 IV Push every 8 hours  ·	amLODIPine   Tablet 10 milliGRAM(s) Oral daily  ·	carvedilol 6.25 milliGRAM(s) Oral every 12 hours  ·	lisinopril 20 milliGRAM(s) Oral every 24 hours  ·	albuterol/ipratropium for Nebulization 3 Nebulizer every 6 hours  ·	polyethylene glycol 3350 17 Oral every 12 hours  ·	senna 2 Oral at bedtime  ·	atorvastatin 80 Oral at bedtime  ·	mupirocin 2% Nasal 1 Both Nostrils two times a day  ·	potassium phosphate / sodium phosphate Powder (PHOS-NaK) 1 Oral once  ·	acetaminophen    Suspension .. 650 Oral every 6 hours PRN  ·	hydrALAZINE Injectable 10 IV Push every 4 hours PRN    IV FLUIDS: IVL   DRIPS:  DIET: TF held  Lines:   Drains:      CODE STATUS:  Full Code                       GOALS OF CARE:  aggressive                      DISPOSITION:  ICU =================================  NEUROCRITICAL CARE ATTENDING NOTE  =================================    BREANNA MCCORMACK   MRN-[id number]  Summary:  76y/M with HTN, HLD, afib (on eliquis, unknown if last taken today,) CVA in  (with minimal residual right-sided weakness per girlfriend,) last known normal at 3pm when girlfriend spoke to him. 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 Upon arrival to [facility], BP noted to be 171/101. Stroke code was called. CTH without findings of hemorrhage. 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 NIHSS 20. No TPA was administered as Pt is out of window. Per patient's girlfriend, the basilar artery aneurysm is known and was diagnosed in  at [facility] when he suffered from a CVA. At that time, he underwent a "treatment for the aneurysm," which the girlfriend reports was too risky and was aborted. Patient never attended his follow-up appointments due to fear per girlfriend.  (10 Mar 2022 22:52)    COURSE IN THE HOSPITAL:  03/10 admitted to [facility]   Tmax 38.3; s/p angio showing extremely tortuous and elongated aortic arch with dolichoectatic vertebrobasilar system, giant partially thrombosed vertebrobasilar aneurysm   Tmax 37.9 tachypneic overnight; apixaban restarted; NGT inserted, tube feeds started at night   Tmax 38.3, desaturation to 88% this morning - now requiring 5L/min, tube feeds held; started on HFNC, improved   Tmax 37.8.  No significant events overnight.      transferred back to NSICU overnight   POD#11      severe epistaxis this morning, desaturation to 88% (off oxygen).  ENT scoped - bleeding controlled, Eliquis not reversed, but held due to high risk of thrombosis       Past Medical History:   Allergies:  No Known Allergies  Home meds:   ·	amLODIPine 5 mg oral tablet: 1 tab(s) orally once a day  ·	captopril 100 mg oral tablet:   ·	carvedilol 6.25 mg oral tablet:   ·	Eliquis 5 mg oral tablet:   ·	hydroCHLOROthiazide 25 mg oral tablet:     PHYSICAL EXAMINATION  T(C): 36.9 ( @ 06:32), Max: 37 ( @ 16:04) HR: 82 ( @ 06:00) (74 - 106) BP: 142/75 ( @ 06:00) (107/64 - 169/87) RR: 17 ( @ 06:00) (13 - 29) SpO2: 97% ( @ :00) (87% - 98%)   NEUROLOGIC EXAMINATION:  Patient is awake, alert, oriented x3 with choices, hypophonic dysarthric, following commands, CARISSA, L facial droop, R UE/LE 3/5, L UE ?trace movements, unable to make a fist, withdraws, L LE brisk withdrawal  GENERAL: face tent  EENT:  (+) nasal packing   CARDIOVASCULAR: (+) S1 S2, regular rate and regular rhythm  PULMONARY: clear to auscultation  ABDOMEN: soft, nontender with normoactive bowel sounds  EXTREMITIES: no edema  SKIN: no rash    LABS:   CAPILLARY BLOOD GLUCOSE 116 126 192    (11.54) 13.1  (15.4)  6.90  )-----------( 290      ( 31 Mar 2022 06:08 )             39.6     141  |  109<H>  |  36<H>  ----------------------------<  112<H>  4.2   |  23  |  0.74    Ca    8.8      31 Mar 2022 06:08  Phos  2.8       Mg     2.0      @ 07:01  -   @ 07:00  IN: 1160 mL / OUT: 1230 mL / NET: -70 mL     HbA1C = 5.4 ()  LDL = 108 ()   HDL = 43 ()  TG = 58 ()  TSH = 3.050 ()    Bacteriology:   Blood CS NG1D  CSF studies:  EEG:  Neuroimagin/11 MRI no acute infarcts:  3cm giant aneurysm basilar artery, partially thrombosed, marked compression of brainstem, distortion of IV but patent  03/10 CTA:  dolichoectasia of basilar artery, partially thrombosed aneurysm; diffuse idiopathic skeletal hyperostosis  Other imaging:    MEDICATIONS:     ·	thrombin epistaxis Kit (Thrombin-JMI) 5000 Both Nostrils once  ·	ceFAZolin  Injectable. 1000 IV Push every 8 hours  ·	amLODIPine   Tablet 10 milliGRAM(s) Oral daily  ·	carvedilol 6.25 milliGRAM(s) Oral every 12 hours  ·	lisinopril 20 milliGRAM(s) Oral every 24 hours  ·	albuterol/ipratropium for Nebulization 3 Nebulizer every 6 hours  ·	polyethylene glycol 3350 17 Oral every 12 hours  ·	senna 2 Oral at bedtime  ·	atorvastatin 80 Oral at bedtime  ·	mupirocin 2% Nasal 1 Both Nostrils two times a day  ·	potassium phosphate / sodium phosphate Powder (PHOS-NaK) 1 Oral once  ·	acetaminophen    Suspension .. 650 Oral every 6 hours PRN  ·	hydrALAZINE Injectable 10 IV Push every 4 hours PRN    IV FLUIDS:   DRIPS:  DIET: TF held  Lines:   Drains:      CODE STATUS:  Full Code                       GOALS OF CARE:  aggressive                      DISPOSITION:  ICU

## 2022-03-31 NOTE — PROGRESS NOTE ADULT - SUBJECTIVE AND OBJECTIVE BOX
CARDIOLOGY RE-CONSULT // FOLLOW-UP NOTE    HPI:  75y/o M with PMHx sig for HTN, HLD, afib (on eliquis, unknown if last taken today,) CVA in 2020 (with minimal residual right-sided weakness per girlfriend,) last known normal at 3pm when girlfriend spoke to him. 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 Upon arrival to [facility], BP noted to be 171/101. Stroke code was called. CTH without findings of hemorrhage. 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 NIHSS 20. No TPA was administered as Pt is out of window. Per patient's girlfriend, the basilar artery aneurysm is known and was diagnosed in 2020 at [facility] when he suffered from a CVA. At that time, he underwent a "treatment for the aneurysm," which the girlfriend reports was too risky and was aborted. Patient never attended his follow-up appointments due to fear per girlfriend.  (10 Mar 2022 22:52)    Cardiology previously consulted for PVCs on telemetry - no intervention required.  Cardiology now re-consulted for patient with pAF on eliquis now with significant epistaxis.      ROS: A 10-point review of systems was otherwise negative.    PAST MEDICAL & SURGICAL HISTORY:      SOCIAL HISTORY:  FAMILY HISTORY:      ALLERGIES: 	  No Known Allergies            MEDICATIONS:  acetaminophen    Suspension .. 650 milliGRAM(s) Oral every 6 hours PRN  albuterol/ipratropium for Nebulization 3 milliLiter(s) Nebulizer every 6 hours  amLODIPine   Tablet 10 milliGRAM(s) Oral daily  atorvastatin 80 milliGRAM(s) Oral at bedtime  BACItracin   Ointment 1 Application(s) Topical two times a day  carvedilol 6.25 milliGRAM(s) Oral every 12 hours  ceFAZolin  Injectable. 1000 milliGRAM(s) IV Push every 8 hours  chlorhexidine 2% Cloths 1 Application(s) Topical <User Schedule>  glucagon  Injectable 1 milliGRAM(s) IntraMuscular once  hydrALAZINE Injectable 10 milliGRAM(s) IV Push every 4 hours PRN  lisinopril 20 milliGRAM(s) Oral every 24 hours  mupirocin 2% Nasal 1 Application(s) Both Nostrils two times a day  polyethylene glycol 3350 17 Gram(s) Oral every 12 hours  senna 2 Tablet(s) Oral at bedtime  sodium chloride 0.9%. 1000 milliLiter(s) IV Continuous <Continuous>  thrombin epistaxis Kit (Thrombin-JMI) 5000 International Unit(s) Both Nostrils once      PHYSICAL EXAM:  T(C): 37.1 (03-31-22 @ 09:00), Max: 37.1 (03-31-22 @ 09:00)  HR: 68 (03-31-22 @ 12:27) (67 - 98)  BP: 125/68 (03-31-22 @ 12:27) (105/57 - 145/81)  RR: 18 (03-31-22 @ 12:27) (13 - 29)  SpO2: 100% (03-31-22 @ 12:27) (88% - 100%)  Wt(kg): --    GEN: Awake, comfortable. NAD.   HEENT: NCAT, PERRL, EOMI. Mucosa moist. No JVD.   RESP: CTA b/l  CV: RRR, normal s1/s2. No m/r/g.  ABD: Soft, NTND. BS+  EXT: Warm. No edema, clubbing, or cyanosis.   NEURO: AAOx3. No focal deficits.    I&O's Summary    30 Mar 2022 07:01  -  31 Mar 2022 07:00  --------------------------------------------------------  IN: 2360 mL / OUT: 1230 mL / NET: 1130 mL    31 Mar 2022 07:01  -  31 Mar 2022 13:00  --------------------------------------------------------  IN: 600 mL / OUT: 115 mL / NET: 485 mL        	  LABS:	 	    CARDIAC MARKERS:                                  13.1   6.90  )-----------( 290      ( 31 Mar 2022 06:08 )             39.6     03-31    141  |  109<H>  |  36<H>  ----------------------------<  112<H>  4.2   |  23  |  0.74    Ca    8.8      31 Mar 2022 06:08  Phos  2.8     03-31  Mg     2.0     03-31      proBNP:   Lipid Profile:   HgA1c:   TSH:     TELEMETRY: 	    ECG:  	  RADIOLOGY:   ECHO:  STRESS:  CATH:   CARDIOLOGY RE-CONSULT // FOLLOW-UP NOTE    HPI:  75y/o M with PMHx sig for HTN, HLD, afib (on eliquis, unknown if last taken today,) CVA in 2020 (with minimal residual right-sided weakness per girlfriend,) last known normal at 3pm when girlfriend spoke to him. 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 Upon arrival to [facility], BP noted to be 171/101. Stroke code was called. CTH without findings of hemorrhage. 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 NIHSS 20. No TPA was administered as Pt is out of window. Per patient's girlfriend, the basilar artery aneurysm is known and was diagnosed in 2020 at [facility] when he suffered from a CVA. At that time, he underwent a "treatment for the aneurysm," which the girlfriend reports was too risky and was aborted. Patient never attended his follow-up appointments due to fear per girlfriend.  (10 Mar 2022 22:52)    Cardiology previously consulted for PVCs on telemetry - no intervention required.  Cardiology now re-consulted for patient with pAF on eliquis now with significant epistaxis.    Patient unable to provide much history       ROS: A 10-point review of systems was otherwise negative.    PAST MEDICAL & SURGICAL HISTORY:      SOCIAL HISTORY:  FAMILY HISTORY:      ALLERGIES: 	  No Known Allergies            MEDICATIONS:  acetaminophen    Suspension .. 650 milliGRAM(s) Oral every 6 hours PRN  albuterol/ipratropium for Nebulization 3 milliLiter(s) Nebulizer every 6 hours  amLODIPine   Tablet 10 milliGRAM(s) Oral daily  atorvastatin 80 milliGRAM(s) Oral at bedtime  BACItracin   Ointment 1 Application(s) Topical two times a day  carvedilol 6.25 milliGRAM(s) Oral every 12 hours  ceFAZolin  Injectable. 1000 milliGRAM(s) IV Push every 8 hours  chlorhexidine 2% Cloths 1 Application(s) Topical <User Schedule>  glucagon  Injectable 1 milliGRAM(s) IntraMuscular once  hydrALAZINE Injectable 10 milliGRAM(s) IV Push every 4 hours PRN  lisinopril 20 milliGRAM(s) Oral every 24 hours  mupirocin 2% Nasal 1 Application(s) Both Nostrils two times a day  polyethylene glycol 3350 17 Gram(s) Oral every 12 hours  senna 2 Tablet(s) Oral at bedtime  sodium chloride 0.9%. 1000 milliLiter(s) IV Continuous <Continuous>  thrombin epistaxis Kit (Thrombin-JMI) 5000 International Unit(s) Both Nostrils once      PHYSICAL EXAM:  T(C): 37.1 (03-31-22 @ 09:00), Max: 37.1 (03-31-22 @ 09:00)  HR: 68 (03-31-22 @ 12:27) (67 - 98)  BP: 125/68 (03-31-22 @ 12:27) (105/57 - 145/81)  RR: 18 (03-31-22 @ 12:27) (13 - 29)  SpO2: 100% (03-31-22 @ 12:27) (88% - 100%)  Wt(kg): --    GEN: Awake, comfortable. NAD.   HEENT: NCAT, PERRL, EOMI. Mucosa moist. No JVD.   RESP: CTA b/l  CV: RRR, normal s1/s2. No m/r/g.  ABD: Soft, NTND. BS+  EXT: Warm. No edema, clubbing, or cyanosis.   NEURO: AAOx3. No focal deficits.    I&O's Summary    30 Mar 2022 07:01  -  31 Mar 2022 07:00  --------------------------------------------------------  IN: 2360 mL / OUT: 1230 mL / NET: 1130 mL    31 Mar 2022 07:01  -  31 Mar 2022 13:00  --------------------------------------------------------  IN: 600 mL / OUT: 115 mL / NET: 485 mL        	  LABS:	 	    CARDIAC MARKERS:                                  13.1   6.90  )-----------( 290      ( 31 Mar 2022 06:08 )             39.6     03-31    141  |  109<H>  |  36<H>  ----------------------------<  112<H>  4.2   |  23  |  0.74    Ca    8.8      31 Mar 2022 06:08  Phos  2.8     03-31  Mg     2.0     03-31      proBNP:   Lipid Profile:   HgA1c:   TSH:     TELEMETRY: 	    ECG:  	  RADIOLOGY:   ECHO:  STRESS:  CATH:   CARDIOLOGY RE-CONSULT // FOLLOW-UP NOTE    HPI:  75y/o M with PMHx sig for HTN, HLD, afib (on eliquis, unknown if last taken today,) CVA in 2020 (with minimal residual right-sided weakness per girlfriend,) last known normal at 3pm when girlfriend spoke to him. 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 Upon arrival to [facility], BP noted to be 171/101. Stroke code was called. CTH without findings of hemorrhage. 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 NIHSS 20. No TPA was administered as Pt is out of window. Per patient's girlfriend, the basilar artery aneurysm is known and was diagnosed in 2020 at [facility] when he suffered from a CVA. At that time, he underwent a "treatment for the aneurysm," which the girlfriend reports was too risky and was aborted. Patient never attended his follow-up appointments due to fear per girlfriend.  (10 Mar 2022 22:52)    Cardiology previously consulted for PVCs on telemetry - no intervention required.  Cardiology now re-consulted for patient with pAF on eliquis now with significant epistaxis.    Patient unable to provide much history regarding his afib (e.g. when/where/in what context it was diagnosed)      ALLERGIES: 	  No Known Allergies            MEDICATIONS:  acetaminophen    Suspension .. 650 milliGRAM(s) Oral every 6 hours PRN  albuterol/ipratropium for Nebulization 3 milliLiter(s) Nebulizer every 6 hours  amLODIPine   Tablet 10 milliGRAM(s) Oral daily  atorvastatin 80 milliGRAM(s) Oral at bedtime  BACItracin   Ointment 1 Application(s) Topical two times a day  carvedilol 6.25 milliGRAM(s) Oral every 12 hours  ceFAZolin  Injectable. 1000 milliGRAM(s) IV Push every 8 hours  chlorhexidine 2% Cloths 1 Application(s) Topical <User Schedule>  glucagon  Injectable 1 milliGRAM(s) IntraMuscular once  hydrALAZINE Injectable 10 milliGRAM(s) IV Push every 4 hours PRN  lisinopril 20 milliGRAM(s) Oral every 24 hours  mupirocin 2% Nasal 1 Application(s) Both Nostrils two times a day  polyethylene glycol 3350 17 Gram(s) Oral every 12 hours  senna 2 Tablet(s) Oral at bedtime  sodium chloride 0.9%. 1000 milliLiter(s) IV Continuous <Continuous>  thrombin epistaxis Kit (Thrombin-JMI) 5000 International Unit(s) Both Nostrils once      PHYSICAL EXAM:  T(C): 37.1 (03-31-22 @ 09:00), Max: 37.1 (03-31-22 @ 09:00)  HR: 68 (03-31-22 @ 12:27) (67 - 98)  BP: 125/68 (03-31-22 @ 12:27) (105/57 - 145/81)  RR: 18 (03-31-22 @ 12:27) (13 - 29)  SpO2: 100% (03-31-22 @ 12:27) (88% - 100%)  Wt(kg): --    GEN: Awake, sitting upright in bed, NAD  HEENT:   RESP: CTA b/l  CV: RRR, normal s1/s2. No m/r/g.  ABD: Soft, NTND. BS+  EXT: Warm. No edema, clubbing, or cyanosis.   NEURO: AAOx3. No focal deficits.    I&O's Summary    30 Mar 2022 07:01  -  31 Mar 2022 07:00  --------------------------------------------------------  IN: 2360 mL / OUT: 1230 mL / NET: 1130 mL    31 Mar 2022 07:01  -  31 Mar 2022 13:00  --------------------------------------------------------  IN: 600 mL / OUT: 115 mL / NET: 485 mL        	  LABS:	 	    CARDIAC MARKERS:                                  13.1   6.90  )-----------( 290      ( 31 Mar 2022 06:08 )             39.6     03-31    141  |  109<H>  |  36<H>  ----------------------------<  112<H>  4.2   |  23  |  0.74    Ca    8.8      31 Mar 2022 06:08  Phos  2.8     03-31  Mg     2.0     03-31      proBNP:   Lipid Profile:   HgA1c:   TSH:     TELEMETRY: 	    ECG:  	  RADIOLOGY:   ECHO:  STRESS:  CATH:   CARDIOLOGY RE-CONSULT // FOLLOW-UP NOTE    HPI:  77y/o M with PMHx sig for HTN, HLD, afib (on eliquis, unknown if last taken today,) CVA in 2020 (with minimal residual right-sided weakness per girlfriend,) last known normal at 3pm when girlfriend spoke to him. 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 Upon arrival to [facility], BP noted to be 171/101. Stroke code was called. CTH without findings of hemorrhage. 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 NIHSS 20. No TPA was administered as Pt is out of window. Per patient's girlfriend, the basilar artery aneurysm is known and was diagnosed in 2020 at [facility] when he suffered from a CVA. At that time, he underwent a "treatment for the aneurysm," which the girlfriend reports was too risky and was aborted. Patient never attended his follow-up appointments due to fear per girlfriend.  (10 Mar 2022 22:52)    Cardiology previously consulted for PVCs on telemetry - no intervention required.  Cardiology now re-consulted for patient with pAF on eliquis now with significant epistaxis.    Patient unable to provide much history regarding his afib (e.g. when/where/in what context it was diagnosed)      ALLERGIES: 	  No Known Allergies            MEDICATIONS:  acetaminophen    Suspension .. 650 milliGRAM(s) Oral every 6 hours PRN  albuterol/ipratropium for Nebulization 3 milliLiter(s) Nebulizer every 6 hours  amLODIPine   Tablet 10 milliGRAM(s) Oral daily  atorvastatin 80 milliGRAM(s) Oral at bedtime  BACItracin   Ointment 1 Application(s) Topical two times a day  carvedilol 6.25 milliGRAM(s) Oral every 12 hours  ceFAZolin  Injectable. 1000 milliGRAM(s) IV Push every 8 hours  chlorhexidine 2% Cloths 1 Application(s) Topical <User Schedule>  glucagon  Injectable 1 milliGRAM(s) IntraMuscular once  hydrALAZINE Injectable 10 milliGRAM(s) IV Push every 4 hours PRN  lisinopril 20 milliGRAM(s) Oral every 24 hours  mupirocin 2% Nasal 1 Application(s) Both Nostrils two times a day  polyethylene glycol 3350 17 Gram(s) Oral every 12 hours  senna 2 Tablet(s) Oral at bedtime  sodium chloride 0.9%. 1000 milliLiter(s) IV Continuous <Continuous>  thrombin epistaxis Kit (Thrombin-JMI) 5000 International Unit(s) Both Nostrils once      PHYSICAL EXAM:  T(C): 37.1 (03-31-22 @ 09:00), Max: 37.1 (03-31-22 @ 09:00)  HR: 68 (03-31-22 @ 12:27) (67 - 98)  BP: 125/68 (03-31-22 @ 12:27) (105/57 - 145/81)  RR: 18 (03-31-22 @ 12:27) (13 - 29)  SpO2: 100% (03-31-22 @ 12:27) (88% - 100%)  Wt(kg): --    GEN: Awake, sitting upright in bed, NAD  HEENT: double lumen posterior nasal packing in place on right; dried blood around nares  RESP: CTA b/l  CV: RRR s1/s2 with premature beats  ABD: Soft, NTND  EXT: Warm. trace edema in ankles    I&O's Summary    30 Mar 2022 07:01  -  31 Mar 2022 07:00  --------------------------------------------------------  IN: 2360 mL / OUT: 1230 mL / NET: 1130 mL    31 Mar 2022 07:01  -  31 Mar 2022 13:00  --------------------------------------------------------  IN: 600 mL / OUT: 115 mL / NET: 485 mL        	  LABS:	 	    CARDIAC MARKERS:                                  13.1   6.90  )-----------( 290      ( 31 Mar 2022 06:08 )             39.6     03-31    141  |  109<H>  |  36<H>  ----------------------------<  112<H>  4.2   |  23  |  0.74    Ca    8.8      31 Mar 2022 06:08  Phos  2.8     03-31  Mg     2.0     03-31      proBNP:   Lipid Profile:   HgA1c:   TSH:     TELEMETRY: sinus with PVCs 	    ECG: sinus with PVCs 	  RADIOLOGY:   ECHO:  STRESS:  CATH:

## 2022-04-01 LAB
ANION GAP SERPL CALC-SCNC: 6 MMOL/L — SIGNIFICANT CHANGE UP (ref 5–17)
BUN SERPL-MCNC: 35 MG/DL — HIGH (ref 7–23)
CALCIUM SERPL-MCNC: 8.6 MG/DL — SIGNIFICANT CHANGE UP (ref 8.4–10.5)
CHLORIDE SERPL-SCNC: 112 MMOL/L — HIGH (ref 96–108)
CO2 SERPL-SCNC: 24 MMOL/L — SIGNIFICANT CHANGE UP (ref 22–31)
CREAT SERPL-MCNC: 0.69 MG/DL — SIGNIFICANT CHANGE UP (ref 0.5–1.3)
EGFR: 95 ML/MIN/1.73M2 — SIGNIFICANT CHANGE UP
GLUCOSE SERPL-MCNC: 153 MG/DL — HIGH (ref 70–99)
HCT VFR BLD CALC: 36.5 % — LOW (ref 39–50)
HGB BLD-MCNC: 12.1 G/DL — LOW (ref 13–17)
MAGNESIUM SERPL-MCNC: 2.1 MG/DL — SIGNIFICANT CHANGE UP (ref 1.6–2.6)
MCHC RBC-ENTMCNC: 31.3 PG — SIGNIFICANT CHANGE UP (ref 27–34)
MCHC RBC-ENTMCNC: 33.2 GM/DL — SIGNIFICANT CHANGE UP (ref 32–36)
MCV RBC AUTO: 94.6 FL — SIGNIFICANT CHANGE UP (ref 80–100)
NRBC # BLD: 0 /100 WBCS — SIGNIFICANT CHANGE UP (ref 0–0)
PHOSPHATE SERPL-MCNC: 2.4 MG/DL — LOW (ref 2.5–4.5)
PLATELET # BLD AUTO: 236 K/UL — SIGNIFICANT CHANGE UP (ref 150–400)
POTASSIUM SERPL-MCNC: 4.2 MMOL/L — SIGNIFICANT CHANGE UP (ref 3.5–5.3)
POTASSIUM SERPL-SCNC: 4.2 MMOL/L — SIGNIFICANT CHANGE UP (ref 3.5–5.3)
RBC # BLD: 3.86 M/UL — LOW (ref 4.2–5.8)
RBC # FLD: 12.7 % — SIGNIFICANT CHANGE UP (ref 10.3–14.5)
SODIUM SERPL-SCNC: 142 MMOL/L — SIGNIFICANT CHANGE UP (ref 135–145)
WBC # BLD: 5.52 K/UL — SIGNIFICANT CHANGE UP (ref 3.8–10.5)
WBC # FLD AUTO: 5.52 K/UL — SIGNIFICANT CHANGE UP (ref 3.8–10.5)

## 2022-04-01 PROCEDURE — 71045 X-RAY EXAM CHEST 1 VIEW: CPT | Mod: 26

## 2022-04-01 PROCEDURE — 99233 SBSQ HOSP IP/OBS HIGH 50: CPT

## 2022-04-01 PROCEDURE — 99024 POSTOP FOLLOW-UP VISIT: CPT

## 2022-04-01 RX ADMIN — ATORVASTATIN CALCIUM 80 MILLIGRAM(S): 80 TABLET, FILM COATED ORAL at 22:18

## 2022-04-01 RX ADMIN — CARVEDILOL PHOSPHATE 6.25 MILLIGRAM(S): 80 CAPSULE, EXTENDED RELEASE ORAL at 05:13

## 2022-04-01 RX ADMIN — MUPIROCIN 1 APPLICATION(S): 20 OINTMENT TOPICAL at 05:14

## 2022-04-01 RX ADMIN — MUPIROCIN 1 APPLICATION(S): 20 OINTMENT TOPICAL at 17:03

## 2022-04-01 RX ADMIN — CARVEDILOL PHOSPHATE 6.25 MILLIGRAM(S): 80 CAPSULE, EXTENDED RELEASE ORAL at 17:46

## 2022-04-01 RX ADMIN — Medication 1 APPLICATION(S): at 05:33

## 2022-04-01 RX ADMIN — CHLORHEXIDINE GLUCONATE 1 APPLICATION(S): 213 SOLUTION TOPICAL at 05:12

## 2022-04-01 RX ADMIN — Medication 1 APPLICATION(S): at 17:03

## 2022-04-01 RX ADMIN — Medication 3 MILLILITER(S): at 05:02

## 2022-04-01 RX ADMIN — Medication 85 MILLIMOLE(S): at 08:25

## 2022-04-01 RX ADMIN — LISINOPRIL 20 MILLIGRAM(S): 2.5 TABLET ORAL at 17:46

## 2022-04-01 RX ADMIN — Medication 1000 MILLIGRAM(S): at 05:12

## 2022-04-01 RX ADMIN — AMLODIPINE BESYLATE 10 MILLIGRAM(S): 2.5 TABLET ORAL at 05:12

## 2022-04-01 RX ADMIN — Medication 3 MILLILITER(S): at 10:56

## 2022-04-01 RX ADMIN — Medication 3 MILLILITER(S): at 22:18

## 2022-04-01 NOTE — PROGRESS NOTE ADULT - SUBJECTIVE AND OBJECTIVE BOX
Physical Medicine and Rehabilitation Progress Note:    Patient is a 76y old  Male who presents with a chief complaint of CVA, left facial, left-sided weakness (01 Apr 2022 09:11)      HPI:  77y/o M with PMHx sig for HTN, HLD, afib (on eliquis, unknown if last taken today,) CVA in 2020 (with minimal residual right-sided weakness per girlfriend,) last known normal at 3pm when girlfriend spoke to him. 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 Upon arrival to [facility], BP noted to be 171/101. Stroke code was called. CTH without findings of hemorrhage. 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 NIHSS 20. No TPA was administered as Pt is out of window. Per patient's girlfriend, the basilar artery aneurysm is known and was diagnosed in 2020 at [facility] when he suffered from a CVA. At that time, he underwent a "treatment for the aneurysm," which the girlfriend reports was too risky and was aborted. Patient never attended his follow-up appointments due to fear per girlfriend.  (10 Mar 2022 22:52)                            12.1   5.52  )-----------( 236      ( 01 Apr 2022 04:53 )             36.5       04-01    142  |  112<H>  |  35<H>  ----------------------------<  153<H>  4.2   |  24  |  0.69    Ca    8.6      01 Apr 2022 04:53  Phos  2.4     04-01  Mg     2.1     04-01      Vital Signs Last 24 Hrs  T(C): 37.1 (01 Apr 2022 10:00), Max: 37.1 (01 Apr 2022 01:06)  T(F): 98.8 (01 Apr 2022 10:00), Max: 98.8 (01 Apr 2022 10:00)  HR: 67 (01 Apr 2022 09:00) (62 - 74)  BP: 116/59 (01 Apr 2022 09:00) (103/59 - 136/68)  BP(mean): 81 (01 Apr 2022 09:00) (76 - 96)  RR: 28 (01 Apr 2022 09:00) (11 - 28)  SpO2: 95% (01 Apr 2022 09:00) (94% - 100%)    MEDICATIONS  (STANDING):  albuterol/ipratropium for Nebulization 3 milliLiter(s) Nebulizer every 6 hours  amLODIPine   Tablet 10 milliGRAM(s) Oral daily  atorvastatin 80 milliGRAM(s) Oral at bedtime  BACItracin   Ointment 1 Application(s) Topical two times a day  carvedilol 6.25 milliGRAM(s) Oral every 12 hours  ceFAZolin  Injectable. 1000 milliGRAM(s) IV Push every 8 hours  chlorhexidine 2% Cloths 1 Application(s) Topical <User Schedule>  glucagon  Injectable 1 milliGRAM(s) IntraMuscular once  lisinopril 20 milliGRAM(s) Oral every 24 hours  mupirocin 2% Nasal 1 Application(s) Both Nostrils two times a day  polyethylene glycol 3350 17 Gram(s) Oral every 12 hours  senna 2 Tablet(s) Oral at bedtime  thrombin epistaxis Kit (Thrombin-JMI) 5000 International Unit(s) Both Nostrils once    MEDICATIONS  (PRN):  acetaminophen    Suspension .. 650 milliGRAM(s) Oral every 6 hours PRN Temp greater or equal to 38C (100.4F), Mild Pain (1 - 3)  hydrALAZINE Injectable 10 milliGRAM(s) IV Push every 4 hours PRN SBP>160    Currently Undergoing Physical/ Occupational Therapy at bedside.    PT/OT Functional Status Assessment:   3/31/2022    Cognitive/Neuro/Behavioral  Level of Consciousness: alert  Arousal Level: arouses to voice;  arouses to repeated stimulation  Orientation: oriented x 4  Speech: hypophonic  Mood/Behavior: hypoactive (quiet, withdrawn);  cooperative    Language Assistance  Preferred Language to Address Healthcare Preferred Language to Address Healthcare: English    Therapeutic Interventions      Bed Mobility  Bed Mobility Training Rolling/Turning: maximum assist (25% patient effort);  2 person assist  Bed Mobility Training Scooting: maximum assist (25% patient effort);  2 person assist;  set-up required;  supervision;  verbal cues;  nonverbal cues (demo/gestures)  Bed Mobility Training Sit-to-Supine: maximum assist (25% patient effort);  2 person assist;  hand over hand;  set-up required;  supervision;  verbal cues;  nonverbal cues (demo/gestures)  Bed Mobility Training Supine-to-Sit: maximum assist (25% patient effort);  hand over hand;  set-up required;  supervision;  2 person assist;  verbal cues;  nonverbal cues (demo/gestures)  Bed Mobility Training Limitations: impaired ability to control trunk for mobility;  decreased ability to use legs for bridging/pushing;  decreased ability to use arms for pushing/pulling;  decreased strength;  impaired balance;  impaired postural control;  abnormal muscle tone    Sit-Stand Transfer Training  Sit-to-Stand Transfer Training Treatment not Performed: deferred due to patient trunk control and BLE strength    Therapeutic Exercise  Therapeutic Exercise Detail: RLE AAROM in knee extension/flexion, x 5 repsLLE PROM in knee extension/flexion, x 5 reps     Neuromuscular Re-education  Neuromuscular Re-education Detail: RUE functional reach for targets in various planes while seated edge of bed - with min assist to reach ipsilaterally, mod assist to reach contralaterally, anterior/posterior WS x 5 reps with BLE approximation AAROM RLE knee flexion/extension x 5 reps with VC's, cervical extension with TC's x 5 reps           PM&R Impression: as above    Current Disposition Plan Recommendations:    acute rehab placement

## 2022-04-01 NOTE — PROGRESS NOTE ADULT - SUBJECTIVE AND OBJECTIVE BOX
ENT Progress Note    76y Male PMH HTN, HLD, AFIB (Eliquis), CVA (2020) admitted to [facility] on 3/10/22 with partially thrombosed basilar artery aneurysm with brain stem compression. 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 BP on admission was 171/101. CTH without hemorrhage. CTA with known 3cm basilar artery aneurysm that was partially thrombosed and with brainstem compression. This was initially identified in 2020 after prior CVA, for which patient has residual right sided weakness from that event. Prior treatment of aneurysm aborted due to risk. MRI head with no acute infarcts. On home Eliquis dose. Was not candidate for TPA. Patient now s/p diagnostic angio with findings of partially thrombosed basilar artery aneurysm 3/11/22. Course c/b resp insufficiency weaned off HFNC. patient had an episode of epistaxis over the weekend  that was controlled with absorbable nasal packing. This morning patient started to have break through bleeding and ENT was called for management.     03-31 Patient seen and examined at bedside this morning. Patient was transferred to ICU from stepdown yesterday after epistaxis was controlled for poor airway protection. NAEON, no further bleeding. Patient more alert this morning compared to yesterday.   04-01 Seen and examined at bedside. No bleeding, mustache dressing dry without needing to be changed. Double lumen packing removed without subsequent bleeding from the nose or in the oropharynx. Dried clot removed from hard palate. Placed surgiflo in right nose with placement of mustache dressing.    MEDICATIONS  (STANDING):  albuterol/ipratropium for Nebulization 3 milliLiter(s) Nebulizer every 6 hours  amLODIPine   Tablet 10 milliGRAM(s) Oral daily  atorvastatin 80 milliGRAM(s) Oral at bedtime  BACItracin   Ointment 1 Application(s) Topical two times a day  carvedilol 6.25 milliGRAM(s) Oral every 12 hours  ceFAZolin  Injectable. 1000 milliGRAM(s) IV Push every 8 hours  chlorhexidine 2% Cloths 1 Application(s) Topical <User Schedule>  glucagon  Injectable 1 milliGRAM(s) IntraMuscular once  lisinopril 20 milliGRAM(s) Oral every 24 hours  mupirocin 2% Nasal 1 Application(s) Both Nostrils two times a day  polyethylene glycol 3350 17 Gram(s) Oral every 12 hours  senna 2 Tablet(s) Oral at bedtime  thrombin epistaxis Kit (Thrombin-JMI) 5000 International Unit(s) Both Nostrils once    MEDICATIONS  (PRN):  acetaminophen    Suspension .. 650 milliGRAM(s) Oral every 6 hours PRN Temp greater or equal to 38C (100.4F), Mild Pain (1 - 3)  hydrALAZINE Injectable 10 milliGRAM(s) IV Push every 4 hours PRN SBP>160    Vital Signs Last 24 Hrs  T(C): 36.9 (01 Apr 2022 05:31), Max: 37.1 (01 Apr 2022 01:06)  T(F): 98.5 (01 Apr 2022 05:31), Max: 98.7 (01 Apr 2022 01:06)  HR: 67 (01 Apr 2022 09:00) (62 - 74)  BP: 116/59 (01 Apr 2022 09:00) (103/59 - 136/68)  BP(mean): 81 (01 Apr 2022 09:00) (76 - 96)  RR: 28 (01 Apr 2022 09:00) (11 - 28)  SpO2: 95% (01 Apr 2022 09:00) (94% - 100%)    Gen: Patient resting comfortably in bed, NAD, responsive to questions  Nose: double lumen packing in right nostril removed, no bleeding, surgiflo and mustache dressing in place left nostril with no bleeding  OC/OP: posterior oropharynx visible with no clot or bleeding, crusting and clot on hard palate removed

## 2022-04-01 NOTE — PROGRESS NOTE ADULT - ASSESSMENT
per Neurosurgery    77 y/o M with PMHx sig for HTN, HLD, afib (on eliquis, unknown if last taken today,) CVA in 2020 (with minimal residual right-sided weakness per girlfriend) p/w dysarthria, left facial droop, and left-sided weakness. CTH without findings of hemorrhage. 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 NIHSS 20. Pt is not a tpa candidate as Pt is out of window. Patient now s/p diagnostic angio with findings of partially thrombosed basilar artery aneurysm 3/11/22. course c/b resp insufficiency weaned off HFNC, now complicated by epistaxis  PLAN:   Neuro:   - neuro checks q4/vital signs q1hrs  - MR head 3/11: no acute infarcts   - CTH 3/22: stable   - EEG negative 3/13, dc'd   - no plan for further neurosurgical intervention   - stroke core measures     Cardio:  - SBP goal 100-160   - Amlodipine 10mg, carvedilol 6.25mg BID, Lisinopril 20mg (substituted for Captopril)   - home HCTZ held, Hydralazine 10q4 PRN   echo 3/11: mild LVH, EF 65-70%   - Eliquis 5mg BID restarted 3/25 for afib, held  - PVCs 3/26, per cardiology NTD  - cont home lipitor 80mg    Pulm:  - humidified oxygen via facemask  - standing mucomyst and duonebs  - Chest PT   - aspiration precaution, HOB up     ENT:   - ENT consulted, s/p laryngoscopy 3/14: No masses, hypomobility of L sided vocal cord  - f/u outpatient with Dr. Onofre/Dr. Peterson for possible vocal cord injection.   - s/p epistaxis episode 3/26: intranasal bactroban BID and saline spray q4 to moisturize can use afrin and manual pressure x 20 minutes if bleeds again   - Nasal packing placed 3/30 x 5days per ENT    Renal:  - Na goal 135-145   - no issues     GI:   - TF via peg  - bowel regimen   - last BM 3/29     Endo:  - A1C 5.4     Heme:  - SCDs for DVT ppx   - Eliquis 5mg BID held, plan to restart 24h after nasal packing removed  - LE dopplers 3/11, 3/23: neg for DVT   - LUE US 3/22: superficial thrombus L basilic/cephalic vein     ID:  - zosyn empirically for PNA completed 3/17   - pancx 3/25: +UA, neg blood cx, completed empiric Ceftriaxone x3 days for UTI  (3/25-3/27)   - Ancef while nasal packings in place     ORTHO:  - L foot xray 3/22: chronic 5th digit middle phalanx base corner fracture

## 2022-04-01 NOTE — PROGRESS NOTE ADULT - ASSESSMENT
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, hemiplegia  diffuse idiopathic skeletal hyperostosis  Hypertension dyslipidemia  h/o CVA 2020, minimal R sided weakness  Atrial fibrillation with controlled ventricular rate    PLAN:   NEURO: neurochecks q4h, VS q4h, PRN pain meds with acetaminophen, no opiates / benzos  no plans for intervention for basilar artery aneurysm  REHAB:  physical therapy evaluation and management    EARLY MOB:  OOB to chair    PULM:  PRN O2 support to keep sats >/=92% (face tent); ENT removed packing, nebs, bacitracin  CARDIO:  SBP goal 100-160mm Hg, cont carvedilol, amlodipine, lisinopril   ENDO:  Blood sugar goals 140-180 mg/dL  GI:  bowel regimen polyethylene glycol 17G daily  DIET: continue   RENAL:  IVL   HEM/ONC:  Hb stable  VTE Prophylaxis: SCDs, off eliquis - will restart tomorrow at lower dose 2.5 BID  ID: no fever, no leukocytosis, ancef discontinued  Social: Rafiq Forrester  is HCP - updated, discussed with patient's wife - high risk for aspiration pneumonia    ATTENDING ATTESTATION:  I was physically present for the key portions of the evaluation and management (E/M) service provided.  I agree with the above history, physical and plan, which I have reviewed and edited where appropriate.    Patient at high risk for neurological deterioration or death due to:  ICU delirium, aspiration PNA, DVT / PE.  Critical care time:  I have personally provided 45 minutes of critical care time, excluding time spent on separate procedures.      Plan discussed with RN, house staff.    LATERAL TRANSFER CHECKLIST    I have reviewed the patient’s history, performed a clinical examination and assessed the patient to be stable for transfer to a non-neurosurgical intensive care unit.      Specifically, the patient:  [X] does not have cerebrovascular insufficiency or require hemodynamic augmentation  [X] does not have vasospasm or delayed cerebral ischemia with subarachnoid hemorrhage  [X] does not require active titration of meds for uncontrolled sympathetic storming  [X] does not have an external ventricular drain ([facility]  [X] does not have a lumbar drain  [X] did not have a complex skull base surgery or complex intracranial tumor in the immediate post-operative period    ICU stepdown Checklist:    Completed: 04-01 @ 10:57    [X] hemodynamically stable – VS WNL and stable x 24hours, UO adequate  [n/a ] if  previously on HDA - off pressors x 24h with stable neuro exam    [X] no new symptoms x 24h (i.e. new fever, new-onset nausea/vomiting)  [X] stable labs: (i.e. WBC not rising, sodium not dropping)  [X] patient not at high risk for aspiration, if high risk then:                  [ ] should have definitive plans for trach/PEG (alternative option is to discharge from ICU to facilty)                  [ ] stepdown to bed close to nurse’s station  [n/a] low suctioning requirements (i.e. 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, hemiplegia  diffuse idiopathic skeletal hyperostosis  Hypertension dyslipidemia  h/o CVA 2020, minimal R sided weakness  Atrial fibrillation with controlled ventricular rate    PLAN:   NEURO: neurochecks q4h, VS q4h, PRN pain meds with acetaminophen, no opiates / benzos  no plans for intervention for basilar artery aneurysm  REHAB:  physical therapy evaluation and management    EARLY MOB:  OOB to chair    PULM:  PRN O2 support to keep sats >/=92% (face tent); ENT removed packing, nebs, bacitracin  CARDIO:  SBP goal 100-160mm Hg, cont carvedilol, amlodipine, lisinopril   ENDO:  Blood sugar goals 140-180 mg/dL  GI:  bowel regimen polyethylene glycol 17G daily  DIET: continue   RENAL:  IVL   HEM/ONC:  Hb stable  VTE Prophylaxis: SCDs, off eliquis - will restart tomorrow at lower dose 2.5 BID  ID: no fever, no leukocytosis, ancef discontinued  Social: Rafiq Usama  is HCP - updated, discussed with patient's wife - high risk for aspiration pneumonia      ATTENDING ATTESTATION:  I was physically present for the key portions of the evaluation and management (E/M) service provided.  I agree with the above history, physical and plan which I have reviewed and edited where appropriate.     Patient not at high risk for neurologic deterioration / death.  Time spent on this noncritically ill patient: 45 minutes spent on total encounter, more than 50% of the visit was spent counseling and/or coordinating care by the attending physician.    Plan discussed with RN, house staff.    REVIEW OF SYSTEMS:  No headaches, no nausea or vomiting; 14 -point review of systems otherwise unremarkable.          LATERAL TRANSFER CHECKLIST    I have reviewed the patient’s history, performed a clinical examination and assessed the patient to be stable for transfer to a non-neurosurgical intensive care unit.      Specifically, the patient:  [X] does not have cerebrovascular insufficiency or require hemodynamic augmentation  [X] does not have vasospasm or delayed cerebral ischemia with subarachnoid hemorrhage  [X] does not require active titration of meds for uncontrolled sympathetic storming  [X] does not have an external ventricular drain (except lateral transfers to [facility])  [X] does not have a lumbar drain  [X] did not have a complex skull base surgery or complex intracranial tumor in the immediate post-operative period    ICU stepdown Checklist:    Completed: 04-01 @ 10:57    [X] hemodynamically stable – VS WNL and stable x 24hours, UO adequate  [n/a ] if  previously on HDA - off pressors x 24h with stable neuro exam    [X] no new symptoms x 24h (i.e. new fever, new-onset nausea/vomiting)  [X] stable labs: (i.e. WBC not rising, sodium not dropping)  [X] patient not at high risk for aspiration, if high risk then:                  [ ] should have definitive plans for trach/PEG (alternative option is to discharge from ICU to facilty)                  [ ] stepdown to bed close to nurse’s station  [n/a] low suctioning requirements (i.e. q4h or less)  [X] sign-off from primary RN* - YES  [X] drains do not require ICU level of care  [X] if patient previously agitated or with behavioral issues – controlled   [X] pain controlled

## 2022-04-01 NOTE — PROGRESS NOTE ADULT - CRITICAL CARE SERVICES PROVIDED
Critical care services provided
Patient is critically ill, requiring critical care services.
Critical care services provided
Patient is critically ill, requiring critical care services.
Critical care services provided
Critical care services provided
Patient is critically ill, requiring critical care services.
Patient is critically ill, requiring critical care services.

## 2022-04-01 NOTE — PROGRESS NOTE ADULT - SUBJECTIVE AND OBJECTIVE BOX
=================================  NEUROCRITICAL CARE ATTENDING NOTE  =================================    BREANNA MCCORMACK   MRN-[id number]  Summary:  76y/M with HTN, HLD, afib (on eliquis, unknown if last taken today,) CVA in  (with minimal residual right-sided weakness per girlfriend,) last known normal at 3pm when girlfriend spoke to him. 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 Upon arrival to [facility], BP noted to be 171/101. Stroke code was called. CTH without findings of hemorrhage. 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 NIHSS 20. No TPA was administered as Pt is out of window. Per patient's girlfriend, the basilar artery aneurysm is known and was diagnosed in  at [facility] when he suffered from a CVA. At that time, he underwent a "treatment for the aneurysm," which the girlfriend reports was too risky and was aborted. Patient never attended his follow-up appointments due to fear per girlfriend.  (10 Mar 2022 22:52)    COURSE IN THE HOSPITAL:  03/10 admitted to [facility]   Tmax 38.3; s/p angio showing extremely tortuous and elongated aortic arch with dolichoectatic vertebrobasilar system, giant partially thrombosed vertebrobasilar aneurysm   Tmax 37.9 tachypneic overnight; apixaban restarted; NGT inserted, tube feeds started at night   Tmax 38.3, desaturation to 88% this morning - now requiring 5L/min, tube feeds held; started on HFNC, improved   Tmax 37.8.  No significant events overnight.      transferred back to NSICU overnight   POD#11      severe epistaxis this morning, desaturation to 88% (off oxygen).  ENT scoped - bleeding controlled, Eliquis not reversed, but held due to high risk of thrombosis   nasal packing removed    Past Medical History:   Allergies:  No Known Allergies  Home meds:   ·	amLODIPine 5 mg oral tablet: 1 tab(s) orally once a day  ·	captopril 100 mg oral tablet:   ·	carvedilol 6.25 mg oral tablet:   ·	Eliquis 5 mg oral tablet:   ·	hydroCHLOROthiazide 25 mg oral tablet:     PHYSICAL EXAMINATION  T(C): 37.1 ( @ 10:00), Max: 37.1 ( @ 01:06) HR: 65 ( @ 10:00) (62 - 74) BP: 111/63 ( @ 10:00) (103/59 - 136/68) RR: 23 ( @ 10:00) (11 - 28) SpO2: 96% ( @ 10:00) (94% - 100%)  NEUROLOGIC EXAMINATION:  Patient is awake, alert, oriented x3 with choices, hypophonic dysarthric, following commands, CARISSA, L facial droop, R UE/LE 3/5, L UE ?trace movements, unable to make a fist, withdraws, L LE brisk withdrawal  GENERAL: face tent  EENT:  (+) nasal packing   CARDIOVASCULAR: (+) S1 S2, regular rate and regular rhythm  PULMONARY: clear to auscultation  ABDOMEN: soft, nontender with normoactive bowel sounds  EXTREMITIES: no edema  SKIN: no rash    LABS:   CAPILLARY BLOOD GLUCOSE 143                        12.1   5.52  )-----------( 236      ( 2022 04:53 )             36.5     142  |  112<H>  |  35<H>  ----------------------------<  153<H>  4.2   |  24  |  0.69    Ca    8.6      2022 04:53  Phos  2.4       Mg     2.1      @ 07:01  -   @ 07:00  IN:  mL / OUT: 900 mL / NET: 1120 mL     HbA1C = 5.4 ()  LDL = 108 ()   HDL = 43 ()  TG = 58 ()  TSH = 3.050 ()    Bacteriology:   Blood CS NG1D  CSF studies:  EEG:  Neuroimagin/11 MRI no acute infarcts:  3cm giant aneurysm basilar artery, partially thrombosed, marked compression of brainstem, distortion of IV but patent  03/10 CTA:  dolichoectasia of basilar artery, partially thrombosed aneurysm; diffuse idiopathic skeletal hyperostosis  Other imaging:    MEDICATIONS:     ·	ceFAZolin  Injectable. 1000 IV Push every 8 hours  ·	amLODIPine   Tablet 10 milliGRAM(s) Oral daily  ·	carvedilol 6.25 milliGRAM(s) Oral every 12 hours  ·	lisinopril 20 milliGRAM(s) Oral every 24 hours  ·	albuterol/ipratropium for Nebulization 3 Nebulizer every 6 hours  ·	polyethylene glycol 3350 17 Oral every 12 hours  ·	senna 2 Oral at bedtime  ·	atorvastatin 80 Oral at bedtime  ·	glucagon  Injectable 1 IntraMuscular once  ·	BACItracin   Ointment 1 Topical two times a day  ·	mupirocin 2% Nasal 1 Both Nostrils two times a day  ·	acetaminophen    Suspension .. 650 Oral every 6 hours PRN  ·	hydrALAZINE Injectable 10 IV Push every 4 hours PRN    IV FLUIDS:   DRIPS:  DIET: TF held  Lines:   Drains:      CODE STATUS:  Full Code                       GOALS OF CARE:  aggressive                      DISPOSITION:  ICU

## 2022-04-01 NOTE — PROGRESS NOTE ADULT - SUBJECTIVE AND OBJECTIVE BOX
HPI:  77y/o M with PMHx sig for HTN, HLD, afib (on eliquis, unknown if last taken today,) CVA in 2020 (with minimal residual right-sided weakness per girlfriend,) last known normal at 3pm when girlfriend spoke to him. 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 Upon arrival to [facility], BP noted to be 171/101. Stroke code was called. CTH without findings of hemorrhage. 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 NIHSS 20. No TPA was administered as Pt is out of window. Per patient's girlfriend, the basilar artery aneurysm is known and was diagnosed in 2020 at [facility] when he suffered from a CVA. At that time, he underwent a "treatment for the aneurysm," which the girlfriend reports was too risky and was aborted. Patient never attended his follow-up appointments due to fear per girlfriend.  (10 Mar 2022 22:52)    Hospital Course:  3/10: Admitted for further workup of stroke symptoms and basilar artery aneurysm.  3/11: [place] placed overnight. 3% hypertonic saline started. Neuro exam stable. Started on vEEG for aphasia  3/12: GM overnight. Neuro exam stable. 3% d/c. home eliquis 5 bid. failed s/s. started on TF via NGT, spiked fever 101, f/u panculture   3/13: GM overnight, neuro stable, veeg negative. Vanc/zosyn started for empiric PNA.   3/14: GM o/n neuro stable. on HFNC. ENT plan for laryngoscope today with . failed s/s, pend repeat eval. dc'd captopril, started lisinopril. dc'd vanc. Laryngoscopy: No masses visualized, hypomobility of L sided vocal cords, dysarthric, hypophonic, risk for aspiration, good cough reflex.Dr. Onofre to discuss with Dr. Peterson regarding a possible vocal cord injection.   3/15: GM overnight, neuro stable, on HFNC 30/30 overnight, transitioned to 4LNC  3/16: GM overnight, neuro stable, tolerating NC and satting well    3/17: POD6 dx angio. GM o/n neuro stable. trickle feeds via NGT  3/18: POD7 GM o/n, saturating well in RA. tolerating TF   03/19: POD8 GM o/n. gen surg consulted for PEG placement.  03/20: POD9 GM o/n. Transferred back to ICU for heparin gtt in preparation for PEG placement Wed.   3/21: POD10 GM o/n, neuro exam stable. Transferred to ICU in preparation for hep gtt to start tomorrow, plan for PEG on Wednesday. F/u am coags.   3/22: POD11. GM o/n neuro stable. on heparin gtt ptt goal 40-60. o/n ptt >200c, held for 2 hours. on heparin @11. pre-op for PEG   3/23: POD12. o/n new R facial droop, CTH stable. heparin gtt d/c. preop PEG today  3/24: POD13, POD1 PEG placement, GM overnight, neuro stable, +BM  3/25: POD14, GM o/n, neuro stable, eliquis restarted. Started ceftriaxone for UTI  3/26: POD 15. GM overnight. exam stable. on abx until 3/26 for UTI. On eliquis for afib.   3/27: POD 16. o/n episode of epistaxis, aftrin administered. exam stable. abx completed for UTI. pending rehab. ENT consulted for epistaxis, recommended nasal saline and bactroban.  3/28: POD17. GM o/n, neuro stable.  3/29: POD18. GM o/n, neuro stable   3/30: POD18. GM o/n. neuro stable. upgraded to ICU for intractable epistaxis. ENT placed nasal dressings, bleeding controlled. Ancef while drains in place. Desatted to 80s, on 8L O2 via face tent. intubation held as per girlfriend, will continue to monitor respiratory status  3/31: POD19 GM overnight. Neuro exam stable. No episode of epistaxis o/n.  4/1: POD20 GM overnight. Neruo exam stable.       Vital Signs Last 24 Hrs  T(C): 37.1 (01 Apr 2022 01:06), Max: 37.1 (31 Mar 2022 09:00)  T(F): 98.7 (01 Apr 2022 01:06), Max: 98.7 (31 Mar 2022 09:00)  HR: 62 (01 Apr 2022 02:00) (62 - 82)  BP: 118/56 (01 Apr 2022 02:00) (103/59 - 143/80)  BP(mean): 81 (01 Apr 2022 02:00) (76 - 106)  RR: 18 (01 Apr 2022 02:00) (11 - 25)  SpO2: 98% (01 Apr 2022 02:00) (88% - 100%)    I&O's Summary    30 Mar 2022 07:01  -  31 Mar 2022 07:00  --------------------------------------------------------  IN: 2360 mL / OUT: 1230 mL / NET: 1130 mL    31 Mar 2022 07:01  -  01 Apr 2022 03:56  --------------------------------------------------------  IN: 1330 mL / OUT: 775 mL / NET: 555 mL      PHYSICAL EXAM:  General: NAD, pt is comfortably laying in bed  HEENT: OE to voice, PERRL 3mm reactive, EOMI b/l, mild L facial, absorbable nasal packing in place for epistaxis  Cardiovascular: RRR  Respiratory: chest rise symmetric, nonlabored breathing  GI: abd soft, NTND   Neuro: A&Ox3, hypophonic & dysarthric, Follows commands.  RUE 5/5 strength, RLE 2/5 proximally, 4/5 PF/DF. L side trace movement throughout to command.   Extremities: warm & well perfused  Wound/incision: PEG incision site c/d/i     TUBES/LINES:  [] Arredondo  [] Lumbar Drain  [] Wound Drains  [] Others    DIET:  [] NPO  [] Mechanical  [x] Tube feeds - PEG    LABS:                        13.1   6.90  )-----------( 290      ( 31 Mar 2022 06:08 )             39.6     03-31    141  |  109<H>  |  36<H>  ----------------------------<  112<H>  4.2   |  23  |  0.74    Ca    8.8      31 Mar 2022 06:08  Phos  2.8     03-31  Mg     2.0     03-31      PT/INR - ( 31 Mar 2022 06:08 )   PT: 17.4 sec;   INR: 1.46          PTT - ( 31 Mar 2022 06:08 )  PTT:32.8 sec        CAPILLARY BLOOD GLUCOSE      POCT Blood Glucose.: 143 mg/dL (31 Mar 2022 23:44)      Drug Levels: [] N/A    CSF Analysis: [] N/A      Allergies    No Known Allergies    Intolerances      MEDICATIONS:  Antibiotics:  ceFAZolin  Injectable. 1000 milliGRAM(s) IV Push every 8 hours    Neuro:  acetaminophen    Suspension .. 650 milliGRAM(s) Oral every 6 hours PRN    Anticoagulation:  thrombin epistaxis Kit (Thrombin-JMI) 5000 International Unit(s) Both Nostrils once    OTHER:  albuterol/ipratropium for Nebulization 3 milliLiter(s) Nebulizer every 6 hours  amLODIPine   Tablet 10 milliGRAM(s) Oral daily  atorvastatin 80 milliGRAM(s) Oral at bedtime  BACItracin   Ointment 1 Application(s) Topical two times a day  carvedilol 6.25 milliGRAM(s) Oral every 12 hours  chlorhexidine 2% Cloths 1 Application(s) Topical <User Schedule>  glucagon  Injectable 1 milliGRAM(s) IntraMuscular once  hydrALAZINE Injectable 10 milliGRAM(s) IV Push every 4 hours PRN  lisinopril 20 milliGRAM(s) Oral every 24 hours  mupirocin 2% Nasal 1 Application(s) Both Nostrils two times a day  polyethylene glycol 3350 17 Gram(s) Oral every 12 hours  senna 2 Tablet(s) Oral at bedtime    IVF:    CULTURES:  Culture Results:   No growth at 5 days. (03-25 @ 18:00)  Culture Results:   No growth at 5 days. (03-25 @ 18:00)    RADIOLOGY & ADDITIONAL TESTS:      ASSESSMENT:  77y/o M with PMHx sig for HTN, HLD, afib (on eliquis, unknown if last taken today,) CVA in 2020 (with minimal residual right-sided weakness per girlfriend) p/w dysarthria, left facial droop, and left-sided weakness. CTH without findings of hemorrhage. 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 NIHSS 20. Pt is not a tpa candidate as Pt is out of window. Patient now s/p diagnostic angio with findings of partially thrombosed basilar artery aneurysm 3/11/22. course c/b resp insufficiency weaned off HFNC, now complicated by epistaxis    BASILAR ARTERY ANEURYSM    Handoff    MEWS Score    Brain aneurysm    Brain aneurysm    Angiogram, carotid and cerebral, bilateral    Basilar artery aneurysm    Basilar artery aneurysm    Brain stem compression    Longstanding persistent atrial fibrillation    HTN (hypertension)    Hyperlipidemia    Acidosis    Leukocytosis    Dysphagia    Hypocalcemia    Hypophosphatemia    Acute respiratory failure with hypoxia    Angiogram, carotid and cerebral, bilateral    EGD, with PEG    STROKE    EGD, with PEG    Brain stem compression    Longstanding persistent atrial fibrillation    HTN (hypertension)    Hyperlipidemia    Acidosis    Leukocytosis    Dysphagia    Hypocalcemia    Hypophosphatemia    Acute respiratory failure with hypoxia    History of CVA (cerebrovascular accident)    [email or web address]      PLAN:   Neuro:   - neuro checks q4/vital signs q1hrs  - MR head 3/11: no acute infarcts   - CTH 3/22: stable   - EEG negative 3/13, dc'd   - no plan for further neurosurgical intervention   - stroke core measures     Cardio:  - SBP goal 100-160   - Amlodipine 10mg, carvedilol 6.25mg BID, Lisinopril 20mg (substituted for Captopril)   - home HCTZ held, Hydralazine 10q4 PRN   echo 3/11: mild LVH, EF 65-70%   - Eliquis 5mg BID restarted 3/25 for afib, held  - PVCs 3/26, per cardiology NTD  - cont home lipitor 80mg    Pulm:  - humidified oxygen via facemask  - standing mucomyst and duonebs  - Chest PT   - aspiration precaution, HOB up     ENT:   - ENT consulted, s/p laryngoscopy 3/14: No masses, hypomobility of L sided vocal cord  - f/u outpatient with Dr. Onofre/Dr. Peterson for possible vocal cord injection.   - s/p epistaxis episode 3/26: intranasal bactroban BID and saline spray q4 to moisturize can use afrin and manual pressure x 20 minutes if bleeds again   - Nasal packing placed 3/30 x 5days per ENT    Renal:  - Na goal 135-145   - no issues     GI:   - TF via peg  - bowel regimen   - last BM 3/29     Endo:  - A1C 5.4     Heme:  - SCDs for DVT ppx   - Eliquis 5mg BID held, plan to restart 24h after nasal packing removed  - LE dopplers 3/11, 3/23: neg for DVT   - LUE US 3/22: superficial thrombus L basilic/cephalic vein     ID:  - zosyn empirically for PNA completed 3/17   - pancx 3/25: +UA, neg blood cx, completed empiric Ceftriaxone x3 days for UTI  (3/25-3/27)   - Ancef while nasal packings in place     ORTHO:  - L foot xray 3/22: chronic 5th digit middle phalanx base corner fracture     Dispo:   - ICU status  - full code  - Pending AR per PT  - Family updated     Assessment and plan discussed with Dr. Peterson and Dr. Gay

## 2022-04-01 NOTE — PROGRESS NOTE ADULT - ASSESSMENT
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  During assessment we performed flexible laryngoscope which showed diffuse laryngeal edema, mild/moderate  secretions, absent cough reflex and evidence that the patient was unable to protect his airway.     - Double lumen packing removed 4/1  - absorbable surgiflo placed in right nostril  - apply bacitracin to b/l ala BID  - If patient requires oxygen, recommend humidified oxygen via facemask.  - If patient rebleeds: Spray Afrin (oxymetazoline) liberally in both nares, sit patient upright with slight flexion of neck (to decrease aspiration risk) and apply constant external nasal compression on soft part of nose for 15 minutes (set a timer, do not release pressure to check if bleeding has stopped until 15 minutes is complete). If Epistaxis fails to remit, please contact ENT team  - Anticoagulation per primary team  - Oral hygiene with oral swabs  - Discussed with attending Dr. Ann

## 2022-04-02 DIAGNOSIS — R04.0 EPISTAXIS: ICD-10-CM

## 2022-04-02 LAB
ANION GAP SERPL CALC-SCNC: 7 MMOL/L — SIGNIFICANT CHANGE UP (ref 5–17)
APTT BLD: 32.8 SEC — SIGNIFICANT CHANGE UP (ref 27.5–35.5)
APTT BLD: 41.6 SEC — HIGH (ref 27.5–35.5)
BUN SERPL-MCNC: 30 MG/DL — HIGH (ref 7–23)
CALCIUM SERPL-MCNC: 8.8 MG/DL — SIGNIFICANT CHANGE UP (ref 8.4–10.5)
CHLORIDE SERPL-SCNC: 109 MMOL/L — HIGH (ref 96–108)
CO2 SERPL-SCNC: 25 MMOL/L — SIGNIFICANT CHANGE UP (ref 22–31)
CREAT SERPL-MCNC: 0.78 MG/DL — SIGNIFICANT CHANGE UP (ref 0.5–1.3)
EGFR: 91 ML/MIN/1.73M2 — SIGNIFICANT CHANGE UP
GLUCOSE SERPL-MCNC: 128 MG/DL — HIGH (ref 70–99)
HCT VFR BLD CALC: 40.5 % — SIGNIFICANT CHANGE UP (ref 39–50)
HGB BLD-MCNC: 13.3 G/DL — SIGNIFICANT CHANGE UP (ref 13–17)
INR BLD: 1.38 — HIGH (ref 0.88–1.16)
MAGNESIUM SERPL-MCNC: 2.6 MG/DL — SIGNIFICANT CHANGE UP (ref 1.6–2.6)
MCHC RBC-ENTMCNC: 31.8 PG — SIGNIFICANT CHANGE UP (ref 27–34)
MCHC RBC-ENTMCNC: 32.8 GM/DL — SIGNIFICANT CHANGE UP (ref 32–36)
MCV RBC AUTO: 96.9 FL — SIGNIFICANT CHANGE UP (ref 80–100)
NRBC # BLD: 0 /100 WBCS — SIGNIFICANT CHANGE UP (ref 0–0)
PHOSPHATE SERPL-MCNC: 3 MG/DL — SIGNIFICANT CHANGE UP (ref 2.5–4.5)
PLATELET # BLD AUTO: 230 K/UL — SIGNIFICANT CHANGE UP (ref 150–400)
POTASSIUM SERPL-MCNC: 4.3 MMOL/L — SIGNIFICANT CHANGE UP (ref 3.5–5.3)
POTASSIUM SERPL-SCNC: 4.3 MMOL/L — SIGNIFICANT CHANGE UP (ref 3.5–5.3)
PROTHROM AB SERPL-ACNC: 16.5 SEC — HIGH (ref 10.5–13.4)
RBC # BLD: 4.18 M/UL — LOW (ref 4.2–5.8)
RBC # FLD: 12.7 % — SIGNIFICANT CHANGE UP (ref 10.3–14.5)
SODIUM SERPL-SCNC: 141 MMOL/L — SIGNIFICANT CHANGE UP (ref 135–145)
WBC # BLD: 4.85 K/UL — SIGNIFICANT CHANGE UP (ref 3.8–10.5)
WBC # FLD AUTO: 4.85 K/UL — SIGNIFICANT CHANGE UP (ref 3.8–10.5)

## 2022-04-02 PROCEDURE — 99233 SBSQ HOSP IP/OBS HIGH 50: CPT

## 2022-04-02 PROCEDURE — 99232 SBSQ HOSP IP/OBS MODERATE 35: CPT

## 2022-04-02 RX ORDER — OXYMETAZOLINE HYDROCHLORIDE 0.5 MG/ML
1 SPRAY NASAL
Refills: 0 | Status: COMPLETED | OUTPATIENT
Start: 2022-04-02 | End: 2022-04-05

## 2022-04-02 RX ORDER — HEPARIN SODIUM 5000 [USP'U]/ML
1700 INJECTION INTRAVENOUS; SUBCUTANEOUS
Qty: 25000 | Refills: 0 | Status: DISCONTINUED | OUTPATIENT
Start: 2022-04-02 | End: 2022-04-03

## 2022-04-02 RX ORDER — OXYMETAZOLINE HYDROCHLORIDE 0.5 MG/ML
1 SPRAY NASAL
Refills: 0 | Status: DISCONTINUED | OUTPATIENT
Start: 2022-04-02 | End: 2022-04-02

## 2022-04-02 RX ADMIN — CARVEDILOL PHOSPHATE 6.25 MILLIGRAM(S): 80 CAPSULE, EXTENDED RELEASE ORAL at 19:02

## 2022-04-02 RX ADMIN — MUPIROCIN 1 APPLICATION(S): 20 OINTMENT TOPICAL at 18:51

## 2022-04-02 RX ADMIN — OXYMETAZOLINE HYDROCHLORIDE 1 SPRAY(S): 0.5 SPRAY NASAL at 23:24

## 2022-04-02 RX ADMIN — LISINOPRIL 20 MILLIGRAM(S): 2.5 TABLET ORAL at 19:02

## 2022-04-02 RX ADMIN — MUPIROCIN 1 APPLICATION(S): 20 OINTMENT TOPICAL at 06:12

## 2022-04-02 RX ADMIN — Medication 3 MILLILITER(S): at 04:18

## 2022-04-02 RX ADMIN — Medication 3 MILLILITER(S): at 22:28

## 2022-04-02 RX ADMIN — HEPARIN SODIUM 17 UNIT(S)/HR: 5000 INJECTION INTRAVENOUS; SUBCUTANEOUS at 10:27

## 2022-04-02 RX ADMIN — CARVEDILOL PHOSPHATE 6.25 MILLIGRAM(S): 80 CAPSULE, EXTENDED RELEASE ORAL at 06:08

## 2022-04-02 RX ADMIN — OXYMETAZOLINE HYDROCHLORIDE 1 SPRAY(S): 0.5 SPRAY NASAL at 19:20

## 2022-04-02 RX ADMIN — ATORVASTATIN CALCIUM 80 MILLIGRAM(S): 80 TABLET, FILM COATED ORAL at 22:05

## 2022-04-02 RX ADMIN — Medication 3 MILLILITER(S): at 17:31

## 2022-04-02 RX ADMIN — Medication 1 APPLICATION(S): at 07:32

## 2022-04-02 RX ADMIN — Medication 3 MILLILITER(S): at 10:00

## 2022-04-02 RX ADMIN — AMLODIPINE BESYLATE 10 MILLIGRAM(S): 2.5 TABLET ORAL at 06:08

## 2022-04-02 RX ADMIN — Medication 1 APPLICATION(S): at 19:02

## 2022-04-02 NOTE — PROGRESS NOTE ADULT - SUBJECTIVE AND OBJECTIVE BOX
Physical Medicine and Rehabilitation Progress Note:    Patient is a 76y old  Male who presents with a chief complaint of CVA, left facial, left-sided weakness (02 Apr 2022 10:42)      HPI:  75y/o M with PMHx sig for HTN, HLD, afib (on eliquis, unknown if last taken today,) CVA in 2020 (with minimal residual right-sided weakness per girlfriend,) last known normal at 3pm when girlfriend spoke to him. 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 Upon arrival to [facility], BP noted to be 171/101. Stroke code was called. CTH without findings of hemorrhage. 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 NIHSS 20. No TPA was administered as Pt is out of window. Per patient's girlfriend, the basilar artery aneurysm is known and was diagnosed in 2020 at [facility] when he suffered from a CVA. At that time, he underwent a "treatment for the aneurysm," which the girlfriend reports was too risky and was aborted. Patient never attended his follow-up appointments due to fear per girlfriend.  (10 Mar 2022 22:52)                            13.3   4.85  )-----------( 230      ( 02 Apr 2022 06:33 )             40.5       04-02    141  |  109<H>  |  30<H>  ----------------------------<  128<H>  4.3   |  25  |  0.78    Ca    8.8      02 Apr 2022 06:33  Phos  3.0     04-02  Mg     2.6     04-02      Vital Signs Last 24 Hrs  T(C): 37.2 (02 Apr 2022 13:00), Max: 37.2 (02 Apr 2022 13:00)  T(F): 98.9 (02 Apr 2022 13:00), Max: 98.9 (02 Apr 2022 13:00)  HR: 62 (02 Apr 2022 11:57) (58 - 66)  BP: 157/74 (02 Apr 2022 11:57) (113/61 - 157/74)  BP(mean): 107 (02 Apr 2022 11:57) (81 - 107)  RR: 18 (02 Apr 2022 11:57) (16 - 18)  SpO2: 95% (02 Apr 2022 11:57) (93% - 97%)    MEDICATIONS  (STANDING):  albuterol/ipratropium for Nebulization 3 milliLiter(s) Nebulizer every 6 hours  amLODIPine   Tablet 10 milliGRAM(s) Oral daily  atorvastatin 80 milliGRAM(s) Oral at bedtime  BACItracin   Ointment 1 Application(s) Topical two times a day  carvedilol 6.25 milliGRAM(s) Oral every 12 hours  glucagon  Injectable 1 milliGRAM(s) IntraMuscular once  heparin  Infusion 1700 Unit(s)/Hr (17 mL/Hr) IV Continuous <Continuous>  lisinopril 20 milliGRAM(s) Oral every 24 hours  mupirocin 2% Nasal 1 Application(s) Both Nostrils two times a day  oxymetazoline 0.05% Nasal Spray 1 Spray(s) Nasal <User Schedule>  polyethylene glycol 3350 17 Gram(s) Oral every 12 hours  senna 2 Tablet(s) Oral at bedtime  thrombin epistaxis Kit (Thrombin-JMI) 5000 International Unit(s) Both Nostrils once    MEDICATIONS  (PRN):  acetaminophen    Suspension .. 650 milliGRAM(s) Oral every 6 hours PRN Temp greater or equal to 38C (100.4F), Mild Pain (1 - 3)    Currently Undergoing Physical/ Occupational Therapy at bedside.    PT/OT Functional Status Assessment:         Cognitive/Neuro/Behavioral  Cognitive/Neuro/Behavioral [WDL Definition: Alert; opens eyes spontaneously; arouses to voice or touch; oriented x 4; follows commands; speech spontaneous, logical; purposeful motor response; behavior appropriate to situation]: WDL except  Level of Consciousness: alert  Arousal Level: arouses to voice  Orientation: oriented x 4  Speech: unable to speak  Mood/Behavior: calm;  cooperative    Language Assistance  Preferred Language to Address Healthcare Preferred Language to Address Healthcare: English    Therapeutic Interventions      Bed Mobility  Bed Mobility Training Rolling/Turning: maximum assist (25% patient effort);  2 person assist;  verbal cues;  nonverbal cues (demo/gestures)  Bed Mobility Training Scooting: maximum assist (25% patient effort);  2 person assist;  verbal cues;  nonverbal cues (demo/gestures)  Bed Mobility Training Sit-to-Supine: maximum assist (25% patient effort);  2 person assist;  verbal cues;  nonverbal cues (demo/gestures)  Bed Mobility Training Supine-to-Sit: maximum assist (25% patient effort);  2 person assist;  verbal cues;  nonverbal cues (demo/gestures)  Bed Mobility Training Limitations: impaired postural control;  impaired balance;  decreased strength;  decreased flexibility;  decreased ROM    Sit-Stand Transfer Training  Transfer Training Sit-to-Stand Transfer: maximum assist (25% patient effort);  3 person assist (1 for knee blocking);  b/l HHA;  verbal cues;  nonverbal cues (demo/gestures)  Transfer Training Stand-to-Sit Transfer: 3 person assist (1 for knee blocking);  maximum assist (25% patient effort);  verbal cues;  nonverbal cues (demo/gestures);  b/l HHA  Sit-to-Stand Transfer Training Transfer Safety Analysis: impaired motor control;  impaired postural control;  impaired coordination;  impaired balance;  decreased strength;  decreased ROM;  decreased flexibility;  abnormal muscle tone    Therapeutic Exercise  Therapeutic Exercise Detail: Reaching activity with R UE while sitting EOB requiring Max Ax1 to maintain dynamic sitting balance. Lateral weight shift onto L/R forearm, with prolonged weight bearing to promote proprioceptive input and return to midline x 5 reps. Pt prompted to turn head toward midline when returning from lateral dirk shift, requiring min-mod cueing to direct gaze, 2/2 visuomotor impairment. Pt required Mod-Max Ax1 to return to midline after weight shift.           PM&R Impression: as above    Current Disposition Plan :    acute rehab / [facility]

## 2022-04-02 NOTE — PROVIDER CONTACT NOTE (OTHER) - SITUATION
Lab called RN making aware that new lab draw is needed for ptt due to insufficient volume for test in tube sent.

## 2022-04-02 NOTE — PROGRESS NOTE ADULT - ASSESSMENT
per Neurosurgery    77 y/o M with PMHx sig for HTN, HLD, afib (on eliquis, unknown if last taken today,) CVA in 2020 (with minimal residual right-sided weakness per girlfriend) p/w dysarthria, left facial droop, and left-sided weakness. CTH without findings of hemorrhage. 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 NIHSS 20. Pt is not a tpa candidate as Pt is out of window. Patient now s/p diagnostic angio with findings of partially thrombosed basilar artery aneurysm 3/11/22. course c/b resp insufficiency weaned off HFNC, now complicated by epistaxis (resolved).     PLAN:   Neuro:   - neuro checks q4/vital signs q1hrs  - MR head 3/11: no acute infarcts   - CTH 3/22: stable   - EEG negative 3/13, dc'd   - no plan for further neurosurgical intervention   - stroke core measures     Cardio:  - SBP goal 100-160   - Amlodipine 10mg, carvedilol 6.25mg BID, Lisinopril 20mg (substituted for Captopril)   - home HCTZ held, Hydralazine 10q4 PRN   echo 3/11: mild LVH, EF 65-70%   - Eliquis 5mg BID restarted 3/25 for afib, held  - PVCs 3/26, per cardiology NTD  - cont home lipitor 80mg    Pulm:  - humidified oxygen via facemask  - standing mucomyst and duonebs  - Chest PT   - aspiration precaution, HOB up     ENT:   - ENT consulted, s/p laryngoscopy 3/14: No masses, hypomobility of L sided vocal cord  - f/u outpatient with Dr. Onofre/Dr. Peterson for possible vocal cord injection.   - s/p epistaxis episode 3/26: intranasal bactroban BID and saline spray q4 to moisturize can use afrin and manual pressure x 20 minutes if bleeds again   - Nasal packing 3/30-4/1, now removed    Renal:  - Na goal 135-145   - no issues     GI:   - TF via peg  - bowel regimen   - last BM 3/29     Endo:  - A1C 5.4     Heme:  - SCDs for DVT ppx   - Eliquis 5mg BID held, plan to restart 24h after nasal packing removed  - LE dopplers 3/11, 3/23: neg for DVT   - LUE US 3/22: superficial thrombus L basilic/cephalic vein     ID:  - zosyn empirically for PNA completed 3/17   - pancx 3/25: +UA, neg blood cx, completed empiric Ceftriaxone x3 days for UTI  (3/25-3/27)     ORTHO:  - L foot xray 3/22: chronic 5th digit middle phalanx base corner fracture     Dispo:   - SDU status  - full code

## 2022-04-02 NOTE — PROVIDER CONTACT NOTE (OTHER) - ACTION/TREATMENT ORDERED:
team did not answer cell; RN leaving hospital as shift is now over; update relayed to [facility].

## 2022-04-02 NOTE — PROGRESS NOTE ADULT - SUBJECTIVE AND OBJECTIVE BOX
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 BP on admission was 171/101. CTH without hemorrhage. CTA with known 3cm basilar artery aneurysm that was partially thrombosed and with brainstem compression. This was initially identified in 2020 after prior CVA, for which patient has residual right sided weakness from that event. Prior treatment of aneurysm aborted due to risk. MRI head with no acute infarcts. On home Eliquis dose. Was not candidate for TPA. Patient now s/p diagnostic angio with findings of partially thrombosed basilar artery aneurysm 3/11/22. Course c/b resp insufficiency weaned off HFNC. patient had an episode of epistaxis over the weekend  that was controlled with absorbable nasal packing. This morning patient started to have break through bleeding and ENT was called for management.     03-31 Patient seen and examined at bedside this morning. Patient was transferred to ICU from stepdown yesterday after epistaxis was controlled for poor airway protection. NAEON, no further bleeding. Patient more alert this morning compared to yesterday.   04-01 Seen and examined at bedside. No bleeding, mustache dressing dry without needing to be changed. Double lumen packing removed without subsequent bleeding from the nose or in the oropharynx. Dried clot removed from hard palate. Placed surgiflo in right nose with placement of mustache dressing.  04-02 Examined at bedside this morning. No bleeding, dressing dry. Double lumen packing removed yesterday, surgiflo placed. Cardiology to restart AC today    MEDICATIONS  (STANDING):  albuterol/ipratropium for Nebulization 3 milliLiter(s) Nebulizer every 6 hours  amLODIPine   Tablet 10 milliGRAM(s) Oral daily  atorvastatin 80 milliGRAM(s) Oral at bedtime  BACItracin   Ointment 1 Application(s) Topical two times a day  carvedilol 6.25 milliGRAM(s) Oral every 12 hours  glucagon  Injectable 1 milliGRAM(s) IntraMuscular once  heparin  Infusion 1700 Unit(s)/Hr (17 mL/Hr) IV Continuous <Continuous>  lisinopril 20 milliGRAM(s) Oral every 24 hours  mupirocin 2% Nasal 1 Application(s) Both Nostrils two times a day  polyethylene glycol 3350 17 Gram(s) Oral every 12 hours  senna 2 Tablet(s) Oral at bedtime  thrombin epistaxis Kit (Thrombin-JMI) 5000 International Unit(s) Both Nostrils once    MEDICATIONS  (PRN):  acetaminophen    Suspension .. 650 milliGRAM(s) Oral every 6 hours PRN Temp greater or equal to 38C (100.4F), Mild Pain (1 - 3)  hydrALAZINE Injectable 10 milliGRAM(s) IV Push every 4 hours PRN SBP>160    Vital Signs Last 24 Hrs  T(C): 37 (02 Apr 2022 05:00), Max: 37.2 (01 Apr 2022 14:00)  T(F): 98.6 (02 Apr 2022 05:00), Max: 98.9 (01 Apr 2022 14:00)  HR: 60 (02 Apr 2022 08:38) (58 - 66)  BP: 120/60 (02 Apr 2022 08:38) (104/55 - 144/72)  BP(mean): 83 (02 Apr 2022 08:38) (73 - 102)  RR: 18 (02 Apr 2022 08:38) (16 - 26)  SpO2: 93% (02 Apr 2022 08:38) (93% - 100%)    Gen: Patient resting comfortably in bed, NAD, responsive to questions  Nose: surgiflo in right, no bleeding, mustache dressing dry, no bleeding  OC/OP: posterior oropharynx visible with no clot or bleeding, crusting and clot on hard palate removed

## 2022-04-02 NOTE — PROGRESS NOTE ADULT - ASSESSMENT
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  During assessment we performed flexible laryngoscope which showed diffuse laryngeal edema, mild/moderate  secretions, absent cough reflex and evidence that the patient was unable to protect his airway.     - Double lumen packing removed 4/1  - absorbable surgiflo placed in right nostril  - apply bacitracin to b/l ala BID  - Gentle nasal saline spray to both sides QID  - Oral hygiene with oral swabs  - If patient requires oxygen, recommend humidified oxygen via facemask.  - If patient rebleeds: Spray Afrin (oxymetazoline) liberally in both nares, sit patient upright with slight flexion of neck (to decrease aspiration risk) and apply constant external nasal compression on soft part of nose for 15 minutes (set a timer, do not release pressure to check if bleeding has stopped until 15 minutes is complete). If Epistaxis fails to remit, please contact ENT team  - Anticoagulation per primary team  - Discussed with attending Dr. Ann

## 2022-04-02 NOTE — PROGRESS NOTE ADULT - SUBJECTIVE AND OBJECTIVE BOX
O/N Events:  Subjective/ROS: Denies HA, CP, SOB, n/v, changes in bowel/urinary habits.  12pt ROS otherwise negative.    VITALS  Vital Signs Last 24 Hrs  T(C): 36.9 (02 Apr 2022 09:00), Max: 37.2 (01 Apr 2022 14:00)  T(F): 98.4 (02 Apr 2022 09:00), Max: 98.9 (01 Apr 2022 14:00)  HR: 60 (02 Apr 2022 08:38) (58 - 66)  BP: 120/60 (02 Apr 2022 08:38) (104/55 - 144/72)  BP(mean): 83 (02 Apr 2022 08:38) (73 - 102)  RR: 18 (02 Apr 2022 08:38) (16 - 26)  SpO2: 93% (02 Apr 2022 08:38) (93% - 100%)    I&O's Summary    01 Apr 2022 07:01  -  02 Apr 2022 07:00  --------------------------------------------------------  IN: 1689.8 mL / OUT: 1200 mL / NET: 489.8 mL        CAPILLARY BLOOD GLUCOSE          PHYSICAL EXAM  General: A&Ox2; NAD  Head: no packing but nasal bandage in place, c/d/i  Neck: Supple; no JVD  Respiratory: CTA B/L; no wheezes/crackles/rales auscultated w/ good air movement  Cardiovascular: Regular rhythm/rate; S1/S2; no gallops or murmurs auscultated  Gastrointestinal: Soft; NTND w/out rebound tenderness or guarding; bowel sounds normal  Extremities: WWP; no edema or cyanosis; radial/pedal pulses palpable  Neurological:  CNII-XII grossly intact; dyasthria  Skin: No rashes noted  Vasc: +2 DP/PT pulses b/l   Psych: Appropriate Affect    MEDICATIONS  (STANDING):  albuterol/ipratropium for Nebulization 3 milliLiter(s) Nebulizer every 6 hours  amLODIPine   Tablet 10 milliGRAM(s) Oral daily  atorvastatin 80 milliGRAM(s) Oral at bedtime  BACItracin   Ointment 1 Application(s) Topical two times a day  carvedilol 6.25 milliGRAM(s) Oral every 12 hours  glucagon  Injectable 1 milliGRAM(s) IntraMuscular once  heparin  Infusion 1700 Unit(s)/Hr (17 mL/Hr) IV Continuous <Continuous>  lisinopril 20 milliGRAM(s) Oral every 24 hours  mupirocin 2% Nasal 1 Application(s) Both Nostrils two times a day  polyethylene glycol 3350 17 Gram(s) Oral every 12 hours  senna 2 Tablet(s) Oral at bedtime  thrombin epistaxis Kit (Thrombin-JMI) 5000 International Unit(s) Both Nostrils once    MEDICATIONS  (PRN):  acetaminophen    Suspension .. 650 milliGRAM(s) Oral every 6 hours PRN Temp greater or equal to 38C (100.4F), Mild Pain (1 - 3)  hydrALAZINE Injectable 10 milliGRAM(s) IV Push every 4 hours PRN SBP>160      No Known Allergies      LABS                        13.3   4.85  )-----------( 230      ( 02 Apr 2022 06:33 )             40.5     04-02    141  |  109<H>  |  30<H>  ----------------------------<  128<H>  4.3   |  25  |  0.78    Ca    8.8      02 Apr 2022 06:33  Phos  3.0     04-02  Mg     2.6     04-02      PT/INR - ( 02 Apr 2022 09:55 )   PT: 16.5 sec;   INR: 1.38          PTT - ( 02 Apr 2022 09:55 )  PTT:32.8 sec          IMAGING/EKG/ETC: reviewed

## 2022-04-02 NOTE — PROGRESS NOTE ADULT - SUBJECTIVE AND OBJECTIVE BOX
HPI:  75y/o M with PMHx sig for HTN, HLD, afib (on eliquis, unknown if last taken today,) CVA in 2020 (with minimal residual right-sided weakness per girlfriend,) last known normal at 3pm when girlfriend spoke to him. EMS found patient in his office at [employer] (he is a neuroscience professor,) with dysarthria, left facial droop, and left-sided weakness. Upon arrival to [facility], BP noted to be 171/101. Stroke code was called. CTH without findings of hemorrhage. 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 NIHSS 20. No TPA was administered as Pt is out of window. Per patient's girlfriend, the basilar artery aneurysm is known and was diagnosed in 2020 at [facility] when he suffered from a CVA. At that time, he underwent a "treatment for the aneurysm," which the girlfriend reports was too risky and was aborted. Patient never attended his follow-up appointments due to fear per girlfriend.  (10 Mar 2022 22:52)    INTERVAL EVENTS:  GM. Tx to SDU.     HOSPITAL COURSE:  3/10: Admitted for further workup of stroke symptoms and basilar artery aneurysm.  3/11: Karen placed overnight. 3% hypertonic saline started. Neuro exam stable. Started on vEEG for aphasia  3/12: GM overnight. Neuro exam stable. 3% d/c. home eliquis 5 bid. failed s/s. started on TF via NGT, spiked fever 101, f/u panculture   3/13: GM overnight, neuro stable, veeg negative. Vanc/zosyn started for empiric PNA.   3/14: GM o/n neuro stable. on HFNC. ENT plan for laryngoscope today with . failed s/s, pend repeat eval. dc'd captopril, started lisinopril. dc'd vanc. Laryngoscopy: No masses visualized, hypomobility of L sided vocal cords, dysarthric, hypophonic, risk for aspiration, good cough reflex.Dr. Onofre to discuss with Dr. Peterson regarding a possible vocal cord injection.   3/15: GM overnight, neuro stable, on HFNC 30/30 overnight, transitioned to 4LNC  3/16: GM overnight, neuro stable, tolerating NC and satting well    3/17: POD6 dx angio. GM o/n neuro stable. trickle feeds via NGT  3/18: POD7 GM o/n, saturating well in RA. tolerating TF   03/19: POD8 GM o/n. gen surg consulted for PEG placement.  03/20: POD9 GM o/n. Transferred back to ICU for heparin gtt in preparation for PEG placement Wed.   3/21: POD10 GM o/n, neuro exam stable. Transferred to ICU in preparation for hep gtt to start tomorrow, plan for PEG on Wednesday. F/u am coags.   3/22: POD11. GM o/n neuro stable. on heparin gtt ptt goal 40-60. o/n ptt >200c, held for 2 hours. on heparin @11. pre-op for PEG   3/23: POD12. o/n new R facial droop, CTH stable. heparin gtt d/c. preop PEG today  3/24: POD13, POD1 PEG placement, GM overnight, neuro stable, +BM  3/25: POD14, GM o/n, neuro stable, eliquis restarted. Started ceftriaxone for UTI  3/26: POD 15. GM overnight. exam stable. on abx until 3/26 for UTI. On eliquis for afib.   3/27: POD 16. o/n episode of epistaxis, aftrin administered. exam stable. abx completed for UTI. pending rehab. ENT consulted for epistaxis, recommended nasal saline and bactroban.  3/28: POD17. GM o/n, neuro stable.  3/29: POD18. GM o/n, neuro stable   3/30: POD18. GM o/n. neuro stable. upgraded to ICU for intractable epistaxis. ENT placed nasal dressings, bleeding controlled. Ancef while drains in place. Desatted to 80s, on 8L O2 via face tent. intubation held as per girlfriend, will continue to monitor respiratory status  3/31: POD19 GM overnight. Neuro exam stable. No episode of epistaxis o/n.  4/1: POD20 GM overnight. Neruo exam stable. nasal packing removed. pt SDU status  4/2: POD 21. GM. Plan to resume eliquis today.     Vital Signs Last 24 Hrs  T(C): 36.6 (01 Apr 2022 22:03), Max: 37.2 (01 Apr 2022 14:00)  T(F): 97.9 (01 Apr 2022 22:03), Max: 98.9 (01 Apr 2022 14:00)  HR: 60 (01 Apr 2022 23:45) (58 - 74)  BP: 128/62 (01 Apr 2022 23:45) (104/55 - 128/62)  BP(mean): 89 (01 Apr 2022 23:45) (73 - 95)  RR: 18 (01 Apr 2022 23:45) (15 - 28)  SpO2: 96% (01 Apr 2022 23:45) (94% - 100%)    I&O's Summary    31 Mar 2022 07:01  -  01 Apr 2022 07:00  --------------------------------------------------------  IN: 2020 mL / OUT: 900 mL / NET: 1120 mL    01 Apr 2022 07:01  -  02 Apr 2022 01:09  --------------------------------------------------------  IN: 1689.8 mL / OUT: 900 mL / NET: 789.8 mL        PHYSICAL EXAM:  General: NAD, pt is comfortably laying in bed  HEENT: OE to voice, PERRL 3mm reactive, EOMI b/l, mild L facial, absorbable nasal packing in place for epistaxis  Cardiovascular: RRR  Respiratory: chest rise symmetric, nonlabored breathing  GI: abd soft, NTND   Neuro: A&Ox3, hypophonic & dysarthric, Follows commands.  RUE 5/5 strength, RLE 2/5 proximally, 4/5 PF/DF. L side trace movement throughout to command.   Extremities: warm & well perfused  Wound/incision: PEG incision site c/d/i       TUBES/LINES:  [] Arredondo  [] Trach  [] NGT  [x] PEG  [] Wound Drains  [] Others    DIET:  [] NPO  [] Mechanical  [x] Tube feeds    LABS:                        12.1   5.52  )-----------( 236      ( 01 Apr 2022 04:53 )             36.5     04-01    142  |  112<H>  |  35<H>  ----------------------------<  153<H>  4.2   |  24  |  0.69    Ca    8.6      01 Apr 2022 04:53  Phos  2.4     04-01  Mg     2.1     04-01      PT/INR - ( 31 Mar 2022 06:08 )   PT: 17.4 sec;   INR: 1.46          PTT - ( 31 Mar 2022 06:08 )  PTT:32.8 sec        CAPILLARY BLOOD GLUCOSE          Drug Levels: [] N/A    CSF Analysis: [] N/A      Allergies    No Known Allergies    Intolerances      MEDICATIONS:  Antibiotics:    Neuro:  acetaminophen    Suspension .. 650 milliGRAM(s) Oral every 6 hours PRN    Anticoagulation:  thrombin epistaxis Kit (Thrombin-JMI) 5000 International Unit(s) Both Nostrils once    OTHER:  albuterol/ipratropium for Nebulization 3 milliLiter(s) Nebulizer every 6 hours  amLODIPine   Tablet 10 milliGRAM(s) Oral daily  atorvastatin 80 milliGRAM(s) Oral at bedtime  BACItracin   Ointment 1 Application(s) Topical two times a day  carvedilol 6.25 milliGRAM(s) Oral every 12 hours  glucagon  Injectable 1 milliGRAM(s) IntraMuscular once  hydrALAZINE Injectable 10 milliGRAM(s) IV Push every 4 hours PRN  lisinopril 20 milliGRAM(s) Oral every 24 hours  mupirocin 2% Nasal 1 Application(s) Both Nostrils two times a day  polyethylene glycol 3350 17 Gram(s) Oral every 12 hours  senna 2 Tablet(s) Oral at bedtime    IVF:    CULTURES:  Culture Results:   No growth at 5 days. (03-25 @ 18:00)  Culture Results:   No growth at 5 days. (03-25 @ 18:00)    RADIOLOGY & ADDITIONAL TESTS:      ASSESSMENT:  75y/o M with PMHx sig for HTN, HLD, afib (on eliquis, unknown if last taken today,) CVA in 2020 (with minimal residual right-sided weakness per girlfriend) p/w dysarthria, left facial droop, and left-sided weakness. CTH without findings of hemorrhage. 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 NIHSS 20. Pt is not a tpa candidate as Pt is out of window. Patient now s/p diagnostic angio with findings of partially thrombosed basilar artery aneurysm 3/11/22. course c/b resp insufficiency weaned off HFNC, now complicated by epistaxis (resolved).     PLAN:   Neuro:   - neuro checks q4/vital signs q1hrs  - MR head 3/11: no acute infarcts   - CTH 3/22: stable   - EEG negative 3/13, dc'd   - no plan for further neurosurgical intervention   - stroke core measures     Cardio:  - SBP goal 100-160   - Amlodipine 10mg, carvedilol 6.25mg BID, Lisinopril 20mg (substituted for Captopril)   - home HCTZ held, Hydralazine 10q4 PRN   echo 3/11: mild LVH, EF 65-70%   - Eliquis 5mg BID restarted 3/25 for afib, held  - PVCs 3/26, per cardiology NTD  - cont home lipitor 80mg    Pulm:  - humidified oxygen via facemask  - standing mucomyst and duonebs  - Chest PT   - aspiration precaution, HOB up     ENT:   - ENT consulted, s/p laryngoscopy 3/14: No masses, hypomobility of L sided vocal cord  - f/u outpatient with Dr. Onofre/Dr. Peterson for possible vocal cord injection.   - s/p epistaxis episode 3/26: intranasal bactroban BID and saline spray q4 to moisturize can use afrin and manual pressure x 20 minutes if bleeds again   - Nasal packing 3/30-4/1, now removed    Renal:  - Na goal 135-145   - no issues     GI:   - TF via peg  - bowel regimen   - last BM 3/29     Endo:  - A1C 5.4     Heme:  - SCDs for DVT ppx   - Eliquis 5mg BID held, plan to restart 24h after nasal packing removed  - LE dopplers 3/11, 3/23: neg for DVT   - LUE US 3/22: superficial thrombus L basilic/cephalic vein     ID:  - zosyn empirically for PNA completed 3/17   - pancx 3/25: +UA, neg blood cx, completed empiric Ceftriaxone x3 days for UTI  (3/25-3/27)     ORTHO:  - L foot xray 3/22: chronic 5th digit middle phalanx base corner fracture     Dispo:   - SDU status  - full code  - Pending AR per PT  - Family updated     Assessment and plan discussed with Dr. Peterson

## 2022-04-03 LAB
ANION GAP SERPL CALC-SCNC: 7 MMOL/L — SIGNIFICANT CHANGE UP (ref 5–17)
APTT BLD: 172.4 SEC — CRITICAL HIGH (ref 27.5–35.5)
BUN SERPL-MCNC: 27 MG/DL — HIGH (ref 7–23)
CALCIUM SERPL-MCNC: 8.7 MG/DL — SIGNIFICANT CHANGE UP (ref 8.4–10.5)
CHLORIDE SERPL-SCNC: 106 MMOL/L — SIGNIFICANT CHANGE UP (ref 96–108)
CO2 SERPL-SCNC: 25 MMOL/L — SIGNIFICANT CHANGE UP (ref 22–31)
CREAT SERPL-MCNC: 0.68 MG/DL — SIGNIFICANT CHANGE UP (ref 0.5–1.3)
EGFR: 95 ML/MIN/1.73M2 — SIGNIFICANT CHANGE UP
GLUCOSE SERPL-MCNC: 147 MG/DL — HIGH (ref 70–99)
HCT VFR BLD CALC: 35 % — LOW (ref 39–50)
HGB BLD-MCNC: 11.5 G/DL — LOW (ref 13–17)
MAGNESIUM SERPL-MCNC: 2 MG/DL — SIGNIFICANT CHANGE UP (ref 1.6–2.6)
MCHC RBC-ENTMCNC: 31.3 PG — SIGNIFICANT CHANGE UP (ref 27–34)
MCHC RBC-ENTMCNC: 32.9 GM/DL — SIGNIFICANT CHANGE UP (ref 32–36)
MCV RBC AUTO: 95.4 FL — SIGNIFICANT CHANGE UP (ref 80–100)
NRBC # BLD: 0 /100 WBCS — SIGNIFICANT CHANGE UP (ref 0–0)
PHOSPHATE SERPL-MCNC: 2.7 MG/DL — SIGNIFICANT CHANGE UP (ref 2.5–4.5)
PLATELET # BLD AUTO: 228 K/UL — SIGNIFICANT CHANGE UP (ref 150–400)
POTASSIUM SERPL-MCNC: 4.2 MMOL/L — SIGNIFICANT CHANGE UP (ref 3.5–5.3)
POTASSIUM SERPL-SCNC: 4.2 MMOL/L — SIGNIFICANT CHANGE UP (ref 3.5–5.3)
RBC # BLD: 3.67 M/UL — LOW (ref 4.2–5.8)
RBC # FLD: 12.7 % — SIGNIFICANT CHANGE UP (ref 10.3–14.5)
SODIUM SERPL-SCNC: 138 MMOL/L — SIGNIFICANT CHANGE UP (ref 135–145)
WBC # BLD: 5 K/UL — SIGNIFICANT CHANGE UP (ref 3.8–10.5)
WBC # FLD AUTO: 5 K/UL — SIGNIFICANT CHANGE UP (ref 3.8–10.5)

## 2022-04-03 PROCEDURE — 99233 SBSQ HOSP IP/OBS HIGH 50: CPT

## 2022-04-03 PROCEDURE — 99232 SBSQ HOSP IP/OBS MODERATE 35: CPT

## 2022-04-03 PROCEDURE — 93971 EXTREMITY STUDY: CPT | Mod: 26,LT

## 2022-04-03 RX ORDER — SODIUM CHLORIDE 0.65 %
1 AEROSOL, SPRAY (ML) NASAL
Refills: 0 | Status: DISCONTINUED | OUTPATIENT
Start: 2022-04-03 | End: 2022-04-08

## 2022-04-03 RX ORDER — SODIUM,POTASSIUM PHOSPHATES 278-250MG
1 POWDER IN PACKET (EA) ORAL ONCE
Refills: 0 | Status: COMPLETED | OUTPATIENT
Start: 2022-04-03 | End: 2022-04-03

## 2022-04-03 RX ORDER — APIXABAN 2.5 MG/1
5 TABLET, FILM COATED ORAL ONCE
Refills: 0 | Status: COMPLETED | OUTPATIENT
Start: 2022-04-03 | End: 2022-04-03

## 2022-04-03 RX ORDER — APIXABAN 2.5 MG/1
5 TABLET, FILM COATED ORAL ONCE
Refills: 0 | Status: DISCONTINUED | OUTPATIENT
Start: 2022-04-03 | End: 2022-04-03

## 2022-04-03 RX ADMIN — Medication 1 SPRAY(S): at 10:54

## 2022-04-03 RX ADMIN — ATORVASTATIN CALCIUM 80 MILLIGRAM(S): 80 TABLET, FILM COATED ORAL at 22:02

## 2022-04-03 RX ADMIN — Medication 1 PACKET(S): at 10:43

## 2022-04-03 RX ADMIN — HEPARIN SODIUM 17 UNIT(S)/HR: 5000 INJECTION INTRAVENOUS; SUBCUTANEOUS at 05:02

## 2022-04-03 RX ADMIN — LISINOPRIL 20 MILLIGRAM(S): 2.5 TABLET ORAL at 18:26

## 2022-04-03 RX ADMIN — Medication 1 APPLICATION(S): at 05:01

## 2022-04-03 RX ADMIN — Medication 3 MILLILITER(S): at 10:11

## 2022-04-03 RX ADMIN — Medication 1 APPLICATION(S): at 18:33

## 2022-04-03 RX ADMIN — OXYMETAZOLINE HYDROCHLORIDE 1 SPRAY(S): 0.5 SPRAY NASAL at 05:02

## 2022-04-03 RX ADMIN — OXYMETAZOLINE HYDROCHLORIDE 1 SPRAY(S): 0.5 SPRAY NASAL at 11:05

## 2022-04-03 RX ADMIN — OXYMETAZOLINE HYDROCHLORIDE 1 SPRAY(S): 0.5 SPRAY NASAL at 18:26

## 2022-04-03 RX ADMIN — APIXABAN 5 MILLIGRAM(S): 2.5 TABLET, FILM COATED ORAL at 22:02

## 2022-04-03 RX ADMIN — AMLODIPINE BESYLATE 10 MILLIGRAM(S): 2.5 TABLET ORAL at 05:01

## 2022-04-03 RX ADMIN — Medication 3 MILLILITER(S): at 05:00

## 2022-04-03 RX ADMIN — MUPIROCIN 1 APPLICATION(S): 20 OINTMENT TOPICAL at 05:01

## 2022-04-03 RX ADMIN — CARVEDILOL PHOSPHATE 6.25 MILLIGRAM(S): 80 CAPSULE, EXTENDED RELEASE ORAL at 18:26

## 2022-04-03 RX ADMIN — Medication 1 SPRAY(S): at 18:26

## 2022-04-03 RX ADMIN — MUPIROCIN 1 APPLICATION(S): 20 OINTMENT TOPICAL at 18:27

## 2022-04-03 RX ADMIN — POLYETHYLENE GLYCOL 3350 17 GRAM(S): 17 POWDER, FOR SOLUTION ORAL at 18:26

## 2022-04-03 NOTE — PROGRESS NOTE ADULT - ASSESSMENT
76y Male PMH HTN, HLD, afib (Eliquis), CVA (2020) admitted to [facility] on 3/10/22 with partially thrombosed basilar artery aneurysm with brain stem compression p/w epistaxis now controlled with nasal pack.  During assessment we performed flexible laryngoscope which showed diffuse laryngeal edema, mild/moderate  secretions, absent cough reflex and evidence that the patient was unable to protect his airway.     - Double lumen packing removed 4/1  - absorbable surgiflo placed in right nostril  - apply bacitracin to b/l ala BID  - Gentle nasal saline spray to both sides QID  - Oral hygiene with oral swabs  - If patient requires oxygen, recommend humidified oxygen via facemask.  - If patient rebleeds: Spray Afrin (oxymetazoline) liberally in both nares, sit patient upright with slight flexion of neck (to decrease aspiration risk) and apply constant external nasal compression on soft part of nose for 15 minutes (set a timer, do not release pressure to check if bleeding has stopped until 15 minutes is complete). If Epistaxis fails to remit, please contact ENT team  - Anticoagulation per primary team  - Discussed with attending Dr. Ann

## 2022-04-03 NOTE — PROGRESS NOTE ADULT - SUBJECTIVE AND OBJECTIVE BOX
ENT Progress Note    76y Male PMH HTN, HLD, AFIB (Eliquis), CVA (2020) admitted to [facility] on 3/10/22 with partially thrombosed basilar artery aneurysm with brain stem compression. 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 BP on admission was 171/101. CTH without hemorrhage. CTA with known 3cm basilar artery aneurysm that was partially thrombosed and with brainstem compression. This was initially identified in 2020 after prior CVA, for which patient has residual right sided weakness from that event. Prior treatment of aneurysm aborted due to risk. MRI head with no acute infarcts. On home Eliquis dose. Was not candidate for TPA. Patient now s/p diagnostic angio with findings of partially thrombosed basilar artery aneurysm 3/11/22. Course c/b resp insufficiency weaned off HFNC. patient had an episode of epistaxis over the weekend  that was controlled with absorbable nasal packing. This morning patient started to have break through bleeding and ENT was called for management.     03-31 Patient seen and examined at bedside this morning. Patient was transferred to ICU from stepdown yesterday after epistaxis was controlled for poor airway protection. NAEON, no further bleeding. Patient more alert this morning compared to yesterday.   04-01 Seen and examined at bedside. No bleeding, mustache dressing dry without needing to be changed. Double lumen packing removed without subsequent bleeding from the nose or in the oropharynx. Dried clot removed from hard palate. Placed surgiflo in right nose with placement of mustache dressing.  04-02 Examined at bedside this morning. No bleeding, dressing dry. Double lumen packing removed yesterday, surgiflo placed. Cardiology to restart AC today  04-03 Examined at bedside this morning, NAEON. Started on heparin gtt yesterday, no bleeding.     MEDICATIONS  (STANDING):  albuterol/ipratropium for Nebulization 3 milliLiter(s) Nebulizer every 6 hours  amLODIPine   Tablet 10 milliGRAM(s) Oral daily  atorvastatin 80 milliGRAM(s) Oral at bedtime  BACItracin   Ointment 1 Application(s) Topical two times a day  carvedilol 6.25 milliGRAM(s) Oral every 12 hours  glucagon  Injectable 1 milliGRAM(s) IntraMuscular once  heparin  Infusion 1700 Unit(s)/Hr (17 mL/Hr) IV Continuous <Continuous>  lisinopril 20 milliGRAM(s) Oral every 24 hours  mupirocin 2% Nasal 1 Application(s) Both Nostrils two times a day  oxymetazoline 0.05% Nasal Spray 1 Spray(s) Nasal <User Schedule>  polyethylene glycol 3350 17 Gram(s) Oral every 12 hours  thrombin epistaxis Kit (Thrombin-JMI) 5000 International Unit(s) Both Nostrils once    MEDICATIONS  (PRN):  acetaminophen    Suspension .. 650 milliGRAM(s) Oral every 6 hours PRN Temp greater or equal to 38C (100.4F), Mild Pain (1 - 3)    Vital Signs Last 24 Hrs  T(C): 37.1 (03 Apr 2022 09:22), Max: 37.2 (02 Apr 2022 13:00)  T(F): 98.8 (03 Apr 2022 09:22), Max: 98.9 (02 Apr 2022 13:00)  HR: 60 (03 Apr 2022 08:26) (56 - 64)  BP: 118/56 (03 Apr 2022 08:26) (113/58 - 157/74)  BP(mean): 80 (03 Apr 2022 08:26) (80 - 107)  RR: 18 (03 Apr 2022 08:26) (17 - 18)  SpO2: 96% (03 Apr 2022 08:26) (95% - 100%)    Gen: Patient resting comfortably in bed, NAD, responsive to questions  Nose: right nasal crusting, no bleeding, mustache dressing dry, no bleeding  OC/OP: posterior oropharynx visible with no clot or bleeding

## 2022-04-03 NOTE — PROGRESS NOTE ADULT - SUBJECTIVE AND OBJECTIVE BOX
HPI:  75y/o M with PMHx sig for HTN, HLD, afib (on eliquis, unknown if last taken today,) CVA in 2020 (with minimal residual right-sided weakness per girlfriend,) last known normal at 3pm when girlfriend spoke to him. EMS found patient in his office at [employer] (he is a neuroscience professor,) with dysarthria, left facial droop, and left-sided weakness. Upon arrival to [facility], BP noted to be 171/101. Stroke code was called. CTH without findings of hemorrhage. 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 NIHSS 20. No TPA was administered as Pt is out of window. Per patient's girlfriend, the basilar artery aneurysm is known and was diagnosed in 2020 at [facility] when he suffered from a CVA. At that time, he underwent a "treatment for the aneurysm," which the girlfriend reports was too risky and was aborted. Patient never attended his follow-up appointments due to fear per girlfriend.  (10 Mar 2022 22:52)    HOSPITAL COURSE:  3/10: Admitted for further workup of stroke symptoms and basilar artery aneurysm.  3/11: Dawson placed overnight. 3% hypertonic saline started. Neuro exam stable. Started on vEEG for aphasia  3/12: GM overnight. Neuro exam stable. 3% d/c. home eliquis 5 bid. failed s/s. started on TF via NGT, spiked fever 101, f/u panculture   3/13: GM overnight, neuro stable, veeg negative. Vanc/zosyn started for empiric PNA.   3/14: GM o/n neuro stable. on HFNC. ENT plan for laryngoscope today with . failed s/s, pend repeat eval. dc'd captopril, started lisinopril. dc'd vanc. Laryngoscopy: No masses visualized, hypomobility of L sided vocal cords, dysarthric, hypophonic, risk for aspiration, good cough reflex.Dr. Onofre to discuss with Dr. Peterson regarding a possible vocal cord injection.   3/15: GM overnight, neuro stable, on HFNC 30/30 overnight, transitioned to 4LNC  3/16: GM overnight, neuro stable, tolerating NC and satting well    3/17: POD6 dx angio. GM o/n neuro stable. trickle feeds via NGT  3/18: POD7 GM o/n, saturating well in RA. tolerating TF   03/19: POD8 GM o/n. gen surg consulted for PEG placement.  03/20: POD9 GM o/n. Transferred back to ICU for heparin gtt in preparation for PEG placement Wed.   3/21: POD10 GM o/n, neuro exam stable. Transferred to ICU in preparation for hep gtt to start tomorrow, plan for PEG on Wednesday. F/u am coags.   3/22: POD11. GM o/n neuro stable. on heparin gtt ptt goal 40-60. o/n ptt >200c, held for 2 hours. on heparin @11. pre-op for PEG   3/23: POD12. o/n new R facial droop, CTH stable. heparin gtt d/c. preop PEG today  3/24: POD13, POD1 PEG placement, GM overnight, neuro stable, +BM  3/25: POD14, GM o/n, neuro stable, eliquis restarted. Started ceftriaxone for UTI  3/26: POD 15. GM overnight. exam stable. on abx until 3/26 for UTI. On eliquis for afib.   3/27: POD 16. o/n episode of epistaxis, aftrin administered. exam stable. abx completed for UTI. pending rehab. ENT consulted for epistaxis, recommended nasal saline and bactroban.  3/28: POD17. GM o/n, neuro stable.  3/29: POD18. GM o/n, neuro stable   3/30: POD18. GM o/n. neuro stable. upgraded to ICU for intractable epistaxis. ENT placed nasal dressings, bleeding controlled. Ancef while drains in place. Desatted to 80s, on 8L O2 via face tent. intubation held as per girlfriend, will continue to monitor respiratory status  3/31: POD19 GM overnight. Neuro exam stable. No episode of epistaxis o/n.  4/1: POD20 GM overnight. Neruo exam stable. nasal packing removed. pt SDU status  4/2: POD 21. GM. Plan to resume eliquis today.   4/3: POD 22. GM o/n. on heparin gtt x 24hrs and possible resume eliquis today if epistaxis controlled. pending rehab       Vital Signs Last 24 Hrs  T(C): 36.9 (02 Apr 2022 21:32), Max: 37.2 (02 Apr 2022 13:00)  T(F): 98.5 (02 Apr 2022 21:32), Max: 98.9 (02 Apr 2022 13:00)  HR: 60 (02 Apr 2022 23:28) (58 - 66)  BP: 126/66 (02 Apr 2022 23:28) (113/58 - 157/74)  BP(mean): 90 (02 Apr 2022 23:28) (80 - 107)  RR: 17 (02 Apr 2022 23:28) (17 - 18)  SpO2: 100% (02 Apr 2022 23:28) (93% - 100%)    I&O's Detail    01 Apr 2022 07:01  -  02 Apr 2022 07:00  --------------------------------------------------------  IN:    IV PiggyBack: 499.8 mL    Jevity 1.2: 1190 mL  Total IN: 1689.8 mL    OUT:    Incontinent per Condom Catheter (mL): 300 mL    Voided (mL): 900 mL  Total OUT: 1200 mL    Total NET: 489.8 mL      02 Apr 2022 07:01  -  03 Apr 2022 00:11  --------------------------------------------------------  IN:    Enteral Tube Flush: 60 mL    Heparin: 238 mL    Jevity 1.2: 1120 mL  Total IN: 1418 mL    OUT:    Incontinent per Condom Catheter (mL): 950 mL  Total OUT: 950 mL    Total NET: 468 mL        I&O's Summary    01 Apr 2022 07:01  -  02 Apr 2022 07:00  --------------------------------------------------------  IN: 1689.8 mL / OUT: 1200 mL / NET: 489.8 mL    02 Apr 2022 07:01  -  03 Apr 2022 00:11  --------------------------------------------------------  IN: 1418 mL / OUT: 950 mL / NET: 468 mL        PHYSICAL EXAM:  General: NAD, pt is comfortably laying in bed  HEENT: OE to voice, PERRL 3mm reactive, EOMI b/l, mild L facial, mustache dressing in place, clean/dry  Cardiovascular: RRR  Respiratory: chest rise symmetric, nonlabored breathing  GI: abd soft, NTND   Neuro: A&Ox3, hypophonic & dysarthric, Follows commands.  RUE 5/5 strength, RLE 2/5 proximally, 4/5 PF/DF. L side trace movement throughout to command.   Extremities: warm & well perfused  Wound/incision: PEG incision site c/d/i     TUBES/LINES:  [] CVC  [] A-line  [] Lumbar Drain  [] Ventriculostomy  [] Other    DIET:  [] NPO  [] Mechanical  [] Tube feeds    LABS:                        13.3   4.85  )-----------( 230      ( 02 Apr 2022 06:33 )             40.5     04-02    141  |  109<H>  |  30<H>  ----------------------------<  128<H>  4.3   |  25  |  0.78    Ca    8.8      02 Apr 2022 06:33  Phos  3.0     04-02  Mg     2.6     04-02      PT/INR - ( 02 Apr 2022 09:55 )   PT: 16.5 sec;   INR: 1.38          PTT - ( 02 Apr 2022 23:08 )  PTT:41.6 sec        CAPILLARY BLOOD GLUCOSE      POCT Blood Glucose.: 150 mg/dL (02 Apr 2022 18:00)      Drug Levels: [] N/A    CSF Analysis: [] N/A      Allergies    No Known Allergies    Intolerances      MEDICATIONS:  Antibiotics:    Neuro:  acetaminophen    Suspension .. 650 milliGRAM(s) Oral every 6 hours PRN    Anticoagulation:  heparin  Infusion 1700 Unit(s)/Hr IV Continuous <Continuous>  thrombin epistaxis Kit (Thrombin-JMI) 5000 International Unit(s) Both Nostrils once    OTHER:  albuterol/ipratropium for Nebulization 3 milliLiter(s) Nebulizer every 6 hours  amLODIPine   Tablet 10 milliGRAM(s) Oral daily  atorvastatin 80 milliGRAM(s) Oral at bedtime  BACItracin   Ointment 1 Application(s) Topical two times a day  carvedilol 6.25 milliGRAM(s) Oral every 12 hours  glucagon  Injectable 1 milliGRAM(s) IntraMuscular once  lisinopril 20 milliGRAM(s) Oral every 24 hours  mupirocin 2% Nasal 1 Application(s) Both Nostrils two times a day  oxymetazoline 0.05% Nasal Spray 1 Spray(s) Nasal <User Schedule>  polyethylene glycol 3350 17 Gram(s) Oral every 12 hours    IVF:    CULTURES:  Culture Results:   No growth at 5 days. (03-25 @ 18:00)  Culture Results:   No growth at 5 days. (03-25 @ 18:00)    RADIOLOGY & ADDITIONAL TESTS:      ASSESSMENT:  75y/o M with PMHx sig for HTN, HLD, afib (on eliquis, unknown if last taken today,) CVA in 2020 (with minimal residual right-sided weakness per girlfriend) p/w dysarthria, left facial droop, and left-sided weakness. CTH without findings of hemorrhage. 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 NIHSS 20. Pt is not a tpa candidate as Pt is out of window. Patient now s/p diagnostic angio with findings of partially thrombosed basilar artery aneurysm 3/11/22. course c/b resp insufficiency weaned off HFNC, now complicated by epistaxis (resolved).     BASILAR ARTERY ANEURYSM    Handoff    MEWS Score    Brain aneurysm    Brain aneurysm    Angiogram, carotid and cerebral, bilateral    Basilar artery aneurysm    Basilar artery aneurysm    Brain stem compression    Longstanding persistent atrial fibrillation    HTN (hypertension)    Hyperlipidemia    Acidosis    Leukocytosis    Dysphagia    Hypocalcemia    Hypophosphatemia    Acute respiratory failure with hypoxia    Nasal hemorrhage    Angiogram, carotid and cerebral, bilateral    EGD, with PEG    STROKE    EGD, with PEG    Brain stem compression    Longstanding persistent atrial fibrillation    HTN (hypertension)    Hyperlipidemia    Acidosis    Leukocytosis    Dysphagia    Hypocalcemia    Hypophosphatemia    Acute respiratory failure with hypoxia    History of CVA (cerebrovascular accident)    [email or web address]        PLAN:  Neuro:   - neuro checks q4/vital signs q4hrs  - MR head 3/11: no acute infarcts   - CTH 3/22: stable   - EEG negative 3/13, dc'd   - no plan for further neurosurgical intervention   - stroke core measures     Cardio:  - SBP goal 100-160   - Amlodipine 10mg, carvedilol 6.25mg BID, Lisinopril 20mg (increase to 40mg if needed)- home HCTZ held  echo 3/11: mild LVH, EF 65-70%   - Eliquis 5mg BID restarted 3/25 for afib, held  - PVCs 3/26, per cardiology NTD  - cont home lipitor 80mg      Pulm:  - RA satting well  - standing mucomyst and duonebs  - Chest PT   - aspiration precaution, HOB up     ENT:   - ENT consulted, s/p laryngoscopy 3/14: No masses, hypomobility of L sided vocal cord  - f/u outpatient with Dr. Onofre/Dr. Peterson for possible vocal cord injection.   - s/p epistaxis episode 3/26: intranasal bactroban BID and saline spray q4 to moisturize can use afrin and manual pressure x 20 minutes if bleeds again   - Nasal packing 3/30-4/1, now removed  -bacitracin BID to bilat nostrils     Renal:  - Na goal 135-145   - no issues     GI:   - TF via peg  - bowel regimen   - last BM 4/1  -senna held for loose BMs    Endo:  - A1C 5.4     Heme:  - SCDs for DVT ppx   - Eliquis 5mg? BID held, restart if tolerates heparin drip 17ml/hr x 24hrs   - LE dopplers 3/11, 3/23: neg for DVT   - LUE US 3/22: superficial thrombus L basilic/cephalic vein     ID:  - zosyn empirically for PNA completed 3/17   - pancx 3/25: +UA, neg blood cx, completed empiric Ceftriaxone x3 days for UTI  (3/25-3/27)     ORTHO:  - L foot xray 3/22: chronic 5th digit middle phalanx base corner fracture     Dispo:   - SDU status  - full code  - Pending AR per PT  - Family updated     Assessment and plan discussed with Dr. Peterson         Assessment:  Present when checked    []  GCS  E   V  M     Heart Failure: []Acute, [] acute on chronic , []chronic  Heart Failure:  [] Diastolic (HFpEF), [] Systolic (HFrEF), []Combined (HFpEF and HFrEF), [] RHF, [] Pulm HTN, [] Other    [] LALO, [] ATN, [] AIN, [] other  [] CKD1, [] CKD2, [] CKD 3, [] CKD 4, [] CKD 5, []ESRD    Encephalopathy: [] Metabolic, [] Hepatic, [] toxic, [] Neurological, [] Other    Abnormal Nurtitional Status: [] malnurtition (see nutrition note), [ ]underweight: BMI < 19, [] morbid obesity: BMI >40, [] Cachexia    [] Sepsis  [] hypovolemic shock,[] cardiogenic shock, [] hemorrhagic shock, [] neuogenic shock  [] Acute Respiratory Failure  []Cerebral edema, [] Brain compression/ herniation,   [] Functional quadriplegia  [] Acute blood loss anemia

## 2022-04-03 NOTE — PROGRESS NOTE ADULT - SUBJECTIVE AND OBJECTIVE BOX
O/N Events: Supratherapeutic PTT  Subjective/ROS: No complaints. Denies HA, CP, SOB, n/v, changes in bowel/urinary habits.  12pt ROS otherwise negative.    VITALS  Vital Signs Last 24 Hrs  T(C): 37 (03 Apr 2022 04:55), Max: 37.2 (02 Apr 2022 13:00)  T(F): 98.6 (03 Apr 2022 04:55), Max: 98.9 (02 Apr 2022 13:00)  HR: 56 (03 Apr 2022 07:33) (56 - 64)  BP: 119/57 (03 Apr 2022 07:33) (113/58 - 157/74)  BP(mean): 82 (03 Apr 2022 07:33) (80 - 107)  RR: 18 (03 Apr 2022 07:33) (17 - 18)  SpO2: 98% (03 Apr 2022 07:33) (95% - 100%)    I&O's Summary    02 Apr 2022 07:01  -  03 Apr 2022 07:00  --------------------------------------------------------  IN: 2027 mL / OUT: 1150 mL / NET: 877 mL        CAPILLARY BLOOD GLUCOSE      POCT Blood Glucose.: 150 mg/dL (02 Apr 2022 18:00)      PHYSICAL EXAM  General: A&Ox2; NAD  Head: no packing but nasal bandage in place, c/d/i  Neck: Supple; no JVD  Respiratory: CTA B/L; no wheezes/crackles/rales auscultated w/ good air movement  Cardiovascular: Regular rhythm/rate; S1/S2; no gallops or murmurs auscultated  Gastrointestinal: Soft; NTND w/out rebound tenderness or guarding; bowel sounds normal  Extremities: WWP; no edema or cyanosis; radial/pedal pulses palpable  Neurological:  CNII-XII grossly intact; dyasthria  Skin: No rashes noted  Vasc: +2 DP/PT pulses b/l   Psych: Appropriate Affect    MEDICATIONS  (STANDING):  albuterol/ipratropium for Nebulization 3 milliLiter(s) Nebulizer every 6 hours  amLODIPine   Tablet 10 milliGRAM(s) Oral daily  atorvastatin 80 milliGRAM(s) Oral at bedtime  BACItracin   Ointment 1 Application(s) Topical two times a day  carvedilol 6.25 milliGRAM(s) Oral every 12 hours  glucagon  Injectable 1 milliGRAM(s) IntraMuscular once  heparin  Infusion 1700 Unit(s)/Hr (17 mL/Hr) IV Continuous <Continuous>  lisinopril 20 milliGRAM(s) Oral every 24 hours  mupirocin 2% Nasal 1 Application(s) Both Nostrils two times a day  oxymetazoline 0.05% Nasal Spray 1 Spray(s) Nasal <User Schedule>  polyethylene glycol 3350 17 Gram(s) Oral every 12 hours  thrombin epistaxis Kit (Thrombin-JMI) 5000 International Unit(s) Both Nostrils once    MEDICATIONS  (PRN):  acetaminophen    Suspension .. 650 milliGRAM(s) Oral every 6 hours PRN Temp greater or equal to 38C (100.4F), Mild Pain (1 - 3)      No Known Allergies      LABS                        11.5   5.00  )-----------( 228      ( 03 Apr 2022 06:36 )             35.0     04-03    138  |  106  |  27<H>  ----------------------------<  147<H>  4.2   |  25  |  0.68    Ca    8.7      03 Apr 2022 06:36  Phos  2.7     04-03  Mg     2.0     04-03      PT/INR - ( 02 Apr 2022 09:55 )   PT: 16.5 sec;   INR: 1.38          PTT - ( 03 Apr 2022 06:36 )  PTT:172.4 sec          IMAGING/EKG/ETC: reviewed

## 2022-04-04 LAB
ANION GAP SERPL CALC-SCNC: 8 MMOL/L — SIGNIFICANT CHANGE UP (ref 5–17)
APTT BLD: 34.9 SEC — SIGNIFICANT CHANGE UP (ref 27.5–35.5)
BUN SERPL-MCNC: 27 MG/DL — HIGH (ref 7–23)
CALCIUM SERPL-MCNC: 9.1 MG/DL — SIGNIFICANT CHANGE UP (ref 8.4–10.5)
CHLORIDE SERPL-SCNC: 108 MMOL/L — SIGNIFICANT CHANGE UP (ref 96–108)
CO2 SERPL-SCNC: 24 MMOL/L — SIGNIFICANT CHANGE UP (ref 22–31)
CREAT SERPL-MCNC: 0.7 MG/DL — SIGNIFICANT CHANGE UP (ref 0.5–1.3)
EGFR: 94 ML/MIN/1.73M2 — SIGNIFICANT CHANGE UP
GLUCOSE SERPL-MCNC: 118 MG/DL — HIGH (ref 70–99)
HCT VFR BLD CALC: 40.2 % — SIGNIFICANT CHANGE UP (ref 39–50)
HGB BLD-MCNC: 12.7 G/DL — LOW (ref 13–17)
MAGNESIUM SERPL-MCNC: 2.1 MG/DL — SIGNIFICANT CHANGE UP (ref 1.6–2.6)
MCHC RBC-ENTMCNC: 31.6 GM/DL — LOW (ref 32–36)
MCHC RBC-ENTMCNC: 31.8 PG — SIGNIFICANT CHANGE UP (ref 27–34)
MCV RBC AUTO: 100.5 FL — HIGH (ref 80–100)
NRBC # BLD: 0 /100 WBCS — SIGNIFICANT CHANGE UP (ref 0–0)
PHOSPHATE SERPL-MCNC: 3.2 MG/DL — SIGNIFICANT CHANGE UP (ref 2.5–4.5)
PLATELET # BLD AUTO: 197 K/UL — SIGNIFICANT CHANGE UP (ref 150–400)
POTASSIUM SERPL-MCNC: 4.7 MMOL/L — SIGNIFICANT CHANGE UP (ref 3.5–5.3)
POTASSIUM SERPL-SCNC: 4.7 MMOL/L — SIGNIFICANT CHANGE UP (ref 3.5–5.3)
RBC # BLD: 4 M/UL — LOW (ref 4.2–5.8)
RBC # FLD: 13 % — SIGNIFICANT CHANGE UP (ref 10.3–14.5)
SARS-COV-2 RNA SPEC QL NAA+PROBE: SIGNIFICANT CHANGE UP
SODIUM SERPL-SCNC: 140 MMOL/L — SIGNIFICANT CHANGE UP (ref 135–145)
WBC # BLD: 5.05 K/UL — SIGNIFICANT CHANGE UP (ref 3.8–10.5)
WBC # FLD AUTO: 5.05 K/UL — SIGNIFICANT CHANGE UP (ref 3.8–10.5)

## 2022-04-04 PROCEDURE — 99024 POSTOP FOLLOW-UP VISIT: CPT

## 2022-04-04 PROCEDURE — 71045 X-RAY EXAM CHEST 1 VIEW: CPT | Mod: 26

## 2022-04-04 PROCEDURE — 99233 SBSQ HOSP IP/OBS HIGH 50: CPT

## 2022-04-04 PROCEDURE — 99221 1ST HOSP IP/OBS SF/LOW 40: CPT

## 2022-04-04 RX ORDER — APIXABAN 2.5 MG/1
5 TABLET, FILM COATED ORAL EVERY 12 HOURS
Refills: 0 | Status: DISCONTINUED | OUTPATIENT
Start: 2022-04-04 | End: 2022-04-08

## 2022-04-04 RX ORDER — APIXABAN 2.5 MG/1
5 TABLET, FILM COATED ORAL EVERY 12 HOURS
Refills: 0 | Status: DISCONTINUED | OUTPATIENT
Start: 2022-04-04 | End: 2022-04-04

## 2022-04-04 RX ADMIN — Medication 3 MILLILITER(S): at 21:53

## 2022-04-04 RX ADMIN — Medication 3 MILLILITER(S): at 10:29

## 2022-04-04 RX ADMIN — Medication 1 SPRAY(S): at 12:04

## 2022-04-04 RX ADMIN — LISINOPRIL 20 MILLIGRAM(S): 2.5 TABLET ORAL at 18:11

## 2022-04-04 RX ADMIN — Medication 1 SPRAY(S): at 18:10

## 2022-04-04 RX ADMIN — OXYMETAZOLINE HYDROCHLORIDE 1 SPRAY(S): 0.5 SPRAY NASAL at 00:26

## 2022-04-04 RX ADMIN — Medication 1 SPRAY(S): at 00:27

## 2022-04-04 RX ADMIN — MUPIROCIN 1 APPLICATION(S): 20 OINTMENT TOPICAL at 05:45

## 2022-04-04 RX ADMIN — Medication 1 APPLICATION(S): at 05:45

## 2022-04-04 RX ADMIN — OXYMETAZOLINE HYDROCHLORIDE 1 SPRAY(S): 0.5 SPRAY NASAL at 12:04

## 2022-04-04 RX ADMIN — Medication 3 MILLILITER(S): at 16:15

## 2022-04-04 RX ADMIN — ATORVASTATIN CALCIUM 80 MILLIGRAM(S): 80 TABLET, FILM COATED ORAL at 21:53

## 2022-04-04 RX ADMIN — POLYETHYLENE GLYCOL 3350 17 GRAM(S): 17 POWDER, FOR SOLUTION ORAL at 05:43

## 2022-04-04 RX ADMIN — CARVEDILOL PHOSPHATE 6.25 MILLIGRAM(S): 80 CAPSULE, EXTENDED RELEASE ORAL at 18:11

## 2022-04-04 RX ADMIN — AMLODIPINE BESYLATE 10 MILLIGRAM(S): 2.5 TABLET ORAL at 05:43

## 2022-04-04 RX ADMIN — APIXABAN 5 MILLIGRAM(S): 2.5 TABLET, FILM COATED ORAL at 10:36

## 2022-04-04 RX ADMIN — Medication 1 SPRAY(S): at 05:44

## 2022-04-04 RX ADMIN — OXYMETAZOLINE HYDROCHLORIDE 1 SPRAY(S): 0.5 SPRAY NASAL at 18:10

## 2022-04-04 RX ADMIN — CARVEDILOL PHOSPHATE 6.25 MILLIGRAM(S): 80 CAPSULE, EXTENDED RELEASE ORAL at 05:43

## 2022-04-04 RX ADMIN — MUPIROCIN 1 APPLICATION(S): 20 OINTMENT TOPICAL at 18:29

## 2022-04-04 RX ADMIN — OXYMETAZOLINE HYDROCHLORIDE 1 SPRAY(S): 0.5 SPRAY NASAL at 05:44

## 2022-04-04 RX ADMIN — Medication 1 APPLICATION(S): at 18:10

## 2022-04-04 RX ADMIN — APIXABAN 5 MILLIGRAM(S): 2.5 TABLET, FILM COATED ORAL at 21:53

## 2022-04-04 NOTE — PROGRESS NOTE ADULT - ASSESSMENT
Assessment and Plan:  BREANNA MCCORMACK is a  76y Male PMH HTN, HLD, afib (Eliquis), CVA (2020) admitted to [facility] on 3/10/22 with partially thrombosed basilar artery aneurysm with brain stem compression p/w epistaxis now controlled with nasal pack.  During assessment we performed flexible laryngoscope which showed diffuse laryngeal edema, mild/moderate  secretions, absent cough reflex and evidence that the patient was unable to protect his airway. Epistaxis currently controlled.    Plan:   - Primary team may restart anticoagulation from an ENT standpoint   - absorbable surgiflo remain in right nostril   - apply bacitracin to b/l ala BID  - Gentle nasal saline spray to both sides QID  - Oral hygiene with oral swabs  - If patient requires oxygen, recommend humidified oxygen via facemask.  - If patient rebleeds: Spray Afrin (oxymetazoline) liberally in both nares, sit patient upright with slight flexion of neck (to decrease aspiration risk) and apply constant external nasal compression on soft part of nose for 15 minutes (set a timer, do not release pressure to check if bleeding has stopped until 15 minutes is complete). If Epistaxis fails to remit, please contact ENT team  - Anticoagulation per primary team  - Discussed with attending Dr. Seth Hawkins PA-C  04-04-22 @ 08:06

## 2022-04-04 NOTE — BH CONSULTATION LIAISON ASSESSMENT NOTE - NSBHCHARTREVIEWLAB_PSY_A_CORE FT
CBC Full  -  ( 04 Apr 2022 06:33 )  WBC Count : 5.05 K/uL  RBC Count : 4.00 M/uL  Hemoglobin : 12.7 g/dL  Hematocrit : 40.2 %  Platelet Count - Automated : 197 K/uL  Mean Cell Volume : 100.5 fl  Mean Cell Hemoglobin : 31.8 pg  Mean Cell Hemoglobin Concentration : 31.6 gm/dL  Auto Neutrophil # : x  Auto Lymphocyte # : x  Auto Monocyte # : x  Auto Eosinophil # : x  Auto Basophil # : x  Auto Neutrophil % : x  Auto Lymphocyte % : x  Auto Monocyte % : x  Auto Eosinophil % : x  Auto Basophil % : x  04-04    140  |  108  |  27<H>  ----------------------------<  118<H>  4.7   |  24  |  0.70    Ca    9.1      04 Apr 2022 06:33  Phos  3.2     04-04  Mg     2.1     04-04

## 2022-04-04 NOTE — PROGRESS NOTE ADULT - SUBJECTIVE AND OBJECTIVE BOX
O/N Events: GM  Subjective/ROS: No complaints. Denies HA, CP, SOB, n/v, changes in bowel/urinary habits.  12pt ROS otherwise negative.    VITALS  Vital Signs Last 24 Hrs  T(C): 37.4 (04 Apr 2022 05:06), Max: 37.6 (03 Apr 2022 21:58)  T(F): 99.3 (04 Apr 2022 05:06), Max: 99.6 (03 Apr 2022 21:58)  HR: 68 (04 Apr 2022 08:45) (64 - 68)  BP: 136/65 (04 Apr 2022 08:45) (118/61 - 138/74)  BP(mean): 93 (04 Apr 2022 08:45) (83 - 100)  RR: 18 (04 Apr 2022 08:45) (17 - 18)  SpO2: 96% (04 Apr 2022 08:45) (94% - 96%)    I&O's Summary    03 Apr 2022 07:01  -  04 Apr 2022 07:00  --------------------------------------------------------  IN: 1680 mL / OUT: 1475 mL / NET: 205 mL    CAPILLARY BLOOD GLUCOSE    PHYSICAL EXAM  General: A&Ox2; NAD  Head: no packing but nasal bandage in place, c/d/i  Neck: Supple; no JVD  Respiratory: CTA B/L; no wheezes/crackles/rales auscultated w/ good air movement  Cardiovascular: Regular rhythm/rate; S1/S2; no gallops or murmurs auscultated  Gastrointestinal: Soft; NTND w/out rebound tenderness or guarding; bowel sounds normal  Extremities: WWP; no edema or cyanosis; radial/pedal pulses palpable  Neurological:  CNII-XII grossly intact; dyasthria  Skin: No rashes noted  Vasc: +2 DP/PT pulses b/l   Psych: Appropriate Affect    MEDICATIONS  (STANDING):  albuterol/ipratropium for Nebulization 3 milliLiter(s) Nebulizer every 6 hours  amLODIPine   Tablet 10 milliGRAM(s) Oral daily  apixaban 5 milliGRAM(s) Oral every 12 hours  atorvastatin 80 milliGRAM(s) Oral at bedtime  BACItracin   Ointment 1 Application(s) Topical two times a day  carvedilol 6.25 milliGRAM(s) Oral every 12 hours  glucagon  Injectable 1 milliGRAM(s) IntraMuscular once  lisinopril 20 milliGRAM(s) Oral every 24 hours  mupirocin 2% Nasal 1 Application(s) Both Nostrils two times a day  oxymetazoline 0.05% Nasal Spray 1 Spray(s) Nasal <User Schedule>  polyethylene glycol 3350 17 Gram(s) Oral every 12 hours  sodium chloride 0.65% Nasal 1 Spray(s) Both Nostrils four times a day  thrombin epistaxis Kit (Thrombin-JMI) 5000 International Unit(s) Both Nostrils once    MEDICATIONS  (PRN):  acetaminophen    Suspension .. 650 milliGRAM(s) Oral every 6 hours PRN Temp greater or equal to 38C (100.4F), Mild Pain (1 - 3)      No Known Allergies      LABS                        12.7   5.05  )-----------( 197      ( 04 Apr 2022 06:33 )             40.2     04-04    140  |  108  |  27<H>  ----------------------------<  118<H>  4.7   |  24  |  0.70    Ca    9.1      04 Apr 2022 06:33  Phos  3.2     04-04  Mg     2.1     04-04      PT/INR - ( 02 Apr 2022 09:55 )   PT: 16.5 sec;   INR: 1.38          PTT - ( 04 Apr 2022 06:33 )  PTT:34.9 sec      IMAGING/EKG/ETC: reviewed

## 2022-04-04 NOTE — BH CONSULTATION LIAISON ASSESSMENT NOTE - NSSUICPROTFACT_PSY_ALL_CORE
Identifies reasons for living/Supportive social network of family or friends/Engaged in work or school/Ability to cope with stress

## 2022-04-04 NOTE — PROGRESS NOTE ADULT - SUBJECTIVE AND OBJECTIVE BOX
HPI:  77y/o M with PMHx sig for HTN, HLD, afib (on eliquis, unknown if last taken today,) CVA in 2020 (with minimal residual right-sided weakness per girlfriend,) last known normal at 3pm when girlfriend spoke to him. EMS found patient in his office at [employer] (he is a neuroscience professor,) with dysarthria, left facial droop, and left-sided weakness. Upon arrival to [facility], BP noted to be 171/101. Stroke code was called. CTH without findings of hemorrhage. 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 NIHSS 20. No TPA was administered as Pt is out of window. Per patient's girlfriend, the basilar artery aneurysm is known and was diagnosed in 2020 at [facility] when he suffered from a CVA. At that time, he underwent a "treatment for the aneurysm," which the girlfriend reports was too risky and was aborted. Patient never attended his follow-up appointments due to fear per girlfriend.  (10 Mar 2022 22:52)    3/11: Karen placed overnight. 3% hypertonic saline started. Neuro exam stable. Started on vEEG for aphasia  3/12: GM overnight. Neuro exam stable. 3% d/c. home eliquis 5 bid. failed s/s. started on TF via NGT, spiked fever 101, f/u panculture   3/13: GM overnight, neuro stable, veeg negative. Vanc/zosyn started for empiric PNA.   3/14: GM o/n neuro stable. on HFNC. ENT plan for laryngoscope today with . failed s/s, pend repeat eval. dc'd captopril, started lisinopril. dc'd vanc. Laryngoscopy: No masses visualized, hypomobility of L sided vocal cords, dysarthric, hypophonic, risk for aspiration, good cough reflex.Dr. Onofre to discuss with Dr. Peterson regarding a possible vocal cord injection.   3/15: GM overnight, neuro stable, on HFNC 30/30 overnight, transitioned to 4LNC  3/16: GM overnight, neuro stable, tolerating NC and satting well    3/17: POD6 dx angio. GM o/n neuro stable. trickle feeds via NGT  3/18: POD7 GM o/n, saturating well in RA. tolerating TF   03/19: POD8 GM o/n. gen surg consulted for PEG placement.  03/20: POD9 GM o/n. Transferred back to ICU for heparin gtt in preparation for PEG placement Wed.   3/21: POD10 GM o/n, neuro exam stable. Transferred to ICU in preparation for hep gtt to start tomorrow, plan for PEG on Wednesday. F/u am coags.   3/22: POD11. GM o/n neuro stable. on heparin gtt ptt goal 40-60. o/n ptt >200c, held for 2 hours. on heparin @11. pre-op for PEG   3/23: POD12. o/n new R facial droop, CTH stable. heparin gtt d/c. preop PEG today  3/24: POD13, POD1 PEG placement, GM overnight, neuro stable, +BM  3/25: POD14, GM o/n, neuro stable, eliquis restarted. Started ceftriaxone for UTI  3/26: POD 15. GM overnight. exam stable. on abx until 3/26 for UTI. On eliquis for afib.   3/27: POD 16. o/n episode of epistaxis, aftrin administered. exam stable. abx completed for UTI. pending rehab. ENT consulted for epistaxis, recommended nasal saline and bactroban.  3/28: POD17. GM o/n, neuro stable.  3/29: POD18. GM o/n, neuro stable   3/30: POD18. GM o/n. neuro stable. upgraded to ICU for intractable epistaxis. ENT placed nasal dressings, bleeding controlled. Ancef while drains in place. Desatted to 80s, on 8L O2 via face tent. intubation held as per girlfriend, will continue to monitor respiratory status  3/31: POD19 GM overnight. Neuro exam stable. No episode of epistaxis o/n.  4/1: POD20 GM overnight. Neruo exam stable. nasal packing removed. pt SDU status  4/2: POD 21. GM. Plan to resume eliquis today.   4/3: POD 22. GM o/n. on hep gtt held at 7 am, possible resume eliquis today if epistaxis controlled. pending rehab   4/4: POD 23, GM, hep gtt held, eliquis started     OVERNIGHT EVENTS: none   Vital Signs Last 24 Hrs  T(C): 37.6 (03 Apr 2022 21:58), Max: 37.6 (03 Apr 2022 21:58)  T(F): 99.6 (03 Apr 2022 21:58), Max: 99.6 (03 Apr 2022 21:58)  HR: 66 (03 Apr 2022 23:10) (56 - 66)  BP: 138/74 (03 Apr 2022 23:10) (118/56 - 138/74)  BP(mean): 100 (03 Apr 2022 23:10) (80 - 100)  RR: 18 (03 Apr 2022 23:10) (17 - 18)  SpO2: 96% (03 Apr 2022 23:10) (94% - 98%)    I&O's Summary    02 Apr 2022 07:01  -  03 Apr 2022 07:00  --------------------------------------------------------  IN: 2027 mL / OUT: 1150 mL / NET: 877 mL    03 Apr 2022 07:01  -  04 Apr 2022 00:26  --------------------------------------------------------  IN: 1260 mL / OUT: 1075 mL / NET: 185 mL      PHYSICAL EXAM:  General: NAD, pt is comfortably sitting up in bed, on room air  HEENT: CN II-XII grossly intact, PERRL 3mm briskly reactive, EOMI b/l, face symmetric, tongue midline, neck FROM  Cardiovascular: RRR, normal S1 and S2   Respiratory: lungs CTAB, no wheezing, rhonchi, or crackles   GI: normoactive BS to auscultation, abd soft, NTND   Neuro: A&Ox3 with choices, expressive aphasia, dysarthric but attempting to mouth words. Follows commands.  Motor: RUE 5/5 throughout, RLE 2/5 throughout LUE hg 2/5 o/w 0/5 and withdrawing briskly, LLE TF. SILT throughout   Extremities: PT/DP pulses 1+ and symmetric  Wound/incision: R groin C/D/I, no hematoma    LABS:                        11.5   5.00  )-----------( 228      ( 03 Apr 2022 06:36 )             35.0     04-03    138  |  106  |  27<H>  ----------------------------<  147<H>  4.2   |  25  |  0.68    Ca    8.7      03 Apr 2022 06:36  Phos  2.7     04-03  Mg     2.0     04-03      PT/INR - ( 02 Apr 2022 09:55 )   PT: 16.5 sec;   INR: 1.38          PTT - ( 03 Apr 2022 06:36 )  PTT:172.4 sec        CAPILLARY BLOOD GLUCOSE          Drug Levels: [] N/A    CSF Analysis: [] N/A      Allergies    No Known Allergies    Intolerances      MEDICATIONS:  Antibiotics:    Neuro:  acetaminophen    Suspension .. 650 milliGRAM(s) Oral every 6 hours PRN    Anticoagulation:  thrombin epistaxis Kit (Thrombin-JMI) 5000 International Unit(s) Both Nostrils once    OTHER:  albuterol/ipratropium for Nebulization 3 milliLiter(s) Nebulizer every 6 hours  amLODIPine   Tablet 10 milliGRAM(s) Oral daily  atorvastatin 80 milliGRAM(s) Oral at bedtime  BACItracin   Ointment 1 Application(s) Topical two times a day  carvedilol 6.25 milliGRAM(s) Oral every 12 hours  glucagon  Injectable 1 milliGRAM(s) IntraMuscular once  lisinopril 20 milliGRAM(s) Oral every 24 hours  mupirocin 2% Nasal 1 Application(s) Both Nostrils two times a day  oxymetazoline 0.05% Nasal Spray 1 Spray(s) Nasal <User Schedule>  polyethylene glycol 3350 17 Gram(s) Oral every 12 hours  sodium chloride 0.65% Nasal 1 Spray(s) Both Nostrils four times a day    IVF:    CULTURES:  Culture Results:   No growth at 5 days. (03-25 @ 18:00)  Culture Results:   No growth at 5 days. (03-25 @ 18:00)    RADIOLOGY & ADDITIONAL TESTS:      77y/o M with PMHx sig for HTN, HLD, afib (on eliquis, unknown if last taken today,) CVA in 2020 (with minimal residual right-sided weakness per girlfriend) p/w dysarthria, left facial droop, and left-sided weakness. CTH without findings of hemorrhage. 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 NIHSS 20. Pt is not a tpa candidate as Pt is out of window. Patient now s/p diagnostic angio with findings of partially thrombosed basilar artery aneurysm 3/11/22. course c/b resp insufficiency weaned off HFNC, now complicated by epistaxis (resolved).     PLAN:   Neuro:   - neuro checks q4/vital signs q4hrs  - MR head 3/11: no acute infarcts   - CTH 3/22: stable   - EEG negative 3/13, dc'd   - no plan for further neurosurgical intervention   - stroke core measures     Cardio:  - SBP goal 100-160   - Amlodipine 10mg, carvedilol 6.25mg BID, Lisinopril 20mg (increase to 40mg if needed)- home HCTZ held  echo 3/11: mild LVH, EF 65-70%   - Eliquis 5mg restarted 4/3 10pm  - PVCs 3/26, per cardiology NTD  - cont home lipitor 80mg      Pulm:  - RA  - standing mucomyst and duonebs  - Chest PT   - aspiration precaution, HOB up     ENT:   - ENT consulted, s/p laryngoscopy 3/14: No masses, hypomobility of L sided vocal cord  - f/u outpatient with Dr. Onofre/Dr. Peterson for possible vocal cord injection.   - s/p epistaxis episode 3/26: intranasal bactroban BID and saline spray q4 to moisturize can use afrin and manual pressure x 20 minutes if bleeds again   - Nasal packing 3/30-4/1, now removed  -bacitracin BID to bilat nostrils   -nasal saline 4x day apply to both nostrils     Renal:  - Na goal 135-145   - no issues     GI:   - TF via peg  - bowel regimen   - last BM 4/2  -senna held for loose BMs    Endo:  - A1C 5.4     Heme:  - SCDs for DVT ppx   - hep gtt dc'd 7 am 4/3, eliquis 5 given   - LE dopplers 3/11, 3/23: neg for DVT   - LUE US 3/22: superficial thrombus L basilic/cephalic vein LUE US 4/3 negative    ID:  - zosyn empirically for PNA completed 3/17   - pancx 3/25: +UA, neg blood cx, completed empiric Ceftriaxone x3 days for UTI  (3/25-3/27)     ORTHO:  - L foot xray 3/22: chronic 5th digit middle phalanx base corner fracture     Dispo:   - SDU status  - full code  - Pending AR per PT  - Family updated     Assessment and plan discussed with Dr. Peterson

## 2022-04-04 NOTE — CHART NOTE - NSCHARTNOTEFT_GEN_A_CORE
Admitting Diagnosis:   Patient is a 76y old  Male who presents with a chief complaint of CVA, left facial, left-sided weakness (2022 09:30)      PAST MEDICAL & SURGICAL HISTORY    Current Nutrition Order: NPO w/ EN via PEG: Jevity 1.2 @ 70 mL/hr x 24 hours. This provides: 1680 mL TV, 2016 kcal, 93.2 g pro, 1356 mL free water. Meetin kcal/kg, 1.0 g/kg based on IBW 88.9 kg.    PO Intake: Good (%) [   ]  Fair (50-75%) [   ] Poor (<25%) [   ] [x] NPO    GI Issues: No nausea/vomiting documented at this time. Last documented bowel movement 4/3.     Pain: No noted pain at time of RD interview.    Skin Integrity: Generalized edema 1+, left arm and left leg edema 2+. Right groin surgical incision per chart. Mayco score: 14.    Labs:       140  |  108  |  27<H>  ----------------------------<  118<H>  4.7   |  24  |  0.70    Ca    9.1      2022 06:33  Phos  3.2     04-04  Mg     2.1     04-04      CAPILLARY BLOOD GLUCOSE          Medications:  MEDICATIONS  (STANDING):  albuterol/ipratropium for Nebulization 3 milliLiter(s) Nebulizer every 6 hours  amLODIPine   Tablet 10 milliGRAM(s) Oral daily  apixaban 5 milliGRAM(s) Enteral Tube every 12 hours  atorvastatin 80 milliGRAM(s) Oral at bedtime  BACItracin   Ointment 1 Application(s) Topical two times a day  carvedilol 6.25 milliGRAM(s) Oral every 12 hours  glucagon  Injectable 1 milliGRAM(s) IntraMuscular once  lisinopril 20 milliGRAM(s) Oral every 24 hours  mupirocin 2% Nasal 1 Application(s) Both Nostrils two times a day  oxymetazoline 0.05% Nasal Spray 1 Spray(s) Nasal <User Schedule>  polyethylene glycol 3350 17 Gram(s) Oral every 12 hours  sodium chloride 0.65% Nasal 1 Spray(s) Both Nostrils four times a day  thrombin epistaxis Kit (Thrombin-JMI) 5000 International Unit(s) Both Nostrils once    MEDICATIONS  (PRN):  acetaminophen    Suspension .. 650 milliGRAM(s) Oral every 6 hours PRN Temp greater or equal to 38C (100.4F), Mild Pain (1 - 3)    Admitting Anthropometrics:  · Height for BMI (FEET)	6 Feet  · Height for BMI (INCHES)	3 Inch(s)  · Height for BMI (CENTIMETERS)	190.5 Centimeter(s)  · Weight for BMI (lbs)	260 lb  · Weight for BMI (kg)	117.9 kg  · Body Mass Index	32.4  · Ideal Body Weight (lbs)	196  · Ideal Body Weight (kg)	88.9    Weight:  3/10 260lbs  3/14 176lbs (?accuracy- pt does not appear to be this weight)    Weight Change:   Please obtain new weight when feasible and trend biweekly.     Nutrition Focused Physical Exam: Completed [   ]  Not Pertinent [ x  ]    Estimated energy needs:   IBW used for calculations as pt >120% of IBW (133%). Needs adjusted for advanced age. **Adjust fluids per team.  Kcal (20-25 kcal/kg): 7667-5454 kcal  Protein (1.0-1.2 g/kg pro): 88-106g pro  Fluids (30-35 ml/kg): 7521-1967 ml    Subjective: 76M with PMHx sig for HTN, HLD, afib (on eliquis, unknown if last taken today,) CVA in  (with minimal residual right-sided weakness per girlfriend) p/w dysarthria, left facial droop, and left-sided weakness. CTH without findings of hemorrhage. 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 NIHSS 20. Pt is not a tpa candidate as pt is out of window. Transferred to NSICU for further assessment/ monitoring. S/p diagnostic angio with findings of partially thrombosed basilar artery aneurysm 3/11/22. SLP eval 3/12 and follow up 3/14 both with recs for NPO with NGT placement for initiation of EN. Pt with new right facial droop o/n 3/23- CTH stable. Pt now s/p EGD with PEG placement 3/23. Now transferred to SDU for further monitoring. 3/30 upgraded to ICU for intractable epistaxis. Desatted to 80s, on 8L O2 via face tent. Epistaxis resolved.     Pt seen at bedside for follow up assessment- on room air. Labs reviewed ; electrolytes within normal limits at this time. Observed EN hung and running @ 70 mL/hr (goal rate). Pt in and out of sleep during RD interview thus limited information obtained however, pt reports good tolerance to EN feeds (no s/s of intolerance). Made aware RD remains available. RD to follow up per protocol. See nutrition recommendations below.     Previous Nutrition Diagnosis: Inadequate oral intake RT inability to meet est needs via PO AEB NPO, reliant on EN to meet 100% of est needs    Active [ x ]  Resolved [   ]    If resolved, new PES:     Goal: 1. Consistently meet >75% est needs via feasible route    Recommendations:  1. NPO  2. Cont with current EN regimen; Jevity 1.2 @ 70 ml/hr x24hrs via NGT providing 1680 ml TV, 2016 kcal, 93g pro, 1356 ml free water.   >>FWF per team  >>Maintain aspiration precautions at all times  3. Pain and bowel regimen per team   4. Cont to monitor lytes and replete prn   5. RD diet edu prn    Education: Deferred    Risk Level: High [   ] Moderate [ x  ] Low [   ].

## 2022-04-04 NOTE — BH CONSULTATION LIAISON ASSESSMENT NOTE - CURRENT MEDICATION
MEDICATIONS  (STANDING):  albuterol/ipratropium for Nebulization 3 milliLiter(s) Nebulizer every 6 hours  amLODIPine   Tablet 10 milliGRAM(s) Oral daily  apixaban 5 milliGRAM(s) Enteral Tube every 12 hours  atorvastatin 80 milliGRAM(s) Oral at bedtime  BACItracin   Ointment 1 Application(s) Topical two times a day  carvedilol 6.25 milliGRAM(s) Oral every 12 hours  glucagon  Injectable 1 milliGRAM(s) IntraMuscular once  lisinopril 20 milliGRAM(s) Oral every 24 hours  mupirocin 2% Nasal 1 Application(s) Both Nostrils two times a day  oxymetazoline 0.05% Nasal Spray 1 Spray(s) Nasal <User Schedule>  polyethylene glycol 3350 17 Gram(s) Oral every 12 hours  sodium chloride 0.65% Nasal 1 Spray(s) Both Nostrils four times a day  thrombin epistaxis Kit (Thrombin-JMI) 5000 International Unit(s) Both Nostrils once    MEDICATIONS  (PRN):  acetaminophen    Suspension .. 650 milliGRAM(s) Oral every 6 hours PRN Temp greater or equal to 38C (100.4F), Mild Pain (1 - 3)

## 2022-04-04 NOTE — BH CONSULTATION LIAISON ASSESSMENT NOTE - RISK ASSESSMENT
Acute suicide risk is low per lack of SI or evidence for hopelessness or behaviors c/w pt considering to end his life.  Risk is elevated due to the sudden and significant nature of neurologic sx, and sx progression should be evaluated eliud by establishing care in outpatient setting.

## 2022-04-04 NOTE — BH CONSULTATION LIAISON ASSESSMENT NOTE - NSBHCONSULTFOLLOWAFTERCARE_PSY_A_CORE FT
Make appointment to followup with one of the following within 1 week of discharge:    •	[facility])  o	www.[email or web address]  o	Geared towards an aging population, with the understanding that their clients will need increasingly comprehensive services.  Must be over 55.    o	[address]  [place], [place]  [address] [phone]  o	Medicare, Medicaid, AARP, Aetna, Affinity, AmeriChoice, [employer], [employer], Cigna, [employer], BCBS, [place], GHI, HealthFirst, HIP, Optum, [place], United, ValueOptions  •	[facility]  o	[email or web address]  o	[address] [place] [address]  (corner [address] and [address])  Telephone: [phone]  o	Medicare, Medicaid, most private insurance  •	[facility]  o	[email or web address]  o	Two locations  ?	[address]  ([address])  [place], [place] [address] [phone]  ?	[address] [place], [place] [address] [phone]  o	Medicare, Medicaid, most private insurance

## 2022-04-04 NOTE — BH CONSULTATION LIAISON ASSESSMENT NOTE - OTHER
no SI/HI, no elicited themes of hopelessness or desperation during the assessment not internally preoccupied or responding to internal stimuli

## 2022-04-04 NOTE — BH CONSULTATION LIAISON ASSESSMENT NOTE - NSICDXBHSECONDARYDX_PSY_ALL_CORE
Basilar artery aneurysm   I72.5  Brain stem compression   G93.5  Longstanding persistent atrial fibrillation   I48.11  HTN (hypertension)   I10  Hyperlipidemia   E78.5  Acidosis   E87.2  Leukocytosis   D72.829  Dysphagia   R13.10  Hypocalcemia   E83.51  Hypophosphatemia   E83.39  Nasal hemorrhage   R04.0

## 2022-04-04 NOTE — BH CONSULTATION LIAISON ASSESSMENT NOTE - NSBHCHARTREVIEWINVESTIGATE_PSY_A_CORE FT
PROCEDURE DATE:  2022          INTERPRETATION:  I, Leticia Deng MD, have reviewed the images and the   report and agree with the findings, with the following modification:    No significant change on comparison with prior study of 3/10/2022.    PROCEDURE INFORMATION:  Exam: CT Head Without Contrast  Exam date and time: 3/22/2022 10:21 PM  Age: 76 years old  Clinical indication: Other: R facial droop    TECHNIQUE:  Imaging protocol: Computed tomography of the head without contrast.    COMPARISON:  MR BRAIN 3/10/2022 11:46 PM, CT angiogram brain March 10, 2022    FINDINGS:  Brain: Mild symmetrical volume loss of the cerebral and cerebellar   hemispheres. White-matter  density alteration is noted within the centrum semiovale and corona   radiata, most likely predominantly  microangiopathic in etiology. No intracranial hemorrhage.  Cerebral ventricles: No ventriculomegaly.  Paranasal sinuses: Visualized sinuses are unremarkable. No fluid levels.  Mastoid air cells: Dependent edema is noted within the right mastoid air   cells.  Vasculature: Vascular calcifications are noted within the intracavernous   internal carotid arteries.  Again seen is the basilar artery aneurysm, located in its caudal aspect,   measuring 2.9 x 2.83 x 3.3  cm. The upper course of the basilar artery is ectatic with diameter of   8.6 mm., and the basilar tip has  a diameter of 7.4 x 8.8 mm. Vascular calcifications within upper   vertebral arteries.  Bones/joints: Unremarkable. No acute fracture.  Soft tissues: Unremarkable.    IMPRESSION:  1. No intracranial hemorrhage.  2. Large aneurysm in the caudal half of the basilar artery measuring 2.9   x 2.83 x 3.3 cm.  3. Fusiform ectasia/aneurysm in a tubular appearance of the upper half of   the basilar artery, with  diameter of up to 8.6 mm, and basilar tip diameter of up to 8.8 mm.  4. Underlying symmetrical volume loss and microangiopathic white matter   changes.  5. Vascular calcifications.    Thank you for allowing us to participate in the care of your patient.    Dictated and Authenticated by: Katherine Rizo MD  2022 12:44 AM Eastern Time ([place] & [place])    --- End of Report ---          KHALED AL TAWIL MD; Resident Radiologist  This document has been electronically signed.  LETICIA DENG MD; Attending Radiologist      -  PROCEDURE DATE:  2022          INTERPRETATION:  I, Shanique Mccauley MD, have reviewed the images and the   report and agree with the findings, with the following modification:    TECHNIQUE: Axial FLAIR, GRE and diffusion-weighted images images of the   brain were obtained utilizing a MRI stroke protocol.    There is a approximately 3 cm giant aneurysm involving the basilar artery   which is partially thrombosed. There is marked compression of the   brainstem with distortion of the fourth ventricle which remains patent.    Limited evaluation of the nasopharynx demonstrates a 7 mm lesion   involving the left fossa of Rosenmuller (series 8 image 5). This is   incompletely evaluated and potentially could represent a submucosal   retention cyst versus other etiologies. Correlation with direct   inspection is recommended.    ******PRELIMINARY REPORT******    Patient Name: BREANNA MCCORMACK MRN: [id number]   (Age): 1945 76 Gender: M  Date of Exam: 03/10/2022 Accession: [id number]  Referring Physician: mango leon # of Images: [id number]  Ordered As: MR BRAIN WO    PROCEDURE INFORMATION:  Exam: MR Head Without Contrast  Exam date and time: 3/10/2022 11:46 PM  Age: 76 years old  Clinical indication: Other: Cerebral aneurysm, follow-up    TECHNIQUE:  Imaging protocol: MR of the head without contrast.    COMPARISON: CT HEAD STROKE PROTOCOL 3/10/2022 9:21 PM    FINDINGS:  Brain: Again seen is a large basilar artery aneurysm which compresses the   brainstem. There are mild periventricular and subcortical T2/FLAIR   hyperintensities which are non-specific and commonly seen in the setting   of chronic small vessel ischemic disease. Mild volume loss. No acute   infarct or hemorrhage. No mass.  Cerebral ventricles: Normal. No ventriculomegaly.  Bones/joints: Unremarkable.  Paranasal sinuses: Left maxillary mucous retention cyst is noted.  Mastoid air cells: Normal as visualized. No mastoid effusion.  Orbital cavity: Unremarkable.  Soft tissues: Unremarkable.    IMPRESSION:  1. No acute infarct.  2. Large basilar artery aneurysm, compressing the brainstem, as on CT.    Thank you for allowing us to participate in the care of your patient.  Dictated and Authenticated by: Anuel Long MD  2022 12:17 AM Eastern Time ([place])    The above report was submitted by the [facility] attending radiologist and   copied to PowerScribe by resident Penny Whyte MD.    --- End of Report ---          PENNY WHYTE MD; Resident Radiologist  This document has been electronically signed.  SHANIQUE MCCAULEY MD; Attending Radiologist

## 2022-04-04 NOTE — BH CONSULTATION LIAISON ASSESSMENT NOTE - HPI (INCLUDE ILLNESS QUALITY, SEVERITY, DURATION, TIMING, CONTEXT, MODIFYING FACTORS, ASSOCIATED SIGNS AND SYMPTOMS)
76y Male PMH HTN, HLD, afib (Eliquis), CVA (2020) admitted to [facility] on 3/10/22 with partially thrombosed basilar artery aneurysm with brain stem compression.     d/w primary team: Pt had known basilar artery aneurysm, lost to f/u for several years, admitted for L sided sx, aneurysm has grown, no surgical intervention needed.  Pt has been hospitalized for several weeks, in and out of ICU due to complications, has new L plegia.  Team consulted psychiatry to see if pt needs outpt f/u to help with coping, noting that he has appeared sad during hospitalization.  No SI or agitation.    Pt assessed at bedside.  He was awake, alert, communicated mostly by nodding or hand gestures.  Answers were logical and appropriate in response to questions, (thumbs up, etc) though per ENT/neurologic condition he was unable to give more elaborate responses.  He indicated that he was coping adequately, mood is okay.  He was emphatic that there were no thoughts of suicide or hopelessness.  He denied having any perceptual abnormalities or AVH.  He thought his care in the hospital was adequate.  He is sleeping fine, though appetite has been low.  He verified stable living situation and relationship, as well as employment as neuroscience professor.  He has no history of depression or other mental health issues, and has never been in treatment in the past.  He would consider therapy as outpatient to help with coping.  He indicated no other significant symptoms or concerns, glad that he could rest.

## 2022-04-04 NOTE — BH CONSULTATION LIAISON ASSESSMENT NOTE - SUMMARY
76M hx HTN, HLD, Afib, known basilar artery aneurysm, lost to f/u for several years, admitted for L sided sx, aneurysm has grown, no surgical intervention needed.  Pt has been hospitalized for several weeks, in and out of ICU due to complications, has new L plegia.  Team consulted psychiatry to see if pt needs outpt f/u to help with coping, noting that he has appeared sad during hospitalization.  No SI or agitation.    Assessment limited, though pt does not evidence severe depression, hopelessness, suicidal thinking, kehinde, or perceptual sx.  He has not been agitated, and there has not been evidence for delirium.  No prior psychiatric history.      -no indication for inpatient psychiatric admission  -given significant new neurologic sx that are likely to be ongoing and impacting most aspects of his life, further assessment and treatment would be recommended in the outpatient setting.   Resources as below.  -no indication for YOSSI psychiatric medication at this time

## 2022-04-04 NOTE — BH CONSULTATION LIAISON ASSESSMENT NOTE - NSBHCHARTREVIEWVS_PSY_A_CORE FT
Vital Signs Last 24 Hrs  T(C): 37.2 (04 Apr 2022 10:23), Max: 37.6 (03 Apr 2022 21:58)  T(F): 98.9 (04 Apr 2022 10:23), Max: 99.6 (03 Apr 2022 21:58)  HR: 68 (04 Apr 2022 08:45) (64 - 68)  BP: 136/65 (04 Apr 2022 08:45) (118/61 - 138/74)  BP(mean): 93 (04 Apr 2022 08:45) (83 - 100)  RR: 18 (04 Apr 2022 08:45) (17 - 18)  SpO2: 96% (04 Apr 2022 08:45) (94% - 96%)

## 2022-04-04 NOTE — PROGRESS NOTE ADULT - SUBJECTIVE AND OBJECTIVE BOX
OTOLARYNGOLOGY (ENT) PROGRESS NOTE    PATIENT: BREANNA MCCORMACK  MRN: [id number]  : 41  [id number]  DATE OF SERVICE:  22  			          Subjective/ Interval:    Patient seen and examined at bedside this morning. Patient was transferred to ICU from stepdown yesterday after epistaxis was controlled for poor airway protection. NAEON, no further bleeding. Patient more alert this morning compared to yesterday.    Seen and examined at bedside. No bleeding, mustache dressing dry without needing to be changed. Double lumen packing removed without subsequent bleeding from the nose or in the oropharynx. Dried clot removed from hard palate. Placed surgiflo in right nose with placement of mustache dressing.   Examined at bedside this morning. No bleeding, dressing dry. Double lumen packing removed yesterday, surgiflo placed. Cardiology to restart AC today   Examined at bedside this morning, NAEON. Started on heparin gtt yesterday, no bleeding.     ALLERGIES:  No Known Allergies      MEDICATIONS:  Antiinfectives:     IV fluids:    Hematologic/Anticoagulation:  apixaban 5 milliGRAM(s) Oral every 12 hours  thrombin epistaxis Kit (Thrombin-JMI) 5000 International Unit(s) Both Nostrils once    Pain medications/Neuro:  acetaminophen    Suspension .. 650 milliGRAM(s) Oral every 6 hours PRN    Endocrine Medications:   atorvastatin 80 milliGRAM(s) Oral at bedtime  glucagon  Injectable 1 milliGRAM(s) IntraMuscular once    All other standing medications:   albuterol/ipratropium for Nebulization 3 milliLiter(s) Nebulizer every 6 hours  amLODIPine   Tablet 10 milliGRAM(s) Oral daily  BACItracin   Ointment 1 Application(s) Topical two times a day  carvedilol 6.25 milliGRAM(s) Oral every 12 hours  lisinopril 20 milliGRAM(s) Oral every 24 hours  mupirocin 2% Nasal 1 Application(s) Both Nostrils two times a day  oxymetazoline 0.05% Nasal Spray 1 Spray(s) Nasal <User Schedule>  polyethylene glycol 3350 17 Gram(s) Oral every 12 hours  sodium chloride 0.65% Nasal 1 Spray(s) Both Nostrils four times a day    All other PRN medications:    Vital Signs Last 24 Hrs  T(C): 37.4 (2022 05:06), Max: 37.6 (2022 21:58)  T(F): 99.3 (2022 05:06), Max: 99.6 (2022 21:58)  HR: 68 (2022 04:56) (60 - 68)  BP: 133/77 (2022 04:56) (118/56 - 138/74)  BP(mean): 100 (2022 23:10) (80 - 100)  RR: 18 (2022 04:56) (17 - 18)  SpO2: 96% (2022 04:56) (94% - 96%)      -03 @ 07:01  -  04-04 @ 07:00  --------------------------------------------------------  IN:    Jevity 1.2: 1680 mL  Total IN: 1680 mL    OUT:    Incontinent per Condom Catheter (mL): 1000 mL    Intermittent Catheterization - Urethral (mL): 475 mL  Total OUT: 1475 mL    Total NET: 205 mL      PHYSICAL EXAM:  Gen: Patient resting comfortably in bed, NAD, responsive to questions  Nose: right nasal crusting, no bleeding, mustache dressing dry, no bleeding  OC/OP: posterior oropharynx visible with no clot or bleeding  resp: regular respirations non labored   LABS                       12.7   5.05  )-----------( 197      ( 2022 06:33 )             40.2    -    140  |  108  |  27<H>  ----------------------------<  118<H>  4.7   |  24  |  0.70    Ca    9.1      2022 06:33  Phos  3.2     04-04  Mg     2.1     -04           Coagulation Studies-   PT/INR - ( 2022 09:55 )   PT: 16.5 sec;   INR: 1.38          PTT - ( 2022 06:33 )  PTT:34.9 sec    Endocrine Panel-  Calcium, Total Serum: 9.1 mg/dL (04-04 @ 06:33)      MICROBIOLOGY:  Culture Results:   No growth at 5 days. (22 @ 18:00)  Culture Results:   No growth at 5 days. (22 @ 18:00)           RADIOLOGY & ADDITIONAL STUDIES:

## 2022-04-04 NOTE — CHART NOTE - NSCHARTNOTESELECT_GEN_ALL_CORE
VTE/Event Note
Follow Up Nutrition Assessment/Nutrition Services
Nutrition Services

## 2022-04-05 DIAGNOSIS — R50.9 FEVER, UNSPECIFIED: ICD-10-CM

## 2022-04-05 LAB
ANION GAP SERPL CALC-SCNC: 9 MMOL/L — SIGNIFICANT CHANGE UP (ref 5–17)
APPEARANCE UR: CLEAR — SIGNIFICANT CHANGE UP
BACTERIA # UR AUTO: PRESENT /HPF
BILIRUB UR-MCNC: NEGATIVE — SIGNIFICANT CHANGE UP
BUN SERPL-MCNC: 26 MG/DL — HIGH (ref 7–23)
CALCIUM SERPL-MCNC: 9.2 MG/DL — SIGNIFICANT CHANGE UP (ref 8.4–10.5)
CHLORIDE SERPL-SCNC: 104 MMOL/L — SIGNIFICANT CHANGE UP (ref 96–108)
CO2 SERPL-SCNC: 25 MMOL/L — SIGNIFICANT CHANGE UP (ref 22–31)
COLOR SPEC: YELLOW — SIGNIFICANT CHANGE UP
COMMENT - URINE: SIGNIFICANT CHANGE UP
CREAT SERPL-MCNC: 0.73 MG/DL — SIGNIFICANT CHANGE UP (ref 0.5–1.3)
DIFF PNL FLD: NEGATIVE — SIGNIFICANT CHANGE UP
EGFR: 93 ML/MIN/1.73M2 — SIGNIFICANT CHANGE UP
EPI CELLS # UR: SIGNIFICANT CHANGE UP /HPF (ref 0–5)
GLUCOSE SERPL-MCNC: 133 MG/DL — HIGH (ref 70–99)
GLUCOSE UR QL: NEGATIVE — SIGNIFICANT CHANGE UP
HCT VFR BLD CALC: 40.7 % — SIGNIFICANT CHANGE UP (ref 39–50)
HGB BLD-MCNC: 13.8 G/DL — SIGNIFICANT CHANGE UP (ref 13–17)
KETONES UR-MCNC: NEGATIVE — SIGNIFICANT CHANGE UP
LEUKOCYTE ESTERASE UR-ACNC: ABNORMAL
MAGNESIUM SERPL-MCNC: 2.2 MG/DL — SIGNIFICANT CHANGE UP (ref 1.6–2.6)
MCHC RBC-ENTMCNC: 32 PG — SIGNIFICANT CHANGE UP (ref 27–34)
MCHC RBC-ENTMCNC: 33.9 GM/DL — SIGNIFICANT CHANGE UP (ref 32–36)
MCV RBC AUTO: 94.4 FL — SIGNIFICANT CHANGE UP (ref 80–100)
NITRITE UR-MCNC: NEGATIVE — SIGNIFICANT CHANGE UP
NRBC # BLD: 0 /100 WBCS — SIGNIFICANT CHANGE UP (ref 0–0)
PH UR: 6.5 — SIGNIFICANT CHANGE UP (ref 5–8)
PHOSPHATE SERPL-MCNC: 3.1 MG/DL — SIGNIFICANT CHANGE UP (ref 2.5–4.5)
PLATELET # BLD AUTO: 265 K/UL — SIGNIFICANT CHANGE UP (ref 150–400)
POTASSIUM SERPL-MCNC: 4.3 MMOL/L — SIGNIFICANT CHANGE UP (ref 3.5–5.3)
POTASSIUM SERPL-SCNC: 4.3 MMOL/L — SIGNIFICANT CHANGE UP (ref 3.5–5.3)
PROT UR-MCNC: NEGATIVE MG/DL — SIGNIFICANT CHANGE UP
RBC # BLD: 4.31 M/UL — SIGNIFICANT CHANGE UP (ref 4.2–5.8)
RBC # FLD: 13.2 % — SIGNIFICANT CHANGE UP (ref 10.3–14.5)
RBC CASTS # UR COMP ASSIST: < 5 /HPF — SIGNIFICANT CHANGE UP
SODIUM SERPL-SCNC: 138 MMOL/L — SIGNIFICANT CHANGE UP (ref 135–145)
SP GR SPEC: 1.01 — SIGNIFICANT CHANGE UP (ref 1–1.03)
UROBILINOGEN FLD QL: 4 E.U./DL
WBC # BLD: 6.87 K/UL — SIGNIFICANT CHANGE UP (ref 3.8–10.5)
WBC # FLD AUTO: 6.87 K/UL — SIGNIFICANT CHANGE UP (ref 3.8–10.5)
WBC UR QL: ABNORMAL /HPF

## 2022-04-05 PROCEDURE — 99232 SBSQ HOSP IP/OBS MODERATE 35: CPT

## 2022-04-05 PROCEDURE — 99233 SBSQ HOSP IP/OBS HIGH 50: CPT

## 2022-04-05 PROCEDURE — 36415 COLL VENOUS BLD VENIPUNCTURE: CPT

## 2022-04-05 PROCEDURE — 71045 X-RAY EXAM CHEST 1 VIEW: CPT | Mod: 26

## 2022-04-05 RX ADMIN — CARVEDILOL PHOSPHATE 6.25 MILLIGRAM(S): 80 CAPSULE, EXTENDED RELEASE ORAL at 05:12

## 2022-04-05 RX ADMIN — Medication 1 SPRAY(S): at 05:13

## 2022-04-05 RX ADMIN — APIXABAN 5 MILLIGRAM(S): 2.5 TABLET, FILM COATED ORAL at 10:24

## 2022-04-05 RX ADMIN — LISINOPRIL 20 MILLIGRAM(S): 2.5 TABLET ORAL at 17:43

## 2022-04-05 RX ADMIN — Medication 3 MILLILITER(S): at 23:10

## 2022-04-05 RX ADMIN — Medication 3 MILLILITER(S): at 04:17

## 2022-04-05 RX ADMIN — MUPIROCIN 1 APPLICATION(S): 20 OINTMENT TOPICAL at 05:13

## 2022-04-05 RX ADMIN — Medication 3 MILLILITER(S): at 16:31

## 2022-04-05 RX ADMIN — ATORVASTATIN CALCIUM 80 MILLIGRAM(S): 80 TABLET, FILM COATED ORAL at 23:10

## 2022-04-05 RX ADMIN — OXYMETAZOLINE HYDROCHLORIDE 1 SPRAY(S): 0.5 SPRAY NASAL at 05:12

## 2022-04-05 RX ADMIN — AMLODIPINE BESYLATE 10 MILLIGRAM(S): 2.5 TABLET ORAL at 05:10

## 2022-04-05 RX ADMIN — Medication 1 SPRAY(S): at 12:22

## 2022-04-05 RX ADMIN — Medication 1 APPLICATION(S): at 17:50

## 2022-04-05 RX ADMIN — OXYMETAZOLINE HYDROCHLORIDE 1 SPRAY(S): 0.5 SPRAY NASAL at 00:58

## 2022-04-05 RX ADMIN — Medication 3 MILLILITER(S): at 10:23

## 2022-04-05 RX ADMIN — OXYMETAZOLINE HYDROCHLORIDE 1 SPRAY(S): 0.5 SPRAY NASAL at 12:21

## 2022-04-05 RX ADMIN — APIXABAN 5 MILLIGRAM(S): 2.5 TABLET, FILM COATED ORAL at 23:10

## 2022-04-05 RX ADMIN — Medication 1 APPLICATION(S): at 05:12

## 2022-04-05 RX ADMIN — MUPIROCIN 1 APPLICATION(S): 20 OINTMENT TOPICAL at 17:59

## 2022-04-05 RX ADMIN — Medication 1 SPRAY(S): at 00:57

## 2022-04-05 RX ADMIN — CARVEDILOL PHOSPHATE 6.25 MILLIGRAM(S): 80 CAPSULE, EXTENDED RELEASE ORAL at 17:43

## 2022-04-05 RX ADMIN — Medication 1 SPRAY(S): at 17:49

## 2022-04-05 NOTE — PROGRESS NOTE ADULT - SUBJECTIVE AND OBJECTIVE BOX
HPI:  75y/o M with PMHx sig for HTN, HLD, afib (on eliquis, unknown if last taken today,) CVA in 2020 (with minimal residual right-sided weakness per girlfriend,) last known normal at 3pm when girlfriend spoke to him. EMS found patient in his office at [employer] (he is a neuroscience professor,) with dysarthria, left facial droop, and left-sided weakness. Upon arrival to [facility], BP noted to be 171/101. Stroke code was called. CTH without findings of hemorrhage. 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 NIHSS 20. No TPA was administered as Pt is out of window. Per patient's girlfriend, the basilar artery aneurysm is known and was diagnosed in 2020 at [facility] when he suffered from a CVA. At that time, he underwent a "treatment for the aneurysm," which the girlfriend reports was too risky and was aborted. Patient never attended his follow-up appointments due to fear per girlfriend.  (10 Mar 2022 22:52)    3/11: Karen placed overnight. 3% hypertonic saline started. Neuro exam stable. Started on vEEG for aphasia  3/12: GM overnight. Neuro exam stable. 3% d/c. home eliquis 5 bid. failed s/s. started on TF via NGT, spiked fever 101, f/u panculture   3/13: GM overnight, neuro stable, veeg negative. Vanc/zosyn started for empiric PNA.   3/14: GM o/n neuro stable. on HFNC. ENT plan for laryngoscope today with . failed s/s, pend repeat eval. dc'd captopril, started lisinopril. dc'd vanc. Laryngoscopy: No masses visualized, hypomobility of L sided vocal cords, dysarthric, hypophonic, risk for aspiration, good cough reflex.Dr. Onofre to discuss with Dr. Peterson regarding a possible vocal cord injection.   3/15: GM overnight, neuro stable, on HFNC 30/30 overnight, transitioned to 4LNC  3/16: GM overnight, neuro stable, tolerating NC and satting well    3/17: POD6 dx angio. GM o/n neuro stable. trickle feeds via NGT  3/18: POD7 GM o/n, saturating well in RA. tolerating TF   03/19: POD8 GM o/n. gen surg consulted for PEG placement.  03/20: POD9 GM o/n. Transferred back to ICU for heparin gtt in preparation for PEG placement Wed.   3/21: POD10 GM o/n, neuro exam stable. Transferred to ICU in preparation for hep gtt to start tomorrow, plan for PEG on Wednesday. F/u am coags.   3/22: POD11. GM o/n neuro stable. on heparin gtt ptt goal 40-60. o/n ptt >200c, held for 2 hours. on heparin @11. pre-op for PEG   3/23: POD12. o/n new R facial droop, CTH stable. heparin gtt d/c. preop PEG today  3/24: POD13, POD1 PEG placement, GM overnight, neuro stable, +BM  3/25: POD14, GM o/n, neuro stable, eliquis restarted. Started ceftriaxone for UTI  3/26: POD 15. GM overnight. exam stable. on abx until 3/26 for UTI. On eliquis for afib.   3/27: POD 16. o/n episode of epistaxis, aftrin administered. exam stable. abx completed for UTI. pending rehab. ENT consulted for epistaxis, recommended nasal saline and bactroban.  3/28: POD17. GM o/n, neuro stable.  3/29: POD18. GM o/n, neuro stable   3/30: POD18. GM o/n. neuro stable. upgraded to ICU for intractable epistaxis. ENT placed nasal dressings, bleeding controlled. Ancef while drains in place. Desatted to 80s, on 8L O2 via face tent. intubation held as per girlfriend, will continue to monitor respiratory status  3/31: POD19 GM overnight. Neuro exam stable. No episode of epistaxis o/n.  4/1: POD20 GM overnight. Neruo exam stable. nasal packing removed. pt SDU status  4/2: POD 21. GM. Plan to resume eliquis today.   4/3: POD 22. GM o/n. on hep gtt held at 7 am, possible resume eliquis today if epistaxis controlled. pending rehab   4/4: POD 23, GM, hep gtt held, eliquis started   4/5: POD 24 GM o/n. Restarted on eliquis. pending rehab.       Vital Signs Last 24 Hrs  T(C): 37.4 (04 Apr 2022 22:38), Max: 37.4 (04 Apr 2022 05:06)  T(F): 99.4 (04 Apr 2022 22:38), Max: 99.4 (04 Apr 2022 22:38)  HR: 80 (04 Apr 2022 20:15) (68 - 80)  BP: 138/81 (04 Apr 2022 20:15) (133/77 - 155/78)  BP(mean): 102 (04 Apr 2022 20:15) (93 - 110)  RR: 18 (04 Apr 2022 20:15) (18 - 18)  SpO2: 93% (04 Apr 2022 20:15) (93% - 96%)    I&O's Detail    03 Apr 2022 07:01  -  04 Apr 2022 07:00  --------------------------------------------------------  IN:    Jevity 1.2: 1680 mL  Total IN: 1680 mL    OUT:    Incontinent per Condom Catheter (mL): 1000 mL    Intermittent Catheterization - Urethral (mL): 475 mL  Total OUT: 1475 mL    Total NET: 205 mL      04 Apr 2022 07:01  -  05 Apr 2022 02:21  --------------------------------------------------------  IN:    Jevity 1.2: 1190 mL  Total IN: 1190 mL    OUT:    Incontinent per Condom Catheter (mL): 1300 mL  Total OUT: 1300 mL    Total NET: -110 mL        I&O's Summary    03 Apr 2022 07:01  -  04 Apr 2022 07:00  --------------------------------------------------------  IN: 1680 mL / OUT: 1475 mL / NET: 205 mL    04 Apr 2022 07:01  -  05 Apr 2022 02:21  --------------------------------------------------------  IN: 1190 mL / OUT: 1300 mL / NET: -110 mL        PHYSICAL EXAM:  General: NAD, pt is comfortably sitting up in bed, on room air  HEENT: CN II-XII grossly intact, PERRL 3mm briskly reactive, EOMI b/l, face symmetric, tongue midline, neck FROM  Cardiovascular: RRR, normal S1 and S2   Respiratory: lungs CTAB, no wheezing, rhonchi, or crackles   GI: normoactive BS to auscultation, abd soft, NTND   Neuro: A&Ox3 with choices, expressive aphasia, dysarthric but attempting to mouth words. Follows commands.  Motor: RUE 5/5 throughout, RLE 2/5 throughout LUE wiggles fingers o/w 0/5 and withdrawing briskly, LLE WD. SILT throughout   Extremities: PT/DP pulses 1+ and symmetric  Wound/incision: R groin C/D/I, no hematoma      TUBES/LINES:  [] CVC  [] A-line  [] Lumbar Drain  [] Ventriculostomy  [] Other    DIET:  [] NPO  [] Mechanical  [] Tube feeds    LABS:                        12.7   5.05  )-----------( 197      ( 04 Apr 2022 06:33 )             40.2     04-04    140  |  108  |  27<H>  ----------------------------<  118<H>  4.7   |  24  |  0.70    Ca    9.1      04 Apr 2022 06:33  Phos  3.2     04-04  Mg     2.1     04-04      PTT - ( 04 Apr 2022 06:33 )  PTT:34.9 sec        CAPILLARY BLOOD GLUCOSE          Drug Levels: [] N/A    CSF Analysis: [] N/A      Allergies    No Known Allergies    Intolerances      MEDICATIONS:  Antibiotics:    Neuro:  acetaminophen    Suspension .. 650 milliGRAM(s) Oral every 6 hours PRN    Anticoagulation:  apixaban 5 milliGRAM(s) Enteral Tube every 12 hours  thrombin epistaxis Kit (Thrombin-JMI) 5000 International Unit(s) Both Nostrils once    OTHER:  albuterol/ipratropium for Nebulization 3 milliLiter(s) Nebulizer every 6 hours  amLODIPine   Tablet 10 milliGRAM(s) Oral daily  atorvastatin 80 milliGRAM(s) Oral at bedtime  BACItracin   Ointment 1 Application(s) Topical two times a day  carvedilol 6.25 milliGRAM(s) Oral every 12 hours  glucagon  Injectable 1 milliGRAM(s) IntraMuscular once  lisinopril 20 milliGRAM(s) Oral every 24 hours  mupirocin 2% Nasal 1 Application(s) Both Nostrils two times a day  oxymetazoline 0.05% Nasal Spray 1 Spray(s) Nasal <User Schedule>  polyethylene glycol 3350 17 Gram(s) Oral every 12 hours  sodium chloride 0.65% Nasal 1 Spray(s) Both Nostrils four times a day    IVF:    CULTURES:  Culture Results:   No growth at 5 days. (03-25 @ 18:00)  Culture Results:   No growth at 5 days. (03-25 @ 18:00)    RADIOLOGY & ADDITIONAL TESTS:      ASSESSMENT:  75y/o M with PMHx sig for HTN, HLD, afib (on eliquis, unknown if last taken today,) CVA in 2020 (with minimal residual right-sided weakness per girlfriend) p/w dysarthria, left facial droop, and left-sided weakness. CTH without findings of hemorrhage. 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 NIHSS 20. Pt is not a tpa candidate as Pt is out of window. Patient now s/p diagnostic angio with findings of partially thrombosed basilar artery aneurysm 3/11/22. course c/b resp insufficiency weaned off HFNC, now complicated by epistaxis (resolved).     BASILAR ARTERY ANEURYSM    Handoff    MEWS Score    Brain aneurysm    Brain aneurysm    Psychological factors affecting medical condition    Angiogram, carotid and cerebral, bilateral    Basilar artery aneurysm    Basilar artery aneurysm    Brain stem compression    Longstanding persistent atrial fibrillation    HTN (hypertension)    Hyperlipidemia    Acidosis    Leukocytosis    Dysphagia    Hypocalcemia    Hypophosphatemia    Acute respiratory failure with hypoxia    Nasal hemorrhage    Angiogram, carotid and cerebral, bilateral    EGD, with PEG    STROKE    EGD, with PEG    Brain stem compression    Longstanding persistent atrial fibrillation    HTN (hypertension)    Hyperlipidemia    Acidosis    Leukocytosis    Dysphagia    Hypocalcemia    Hypophosphatemia    Acute respiratory failure with hypoxia    History of CVA (cerebrovascular accident)    [email or web address]        PLAN:  Neuro:   - neuro checks q4/vital signs q4hrs  - MR head 3/11: no acute infarcts   - CTH 3/22: stable   - EEG negative 3/13, dc'd   - no plan for further neurosurgical intervention   - stroke core measures   - Psych recs outpt f/u (refer to note)    Cardio:  - SBP goal 100-160   - Amlodipine 10mg, carvedilol 6.25mg BID, Lisinopril 20mg (increase to 40mg if needed)- home HCTZ held  echo 3/11: mild LVH, EF 65-70%   - Eliquis 5mg restarted 4/3 10pm  - PVCs 3/26, per cardiology NTD  - cont home lipitor 80mg    Pulm:  - RA  - standing mucomyst and duonebs  - Chest PT   - aspiration precaution, HOB up     ENT:   - ENT consulted, s/p laryngoscopy 3/14: No masses, hypomobility of L sided vocal cord  - f/u outpatient with Dr. Onofre/Dr. Peterson for possible vocal cord injection.   - s/p epistaxis episode 3/26: intranasal bactroban BID and saline spray q4 to moisturize can use afrin and manual pressure x 20 minutes if bleeds again   - Nasal packing 3/30-4/1, now removed replaced w/ Surgiflo (absorbable)   -bacitracin BID to bilat nostrils   -nasal saline 4x day apply to both nostrils     Renal:  - Na goal 135-145   - no issues     GI:   - TF via peg  - bowel regimen   - last BM 4/2  -senna held for loose BMs    Endo:  - A1C 5.4     Heme:  - SCDs for DVT ppx   - hep gtt dc'd 7 am 4/3, eliquis 5 given   - LE dopplers 3/11, 3/23: neg for DVT   - LUE US 3/22: superficial thrombus L basilic/cephalic vein LUE US 4/3 negative    ID:  - zosyn empirically for PNA completed 3/17   - pancx 3/25: +UA, neg blood cx, completed empiric Ceftriaxone x3 days for UTI  (3/25-3/27)     ORTHO:  - L foot xray 3/22: chronic 5th digit middle phalanx base corner fracture     Dispo:   - SDU status  - full code  - Pending BUNNY  - Family updated     Assessment and plan discussed with Dr. Peterson         Assessment:  Present when checked    []  GCS  E   V  M     Heart Failure: []Acute, [] acute on chronic , []chronic  Heart Failure:  [] Diastolic (HFpEF), [] Systolic (HFrEF), []Combined (HFpEF and HFrEF), [] RHF, [] Pulm HTN, [] Other    [] LALO, [] ATN, [] AIN, [] other  [] CKD1, [] CKD2, [] CKD 3, [] CKD 4, [] CKD 5, []ESRD    Encephalopathy: [] Metabolic, [] Hepatic, [] toxic, [] Neurological, [] Other    Abnormal Nurtitional Status: [] malnurtition (see nutrition note), [ ]underweight: BMI < 19, [] morbid obesity: BMI >40, [] Cachexia    [] Sepsis  [] hypovolemic shock,[] cardiogenic shock, [] hemorrhagic shock, [] neuogenic shock  [] Acute Respiratory Failure  []Cerebral edema, [] Brain compression/ herniation,   [] Functional quadriplegia  [] Acute blood loss anemia

## 2022-04-05 NOTE — PROCEDURE NOTE - GENERAL PROCEDURE DETAILS
skin was prepped with chlorhexidine, in sterile fashion blood was obtained catheter from two separate sites. Hemostasis achieved with manual compression and gauze and tape applied. Discharged

## 2022-04-05 NOTE — PROVIDER CONTACT NOTE (OTHER) - ASSESSMENT
Pt coughed up bloody sputum found slatted on patient bedsheet. VS stable, neuro checks at baseline, pt stating at 95%.

## 2022-04-05 NOTE — PROGRESS NOTE ADULT - SUBJECTIVE AND OBJECTIVE BOX
O/N Events: GM, low grade temp this morning  Subjective/ROS: Denies HA, CP, SOB, n/v, changes in bowel/urinary habits.  12pt ROS otherwise negative.    VITALS  Vital Signs Last 24 Hrs  T(C): 37.1 (2022 09:08), Max: 37.8 (2022 05:12)  T(F): 98.8 (2022 09:08), Max: 100.1 (2022 05:12)  HR: 70 (2022 12:03) (70 - 80)  BP: 123/64 (2022 12:03) (123/64 - 155/78)  BP(mean): 85 (2022 12:03) (85 - 110)  RR: 18 (2022 12:03) (17 - 18)  SpO2: 95% (2022 12:03) (91% - 96%)    I&O's Summary    2022 07:01  -  2022 07:00  --------------------------------------------------------  IN: 1610 mL / OUT: 1625 mL / NET: -15 mL    2022 07:01  -  2022 12:42  --------------------------------------------------------  IN: 420 mL / OUT: 875 mL / NET: -455 mL        CAPILLARY BLOOD GLUCOSE          PHYSICAL EXAM  General: A&Ox2; NAD  Head: no packing but nasal bandage in place, c/d/i  Neck: Supple; no JVD  Respiratory: CTA B/L; no wheezes/crackles/rales auscultated w/ good air movement  Cardiovascular: Regular rhythm/rate; S1/S2; no gallops or murmurs auscultated  Gastrointestinal: Soft; NTND w/out rebound tenderness or guarding; bowel sounds normal  Extremities: WWP; no edema or cyanosis; radial/pedal pulses palpable  Neurological:  CNII-XII grossly intact; dyasthria  Skin: No rashes noted  Vasc: +2 DP/PT pulses b/l   Psych: Appropriate Affect    MEDICATIONS  (STANDING):  albuterol/ipratropium for Nebulization 3 milliLiter(s) Nebulizer every 6 hours  amLODIPine   Tablet 10 milliGRAM(s) Oral daily  apixaban 5 milliGRAM(s) Enteral Tube every 12 hours  atorvastatin 80 milliGRAM(s) Oral at bedtime  BACItracin   Ointment 1 Application(s) Topical two times a day  carvedilol 6.25 milliGRAM(s) Oral every 12 hours  glucagon  Injectable 1 milliGRAM(s) IntraMuscular once  lisinopril 20 milliGRAM(s) Oral every 24 hours  mupirocin 2% Nasal 1 Application(s) Both Nostrils two times a day  polyethylene glycol 3350 17 Gram(s) Oral every 12 hours  sodium chloride 0.65% Nasal 1 Spray(s) Both Nostrils four times a day  thrombin epistaxis Kit (Thrombin-JMI) 5000 International Unit(s) Both Nostrils once    MEDICATIONS  (PRN):  acetaminophen    Suspension .. 650 milliGRAM(s) Oral every 6 hours PRN Temp greater or equal to 38C (100.4F), Mild Pain (1 - 3)      No Known Allergies      LABS                        13.8   6.87  )-----------( 265      ( 2022 06:57 )             40.7     04-05    138  |  104  |  26<H>  ----------------------------<  133<H>  4.3   |  25  |  0.73    Ca    9.2      2022 06:57  Phos  3.1     04-05  Mg     2.2     04-05      PTT - ( 2022 06:33 )  PTT:34.9 sec  Urinalysis Basic - ( 2022 11:50 )    Color: Yellow / Appearance: Clear / S.010 / pH: x  Gluc: x / Ketone: NEGATIVE  / Bili: Negative / Urobili: 4.0 E.U./dL   Blood: x / Protein: NEGATIVE mg/dL / Nitrite: NEGATIVE   Leuk Esterase: Trace / RBC: < 5 /HPF / WBC 5-10 /HPF   Sq Epi: x / Non Sq Epi: 0-5 /HPF / Bacteria: Present /HPF            IMAGING/EKG/ETC: reviewed

## 2022-04-06 LAB
ANION GAP SERPL CALC-SCNC: 7 MMOL/L — SIGNIFICANT CHANGE UP (ref 5–17)
BASE EXCESS BLDA CALC-SCNC: 3 MMOL/L — SIGNIFICANT CHANGE UP (ref -2–3)
BUN SERPL-MCNC: 32 MG/DL — HIGH (ref 7–23)
CALCIUM SERPL-MCNC: 9 MG/DL — SIGNIFICANT CHANGE UP (ref 8.4–10.5)
CHLORIDE SERPL-SCNC: 106 MMOL/L — SIGNIFICANT CHANGE UP (ref 96–108)
CO2 BLDA-SCNC: 28 MMOL/L — HIGH (ref 19–24)
CO2 SERPL-SCNC: 24 MMOL/L — SIGNIFICANT CHANGE UP (ref 22–31)
CREAT SERPL-MCNC: 0.77 MG/DL — SIGNIFICANT CHANGE UP (ref 0.5–1.3)
EGFR: 92 ML/MIN/1.73M2 — SIGNIFICANT CHANGE UP
GLUCOSE SERPL-MCNC: 157 MG/DL — HIGH (ref 70–99)
HCO3 BLDA-SCNC: 27 MMOL/L — SIGNIFICANT CHANGE UP (ref 21–28)
HCT VFR BLD CALC: 39.7 % — SIGNIFICANT CHANGE UP (ref 39–50)
HGB BLD-MCNC: 13.4 G/DL — SIGNIFICANT CHANGE UP (ref 13–17)
MAGNESIUM SERPL-MCNC: 2 MG/DL — SIGNIFICANT CHANGE UP (ref 1.6–2.6)
MCHC RBC-ENTMCNC: 32.4 PG — SIGNIFICANT CHANGE UP (ref 27–34)
MCHC RBC-ENTMCNC: 33.8 GM/DL — SIGNIFICANT CHANGE UP (ref 32–36)
MCV RBC AUTO: 96.1 FL — SIGNIFICANT CHANGE UP (ref 80–100)
NRBC # BLD: 0 /100 WBCS — SIGNIFICANT CHANGE UP (ref 0–0)
PCO2 BLDA: 39 MMHG — SIGNIFICANT CHANGE UP (ref 35–48)
PH BLDA: 7.45 — SIGNIFICANT CHANGE UP (ref 7.35–7.45)
PHOSPHATE SERPL-MCNC: 3.2 MG/DL — SIGNIFICANT CHANGE UP (ref 2.5–4.5)
PLATELET # BLD AUTO: 276 K/UL — SIGNIFICANT CHANGE UP (ref 150–400)
PO2 BLDA: 84 MMHG — SIGNIFICANT CHANGE UP (ref 83–108)
POTASSIUM SERPL-MCNC: 4.5 MMOL/L — SIGNIFICANT CHANGE UP (ref 3.5–5.3)
POTASSIUM SERPL-SCNC: 4.5 MMOL/L — SIGNIFICANT CHANGE UP (ref 3.5–5.3)
RBC # BLD: 4.13 M/UL — LOW (ref 4.2–5.8)
RBC # FLD: 14 % — SIGNIFICANT CHANGE UP (ref 10.3–14.5)
SAO2 % BLDA: 97.4 % — SIGNIFICANT CHANGE UP (ref 94–98)
SODIUM SERPL-SCNC: 137 MMOL/L — SIGNIFICANT CHANGE UP (ref 135–145)
WBC # BLD: 7.55 K/UL — SIGNIFICANT CHANGE UP (ref 3.8–10.5)
WBC # FLD AUTO: 7.55 K/UL — SIGNIFICANT CHANGE UP (ref 3.8–10.5)

## 2022-04-06 PROCEDURE — 99233 SBSQ HOSP IP/OBS HIGH 50: CPT

## 2022-04-06 PROCEDURE — 71045 X-RAY EXAM CHEST 1 VIEW: CPT | Mod: 26

## 2022-04-06 PROCEDURE — 99232 SBSQ HOSP IP/OBS MODERATE 35: CPT

## 2022-04-06 RX ORDER — ACETAMINOPHEN 500 MG
650 TABLET ORAL
Qty: 0 | Refills: 0 | DISCHARGE
Start: 2022-04-06

## 2022-04-06 RX ORDER — POLYETHYLENE GLYCOL 3350 17 G/17G
17 POWDER, FOR SOLUTION ORAL
Qty: 0 | Refills: 0 | DISCHARGE
Start: 2022-04-06

## 2022-04-06 RX ORDER — SODIUM CHLORIDE 0.65 %
1 AEROSOL, SPRAY (ML) NASAL
Qty: 0 | Refills: 0 | DISCHARGE
Start: 2022-04-06

## 2022-04-06 RX ADMIN — ATORVASTATIN CALCIUM 80 MILLIGRAM(S): 80 TABLET, FILM COATED ORAL at 21:33

## 2022-04-06 RX ADMIN — APIXABAN 5 MILLIGRAM(S): 2.5 TABLET, FILM COATED ORAL at 21:32

## 2022-04-06 RX ADMIN — Medication 1 SPRAY(S): at 06:31

## 2022-04-06 RX ADMIN — Medication 3 MILLILITER(S): at 16:21

## 2022-04-06 RX ADMIN — Medication 1 SPRAY(S): at 00:31

## 2022-04-06 RX ADMIN — Medication 1 SPRAY(S): at 17:53

## 2022-04-06 RX ADMIN — MUPIROCIN 1 APPLICATION(S): 20 OINTMENT TOPICAL at 06:33

## 2022-04-06 RX ADMIN — Medication 1 SPRAY(S): at 11:11

## 2022-04-06 RX ADMIN — POLYETHYLENE GLYCOL 3350 17 GRAM(S): 17 POWDER, FOR SOLUTION ORAL at 06:32

## 2022-04-06 RX ADMIN — Medication 1 APPLICATION(S): at 18:06

## 2022-04-06 RX ADMIN — CARVEDILOL PHOSPHATE 6.25 MILLIGRAM(S): 80 CAPSULE, EXTENDED RELEASE ORAL at 17:53

## 2022-04-06 RX ADMIN — CARVEDILOL PHOSPHATE 6.25 MILLIGRAM(S): 80 CAPSULE, EXTENDED RELEASE ORAL at 06:26

## 2022-04-06 RX ADMIN — APIXABAN 5 MILLIGRAM(S): 2.5 TABLET, FILM COATED ORAL at 10:48

## 2022-04-06 RX ADMIN — LISINOPRIL 20 MILLIGRAM(S): 2.5 TABLET ORAL at 17:52

## 2022-04-06 RX ADMIN — AMLODIPINE BESYLATE 10 MILLIGRAM(S): 2.5 TABLET ORAL at 06:26

## 2022-04-06 RX ADMIN — Medication 3 MILLILITER(S): at 06:25

## 2022-04-06 RX ADMIN — Medication 1 APPLICATION(S): at 06:27

## 2022-04-06 RX ADMIN — Medication 3 MILLILITER(S): at 21:33

## 2022-04-06 RX ADMIN — Medication 3 MILLILITER(S): at 10:48

## 2022-04-06 RX ADMIN — MUPIROCIN 1 APPLICATION(S): 20 OINTMENT TOPICAL at 17:52

## 2022-04-06 NOTE — PROGRESS NOTE ADULT - ASSESSMENT
per Neurosurgery    75 y/o M with PMHx sig for HTN, HLD, afib (on eliquis, unknown if last taken today,) CVA in 2020 (with minimal residual right-sided weakness per girlfriend) p/w dysarthria, left facial droop, and left-sided weakness. CTH without findings of hemorrhage. 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 NIHSS 20. Pt is not a tpa candidate as Pt is out of window. Patient now s/p diagnostic angio with findings of partially thrombosed basilar artery aneurysm 3/11/22. course c/b resp insufficiency weaned off HFNC, now complicated by epistaxis (resolved).       PLAN:  Neuro:   - neuro checks q4/vital signs q4hrs  - MR head 3/11: no acute infarcts   - CTH 3/22: stable   - EEG negative 3/13, dc'd   - no plan for further neurosurgical intervention   - stroke core measures   - Psych recs outpt f/u (refer to note)    Cardio:  - SBP goal 100-160   - Amlodipine 10mg, carvedilol 6.25mg BID, Lisinopril 20mg (increase to 40mg if needed)- home HCTZ held  echo 3/11: mild LVH, EF 65-70%   - Eliquis 5mg restarted 4/3 10pm  - PVCs 3/26, per cardiology NTD  - cont home lipitor 80mg    Pulm:  - RA  - standing mucomyst and duonebs  - Chest PT   - aspiration precaution, HOB up     ENT:   - ENT consulted, s/p laryngoscopy 3/14: No masses, hypomobility of L sided vocal cord  - f/u outpatient with Dr. Onofre/Dr. Peterson for possible vocal cord injection.   - s/p epistaxis episode 3/26: intranasal bactroban BID and saline spray q4 to moisturize can use afrin and manual pressure x 20 minutes if bleeds again   - Nasal packing 3/30-4/1, now removed replaced w/ Surgiflo (absorbable)   -bacitracin BID to bilat nostrils   -nasal saline 4x day apply to both nostrils     Renal:  - Na goal 135-145   - no issues     GI:   - TF via peg  - bowel regimen   - last BM 4/2  -senna held for loose BMs    Endo:  - A1C 5.4     Heme:  - SCDs for DVT ppx   - hep gtt dc'd 7 am 4/3, eliquis 5 given   - LE dopplers 3/11, 3/23: neg for DVT   - LUE US 3/22: superficial thrombus L basilic/cephalic vein LUE US 4/3 negative    ID:  - zosyn empirically for PNA completed 3/17   - pancx 3/25: +UA, neg blood cx, completed empiric Ceftriaxone x3 days for UTI  (3/25-3/27)     ORTHO:  - L foot xray 3/22: chronic 5th digit middle phalanx base corner fracture     Dispo:   - SDU status  - full code  - Pending BUNNY  - Family updated

## 2022-04-06 NOTE — PROGRESS NOTE ADULT - SUBJECTIVE AND OBJECTIVE BOX
Physical Medicine and Rehabilitation Progress Note:    Patient is a 76y old  Male who presents with a chief complaint of CVA, left facial, left-sided weakness (06 Apr 2022 00:08)      HPI:  77y/o M with PMHx sig for HTN, HLD, afib (on eliquis, unknown if last taken today,) CVA in 2020 (with minimal residual right-sided weakness per girlfriend,) last known normal at 3pm when girlfriend spoke to him. 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 Upon arrival to [facility], BP noted to be 171/101. Stroke code was called. CTH without findings of hemorrhage. 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 NIHSS 20. No TPA was administered as Pt is out of window. Per patient's girlfriend, the basilar artery aneurysm is known and was diagnosed in 2020 at [facility] when he suffered from a CVA. At that time, he underwent a "treatment for the aneurysm," which the girlfriend reports was too risky and was aborted. Patient never attended his follow-up appointments due to fear per girlfriend.  (10 Mar 2022 22:52)                            13.4   7.55  )-----------( 276      ( 06 Apr 2022 06:06 )             39.7       04-06    137  |  106  |  32<H>  ----------------------------<  157<H>  4.5   |  24  |  0.77    Ca    9.0      06 Apr 2022 06:06  Phos  3.2     04-06  Mg     2.0     04-06      Vital Signs Last 24 Hrs  T(C): 37.1 (06 Apr 2022 09:19), Max: 37.5 (05 Apr 2022 21:14)  T(F): 98.8 (06 Apr 2022 09:19), Max: 99.5 (05 Apr 2022 21:14)  HR: 82 (06 Apr 2022 11:58) (74 - 83)  BP: 118/63 (06 Apr 2022 11:58) (110/59 - 132/71)  BP(mean): 86 (06 Apr 2022 11:58) (79 - 96)  RR: 18 (06 Apr 2022 11:58) (18 - 18)  SpO2: 95% (06 Apr 2022 11:58) (94% - 95%)    MEDICATIONS  (STANDING):  albuterol/ipratropium for Nebulization 3 milliLiter(s) Nebulizer every 6 hours  amLODIPine   Tablet 10 milliGRAM(s) Oral daily  apixaban 5 milliGRAM(s) Enteral Tube every 12 hours  atorvastatin 80 milliGRAM(s) Oral at bedtime  BACItracin   Ointment 1 Application(s) Topical two times a day  carvedilol 6.25 milliGRAM(s) Oral every 12 hours  glucagon  Injectable 1 milliGRAM(s) IntraMuscular once  lisinopril 20 milliGRAM(s) Oral every 24 hours  mupirocin 2% Nasal 1 Application(s) Both Nostrils two times a day  polyethylene glycol 3350 17 Gram(s) Oral every 12 hours  sodium chloride 0.65% Nasal 1 Spray(s) Both Nostrils four times a day    MEDICATIONS  (PRN):  acetaminophen    Suspension .. 650 milliGRAM(s) Oral every 6 hours PRN Temp greater or equal to 38C (100.4F), Mild Pain (1 - 3)    Currently Undergoing Physical/ Occupational Therapy at bedside.    PT/OT Functional Status Assessment:   4/5/2022      Cognitive/Neuro/Behavioral  Level of Consciousness: lethargic  Arousal Level: arouses to touch/gentle shaking;  arouses to repeated stimulation  Orientation: person  Speech: garbled;  pt mainly utilized R hand/fingers and nonverbal communication   Mood/Behavior: calm;  cooperative    Language Assistance  Preferred Language to Address Healthcare Preferred Language to Address Healthcare: English    Therapeutic Interventions      Bed Mobility  Bed Mobility Training Scooting: maximum assist (25% patient effort);  2 person assist;  supine, seated ;  set-up required;  verbal cues  Bed Mobility Training Sit-to-Supine: maximum assist (25% patient effort);  2 person assist;  set-up required;  verbal cues  Bed Mobility Training Supine-to-Sit: maximum assist (25% patient effort);  2 person assist;  set-up required;  verbal cues  Bed Mobility Training Limitations: impaired ability to control trunk for mobility;  decreased ability to use legs for bridging/pushing;  decreased ability to use arms for pushing/pulling;  decreased strength;  impaired postural control;  cognitive, decreased safety awareness;  abnormal muscle tone    Sit-Stand Transfer Training  Sit-to-Stand Transfer Training Treatment not Performed: unable 2/2 lethargy and trunk control this session    Therapeutic Exercise  Therapeutic Exercise Detail: Pt dangled at EOB for ~20 minutes with maxAx1 for trunk control. Pt following commands 75% of time with repeat cues, in seated. Performed AAROM trunk fwd/back and med/lat shifting x20 each, AAROM B UE push/pull x20, AAROM shoulder shrugging x10 each side, AAROM B LE LAQ and heel raise x10 each side, AAROM cerivcal extn x10.Cog: responsive to name, answering Y/N questions appropriately 50% time, able to demo 2 fingers and thumbs up (RUE)           PM&R Impression: as above    Current Disposition Plan :    subacute rehab placement

## 2022-04-06 NOTE — PROGRESS NOTE ADULT - SUBJECTIVE AND OBJECTIVE BOX
HPI:  75y/o M with PMHx sig for HTN, HLD, afib (on eliquis, unknown if last taken today,) CVA in  (with minimal residual right-sided weakness per girlfriend,) last known normal at 3pm when girlfriend spoke to him. 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 Upon arrival to [facility], BP noted to be 171/101. Stroke code was called. CTH without findings of hemorrhage. 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 NIHSS 20. No TPA was administered as Pt is out of window. Per patient's girlfriend, the basilar artery aneurysm is known and was diagnosed in  at [facility] when he suffered from a CVA. At that time, he underwent a "treatment for the aneurysm," which the girlfriend reports was too risky and was aborted. Patient never attended his follow-up appointments due to fear per girlfriend.  (10 Mar 2022 22:52)    3/11: Karen placed overnight. 3% hypertonic saline started. Neuro exam stable. Started on vEEG for aphasia  3/12: GM overnight. Neuro exam stable. 3% d/c. home eliquis 5 bid. failed s/s. started on TF via NGT, spiked fever 101, f/u panculture   3/13: GM overnight, neuro stable, veeg negative. Vanc/zosyn started for empiric PNA.   3/14: GM o/n neuro stable. on HFNC. ENT plan for laryngoscope today with . failed s/s, pend repeat eval. dc'd captopril, started lisinopril. dc'd vanc. Laryngoscopy: No masses visualized, hypomobility of L sided vocal cords, dysarthric, hypophonic, risk for aspiration, good cough reflex.Dr. Onofre to discuss with Dr. Peterson regarding a possible vocal cord injection.   3/15: GM overnight, neuro stable, on HFNC 30/30 overnight, transitioned to 4LNC  3/16: GM overnight, neuro stable, tolerating NC and satting well    3/17: POD6 dx angio. GM o/n neuro stable. trickle feeds via NGT  3/18: POD7 GM o/n, saturating well in RA. tolerating TF   : POD8 GM o/n. gen surg consulted for PEG placement.  : POD9 GM o/n. Transferred back to ICU for heparin gtt in preparation for PEG placement Wed.   3/21: POD10 GM o/n, neuro exam stable. Transferred to ICU in preparation for hep gtt to start tomorrow, plan for PEG on Wednesday. F/u am coags.   3/22: POD11. GM o/n neuro stable. on heparin gtt ptt goal 40-60. o/n ptt >200c, held for 2 hours. on heparin @11. pre-op for PEG   3/23: POD12. o/n new R facial droop, CTH stable. heparin gtt d/c. preop PEG today  3/24: POD13, POD1 PEG placement, GM overnight, neuro stable, +BM  3/25: POD14, GM o/n, neuro stable, eliquis restarted. Started ceftriaxone for UTI  3/26: POD 15. GM overnight. exam stable. on abx until 3/26 for UTI. On eliquis for afib.   3/27: POD 16. o/n episode of epistaxis, aftrin administered. exam stable. abx completed for UTI. pending rehab. ENT consulted for epistaxis, recommended nasal saline and bactroban.  3/28: POD17. GM o/n, neuro stable.  3/29: POD18. GM o/n, neuro stable   3/30: POD18. GM o/n. neuro stable. upgraded to ICU for intractable epistaxis. ENT placed nasal dressings, bleeding controlled. Ancef while drains in place. Desatted to 80s, on 8L O2 via face tent. intubation held as per girlfriend, will continue to monitor respiratory status  3/31: POD19 GM overnight. Neuro exam stable. No episode of epistaxis o/n.  : POD20 GM overnight. Neruo exam stable. nasal packing removed. pt SDU status  : POD 21. GM. Plan to resume eliquis today.   4/3: POD 22. GM o/n. on hep gtt held at 7 am, possible resume eliquis today if epistaxis controlled. pending rehab   : POD 23, GM, hep gtt held, eliquis started   : POD 24 GM o/n. Restarted on eliquis. pending rehab.   : POD 25. GM o/n. afebrile. no growth on cultures. pending rehab           Vital Signs Last 24 Hrs  T(C): 37.5 (2022 21:14), Max: 37.8 (2022 05:12)  T(F): 99.5 (2022 21:14), Max: 100.1 (2022 05:12)  HR: 74 (2022 20:20) (68 - 76)  BP: 120/67 (2022 20:20) (120/67 - 132/71)  BP(mean): 88 (2022 20:20) (85 - 96)  RR: 18 (2022 20:20) (17 - 18)  SpO2: 94% (2022 20:20) (91% - 96%)    I&O's Detail    2022 07:01  -  2022 07:00  --------------------------------------------------------  IN:    Jevity 1.2: 1610 mL  Total IN: 1610 mL    OUT:    Incontinent per Condom Catheter (mL): 1625 mL  Total OUT: 1625 mL    Total NET: -15 mL      2022 07:01  -  2022 00:09  --------------------------------------------------------  IN:    Jevity 1.2: 700 mL  Total IN: 700 mL    OUT:    Incontinent per Condom Catheter (mL): 575 mL    Intermittent Catheterization - Urethral (mL): 300 mL  Total OUT: 875 mL    Total NET: -175 mL        I&O's Summary    2022 07:01  -  2022 07:00  --------------------------------------------------------  IN: 1610 mL / OUT: 1625 mL / NET: -15 mL    2022 07:01  -  2022 00:09  --------------------------------------------------------  IN: 700 mL / OUT: 875 mL / NET: -175 mL        PHYSICAL EXAM:  `General: NAD, pt is comfortably sitting up in bed, on room air  HEENT: CN II-XII grossly intact, PERRL 3mm briskly reactive, EOMI b/l, face symmetric, tongue midline, neck FROM  Cardiovascular: RRR, normal S1 and S2   Respiratory: lungs CTAB, no wheezing, rhonchi, or crackles   GI: normoactive BS to auscultation, abd soft, NTND   Neuro: A&Ox3 with choices, expressive aphasia, dysarthric but attempting to mouth words. Follows commands.  Motor: RUE 5/5 throughout, RLE 2/5 throughout LUE wiggles fingers o/w 0/5 and withdrawing briskly, LLE WD. SILT throughout   Extremities: PT/DP pulses 1+ and symmetric  Wound/incision: R groin C/D/I, no hematoma    TUBES/LINES:  [] CVC  [] A-line  [] Lumbar Drain  [] Ventriculostomy  [] Other    DIET:  [] NPO  [] Mechanical  [] Tube feeds    LABS:                        13.8   6.87  )-----------( 265      ( 2022 06:57 )             40.7         138  |  104  |  26<H>  ----------------------------<  133<H>  4.3   |  25  |  0.73    Ca    9.2      2022 06:57  Phos  3.1     04-  Mg     2.2     04-05      PTT - ( 2022 06:33 )  PTT:34.9 sec  Urinalysis Basic - ( 2022 11:50 )    Color: Yellow / Appearance: Clear / S.010 / pH: x  Gluc: x / Ketone: NEGATIVE  / Bili: Negative / Urobili: 4.0 E.U./dL   Blood: x / Protein: NEGATIVE mg/dL / Nitrite: NEGATIVE   Leuk Esterase: Trace / RBC: < 5 /HPF / WBC 5-10 /HPF   Sq Epi: x / Non Sq Epi: 0-5 /HPF / Bacteria: Present /HPF          CAPILLARY BLOOD GLUCOSE          Drug Levels: [] N/A    CSF Analysis: [] N/A      Allergies    No Known Allergies    Intolerances      MEDICATIONS:  Antibiotics:    Neuro:  acetaminophen    Suspension .. 650 milliGRAM(s) Oral every 6 hours PRN    Anticoagulation:  apixaban 5 milliGRAM(s) Enteral Tube every 12 hours  thrombin epistaxis Kit (Thrombin-JMI) 5000 International Unit(s) Both Nostrils once    OTHER:  albuterol/ipratropium for Nebulization 3 milliLiter(s) Nebulizer every 6 hours  amLODIPine   Tablet 10 milliGRAM(s) Oral daily  atorvastatin 80 milliGRAM(s) Oral at bedtime  BACItracin   Ointment 1 Application(s) Topical two times a day  carvedilol 6.25 milliGRAM(s) Oral every 12 hours  glucagon  Injectable 1 milliGRAM(s) IntraMuscular once  lisinopril 20 milliGRAM(s) Oral every 24 hours  mupirocin 2% Nasal 1 Application(s) Both Nostrils two times a day  polyethylene glycol 3350 17 Gram(s) Oral every 12 hours  sodium chloride 0.65% Nasal 1 Spray(s) Both Nostrils four times a day    IVF:    CULTURES:  Culture Results:   No growth at 12 hours ( @ 10:03)  Culture Results:   No growth at 12 hours ( @ 10:03)    RADIOLOGY & ADDITIONAL TESTS:      ASSESSMENT:  75y/o M with PMHx sig for HTN, HLD, afib (on eliquis, unknown if last taken today,) CVA in  (with minimal residual right-sided weakness per girlfriend) p/w dysarthria, left facial droop, and left-sided weakness. CTH without findings of hemorrhage. 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 NIHSS 20. Pt is not a tpa candidate as Pt is out of window. Patient now s/p diagnostic angio with findings of partially thrombosed basilar artery aneurysm 3/11/22. course c/b resp insufficiency weaned off HFNC, now complicated by epistaxis (resolved).       BASILAR ARTERY ANEURYSM    Handoff    MEWS Score    Brain aneurysm    Brain aneurysm    Psychological factors affecting medical condition    Angiogram, carotid and cerebral, bilateral    Basilar artery aneurysm    Basilar artery aneurysm    Brain stem compression    Longstanding persistent atrial fibrillation    HTN (hypertension)    Hyperlipidemia    Acidosis    Leukocytosis    Dysphagia    Hypocalcemia    Hypophosphatemia    Acute respiratory failure with hypoxia    Nasal hemorrhage    Fever    Angiogram, carotid and cerebral, bilateral    EGD, with PEG    STROKE    EGD, with PEG    Brain stem compression    Longstanding persistent atrial fibrillation    HTN (hypertension)    Hyperlipidemia    Acidosis    Leukocytosis    Dysphagia    Hypocalcemia    Hypophosphatemia    Acute respiratory failure with hypoxia    History of CVA (cerebrovascular accident)    [email or web address]        PLAN:  Neuro:   - neuro checks q4/vital signs q4hrs  - MR head 3/11: no acute infarcts   - CTH 3/22: stable   - EEG negative 3/13, dc'd   - no plan for further neurosurgical intervention   - stroke core measures   - Psych recs outpt f/u (refer to note)    Cardio:  - SBP goal 100-160   - Amlodipine 10mg, carvedilol 6.25mg BID, Lisinopril 20mg (increase to 40mg if needed)- home HCTZ held  echo 3/11: mild LVH, EF 65-70%   - Eliquis 5mg restarted 4/3 10pm  - PVCs 3/26, per cardiology NTD  - cont home lipitor 80mg    Pulm:  - RA  - standing mucomyst and duonebs  - Chest PT   - aspiration precaution, HOB up     ENT:   - ENT consulted, s/p laryngoscopy 3/14: No masses, hypomobility of L sided vocal cord  - f/u outpatient with Dr. Onofre/Dr. Peterson for possible vocal cord injection.   - s/p epistaxis episode 3/26: intranasal bactroban BID and saline spray q4 to moisturize can use afrin and manual pressure x 20 minutes if bleeds again   - Nasal packing 3/30-, now removed replaced w/ Surgiflo (absorbable)   -bacitracin BID to bilat nostrils   -nasal saline 4x day apply to both nostrils     Renal:  - Na goal 135-145   - no issues     GI:   - TF via peg  - bowel regimen   - last BM   -senna held for loose BMs    Endo:  - A1C 5.4     Heme:  - SCDs for DVT ppx   - hep gtt dc'd 7 am 4/3, eliquis 5 given   - LE dopplers 3/11, 3/23: neg for DVT   - LUE US 3/22: superficial thrombus L basilic/cephalic vein LUE US 4/3 negative    ID:  - zosyn empirically for PNA completed 3/17   - pancx 3/25: +UA, neg blood cx, completed empiric Ceftriaxone x3 days for UTI  (3/25-3/27)     ORTHO:  - L foot xray 3/22: chronic 5th digit middle phalanx base corner fracture     Dispo:   - SDU status  - full code  - Pending BUNNY  - Family updated     Assessment and plan discussed with Dr. Peterson         Assessment:  Present when checked    []  GCS  E   V  M     Heart Failure: []Acute, [] acute on chronic , []chronic  Heart Failure:  [] Diastolic (HFpEF), [] Systolic (HFrEF), []Combined (HFpEF and HFrEF), [] RHF, [] Pulm HTN, [] Other    [] LALO, [] ATN, [] AIN, [] other  [] CKD1, [] CKD2, [] CKD 3, [] CKD 4, [] CKD 5, []ESRD    Encephalopathy: [] Metabolic, [] Hepatic, [] toxic, [] Neurological, [] Other    Abnormal Nurtitional Status: [] malnurtition (see nutrition note), [ ]underweight: BMI < 19, [] morbid obesity: BMI >40, [] Cachexia    [] Sepsis  [] hypovolemic shock,[] cardiogenic shock, [] hemorrhagic shock, [] neuogenic shock  [] Acute Respiratory Failure  []Cerebral edema, [] Brain compression/ herniation,   [] Functional quadriplegia  [] Acute blood loss anemia

## 2022-04-06 NOTE — PROGRESS NOTE ADULT - SUBJECTIVE AND OBJECTIVE BOX
O/N Events: GM  Subjective/ROS: No complaints. Denies HA, CP, SOB, n/v, changes in bowel/urinary habits.  12pt ROS otherwise negative.    VITALS  Vital Signs Last 24 Hrs  T(C): 37.3 (2022 13:27), Max: 37.5 (2022 21:14)  T(F): 99.2 (2022 13:27), Max: 99.5 (2022 21:14)  HR: 82 (2022 11:58) (74 - 83)  BP: 118/63 (2022 11:58) (110/59 - 132/71)  BP(mean): 86 (2022 11:58) (79 - 96)  RR: 18 (2022 11:58) (18 - 18)  SpO2: 95% (2022 11:58) (94% - 95%)    I&O's Summary    2022 07:01  -  2022 07:00  --------------------------------------------------------  IN: 1470 mL / OUT: 1550 mL / NET: -80 mL    2022 07:01  -  2022 14:32  --------------------------------------------------------  IN: 420 mL / OUT: 325 mL / NET: 95 mL        CAPILLARY BLOOD GLUCOSE    PHYSICAL EXAM  General: A&Ox2; NAD  Head: dried blood below nares, c/d/i  Neck: Supple; no JVD  Respiratory: CTA B/L; no wheezes/crackles/rales auscultated w/ good air movement  Cardiovascular: Regular rhythm/rate; S1/S2; no gallops or murmurs auscultated  Gastrointestinal: Soft; NTND w/out rebound tenderness or guarding; bowel sounds normal  Extremities: WWP; no edema or cyanosis; radial/pedal pulses palpable  Neurological:  CNII-XII grossly intact; dyasthria  Skin: No rashes noted  Vasc: +2 DP/PT pulses b/l   Psych: Appropriate Affect    MEDICATIONS  (STANDING):  albuterol/ipratropium for Nebulization 3 milliLiter(s) Nebulizer every 6 hours  amLODIPine   Tablet 10 milliGRAM(s) Oral daily  apixaban 5 milliGRAM(s) Enteral Tube every 12 hours  atorvastatin 80 milliGRAM(s) Oral at bedtime  BACItracin   Ointment 1 Application(s) Topical two times a day  carvedilol 6.25 milliGRAM(s) Oral every 12 hours  glucagon  Injectable 1 milliGRAM(s) IntraMuscular once  lisinopril 20 milliGRAM(s) Oral every 24 hours  mupirocin 2% Nasal 1 Application(s) Both Nostrils two times a day  polyethylene glycol 3350 17 Gram(s) Oral every 12 hours  sodium chloride 0.65% Nasal 1 Spray(s) Both Nostrils four times a day    MEDICATIONS  (PRN):  acetaminophen    Suspension .. 650 milliGRAM(s) Oral every 6 hours PRN Temp greater or equal to 38C (100.4F), Mild Pain (1 - 3)      No Known Allergies      LABS                        13.4   7.55  )-----------( 276      ( 2022 06:06 )             39.7     04-06    137  |  106  |  32<H>  ----------------------------<  157<H>  4.5   |  24  |  0.77    Ca    9.0      2022 06:06  Phos  3.2     04-06  Mg     2.0     04-06        Urinalysis Basic - ( 2022 11:50 )    Color: Yellow / Appearance: Clear / S.010 / pH: x  Gluc: x / Ketone: NEGATIVE  / Bili: Negative / Urobili: 4.0 E.U./dL   Blood: x / Protein: NEGATIVE mg/dL / Nitrite: NEGATIVE   Leuk Esterase: Trace / RBC: < 5 /HPF / WBC 5-10 /HPF   Sq Epi: x / Non Sq Epi: 0-5 /HPF / Bacteria: Present /HPF            IMAGING/EKG/ETC: reviewed

## 2022-04-07 LAB
ANION GAP SERPL CALC-SCNC: 8 MMOL/L — SIGNIFICANT CHANGE UP (ref 5–17)
APPEARANCE UR: CLEAR — SIGNIFICANT CHANGE UP
BACTERIA # UR AUTO: PRESENT /HPF
BILIRUB UR-MCNC: NEGATIVE — SIGNIFICANT CHANGE UP
BUN SERPL-MCNC: 31 MG/DL — HIGH (ref 7–23)
CALCIUM SERPL-MCNC: 8.7 MG/DL — SIGNIFICANT CHANGE UP (ref 8.4–10.5)
CHLORIDE SERPL-SCNC: 104 MMOL/L — SIGNIFICANT CHANGE UP (ref 96–108)
CO2 SERPL-SCNC: 26 MMOL/L — SIGNIFICANT CHANGE UP (ref 22–31)
COLOR SPEC: YELLOW — SIGNIFICANT CHANGE UP
CREAT SERPL-MCNC: 0.73 MG/DL — SIGNIFICANT CHANGE UP (ref 0.5–1.3)
DIFF PNL FLD: NEGATIVE — SIGNIFICANT CHANGE UP
EGFR: 93 ML/MIN/1.73M2 — SIGNIFICANT CHANGE UP
EPI CELLS # UR: SIGNIFICANT CHANGE UP /HPF (ref 0–5)
GLUCOSE SERPL-MCNC: 145 MG/DL — HIGH (ref 70–99)
GLUCOSE UR QL: NEGATIVE — SIGNIFICANT CHANGE UP
HCT VFR BLD CALC: 37.7 % — LOW (ref 39–50)
HGB BLD-MCNC: 12.2 G/DL — LOW (ref 13–17)
KETONES UR-MCNC: NEGATIVE — SIGNIFICANT CHANGE UP
LEUKOCYTE ESTERASE UR-ACNC: ABNORMAL
MAGNESIUM SERPL-MCNC: 2.2 MG/DL — SIGNIFICANT CHANGE UP (ref 1.6–2.6)
MCHC RBC-ENTMCNC: 31.9 PG — SIGNIFICANT CHANGE UP (ref 27–34)
MCHC RBC-ENTMCNC: 32.4 GM/DL — SIGNIFICANT CHANGE UP (ref 32–36)
MCV RBC AUTO: 98.4 FL — SIGNIFICANT CHANGE UP (ref 80–100)
NITRITE UR-MCNC: POSITIVE
NRBC # BLD: 0 /100 WBCS — SIGNIFICANT CHANGE UP (ref 0–0)
PH UR: 7 — SIGNIFICANT CHANGE UP (ref 5–8)
PHOSPHATE SERPL-MCNC: 3.6 MG/DL — SIGNIFICANT CHANGE UP (ref 2.5–4.5)
PLATELET # BLD AUTO: 229 K/UL — SIGNIFICANT CHANGE UP (ref 150–400)
POTASSIUM SERPL-MCNC: 4.3 MMOL/L — SIGNIFICANT CHANGE UP (ref 3.5–5.3)
POTASSIUM SERPL-SCNC: 4.3 MMOL/L — SIGNIFICANT CHANGE UP (ref 3.5–5.3)
PROCALCITONIN SERPL-MCNC: 0.06 NG/ML — SIGNIFICANT CHANGE UP (ref 0.02–0.1)
PROT UR-MCNC: NEGATIVE MG/DL — SIGNIFICANT CHANGE UP
RBC # BLD: 3.83 M/UL — LOW (ref 4.2–5.8)
RBC # FLD: 14.1 % — SIGNIFICANT CHANGE UP (ref 10.3–14.5)
RBC CASTS # UR COMP ASSIST: < 5 /HPF — SIGNIFICANT CHANGE UP
SODIUM SERPL-SCNC: 138 MMOL/L — SIGNIFICANT CHANGE UP (ref 135–145)
SP GR SPEC: 1.01 — SIGNIFICANT CHANGE UP (ref 1–1.03)
UROBILINOGEN FLD QL: 4 E.U./DL
WBC # BLD: 7.62 K/UL — SIGNIFICANT CHANGE UP (ref 3.8–10.5)
WBC # FLD AUTO: 7.62 K/UL — SIGNIFICANT CHANGE UP (ref 3.8–10.5)
WBC UR QL: ABNORMAL /HPF

## 2022-04-07 PROCEDURE — 71275 CT ANGIOGRAPHY CHEST: CPT | Mod: 26

## 2022-04-07 PROCEDURE — 99233 SBSQ HOSP IP/OBS HIGH 50: CPT

## 2022-04-07 PROCEDURE — 36600 WITHDRAWAL OF ARTERIAL BLOOD: CPT

## 2022-04-07 RX ORDER — SODIUM CHLORIDE 9 MG/ML
500 INJECTION, SOLUTION INTRAVENOUS ONCE
Refills: 0 | Status: COMPLETED | OUTPATIENT
Start: 2022-04-07 | End: 2022-04-07

## 2022-04-07 RX ADMIN — Medication 3 MILLILITER(S): at 21:30

## 2022-04-07 RX ADMIN — AMLODIPINE BESYLATE 10 MILLIGRAM(S): 2.5 TABLET ORAL at 08:07

## 2022-04-07 RX ADMIN — ATORVASTATIN CALCIUM 80 MILLIGRAM(S): 80 TABLET, FILM COATED ORAL at 21:30

## 2022-04-07 RX ADMIN — Medication 3 MILLILITER(S): at 15:37

## 2022-04-07 RX ADMIN — MUPIROCIN 1 APPLICATION(S): 20 OINTMENT TOPICAL at 06:22

## 2022-04-07 RX ADMIN — Medication 1 SPRAY(S): at 23:20

## 2022-04-07 RX ADMIN — APIXABAN 5 MILLIGRAM(S): 2.5 TABLET, FILM COATED ORAL at 21:30

## 2022-04-07 RX ADMIN — Medication 3 MILLILITER(S): at 10:24

## 2022-04-07 RX ADMIN — POLYETHYLENE GLYCOL 3350 17 GRAM(S): 17 POWDER, FOR SOLUTION ORAL at 17:41

## 2022-04-07 RX ADMIN — CARVEDILOL PHOSPHATE 6.25 MILLIGRAM(S): 80 CAPSULE, EXTENDED RELEASE ORAL at 06:18

## 2022-04-07 RX ADMIN — MUPIROCIN 1 APPLICATION(S): 20 OINTMENT TOPICAL at 17:53

## 2022-04-07 RX ADMIN — Medication 650 MILLIGRAM(S): at 00:49

## 2022-04-07 RX ADMIN — Medication 1 APPLICATION(S): at 08:07

## 2022-04-07 RX ADMIN — Medication 1 SPRAY(S): at 12:05

## 2022-04-07 RX ADMIN — POLYETHYLENE GLYCOL 3350 17 GRAM(S): 17 POWDER, FOR SOLUTION ORAL at 06:21

## 2022-04-07 RX ADMIN — Medication 1 APPLICATION(S): at 17:42

## 2022-04-07 RX ADMIN — SODIUM CHLORIDE 500 MILLILITER(S): 9 INJECTION, SOLUTION INTRAVENOUS at 13:17

## 2022-04-07 RX ADMIN — CARVEDILOL PHOSPHATE 6.25 MILLIGRAM(S): 80 CAPSULE, EXTENDED RELEASE ORAL at 21:30

## 2022-04-07 RX ADMIN — Medication 1 SPRAY(S): at 00:49

## 2022-04-07 RX ADMIN — LISINOPRIL 20 MILLIGRAM(S): 2.5 TABLET ORAL at 21:30

## 2022-04-07 RX ADMIN — Medication 1 SPRAY(S): at 06:21

## 2022-04-07 RX ADMIN — Medication 3 MILLILITER(S): at 03:33

## 2022-04-07 RX ADMIN — APIXABAN 5 MILLIGRAM(S): 2.5 TABLET, FILM COATED ORAL at 10:24

## 2022-04-07 RX ADMIN — Medication 1 SPRAY(S): at 17:42

## 2022-04-07 NOTE — PROVIDER CONTACT NOTE (OTHER) - DATE AND TIME:
07-Apr-2022 12:30
12-Mar-2022 17:33
13-Mar-2022 20:47
17-Mar-2022 01:00
24-Mar-2022 03:00
30-Mar-2022 06:30
07-Apr-2022 00:45
02-Apr-2022 19:57
07-Apr-2022 17:10
05-Apr-2022 11:30
27-Mar-2022 01:49

## 2022-04-07 NOTE — PROCEDURE NOTE - PROCEDURE DATE TIME, MLM
12-Mar-2022 17:05
22-Mar-2022 06:22
25-Mar-2022 18:09
12-Mar-2022 18:14
11-Mar-2022 03:00
16-Mar-2022 18:40
05-Apr-2022 10:04
07-Apr-2022 02:23

## 2022-04-07 NOTE — PROGRESS NOTE ADULT - SUBJECTIVE AND OBJECTIVE BOX
Patient is a 76y old  Male who presents with a chief complaint of CVA, left facial, left-sided weakness (2022 00:28)        SUBJECTIVE:  Patient was seen and examined at bedside.    Overnight Events :  Just finished PT and OT at the time of my encounter , within the limitations of his left facial weakness and droop, he is alert and awake and denies any complaints at this time except for feeling fatigued     He was just taken of the face tent oxygen delivery , with good waveform his saturations were between 93-96%       Review of systems: 12 point Review of systems negative unless otherwise documented elsewhere in note.     Diet, NPO with Tube Feed:   Tube Feeding Modality: Gastrostomy  Jevity 1.2 Tomas (JEVITY1.2RTH)  Total Volume for 24 Hours (mL): 1680  Total Number of Cans: 7  Continuous  Starting Tube Feed Rate mL per Hour: 20  Increase Tube Feed Rate by (mL): 20     Every 6 hours  Until Goal Tube Feed Rate (mL per Hour): 70  Tube Feed Duration (in Hours): 24  Tube Feed Start Time: 12:30 (22 @ 10:50) [Active]      MEDICATIONS:  MEDICATIONS  (STANDING):  albuterol/ipratropium for Nebulization 3 milliLiter(s) Nebulizer every 6 hours  amLODIPine   Tablet 10 milliGRAM(s) Oral daily  apixaban 5 milliGRAM(s) Enteral Tube every 12 hours  atorvastatin 80 milliGRAM(s) Oral at bedtime  BACItracin   Ointment 1 Application(s) Topical two times a day  carvedilol 6.25 milliGRAM(s) Oral every 12 hours  glucagon  Injectable 1 milliGRAM(s) IntraMuscular once  lisinopril 20 milliGRAM(s) Oral every 24 hours  mupirocin 2% Nasal 1 Application(s) Both Nostrils two times a day  polyethylene glycol 3350 17 Gram(s) Oral every 12 hours  sodium chloride 0.65% Nasal 1 Spray(s) Both Nostrils four times a day    MEDICATIONS  (PRN):  acetaminophen    Suspension .. 650 milliGRAM(s) Oral every 6 hours PRN Temp greater or equal to 38C (100.4F), Mild Pain (1 - 3)      Allergies    No Known Allergies    Intolerances        OBJECTIVE:  Vital Signs Last 24 Hrs  T(C): 36.8 (2022 09:50), Max: 38.1 (2022 00:14)  T(F): 98.3 (2022 09:50), Max: 100.5 (2022 00:14)  HR: 64 (2022 08:06) (64 - 86)  BP: 115/61 (2022 08:06) (108/62 - 133/79)  BP(mean): 83 (2022 08:06) (78 - 96)  RR: 20 (2022 05:05) (18 - 20)  SpO2: 97% (2022 08:06) (90% - 98%)  I&O's Summary    2022 07:01  -  2022 07:00  --------------------------------------------------------  IN: 910 mL / OUT: 1450 mL / NET: -540 mL    2022 07:01  -  2022 10:21  --------------------------------------------------------  IN: 210 mL / OUT: 200 mL / NET: 10 mL        PHYSICAL EXAM:  General: A&Ox2; NAD  Head: dried blood below  right nares, c/d/i  Neck: Supple; no JVD  Respiratory: CTA B/L; no wheezes/crackles/rales auscultated w/ good air movement  Cardiovascular: Regular rhythm/rate; S1/S2; no gallops or murmurs auscultated  Gastrointestinal: Soft; NTND w/out rebound tenderness or guarding; bowel sounds normal  Extremities: WWP; no edema or cyanosis; radial/pedal pulses palpable  Neurological:  left facial droop , dyasthria, left upper and lower extremity strength 2/5 , right upper and lower extremity strength 4/5   Skin: No rashes noted  Vasc: +2 DP/PT pulses b/l   Psych: Appropriate     LABS:                        12.2   7.62  )-----------( 229      ( 2022 07:52 )             37.7     04-07    138  |  104  |  31<H>  ----------------------------<  145<H>  4.3   |  26  |  0.73    Ca    8.7      2022 07:52  Phos  3.6     04-07  Mg     2.2     04-07          CAPILLARY BLOOD GLUCOSE        Urinalysis Basic - ( 2022 00:37 )    Color: Yellow / Appearance: Clear / S.015 / pH: x  Gluc: x / Ketone: NEGATIVE  / Bili: Negative / Urobili: 4.0 E.U./dL   Blood: x / Protein: NEGATIVE mg/dL / Nitrite: POSITIVE   Leuk Esterase: Moderate / RBC: < 5 /HPF / WBC 5-10 /HPF   Sq Epi: x / Non Sq Epi: 0-5 /HPF / Bacteria: Present /HPF        MICRODATA:    Urinalysis with Rflx Culture (collected 2022 00:37)    Urinalysis with Rflx Culture (collected 2022 11:50)    Culture - Blood (collected 2022 10:03)  Source: .Blood Blood-Peripheral  Preliminary Report (2022 11:01):    No growth at 1 day.    Culture - Blood (collected 2022 10:03)  Source: .Blood Blood-Peripheral  Preliminary Report (2022 11:01):    No growth at 1 day.        RADIOLOGY/OTHER STUDIES:

## 2022-04-07 NOTE — PROCEDURE NOTE - NSSITEPREP_SKIN_A_CORE
chlorhexidine
Adherence to aseptic technique: hand hygiene prior to donning barriers (gown, gloves), don cap and mask, sterile drape over patient
chlorhexidine

## 2022-04-07 NOTE — PROCEDURE NOTE - NSINDICATIONS_GEN_A_CORE
arterial puncture to obtain ABG's
feeding difficulties
feeding tube replacement
critical patient/monitoring purposes
feeding difficulties

## 2022-04-07 NOTE — PROVIDER CONTACT NOTE (OTHER) - BACKGROUND
Pt admitted for basilar artery aneurysm
Pt admitted for basilar artery aneurysm complicated by epistaxis.
pt started on heparin drip today currently running at 17cc/hr
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 Pt now admitted for partially thrombosed basilar artery aneurysm.
n/a
Pt admitted for a basilar artery aneurysm
Pt brought in for a basilar artery aneurysm

## 2022-04-07 NOTE — PROCEDURE NOTE - NSINFORMCONSENT_GEN_A_CORE
Benefits, risks, and possible complications of procedure explained to patient/caregiver who verbalized understanding and gave verbal consent.
Benefits, risks, and possible complications of procedure explained to patient/caregiver who verbalized understanding and gave written consent.

## 2022-04-07 NOTE — PROVIDER CONTACT NOTE (OTHER) - REASON
Pt bleeding from nose into mouth
Pt febrile
Bradycardic
Pt has nasal bleed
Low blood pressure
Temp. 101
Inquire about antibiotics for UTI
Pt in 1st degree AV block with PVCs on cardiac monitor
need redraw of ptT
Holding BP medication
Pt coughed bloody sputum

## 2022-04-07 NOTE — PROCEDURE NOTE - NSANATOMICLLOCATION_GEN_A_CORE
radial artery/left
Foot/right
left/radial artery
left hand and right antecubital/left/right
right/radial artery

## 2022-04-07 NOTE — PROGRESS NOTE ADULT - PROBLEM SELECTOR PLAN 9
-replete as needed
Likely pneumonitis from microaspiration. No indication for antibiotics as no infiltrate or pro britta noted. Should be provided continued speech/swallow therapy to strengthen swallow. Tube feeds increase risk of further aspirations. Tylenol for fevers.
Likely pneumonitis from microaspiration. No indication for antibiotics as no infiltrate or procal noted. Should be provided continued speech/swallow therapy to strengthen swallow. Tube feeds increase risk of further aspirations. Tylenol for fevers.
Likely pneumonitis from microaspiration. No indication for antibiotics as no infiltrate or procal noted. Should be provided continued speech/swallow therapy to strengthen swallow. Tube feeds increase risk of further aspirations. Tylenol for fevers.
resolved
resolved

## 2022-04-07 NOTE — PROCEDURE NOTE - NSPROCNAME_GEN_A_CORE
Arterial Puncture/Cannulation
General
Feeding Tube Placement
Feeding Tube Replacement
Feeding Tube Replacement
Arterial Puncture/Cannulation

## 2022-04-07 NOTE — PROCEDURE NOTE - NSPROCDETAILS_GEN_ALL_CORE
location identified, draped/prepped, sterile technique used, needle inserted/introduced/positive blood return obtained via catheter/connected to a pressurized flush line/sutured in place/hemostasis with direct pressure, dressing applied/all materials/supplies accounted for at end of procedure
hemostasis with direct pressure, dressing applied

## 2022-04-07 NOTE — PROGRESS NOTE ADULT - SUBJECTIVE AND OBJECTIVE BOX
HPI:  75y/o M with PMHx sig for HTN, HLD, afib (on eliquis, unknown if last taken today,) CVA in  (with minimal residual right-sided weakness per girlfriend,) last known normal at 3pm when girlfriend spoke to him. 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 Upon arrival to [facility], BP noted to be 171/101. Stroke code was called. CTH without findings of hemorrhage. 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 NIHSS 20. No TPA was administered as Pt is out of window. Per patient's girlfriend, the basilar artery aneurysm is known and was diagnosed in  at [facility] when he suffered from a CVA. At that time, he underwent a "treatment for the aneurysm," which the girlfriend reports was too risky and was aborted. Patient never attended his follow-up appointments due to fear per girlfriend.  (10 Mar 2022 22:52)    Hospital Course:   3/11: Karen placed overnight. 3% hypertonic saline started. Neuro exam stable. Started on vEEG for aphasia  3/12: GM overnight. Neuro exam stable. 3% d/c. home eliquis 5 bid. failed s/s. started on TF via NGT, spiked fever 101, f/u panculture   3/13: GM overnight, neuro stable, veeg negative. Vanc/zosyn started for empiric PNA.   3/14: GM o/n neuro stable. on HFNC. ENT plan for laryngoscope today with . failed s/s, pend repeat eval. dc'd captopril, started lisinopril. dc'd vanc. Laryngoscopy: No masses visualized, hypomobility of L sided vocal cords, dysarthric, hypophonic, risk for aspiration, good cough reflex.Dr. Onofre to discuss with Dr. Peterson regarding a possible vocal cord injection.   3/15: GM overnight, neuro stable, on HFNC 30/30 overnight, transitioned to 4LNC  3/16: GM overnight, neuro stable, tolerating NC and satting well    3/17: POD6 dx angio. GM o/n neuro stable. trickle feeds via NGT  3/18: POD7 GM o/n, saturating well in RA. tolerating TF   : POD8 GM o/n. gen surg consulted for PEG placement.  : POD9 GM o/n. Transferred back to ICU for heparin gtt in preparation for PEG placement Wed.   3/21: POD10 GM o/n, neuro exam stable. Transferred to ICU in preparation for hep gtt to start tomorrow, plan for PEG on Wednesday. F/u am coags.   3/22: POD11. GM o/n neuro stable. on heparin gtt ptt goal 40-60. o/n ptt >200c, held for 2 hours. on heparin @11. pre-op for PEG   3/23: POD12. o/n new R facial droop, CTH stable. heparin gtt d/c. preop PEG today  3/24: POD13, POD1 PEG placement, GM overnight, neuro stable, +BM  3/25: POD14, GM o/n, neuro stable, eliquis restarted. Started ceftriaxone for UTI  3/26: POD 15. GM overnight. exam stable. on abx until 3/26 for UTI. On eliquis for afib.   3/27: POD 16. o/n episode of epistaxis, aftrin administered. exam stable. abx completed for UTI. pending rehab. ENT consulted for epistaxis, recommended nasal saline and bactroban.  3/28: POD17. GM o/n, neuro stable.  3/29: POD18. GM o/n, neuro stable   3/30: POD18. GM o/n. neuro stable. upgraded to ICU for intractable epistaxis. ENT placed nasal dressings, bleeding controlled. Ancef while drains in place. Desatted to 80s, on 8L O2 via face tent. intubation held as per girlfriend, will continue to monitor respiratory status  3/31: POD19 GM overnight. Neuro exam stable. No episode of epistaxis o/n.  : POD20 GM overnight. Neruo exam stable. nasal packing removed. pt SDU status  : POD 21. GM. Plan to resume eliquis today.   4/3: POD 22. GM o/n. on hep gtt held at 7 am, possible resume eliquis today if epistaxis controlled. pending rehab   : POD 23, GM, hep gtt held, eliquis started   : POD 24 GM o/n. Restarted on eliquis. pending rehab.   : POD 25. GM o/n. afebrile. no growth on cultures. pending rehab   : POD 26 Angio. Desat to 87 o/n, placed on face tent 40% FiO2, ABG NL, CXR NL, patient febrile, UA w/ reflex sent, pending CT PE protocol     Vital Signs Last 24 Hrs  T(C): 38.1 (2022 00:14), Max: 38.1 (2022 00:14)  T(F): 100.5 (2022 00:14), Max: 100.5 (2022 00:14)  HR: 74 (2022 00:14) (74 - 86)  BP: 108/62 (2022 00:14) (108/62 - 133/79)  BP(mean): 79 (2022 00:14) (78 - 96)  RR: 20 (2022 00:14) (18 - 20)  SpO2: 94% (2022 00:14) (90% - 98%)    I&O's Detail    2022 07:01  -  2022 07:00  --------------------------------------------------------  IN:    Jevity 1.2: 1470 mL  Total IN: 1470 mL    OUT:    Incontinent per Condom Catheter (mL): 1250 mL    Intermittent Catheterization - Urethral (mL): 300 mL  Total OUT: 1550 mL    Total NET: -80 mL      2022 07:01  -  2022 00:29  --------------------------------------------------------  IN:    Jevity 1.2: 910 mL  Total IN: 910 mL    OUT:    Incontinent per Condom Catheter (mL): 1150 mL  Total OUT: 1150 mL    Total NET: -240 mL    I&O's Summary    2022 07:01  -  2022 07:00  --------------------------------------------------------  IN: 1470 mL / OUT: 1550 mL / NET: -80 mL    2022 07:01  -  2022 00:29  --------------------------------------------------------  IN: 910 mL / OUT: 1150 mL / NET: -240 mL    PHYSICAL EXAM:  `General: NAD, pt is comfortably sitting up in bed, on room air  HEENT: CN II-XII grossly intact, PERRL 3mm briskly reactive, EOMI b/l, face symmetric, tongue midline, neck FROM  Cardiovascular: RRR, normal S1 and S2   Respiratory: lungs CTAB, no wheezing, rhonchi, or crackles   GI: normoactive BS to auscultation, abd soft, NTND   Neuro: A&Ox3 with choices, expressive aphasia, dysarthric but attempting to mouth words. Follows commands.  Motor: RUE 5/5 throughout, RLE 2/5 throughout LUE wiggles fingers o/w 0/5 and withdrawing briskly, LLE WD. SILT throughout   Extremities: PT/DP pulses 1+ and symmetric  Wound/incision: R groin C/D/I, no hematoma      LABS:                        13.4   7.55  )-----------( 276      ( 2022 06:06 )             39.7     04-06    137  |  106  |  32<H>  ----------------------------<  157<H>  4.5   |  24  |  0.77    Ca    9.0      2022 06:06  Phos  3.2     04-06  Mg     2.0     04-06        Urinalysis Basic - ( 2022 11:50 )    Color: Yellow / Appearance: Clear / S.010 / pH: x  Gluc: x / Ketone: NEGATIVE  / Bili: Negative / Urobili: 4.0 E.U./dL   Blood: x / Protein: NEGATIVE mg/dL / Nitrite: NEGATIVE   Leuk Esterase: Trace / RBC: < 5 /HPF / WBC 5-10 /HPF   Sq Epi: x / Non Sq Epi: 0-5 /HPF / Bacteria: Present /HPF          CAPILLARY BLOOD GLUCOSE          Drug Levels: [] N/A    CSF Analysis: [] N/A      Allergies    No Known Allergies    Intolerances      MEDICATIONS:  Antibiotics:    Neuro:  acetaminophen    Suspension .. 650 milliGRAM(s) Oral every 6 hours PRN    Anticoagulation:  apixaban 5 milliGRAM(s) Enteral Tube every 12 hours    OTHER:  albuterol/ipratropium for Nebulization 3 milliLiter(s) Nebulizer every 6 hours  amLODIPine   Tablet 10 milliGRAM(s) Oral daily  atorvastatin 80 milliGRAM(s) Oral at bedtime  BACItracin   Ointment 1 Application(s) Topical two times a day  carvedilol 6.25 milliGRAM(s) Oral every 12 hours  glucagon  Injectable 1 milliGRAM(s) IntraMuscular once  lisinopril 20 milliGRAM(s) Oral every 24 hours  mupirocin 2% Nasal 1 Application(s) Both Nostrils two times a day  polyethylene glycol 3350 17 Gram(s) Oral every 12 hours  sodium chloride 0.65% Nasal 1 Spray(s) Both Nostrils four times a day    IVF:    CULTURES:  Culture Results:   No growth at 1 day. ( @ 10:03)  Culture Results:   No growth at 1 day. ( @ 10:03)    RADIOLOGY & ADDITIONAL TESTS:      ASSESSMENT:  75 y/o M with PMHx sig for HTN, HLD, afib (on eliquis, unknown if last taken today,) CVA in  (with minimal residual right-sided weakness per girlfriend) p/w dysarthria, left facial droop, and left-sided weakness. CTH without findings of hemorrhage. 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 NIHSS 20. Pt is not a tpa candidate as Pt is out of window. Patient now s/p diagnostic angio with findings of partially thrombosed basilar artery aneurysm 3/11/22. course c/b resp insufficiency weaned off HFNC, now complicated by epistaxis (resolved).     PLAN:   Neuro:   - neuro checks q4/vital signs q4hrs   - MR head 3/11: no acute infarcts   - CTH 3/22: stable   - EEG negative 3/13, dc'd   - no plan for further neurosurgical intervention   - stroke core measures   - Psych recs outpt f/u (refer to note)     Cardio:  - SBP goal 100-160   - Amlodipine 10mg, carvedilol 6.25mg BID, Lisinopril 20mg (increase to 40mg if needed) - home HCTZ held  echo 3/11: mild LVH, EF 65-70%   - Eliquis 5mg restarted 4/3 10pm  - PVCs 3/26, per cardiology NTD  - cont home lipitor 80mg    Pulm:  - RA  - standing mucomyst and duonebs  - Chest PT   - aspiration precaution, HOB up     ENT:   - ENT consulted, s/p laryngoscopy 3/14: No masses, hypomobility of L sided vocal cord  - f/u outpatient with Dr. Onofre/Dr. Peterson for possible vocal cord injection.   - s/p epistaxis episode 3/26: intranasal bactroban BID and saline spray q4 to moisturize can use afrin and manual pressure x 20 minutes if bleeds again   - Nasal packing 3/30-, now removed replaced w/ Surgiflo (absorbable)   - bacitracin BID to bilat nostrils   - nasal saline 4x day apply to both nostrils     Renal:  - Na goal 135-145   - no issues     GI:   - TF via peg  - bowel regimen   - last BM   - senna held for loose BMs    Endo:  - A1C 5.4     Heme:  - SCDs for DVT ppx   - hep gtt dc'd 7 am 4/3, eliquis 5 given   - LE dopplers 3/11, 3/23: neg for DVT   - LUE US 3/22: superficial thrombus L basilic/cephalic vein LUE US 4/3 negative    ID:  - zosyn empirically for PNA completed 3/17, completed empiric Ceftriaxone x3 days for UTI  (3/25-3/27)   - Last Pancx      ORTHO:  - L foot xray 3/22: chronic 5th digit middle phalanx base corner fracture     Dispo:   - SDU status  - full code  - Pending BUNNY  - Family updated     Assessment and plan discussed with Dr. Peterson

## 2022-04-07 NOTE — PROVIDER CONTACT NOTE (OTHER) - NAME OF MD/NP/PA/DO NOTIFIED:
Vielka MOREL
ADRIEL Laura
ADRIEL Philip
ADRIEL Wood and Neuro team at the bedside
Aretha BENÍTEZ)
Neurosurgery team
ADRIEL Mendez
Aretha BENÍTEZ)
ADRIEL Fragoso
ADRIEL Laura
ADRIEL Chacon

## 2022-04-08 ENCOUNTER — TRANSCRIPTION ENCOUNTER (OUTPATIENT)
Age: 77
End: 2022-04-08

## 2022-04-08 VITALS — TEMPERATURE: 99 F

## 2022-04-08 LAB
ANION GAP SERPL CALC-SCNC: 7 MMOL/L — SIGNIFICANT CHANGE UP (ref 5–17)
BUN SERPL-MCNC: 28 MG/DL — HIGH (ref 7–23)
CALCIUM SERPL-MCNC: 8.5 MG/DL — SIGNIFICANT CHANGE UP (ref 8.4–10.5)
CHLORIDE SERPL-SCNC: 102 MMOL/L — SIGNIFICANT CHANGE UP (ref 96–108)
CO2 SERPL-SCNC: 25 MMOL/L — SIGNIFICANT CHANGE UP (ref 22–31)
CREAT SERPL-MCNC: 0.61 MG/DL — SIGNIFICANT CHANGE UP (ref 0.5–1.3)
EGFR: 98 ML/MIN/1.73M2 — SIGNIFICANT CHANGE UP
GLUCOSE SERPL-MCNC: 159 MG/DL — HIGH (ref 70–99)
HCT VFR BLD CALC: 34 % — LOW (ref 39–50)
HGB BLD-MCNC: 11.4 G/DL — LOW (ref 13–17)
MAGNESIUM SERPL-MCNC: 1.9 MG/DL — SIGNIFICANT CHANGE UP (ref 1.6–2.6)
MCHC RBC-ENTMCNC: 31.5 PG — SIGNIFICANT CHANGE UP (ref 27–34)
MCHC RBC-ENTMCNC: 33.5 GM/DL — SIGNIFICANT CHANGE UP (ref 32–36)
MCV RBC AUTO: 93.9 FL — SIGNIFICANT CHANGE UP (ref 80–100)
NRBC # BLD: 0 /100 WBCS — SIGNIFICANT CHANGE UP (ref 0–0)
PHOSPHATE SERPL-MCNC: 2.9 MG/DL — SIGNIFICANT CHANGE UP (ref 2.5–4.5)
PLATELET # BLD AUTO: 211 K/UL — SIGNIFICANT CHANGE UP (ref 150–400)
POTASSIUM SERPL-MCNC: 4.3 MMOL/L — SIGNIFICANT CHANGE UP (ref 3.5–5.3)
POTASSIUM SERPL-SCNC: 4.3 MMOL/L — SIGNIFICANT CHANGE UP (ref 3.5–5.3)
RBC # BLD: 3.62 M/UL — LOW (ref 4.2–5.8)
RBC # FLD: 14.1 % — SIGNIFICANT CHANGE UP (ref 10.3–14.5)
SODIUM SERPL-SCNC: 134 MMOL/L — LOW (ref 135–145)
WBC # BLD: 6.28 K/UL — SIGNIFICANT CHANGE UP (ref 3.8–10.5)
WBC # FLD AUTO: 6.28 K/UL — SIGNIFICANT CHANGE UP (ref 3.8–10.5)

## 2022-04-08 PROCEDURE — C1769: CPT

## 2022-04-08 PROCEDURE — 84300 ASSAY OF URINE SODIUM: CPT

## 2022-04-08 PROCEDURE — 92610 EVALUATE SWALLOWING FUNCTION: CPT

## 2022-04-08 PROCEDURE — G1004: CPT

## 2022-04-08 PROCEDURE — 86900 BLOOD TYPING SEROLOGIC ABO: CPT

## 2022-04-08 PROCEDURE — 85610 PROTHROMBIN TIME: CPT

## 2022-04-08 PROCEDURE — 84484 ASSAY OF TROPONIN QUANT: CPT

## 2022-04-08 PROCEDURE — 87635 SARS-COV-2 COVID-19 AMP PRB: CPT

## 2022-04-08 PROCEDURE — 97164 PT RE-EVAL EST PLAN CARE: CPT

## 2022-04-08 PROCEDURE — 70498 CT ANGIOGRAPHY NECK: CPT | Mod: MA

## 2022-04-08 PROCEDURE — 84100 ASSAY OF PHOSPHORUS: CPT

## 2022-04-08 PROCEDURE — 93306 TTE W/DOPPLER COMPLETE: CPT

## 2022-04-08 PROCEDURE — 70496 CT ANGIOGRAPHY HEAD: CPT | Mod: MA

## 2022-04-08 PROCEDURE — 83880 ASSAY OF NATRIURETIC PEPTIDE: CPT

## 2022-04-08 PROCEDURE — 97161 PT EVAL LOW COMPLEX 20 MIN: CPT

## 2022-04-08 PROCEDURE — 99232 SBSQ HOSP IP/OBS MODERATE 35: CPT

## 2022-04-08 PROCEDURE — 80061 LIPID PANEL: CPT

## 2022-04-08 PROCEDURE — 84295 ASSAY OF SERUM SODIUM: CPT

## 2022-04-08 PROCEDURE — 94640 AIRWAY INHALATION TREATMENT: CPT

## 2022-04-08 PROCEDURE — U0003: CPT

## 2022-04-08 PROCEDURE — 87186 SC STD MICRODIL/AGAR DIL: CPT

## 2022-04-08 PROCEDURE — 73620 X-RAY EXAM OF FOOT: CPT

## 2022-04-08 PROCEDURE — 85025 COMPLETE CBC W/AUTO DIFF WBC: CPT

## 2022-04-08 PROCEDURE — 95714 VEEG EA 12-26 HR UNMNTR: CPT

## 2022-04-08 PROCEDURE — 83036 HEMOGLOBIN GLYCOSYLATED A1C: CPT

## 2022-04-08 PROCEDURE — 97116 GAIT TRAINING THERAPY: CPT

## 2022-04-08 PROCEDURE — 86850 RBC ANTIBODY SCREEN: CPT

## 2022-04-08 PROCEDURE — 82803 BLOOD GASES ANY COMBINATION: CPT

## 2022-04-08 PROCEDURE — 87640 STAPH A DNA AMP PROBE: CPT

## 2022-04-08 PROCEDURE — C1760: CPT

## 2022-04-08 PROCEDURE — 87641 MR-STAPH DNA AMP PROBE: CPT

## 2022-04-08 PROCEDURE — 99233 SBSQ HOSP IP/OBS HIGH 50: CPT

## 2022-04-08 PROCEDURE — 85730 THROMBOPLASTIN TIME PARTIAL: CPT

## 2022-04-08 PROCEDURE — C1894: CPT

## 2022-04-08 PROCEDURE — 93970 EXTREMITY STUDY: CPT

## 2022-04-08 PROCEDURE — 92526 ORAL FUNCTION THERAPY: CPT

## 2022-04-08 PROCEDURE — 84132 ASSAY OF SERUM POTASSIUM: CPT

## 2022-04-08 PROCEDURE — 97535 SELF CARE MNGMENT TRAINING: CPT

## 2022-04-08 PROCEDURE — 87040 BLOOD CULTURE FOR BACTERIA: CPT

## 2022-04-08 PROCEDURE — 97112 NEUROMUSCULAR REEDUCATION: CPT

## 2022-04-08 PROCEDURE — 96375 TX/PRO/DX INJ NEW DRUG ADDON: CPT

## 2022-04-08 PROCEDURE — 93971 EXTREMITY STUDY: CPT

## 2022-04-08 PROCEDURE — 0042T: CPT

## 2022-04-08 PROCEDURE — 82330 ASSAY OF CALCIUM: CPT

## 2022-04-08 PROCEDURE — 70551 MRI BRAIN STEM W/O DYE: CPT | Mod: ME

## 2022-04-08 PROCEDURE — 84145 PROCALCITONIN (PCT): CPT

## 2022-04-08 PROCEDURE — 83735 ASSAY OF MAGNESIUM: CPT

## 2022-04-08 PROCEDURE — 71045 X-RAY EXAM CHEST 1 VIEW: CPT

## 2022-04-08 PROCEDURE — 70450 CT HEAD/BRAIN W/O DYE: CPT

## 2022-04-08 PROCEDURE — 83935 ASSAY OF URINE OSMOLALITY: CPT

## 2022-04-08 PROCEDURE — 84443 ASSAY THYROID STIM HORMONE: CPT

## 2022-04-08 PROCEDURE — 71275 CT ANGIOGRAPHY CHEST: CPT

## 2022-04-08 PROCEDURE — C1887: CPT

## 2022-04-08 PROCEDURE — 83605 ASSAY OF LACTIC ACID: CPT

## 2022-04-08 PROCEDURE — 92507 TX SP LANG VOICE COMM INDIV: CPT

## 2022-04-08 PROCEDURE — 96374 THER/PROPH/DIAG INJ IV PUSH: CPT

## 2022-04-08 PROCEDURE — 82962 GLUCOSE BLOOD TEST: CPT

## 2022-04-08 PROCEDURE — 80048 BASIC METABOLIC PNL TOTAL CA: CPT

## 2022-04-08 PROCEDURE — 87086 URINE CULTURE/COLONY COUNT: CPT

## 2022-04-08 PROCEDURE — 36415 COLL VENOUS BLD VENIPUNCTURE: CPT

## 2022-04-08 PROCEDURE — 85027 COMPLETE CBC AUTOMATED: CPT

## 2022-04-08 PROCEDURE — 95700 EEG CONT REC W/VID EEG TECH: CPT

## 2022-04-08 PROCEDURE — 81001 URINALYSIS AUTO W/SCOPE: CPT

## 2022-04-08 PROCEDURE — 97110 THERAPEUTIC EXERCISES: CPT

## 2022-04-08 PROCEDURE — 80053 COMPREHEN METABOLIC PANEL: CPT

## 2022-04-08 PROCEDURE — 97530 THERAPEUTIC ACTIVITIES: CPT

## 2022-04-08 PROCEDURE — 86901 BLOOD TYPING SEROLOGIC RH(D): CPT

## 2022-04-08 PROCEDURE — U0005: CPT

## 2022-04-08 PROCEDURE — 99291 CRITICAL CARE FIRST HOUR: CPT | Mod: 25

## 2022-04-08 RX ORDER — CIPROFLOXACIN LACTATE 400MG/40ML
500 VIAL (ML) INTRAVENOUS EVERY 12 HOURS
Refills: 0 | Status: DISCONTINUED | OUTPATIENT
Start: 2022-04-08 | End: 2022-04-08

## 2022-04-08 RX ORDER — IPRATROPIUM/ALBUTEROL SULFATE 18-103MCG
3 AEROSOL WITH ADAPTER (GRAM) INHALATION
Qty: 0 | Refills: 0 | DISCHARGE
Start: 2022-04-08

## 2022-04-08 RX ORDER — MAGNESIUM SULFATE 500 MG/ML
1 VIAL (ML) INJECTION ONCE
Refills: 0 | Status: COMPLETED | OUTPATIENT
Start: 2022-04-08 | End: 2022-04-08

## 2022-04-08 RX ORDER — LISINOPRIL 2.5 MG/1
1 TABLET ORAL
Qty: 0 | Refills: 0 | DISCHARGE
Start: 2022-04-08

## 2022-04-08 RX ORDER — AMOXICILLIN 250 MG/5ML
1 SUSPENSION, RECONSTITUTED, ORAL (ML) ORAL
Qty: 0 | Refills: 0 | DISCHARGE
Start: 2022-04-08

## 2022-04-08 RX ORDER — CIPROFLOXACIN LACTATE 400MG/40ML
1 VIAL (ML) INTRAVENOUS
Qty: 0 | Refills: 0 | DISCHARGE
Start: 2022-04-08

## 2022-04-08 RX ORDER — AMOXICILLIN 250 MG/5ML
500 SUSPENSION, RECONSTITUTED, ORAL (ML) ORAL EVERY 8 HOURS
Refills: 0 | Status: DISCONTINUED | OUTPATIENT
Start: 2022-04-08 | End: 2022-04-08

## 2022-04-08 RX ORDER — AMOXICILLIN 250 MG/5ML
500 SUSPENSION, RECONSTITUTED, ORAL (ML) ORAL EVERY 12 HOURS
Refills: 0 | Status: DISCONTINUED | OUTPATIENT
Start: 2022-04-08 | End: 2022-04-08

## 2022-04-08 RX ADMIN — Medication 1 SPRAY(S): at 11:43

## 2022-04-08 RX ADMIN — Medication 100 GRAM(S): at 12:59

## 2022-04-08 RX ADMIN — Medication 3 MILLILITER(S): at 09:40

## 2022-04-08 RX ADMIN — POLYETHYLENE GLYCOL 3350 17 GRAM(S): 17 POWDER, FOR SOLUTION ORAL at 05:09

## 2022-04-08 RX ADMIN — Medication 3 MILLILITER(S): at 05:09

## 2022-04-08 RX ADMIN — MUPIROCIN 1 APPLICATION(S): 20 OINTMENT TOPICAL at 05:11

## 2022-04-08 RX ADMIN — CARVEDILOL PHOSPHATE 6.25 MILLIGRAM(S): 80 CAPSULE, EXTENDED RELEASE ORAL at 17:20

## 2022-04-08 RX ADMIN — Medication 1 SPRAY(S): at 17:19

## 2022-04-08 RX ADMIN — Medication 500 MILLIGRAM(S): at 15:59

## 2022-04-08 RX ADMIN — Medication 3 MILLILITER(S): at 17:18

## 2022-04-08 RX ADMIN — APIXABAN 5 MILLIGRAM(S): 2.5 TABLET, FILM COATED ORAL at 09:41

## 2022-04-08 RX ADMIN — AMLODIPINE BESYLATE 10 MILLIGRAM(S): 2.5 TABLET ORAL at 05:09

## 2022-04-08 RX ADMIN — Medication 1 SPRAY(S): at 05:10

## 2022-04-08 RX ADMIN — Medication 1 APPLICATION(S): at 17:19

## 2022-04-08 RX ADMIN — LISINOPRIL 20 MILLIGRAM(S): 2.5 TABLET ORAL at 17:19

## 2022-04-08 RX ADMIN — CARVEDILOL PHOSPHATE 6.25 MILLIGRAM(S): 80 CAPSULE, EXTENDED RELEASE ORAL at 05:10

## 2022-04-08 RX ADMIN — MUPIROCIN 1 APPLICATION(S): 20 OINTMENT TOPICAL at 17:20

## 2022-04-08 RX ADMIN — Medication 1 APPLICATION(S): at 05:10

## 2022-04-08 NOTE — PROGRESS NOTE ADULT - PROBLEM SELECTOR PROBLEM 1
Basilar artery aneurysm

## 2022-04-08 NOTE — PROGRESS NOTE ADULT - SUBJECTIVE AND OBJECTIVE BOX
Physical Medicine and Rehabilitation Progress Note:    Patient is a 76y old  Male who presents with a chief complaint of CVA, left facial, left-sided weakness (08 Apr 2022 08:21)      HPI:  75y/o M with PMHx sig for HTN, HLD, afib (on eliquis, unknown if last taken today,) CVA in 2020 (with minimal residual right-sided weakness per girlfriend,) last known normal at 3pm when girlfriend spoke to him. 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 Upon arrival to [facility], BP noted to be 171/101. Stroke code was called. CTH without findings of hemorrhage. 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 NIHSS 20. No TPA was administered as Pt is out of window. Per patient's girlfriend, the basilar artery aneurysm is known and was diagnosed in 2020 at [facility] when he suffered from a CVA. At that time, he underwent a "treatment for the aneurysm," which the girlfriend reports was too risky and was aborted. Patient never attended his follow-up appointments due to fear per girlfriend.  (10 Mar 2022 22:52)                            11.4   6.28  )-----------( 211      ( 08 Apr 2022 06:29 )             34.0       04-08    134<L>  |  102  |  28<H>  ----------------------------<  159<H>  4.3   |  25  |  0.61    Ca    8.5      08 Apr 2022 06:28  Phos  2.9     04-08  Mg     1.9     04-08      Vital Signs Last 24 Hrs  T(C): 37.7 (08 Apr 2022 08:52), Max: 37.7 (08 Apr 2022 08:52)  T(F): 99.8 (08 Apr 2022 08:52), Max: 99.8 (08 Apr 2022 08:52)  HR: 66 (08 Apr 2022 08:20) (64 - 68)  BP: 113/56 (08 Apr 2022 08:20) (99/56 - 115/55)  BP(mean): 79 (08 Apr 2022 08:20) (75 - 79)  RR: 18 (08 Apr 2022 08:20) (18 - 19)  SpO2: 94% (08 Apr 2022 08:20) (92% - 96%)    MEDICATIONS  (STANDING):  albuterol/ipratropium for Nebulization 3 milliLiter(s) Nebulizer every 6 hours  amLODIPine   Tablet 10 milliGRAM(s) Oral daily  apixaban 5 milliGRAM(s) Enteral Tube every 12 hours  atorvastatin 80 milliGRAM(s) Oral at bedtime  BACItracin   Ointment 1 Application(s) Topical two times a day  carvedilol 6.25 milliGRAM(s) Oral every 12 hours  glucagon  Injectable 1 milliGRAM(s) IntraMuscular once  lisinopril 20 milliGRAM(s) Oral every 24 hours  mupirocin 2% Nasal 1 Application(s) Both Nostrils two times a day  polyethylene glycol 3350 17 Gram(s) Oral every 12 hours  sodium chloride 0.65% Nasal 1 Spray(s) Both Nostrils four times a day    MEDICATIONS  (PRN):  acetaminophen    Suspension .. 650 milliGRAM(s) Oral every 6 hours PRN Temp greater or equal to 38C (100.4F), Mild Pain (1 - 3)    Currently Undergoing Physical/ Occupational Therapy at bedside.    PT/OT Functional Status Assessment:   4/7/2022        Cognitive/Neuro/Behavioral  Level of Consciousness: alert  Arousal Level: arouses to voice  Orientation: oriented x 4  Speech: hypophonic;  garbled  Mood/Behavior: calm    Language Assistance  Preferred Language to Address Healthcare Preferred Language to Address Healthcare: English    Therapeutic Interventions      Bed Mobility  Bed Mobility Training Symptoms Noted During/After Treatment: fatigue  Bed Mobility Training Rolling/Turning: maximum assist (25% patient effort);  2 person assist;  verbal cues  Bed Mobility Training Scooting: maximum assist (25% patient effort);  2 person assist;  verbal cues  Bed Mobility Training Sit-to-Supine: maximum assist (25% patient effort);  2 person assist;  verbal cues  Bed Mobility Training Supine-to-Sit: maximum assist (25% patient effort);  2 person assist;  verbal cues  Bed Mobility Training Limitations: decreased ability to use arms for pushing/pulling;  decreased ability to use legs for bridging/pushing;  impaired ability to control trunk for mobility;  impaired use of LLE and LUE;  decreased strength;  impaired balance;  impaired motor control;  impaired postural control    Sit-Stand Transfer Training  Sit-to-Stand Transfer Training Charges: PT deferred 2/2 pt demo impaired sitting balance and tolerance to activity.     Neuromuscular Re-education  Neuromuscular Re-education Detail: Pt tolerated sitting at EOB ~10 minutes with mod-max A x 1// Demo improvement in LUE, with ability to squeeze L hand,  strength R 5/5, L 2-/5 // Pull to sit from reclined position with RUE x 5 // functional reaching with RUE x 5 // Push up to midline from R elbow x 3 with min A // MMT LLE 0/5 for all major pivots       Neuromuscular Re-education  Neuromuscular Re-education Symptoms Noted During/After Treatment: fatigue;  patient O2 92-94% trhoughout sitting EOB, RN made aware   Neuromuscular Re-education Detail: Patient engaged in anterior/posterior and right lateral leans utilizing b/l hands to grasp to pull forward and return to neutral position. Patient able to utilize RUE to correct posture, no active movement noted from LUE. Patient noted with L gross grasp with 1/5, all other major pivots points 0/5.     Grooming Training  Grooming Training Assistance: minimum assist (75% patient effort);  while sitting EOB patient able to stablize toothpaste tube with L hand and manipulate cap with R hand to successfully unscrew cap. Required max A at trunk to maintain postural control while completing task ;  1 person assist;  verbal cues;  set-up required;  abnormal muscle tone;  decreased strength;  impaired balance;  impaired coordination;  impaired postural control            PM&R Impression: as above    Current Disposition Plan Recommendations:    subacute rehab placement

## 2022-04-08 NOTE — PROGRESS NOTE ADULT - PROBLEM SELECTOR PLAN 2
see above
Management as per Primary team
see above
Management as per Primary team
see above
see above
Management as per Primary team

## 2022-04-08 NOTE — PROGRESS NOTE ADULT - PROBLEM SELECTOR PROBLEM 3
Longstanding persistent atrial fibrillation

## 2022-04-08 NOTE — ADVANCED PRACTICE NURSE CONSULT - RECOMMEDATIONS
Left buttock and intergluteal cleft denuded skin - cleanse with cleansing wipe, apply Triad cream every other day or prn if soiled.   WOCN discussed assessment and recommendations with RNTuan and house staff,  Left buttock and intergluteal cleft denuded skin - cleanse with cleansing wipe, apply Triad cream every other day or prn if soiled.   WOCN discussed assessment and recommendations with RNTuan and neuro PADorita.

## 2022-04-08 NOTE — PROGRESS NOTE ADULT - PROBLEM SELECTOR PROBLEM 8
Leukocytosis
Leukocytosis
Nasal hemorrhage
Nasal hemorrhage
Leukocytosis
Nasal hemorrhage
Fever
Nasal hemorrhage

## 2022-04-08 NOTE — PROGRESS NOTE ADULT - REASON FOR ADMISSION
CVA, left facial, left-sided weakness

## 2022-04-08 NOTE — PROGRESS NOTE ADULT - PROBLEM SELECTOR PLAN 7
resolved
-likely a bit of starvation ketosis as AG increased slightly  -Monitor now that tube feeds have started
resolved
resolved
resolved  ENT recs appreciated
resolved

## 2022-04-08 NOTE — PROGRESS NOTE ADULT - SUBJECTIVE AND OBJECTIVE BOX
HPI:  75y/o M with PMHx sig for HTN, HLD, afib (on eliquis, unknown if last taken today,) CVA in  (with minimal residual right-sided weakness per girlfriend,) last known normal at 3pm when girlfriend spoke to him. 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 Upon arrival to [facility], BP noted to be 171/101. Stroke code was called. CTH without findings of hemorrhage. 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 NIHSS 20. No TPA was administered as Pt is out of window. Per patient's girlfriend, the basilar artery aneurysm is known and was diagnosed in  at [facility] when he suffered from a CVA. At that time, he underwent a "treatment for the aneurysm," which the girlfriend reports was too risky and was aborted. Patient never attended his follow-up appointments due to fear per girlfriend.  (10 Mar 2022 22:52)      Subjective:  Patient denies any acute complaints.    Hospital course:  3/10: Admitted for further workup of stroke symptoms and basilar artery aneurysm.  3/11: Karen placed overnight. 3% hypertonic saline started. Neuro exam stable. Started on vEEG for aphasia  3/12: GM overnight. Neuro exam stable. 3% d/c. home eliquis 5 bid. failed s/s. started on TF via NGT, spiked fever 101, f/u panculture   3/13: GM overnight, neuro stable, veeg negative. Vanc/zosyn started for empiric PNA.   3/14: GM o/n neuro stable. on HFNC. ENT plan for laryngoscope today with . failed s/s, pend repeat eval. dc'd captopril, started lisinopril. dc'd vanc. Laryngoscopy: No masses visualized, hypomobility of L sided vocal cords, dysarthric, hypophonic, risk for aspiration, good cough reflex.Dr. Onofre to discuss with Dr. Peterson regarding a possible vocal cord injection.   3/15: GM overnight, neuro stable, on HFNC 30/30 overnight, transitioned to 4LNC  3/16: GM overnight, neuro stable, tolerating NC and satting well    3/17: POD6 dx angio. GM o/n neuro stable. trickle feeds via NGT  3/18: POD7 GM o/n, saturating well in RA. tolerating TF   : POD8 GM o/n. gen surg consulted for PEG placement.  : POD9 GM o/n. Transferred back to ICU for heparin gtt in preparation for PEG placement Wed.   3/21: POD10 GM o/n, neuro exam stable. Transferred to ICU in preparation for hep gtt to start tomorrow, plan for PEG on Wednesday. F/u am coags.   3/22: POD11. GM o/n neuro stable. on heparin gtt ptt goal 40-60. o/n ptt >200c, held for 2 hours. on heparin @11. pre-op for PEG   3/23: POD12. o/n new R facial droop, CTH stable. heparin gtt d/c. preop PEG today  3/24: POD13, POD1 PEG placement, GM overnight, neuro stable, +BM  3/25: POD14, GM o/n, neuro stable, eliquis restarted. Started ceftriaxone for UTI  3/26: POD 15. GM overnight. exam stable. on abx until 3/26 for UTI. On eliquis for afib.   3/27: POD 16. o/n episode of epistaxis, aftrin administered. exam stable. abx completed for UTI. pending rehab. ENT consulted for epistaxis, recommended nasal saline and bactroban.  3/28: POD17. GM o/n, neuro stable.  3/29: POD18. GM o/n, neuro stable   3/30: POD18. GM o/n. neuro stable. upgraded to ICU for intractable epistaxis. ENT placed nasal dressings, bleeding controlled. Ancef while drains in place. Desatted to 80s, on 8L O2 via face tent. intubation held as per girlfriend, will continue to monitor respiratory status  3/31: POD19 GM overnight. Neuro exam stable. No episode of epistaxis o/n.  : POD20 GM overnight. Neruo exam stable. nasal packing removed. pt SDU status  : POD 21. GM. Plan to resume eliquis today.   4/3: POD 22. GM o/n. on hep gtt held at 7 am, possible resume eliquis today if epistaxis controlled. pending rehab   : POD 23, GM, hep gtt held, eliquis started   : POD 24 GM o/n. Restarted on eliquis. pending rehab. Tmax 100.1 axillary. pancultured, ?   : POD 25. GM o/n. afebrile. no growth on cultures. pending rehab   : POD . Desat to 87. on face tent 40%, PE protocol (-), UA+, pending UCx. Febrile to 100.5 x 1. No further fevers. Stable on face tent throughout day. Noted to have slight drop in BP to high-90s while sleeping - given 500ml LR x 1. Aggressive chest PT started to assist with O2 weaning.  : POD27. GM o/n, neuro stable, on face tent    Vital Signs Last 24 Hrs  T(C): 37.3 (2022 21:47), Max: 37.3 (2022 21:47)  T(F): 99.1 (2022 21:47), Max: 99.1 (2022 21:47)  HR: 66 (2022 00:08) (64 - 68)  BP: 115/55 (2022 00:08) (99/56 - 125/69)  BP(mean): 78 (2022 00:08) (75 - 89)  RR: 18 (2022 00:08) (18 - 20)  SpO2: 93% (2022 00:08) (92% - 97%)    I&O's Summary    2022 07:01  -  2022 07:00  --------------------------------------------------------  IN: 910 mL / OUT: 1450 mL / NET: -540 mL    2022 07:01  -  2022 00:14  --------------------------------------------------------  IN: 1670 mL / OUT: 750 mL / NET: 920 mL        PHYSICAL EXAM:  General: patient seen laying supine in bed in NAD  Neuro: AAOx3, FC, OEs to voice, expressive aphasia, L facial droop, hypophonic and dysarthric speech. RUE 5/5 throughout, RLE HF/KF/KE 2/5, DF/PF 3/5, LUE 1/5 hand  0/5 shoulder/biceps/triceps, LLE w/d to noxious.  sensation intact to light touch throughout  HEENT: PERRL, EOMI  Neck: supple  Cardiac: RRR, S1S2  Pulmonary: chest rise symmetric  Abdomen: soft, nontender, nondistended, +PEG  Ext: perfusing well  Skin: warm, dry    DIET:  [] NPO  [] Mechanical  [x] Tube feeds    LABS:                        12.2   7.62  )-----------( 229      ( 2022 07:52 )             37.7     04-07    138  |  104  |  31<H>  ----------------------------<  145<H>  4.3   |  26  |  0.73    Ca    8.7      2022 07:52  Phos  3.6     04-07  Mg     2.2     04-07        Urinalysis Basic - ( 2022 00:37 )    Color: Yellow / Appearance: Clear / S.015 / pH: x  Gluc: x / Ketone: NEGATIVE  / Bili: Negative / Urobili: 4.0 E.U./dL   Blood: x / Protein: NEGATIVE mg/dL / Nitrite: POSITIVE   Leuk Esterase: Moderate / RBC: < 5 /HPF / WBC 5-10 /HPF   Sq Epi: x / Non Sq Epi: 0-5 /HPF / Bacteria: Present /HPF          CAPILLARY BLOOD GLUCOSE          Drug Levels: [] N/A    CSF Analysis: [] N/A      Allergies    No Known Allergies    Intolerances      MEDICATIONS:  Antibiotics:    Neuro:  acetaminophen    Suspension .. 650 milliGRAM(s) Oral every 6 hours PRN    Anticoagulation:  apixaban 5 milliGRAM(s) Enteral Tube every 12 hours    OTHER:  albuterol/ipratropium for Nebulization 3 milliLiter(s) Nebulizer every 6 hours  amLODIPine   Tablet 10 milliGRAM(s) Oral daily  atorvastatin 80 milliGRAM(s) Oral at bedtime  BACItracin   Ointment 1 Application(s) Topical two times a day  carvedilol 6.25 milliGRAM(s) Oral every 12 hours  glucagon  Injectable 1 milliGRAM(s) IntraMuscular once  lisinopril 20 milliGRAM(s) Oral every 24 hours  mupirocin 2% Nasal 1 Application(s) Both Nostrils two times a day  polyethylene glycol 3350 17 Gram(s) Oral every 12 hours  sodium chloride 0.65% Nasal 1 Spray(s) Both Nostrils four times a day    IVF:    CULTURES:  Culture Results:   No growth at 2 days. ( @ 10:03)  Culture Results:   No growth at 2 days. ( @ 10:03)    RADIOLOGY & ADDITIONAL TESTS:  < from: CT Angio Chest PE Protocol w/ IV Cont (22 @ 02:10) >  IMPRESSION: Limited study due to patient movement and respiration.  No visualized pulmonary embolus.  7 mm solid nodule in the left lower lobe. Recommend follow-up CT in 6-12   months.    < end of copied text >    BASILAR ARTERY ANEURYSM    Handoff    MEWS Score    Brain aneurysm    Brain aneurysm    Psychological factors affecting medical condition    Angiogram, carotid and cerebral, bilateral    Basilar artery aneurysm    Basilar artery aneurysm    Brain stem compression    Longstanding persistent atrial fibrillation    HTN (hypertension)    Hyperlipidemia    Acidosis    Leukocytosis    Dysphagia    Hypocalcemia    Hypophosphatemia    Acute respiratory failure with hypoxia    Nasal hemorrhage    Fever    Angiogram, carotid and cerebral, bilateral    EGD, with PEG    STROKE    EGD, with PEG    Brain stem compression    Longstanding persistent atrial fibrillation    HTN (hypertension)    Hyperlipidemia    Acidosis    Leukocytosis    Dysphagia    Hypocalcemia    Hypophosphatemia    Acute respiratory failure with hypoxia    History of CVA (cerebrovascular accident)    [email or web address]        ASSESSMENT:  75 y/o M with PMHx sig for HTN, HLD, afib (on eliquis, unknown if last taken today,) CVA in  (with minimal residual right-sided weakness per girlfriend) p/w dysarthria, left facial droop, and left-sided weakness. CTH without findings of hemorrhage. 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 NIHSS 20. Pt is not a tpa candidate as Pt is out of window. Patient now s/p diagnostic angio with findings of partially thrombosed basilar artery aneurysm 3/11/22. course c/b resp insufficiency weaned off HFNC, now complicated by epistaxis (resolved). Pt with desat to 80s overnight , CTPE negative, concern for aspiration pneumonitis, on face mask now.    PLAN:   Neuro:   - Neuro checks q4/vital signs q4hrs   - MR head 3/11: no acute infarcts   - CTH 3/22: stable   - EEG negative 3/13, dc'd   - no plan for further neurosurgical intervention    - stroke core measures   - Psych recs outpt f/u (refer to note)     Cardio:  - SBP goal 100-160   - Amlodipine 10mg, carvedilol 6.25mg BID, Lisinopril 20mg (increase to 40mg if needed)- home HCTZ held   echo 3/11: mild LVH, EF 65-70%   - Eliquis 5mg restarted 4/3 10pm   - PVCs 3/26, per cardiology NTD   - cont home lipitor 80mg     Pulm:  - CT PE protocol neg , concern aspiration pneumonitis  - on face tent  - standing mucomyst and duonebs   - Chest PT   - aspiration precaution, HOB up   - CT Chest  showing 7mm LLL lung nodule - recommend follow up CT Chest in 6-12 mos    ENT:   - ENT consulted, s/p laryngoscopy 3/14: No masses, hypomobility of L sided vocal cord  - f/u outpatient with Dr. Onofre/Dr. Peterson for possible vocal cord injection.   - s/p epistaxis episode 3/26: intranasal bactroban BID and saline spray q4 to moisturize can use afrin and manual pressure x 20 minutes if bleeds again   - Nasal packing 3/30-, now removed replaced w/ Surgiflo (absorbable)   - bacitracin BID to bilat nostrils   - nasal saline 4x day apply to both nostrils     Renal:  - Na goal 135-145   - no issues     GI:   - TF via peg  - bowel regimen   - last BM       Endo:  - A1C 5.4     Heme:  - SCDs for DVT ppx   - eliquis 5mg BID  - LE dopplers 3/11, 3/23: neg for DVT   - pending repeat LE dopplers  - LUE US 3/22: superficial thrombus L basilic/cephalic vein LUE US 4/3 negative    ID:  - Completed zosyn empirically for PNA on 3/17, completed empiric Ceftriaxone x3 days for UTI  (3/25-3/27)   - Last Pancx    - Fever 100.5 on 4/7 x 1, procal 0.06, UA positive, though possibly chronic  - Follow up Urine Cx  - Hold off abx for now, if spikes again will consider starting    ORTHO:  - L foot xray 3/22: chronic 5th digit middle phalanx base corner fracture     Dispo:   - SDU status  - full code  - Pending BUNNY  - Family updated     Assessment and plan discussed with Dr. Peterson     Assessment:  Present when checked    []  GCS  E   V  M     Heart Failure: []Acute, [] acute on chronic , []chronic  Heart Failure:  [] Diastolic (HFpEF), [] Systolic (HFrEF), []Combined (HFpEF and HFrEF), [] RHF, [] Pulm HTN, [] Other    [] LALO, [] ATN, [] AIN, [] other  [] CKD1, [] CKD2, [] CKD 3, [] CKD 4, [] CKD 5, []ESRD    Encephalopathy: [] Metabolic, [] Hepatic, [] toxic, [] Neurological, [] Other    Abnormal Nurtitional Status: [] malnurtition (see nutrition note), [ ]underweight: BMI < 19, [] morbid obesity: BMI >40, [] Cachexia    [] Sepsis  [] hypovolemic shock,[] cardiogenic shock, [] hemorrhagic shock, [] neuogenic shock  [] Acute Respiratory Failure  []Cerebral edema, [] Brain compression/ herniation,   [] Functional quadriplegia  [] Acute blood loss anemia

## 2022-04-08 NOTE — PROGRESS NOTE ADULT - PROVIDER SPECIALTY LIST ADULT
NSICU
Neurosurgery
Rehab Medicine
Surgery
Cardiology
Cardiology
Hospitalist
NSICU
Neurosurgery
Rehab Medicine
Rehab Medicine
ENT
Hospitalist
NSICU
Neurosurgery
Rehab Medicine
Surgery
Surgery
ENT
NSICU
NSICU
Neurosurgery
Rehab Medicine
Surgery
Surgery
NSICU
Hospitalist

## 2022-04-08 NOTE — ADVANCED PRACTICE NURSE CONSULT - ASSESSMENT
Left buttock and intergluteal cleft skin denuded. Denuded skin associated with moisture, not pressure. Skin breakdown not over bony prominence and not pressure injuries. Patient requires a 2 - 3 person assist to turn in bed. Positioning pillow being used to turn and reposition the patient in bed. Bilateral heel protectors on to offload the patient's heels. The patient lying on a [employer] air transfer system for repositioning and boosting in bed. Tuan RIOJAS present and assisted during assessment.

## 2022-04-08 NOTE — DISCHARGE NOTE NURSING/CASE MANAGEMENT/SOCIAL WORK - NSDCPEFALRISK_GEN_ALL_CORE
For information on Fall & Injury Prevention, visit: [email or web address] OR  [email or web address] OR  https://www.cdc.gov/steadi/patient.html

## 2022-04-08 NOTE — DISCHARGE NOTE NURSING/CASE MANAGEMENT/SOCIAL WORK - NSDCFUADDAPPT_GEN_ALL_CORE_FT
Please follow up with Dr. Peterson (Neurosurgeon) and Dr. Murillo (Neuroendovascular surgeon)    Please follow up with ENT - follow up outpatient for possible outpatient vocal fold injection.     Follow up with General Surgery or GI outpatient for PEG management.     Please follow up with your primary care doctor outpatient.     Make appointment to followup with one of the following within 1 week of discharge:    •	[facility])  o	www.[email or web address]  o	Geared towards an aging population, with the understanding that their clients will need increasingly comprehensive services.  Must be over 55.    o	[address] [place]  [address]  [phone]  o	Medicare, Medicaid, AARP, Aetna, [employer]inity, AmeriChoice, [facility] [employer] [facility], [employer], Cigna, [facility], BCBS, [place], [employer]HI, HealthFirst, HIP, Optum, [place], United, ValueOptions  •	[facility]  o	[email or web address]  o	[address] [place] [address]  (corner of [address] and [facility])  Telephone: [phone]  o	Medicare, Medicaid, most private insurance  •	[facility]  o	[email or web address]  o	Two locations  §	[address]  ([address])  [place] [address]  [phone]  §	[address], [place] [address] [phone]  o	Medicare, Medicaid, most private insurance

## 2022-04-08 NOTE — PROGRESS NOTE ADULT - PROBLEM SELECTOR PLAN 6
Continue Atorvastatin
atorva
atorvastatin
Continue Atorvastatin
atorva
atorvastatin
atorva
Continue Atorvastatin

## 2022-04-08 NOTE — PROGRESS NOTE ADULT - ASSESSMENT
per Neurosurgery    75 y/o M with PMHx sig for HTN, HLD, afib (on eliquis, unknown if last taken today,) CVA in 2020 (with minimal residual right-sided weakness per girlfriend) p/w dysarthria, left facial droop, and left-sided weakness. CTH without findings of hemorrhage. 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 NIHSS 20. Pt is not a tpa candidate as Pt is out of window. Patient now s/p diagnostic angio with findings of partially thrombosed basilar artery aneurysm 3/11/22. course c/b resp insufficiency weaned off HFNC, now complicated by epistaxis (resolved). Pt with desat to 80s overnight 4/7, CTPE negative, concern for aspiration pneumonitis, on face mask now.    PLAN:   Neuro:   - Neuro checks q4/vital signs q4hrs   - MR head 3/11: no acute infarcts   - CTH 3/22: stable   - EEG negative 3/13, dc'd   - no plan for further neurosurgical intervention    - stroke core measures   - Psych recs outpt f/u (refer to note)     Cardio:  - SBP goal 100-160   - Amlodipine 10mg, carvedilol 6.25mg BID, Lisinopril 20mg (increase to 40mg if needed)- home HCTZ held   echo 3/11: mild LVH, EF 65-70%   - Eliquis 5mg restarted 4/3 10pm   - PVCs 3/26, per cardiology NTD   - cont home lipitor 80mg     Pulm:  - CT PE protocol neg 4/7, concern aspiration pneumonitis  - on face tent  - standing mucomyst and duonebs   - Chest PT   - aspiration precaution, HOB up   - CT Chest 4/7 showing 7mm LLL lung nodule - recommend follow up CT Chest in 6-12 mos    ENT:   - ENT consulted, s/p laryngoscopy 3/14: No masses, hypomobility of L sided vocal cord  - f/u outpatient with Dr. Onofre/Dr. Peterson for possible vocal cord injection.   - s/p epistaxis episode 3/26: intranasal bactroban BID and saline spray q4 to moisturize can use afrin and manual pressure x 20 minutes if bleeds again   - Nasal packing 3/30-4/1, now removed replaced w/ Surgiflo (absorbable)   - bacitracin BID to bilat nostrils   - nasal saline 4x day apply to both nostrils     Renal:  - Na goal 135-145   - no issues     GI:   - TF via peg  - bowel regimen   - last BM 4/5      Endo:  - A1C 5.4     Heme:  - SCDs for DVT ppx   - eliquis 5mg BID  - LE dopplers 3/11, 3/23: neg for DVT   - pending repeat LE dopplers  - LUE US 3/22: superficial thrombus L basilic/cephalic vein LUE US 4/3 negative    ID:  - Completed zosyn empirically for PNA on 3/17, completed empiric Ceftriaxone x3 days for UTI  (3/25-3/27)   - Last Pancx 4/5   - Fever 100.5 on 4/7 x 1, procal 0.06, UA positive, though possibly chronic  - Follow up Urine Cx  - Hold off abx for now, if spikes again will consider starting    ORTHO:  - L foot xray 3/22: chronic 5th digit middle phalanx base corner fracture     Dispo:   - SDU status  - full code  - Pending BUNNY  - Family updated

## 2022-04-08 NOTE — PROGRESS NOTE ADULT - ASSESSMENT
77 y/o M with PMHx sig for HTN, HLD, afib (on eliquis, unknown if last taken today,) CVA in 2020 (with minimal residual right-sided weakness per girlfriend) p/w dysarthria, left facial droop, and left-sided weakness. CTH without findings of hemorrhage. 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 NIHSS 20. Pt is not a tpa candidate as Pt is out of window. Patient now s/p diagnostic angio with findings of partially thrombosed basilar artery aneurysm 3/11/22. course c/b resp insufficiency weaned off HFNC, now complicated by epistaxis (resolved).

## 2022-04-08 NOTE — DISCHARGE NOTE NURSING/CASE MANAGEMENT/SOCIAL WORK - PATIENT PORTAL LINK FT
You can access the FollowMyHealth Patient Portal offered by [facility] by registering at the following website: [email or web address]. By joining [employer]’s FollowMyHealth portal, you will also be able to view your health information using other applications (apps) compatible with our system.

## 2022-04-08 NOTE — PROGRESS NOTE ADULT - PROBLEM SELECTOR PROBLEM 2
Brain stem compression

## 2022-04-08 NOTE — PROGRESS NOTE ADULT - PROBLEM SELECTOR PLAN 1
Management as per primary team
as per primary team
Management as per primary team
as per primary team
Management as per primary team

## 2022-04-08 NOTE — ADVANCED PRACTICE NURSE CONSULT - REASON FOR CONSULT
[facility] nurse consult to assess left buttock DTPI. Per the H&P, the patient is a  77y/o M with PMHx sig for HTN, HLD, afib (on eliquis, unknown if last taken today,) CVA in 2020 (with minimal residual right-sided weakness per girlfriend,) last known normal at 3pm when girlfriend spoke to him. 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 Upon arrival to [facility], BP noted to be 171/101. Stroke code was called. CTH without findings of hemorrhage. 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 NIHSS 20. No TPA was administered as Pt is out of window. Per patient's girlfriend, the basilar artery aneurysm is known and was diagnosed in 2020 at [facility] when he suffered from a CVA.

## 2022-04-08 NOTE — PROGRESS NOTE ADULT - PROBLEM SELECTOR PROBLEM 7
Acidosis
Leukocytosis
Nasal hemorrhage
Leukocytosis
Acidosis
Acidosis
Leukocytosis
Leukocytosis

## 2022-04-08 NOTE — PROGRESS NOTE ADULT - PROBLEM SELECTOR PLAN 4
s/p PEG tolerating feeds
-S/P PEG placement with Surgery team 4, tolerating feeds  -Due to aneurysm as noted above
s/p PEG tolerating feeds
-S/P PEG placement with Surgery team 4, starting feeds this morning  -Due to aneurysm as noted above
s/p PEG tolerating feeds
-S/P PEG placement with Surgery team 4, tolerating feeds  -Due to aneurysm as noted above

## 2022-04-08 NOTE — PROGRESS NOTE ADULT - SUBJECTIVE AND OBJECTIVE BOX
O/N Events: GM  Subjective/ROS: No complaints. Denies HA, CP, SOB, n/v, changes in bowel/urinary habits.  12pt ROS otherwise negative.    VITALS  Vital Signs Last 24 Hrs  T(C): 36.8 (2022 05:04), Max: 37.3 (2022 21:47)  T(F): 98.3 (2022 05:04), Max: 99.1 (2022 21:47)  HR: 66 (2022 00:08) (64 - 68)  BP: 115/55 (2022 00:08) (99/56 - 125/69)  BP(mean): 78 (2022 00:08) (75 - 89)  RR: 18 (2022 00:08) (18 - 19)  SpO2: 93% (2022 00:08) (92% - 97%)    I&O's Summary    2022 07:01  -  2022 07:00  --------------------------------------------------------  IN: 2050 mL / OUT: 1100 mL / NET: 950 mL        CAPILLARY BLOOD GLUCOSE          PHYSICAL EXAM  General: A&Ox2; NAD  Head: dried blood below  right nares, c/d/i  Neck: Supple; no JVD  Respiratory: CTA B/L; no wheezes/crackles/rales auscultated w/ good air movement  Cardiovascular: Regular rhythm/rate; S1/S2; no gallops or murmurs auscultated  Gastrointestinal: Soft; NTND w/out rebound tenderness or guarding; bowel sounds normal  Extremities: WWP; no edema or cyanosis; radial/pedal pulses palpable  Neurological:  left facial droop , dyasthria, left upper and lower extremity strength 2/5 , right upper and lower extremity strength 4/5   Skin: No rashes noted  Vasc: +2 DP/PT pulses b/l   Psych: Appropriate     MEDICATIONS  (STANDING):  albuterol/ipratropium for Nebulization 3 milliLiter(s) Nebulizer every 6 hours  amLODIPine   Tablet 10 milliGRAM(s) Oral daily  apixaban 5 milliGRAM(s) Enteral Tube every 12 hours  atorvastatin 80 milliGRAM(s) Oral at bedtime  BACItracin   Ointment 1 Application(s) Topical two times a day  carvedilol 6.25 milliGRAM(s) Oral every 12 hours  glucagon  Injectable 1 milliGRAM(s) IntraMuscular once  lisinopril 20 milliGRAM(s) Oral every 24 hours  mupirocin 2% Nasal 1 Application(s) Both Nostrils two times a day  polyethylene glycol 3350 17 Gram(s) Oral every 12 hours  sodium chloride 0.65% Nasal 1 Spray(s) Both Nostrils four times a day    MEDICATIONS  (PRN):  acetaminophen    Suspension .. 650 milliGRAM(s) Oral every 6 hours PRN Temp greater or equal to 38C (100.4F), Mild Pain (1 - 3)      No Known Allergies      LABS                        11.4   6.28  )-----------( 211      ( 2022 06:29 )             34.0     04-08    134<L>  |  102  |  28<H>  ----------------------------<  159<H>  4.3   |  25  |  0.61    Ca    8.5      2022 06:28  Phos  2.9     04-08  Mg     1.9     04-08        Urinalysis Basic - ( 2022 00:37 )    Color: Yellow / Appearance: Clear / S.015 / pH: x  Gluc: x / Ketone: NEGATIVE  / Bili: Negative / Urobili: 4.0 E.U./dL   Blood: x / Protein: NEGATIVE mg/dL / Nitrite: POSITIVE   Leuk Esterase: Moderate / RBC: < 5 /HPF / WBC 5-10 /HPF   Sq Epi: x / Non Sq Epi: 0-5 /HPF / Bacteria: Present /HPF            IMAGING/EKG/ETC: reviewed

## 2022-04-08 NOTE — PROGRESS NOTE ADULT - PROBLEM SELECTOR PLAN 8
resolved, heparin gtt to start today  ENT recs appreciated
resolved  ENT recs appreciated
resolved  ENT recs appreciated
-Likely reactive, no sign of acitve infection at this time
resolved  ENT recs appreciated
resolved, heparin gtt to start today  ENT recs appreciated
Likely pneumonitis from microaspiration. No indication for antibiotics as no infiltrate or pro britta noted. Should be provided continued speech/swallow therapy to strengthen swallow. Tube feeds increase risk of further aspirations. Tylenol for fevers.
-Likely reactive, no sign of acitve infection at this time
-Likely reactive, no sign of acitve infection at this time
resolved  ENT recs appreciated

## 2022-04-08 NOTE — PROGRESS NOTE ADULT - PROBLEM SELECTOR PLAN 3
Coreg  -Eliquis 5mg BID
Coreg  -Eliquis 5mg BID
-Transition to Eliquis   -Currently rate controlled
Coreg  -Eliquis 5mg BID
Coreg  -Transition to Eliquis 5mg BID
-Transition to Eliquis   -Currently rate controlled
Coreg  -Heparin gtt to start today, if tolerates for 24h will transition to eliquis
Coreg  -Eliquis 5mg BID
-Has been on Heparin gtt without complication  -Transition to injectable prophylaxis for 24h  -Transition to Eliquis tomorrow  -Currently rate controlled
Coreg  -Eliquis 5mg BID

## 2022-04-09 LAB
-  AMPICILLIN: SIGNIFICANT CHANGE UP
-  AZTREONAM: SIGNIFICANT CHANGE UP
-  CEFEPIME: SIGNIFICANT CHANGE UP
-  CIPROFLOXACIN: SIGNIFICANT CHANGE UP
-  CIPROFLOXACIN: SIGNIFICANT CHANGE UP
-  GENTAMICIN: SIGNIFICANT CHANGE UP
-  LEVOFLOXACIN: SIGNIFICANT CHANGE UP
-  NITROFURANTOIN: SIGNIFICANT CHANGE UP
-  PIPERACILLIN/TAZOBACTAM: SIGNIFICANT CHANGE UP
-  TETRACYCLINE: SIGNIFICANT CHANGE UP
-  TOBRAMYCIN: SIGNIFICANT CHANGE UP
-  VANCOMYCIN: SIGNIFICANT CHANGE UP
CULTURE RESULTS: SIGNIFICANT CHANGE UP
METHOD TYPE: SIGNIFICANT CHANGE UP
METHOD TYPE: SIGNIFICANT CHANGE UP
ORGANISM # SPEC MICROSCOPIC CNT: SIGNIFICANT CHANGE UP
SPECIMEN SOURCE: SIGNIFICANT CHANGE UP

## 2022-04-10 LAB
CULTURE RESULTS: SIGNIFICANT CHANGE UP
CULTURE RESULTS: SIGNIFICANT CHANGE UP
SPECIMEN SOURCE: SIGNIFICANT CHANGE UP
SPECIMEN SOURCE: SIGNIFICANT CHANGE UP

## 2022-04-12 DIAGNOSIS — G81.94 HEMIPLEGIA, UNSPECIFIED AFFECTING LEFT NONDOMINANT SIDE: ICD-10-CM

## 2022-04-12 DIAGNOSIS — I45.10 UNSPECIFIED RIGHT BUNDLE-BRANCH BLOCK: ICD-10-CM

## 2022-04-12 DIAGNOSIS — R04.0 EPISTAXIS: ICD-10-CM

## 2022-04-12 DIAGNOSIS — N39.0 URINARY TRACT INFECTION, SITE NOT SPECIFIED: ICD-10-CM

## 2022-04-12 DIAGNOSIS — E83.39 OTHER DISORDERS OF PHOSPHORUS METABOLISM: ICD-10-CM

## 2022-04-12 DIAGNOSIS — J38.00 PARALYSIS OF VOCAL CORDS AND LARYNX, UNSPECIFIED: ICD-10-CM

## 2022-04-12 DIAGNOSIS — D72.828 OTHER ELEVATED WHITE BLOOD CELL COUNT: ICD-10-CM

## 2022-04-12 DIAGNOSIS — I65.1 OCCLUSION AND STENOSIS OF BASILAR ARTERY: ICD-10-CM

## 2022-04-12 DIAGNOSIS — E87.2 ACIDOSIS: ICD-10-CM

## 2022-04-12 DIAGNOSIS — I48.11 LONGSTANDING PERSISTENT ATRIAL FIBRILLATION: ICD-10-CM

## 2022-04-12 DIAGNOSIS — I49.3 VENTRICULAR PREMATURE DEPOLARIZATION: ICD-10-CM

## 2022-04-12 DIAGNOSIS — Q25.46 TORTUOUS AORTIC ARCH: ICD-10-CM

## 2022-04-12 DIAGNOSIS — R53.1 WEAKNESS: ICD-10-CM
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
## 2022-04-12 DIAGNOSIS — J38.4 EDEMA OF LARYNX: ICD-10-CM

## 2022-04-12 DIAGNOSIS — R47.01 APHASIA: ICD-10-CM

## 2022-04-12 DIAGNOSIS — R47.1 DYSARTHRIA AND ANARTHRIA: ICD-10-CM

## 2022-04-12 DIAGNOSIS — I10 ESSENTIAL (PRIMARY) HYPERTENSION: ICD-10-CM

## 2022-04-12 DIAGNOSIS — J96.01 ACUTE RESPIRATORY FAILURE WITH HYPOXIA: ICD-10-CM

## 2022-04-12 DIAGNOSIS — J69.0 PNEUMONITIS DUE TO INHALATION OF FOOD AND VOMIT: ICD-10-CM

## 2022-04-12 DIAGNOSIS — R29.810 FACIAL WEAKNESS: ICD-10-CM

## 2022-04-12 DIAGNOSIS — E83.51 HYPOCALCEMIA: ICD-10-CM

## 2022-04-12 DIAGNOSIS — I44.0 ATRIOVENTRICULAR BLOCK, FIRST DEGREE: ICD-10-CM

## 2022-04-12 DIAGNOSIS — I72.5 ANEURYSM OF OTHER PRECEREBRAL ARTERIES: ICD-10-CM

## 2022-04-12 DIAGNOSIS — G93.5 COMPRESSION OF BRAIN: ICD-10-CM

## 2022-04-12 DIAGNOSIS — I69.351 HEMIPLEGIA AND HEMIPARESIS FOLLOWING CEREBRAL INFARCTION AFFECTING RIGHT DOMINANT SIDE: ICD-10-CM

## 2022-04-12 DIAGNOSIS — E78.5 HYPERLIPIDEMIA, UNSPECIFIED: ICD-10-CM

## 2022-04-12 DIAGNOSIS — R29.720 NIHSS SCORE 20: ICD-10-CM

## 2022-04-12 DIAGNOSIS — S92.522A DISPLACED FRACTURE OF MIDDLE PHALANX OF LEFT LESSER TOE(S), INITIAL ENCOUNTER FOR CLOSED FRACTURE: ICD-10-CM

## 2022-04-12 DIAGNOSIS — Z79.01 LONG TERM (CURRENT) USE OF ANTICOAGULANTS: ICD-10-CM

## 2022-04-12 DIAGNOSIS — M48.10 ANKYLOSING HYPEROSTOSIS [FORESTIER], SITE UNSPECIFIED: ICD-10-CM

## 2022-04-12 DIAGNOSIS — E43 UNSPECIFIED SEVERE PROTEIN-CALORIE MALNUTRITION: ICD-10-CM

## 2022-04-12 DIAGNOSIS — I82.612 ACUTE EMBOLISM AND THROMBOSIS OF SUPERFICIAL VEINS OF LEFT UPPER EXTREMITY: ICD-10-CM
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
## 2022-04-12 DIAGNOSIS — R13.10 DYSPHAGIA, UNSPECIFIED: ICD-10-CM

## 2022-05-19 PROBLEM — Z86.79 HISTORY OF HYPERTENSION: Status: RESOLVED | Noted: 2022-05-19 | Resolved: 2022-05-19

## 2022-05-19 PROBLEM — Z86.39 HISTORY OF HYPERLIPIDEMIA: Status: RESOLVED | Noted: 2022-05-19 | Resolved: 2022-05-19

## 2022-05-19 PROBLEM — Z86.73 HISTORY OF CEREBROVASCULAR ACCIDENT: Status: RESOLVED | Noted: 2022-05-19 | Resolved: 2022-05-19

## 2022-05-19 PROBLEM — Z86.79 HISTORY OF CHRONIC ATRIAL FIBRILLATION: Status: RESOLVED | Noted: 2022-05-19 | Resolved: 2022-05-19

## 2022-05-19 PROBLEM — G93.5: Status: RESOLVED | Noted: 2022-05-19 | Resolved: 2022-05-19

## 2022-05-19 PROBLEM — Z87.898 HISTORY OF EPISTAXIS: Status: RESOLVED | Noted: 2022-05-19 | Resolved: 2022-05-19

## 2022-05-19 RX ORDER — AMLODIPINE BESYLATE 5 MG/1
5 TABLET ORAL
Refills: 0 | Status: ACTIVE | COMMUNITY

## 2022-05-19 RX ORDER — CARVEDILOL 6.25 MG/1
6.25 TABLET, FILM COATED ORAL
Refills: 0 | Status: ACTIVE | COMMUNITY

## 2022-05-19 RX ORDER — LISINOPRIL 20 MG/1
20 TABLET ORAL
Refills: 0 | Status: ACTIVE | COMMUNITY

## 2022-05-19 RX ORDER — APIXABAN 5 MG/1
5 TABLET, FILM COATED ORAL
Refills: 0 | Status: ACTIVE | COMMUNITY

## 2022-05-19 RX ORDER — ATORVASTATIN CALCIUM 80 MG/1
80 TABLET, FILM COATED ORAL
Refills: 0 | Status: ACTIVE | COMMUNITY

## 2022-05-20 ENCOUNTER — APPOINTMENT (OUTPATIENT)
Dept: NEUROSURGERY | Facility: CLINIC | Age: 77
End: 2022-05-20
Payer: COMMERCIAL

## 2022-05-20 DIAGNOSIS — Z86.79 PERSONAL HISTORY OF OTHER DISEASES OF THE CIRCULATORY SYSTEM: ICD-10-CM

## 2022-05-20 DIAGNOSIS — G93.5 COMPRESSION OF BRAIN: ICD-10-CM

## 2022-05-20 DIAGNOSIS — Z87.898 PERSONAL HISTORY OF OTHER SPECIFIED CONDITIONS: ICD-10-CM

## 2022-05-20 DIAGNOSIS — Z86.39 PERSONAL HISTORY OF OTHER ENDOCRINE, NUTRITIONAL AND METABOLIC DISEASE: ICD-10-CM

## 2022-05-20 DIAGNOSIS — I72.5 ANEURYSM OF OTHER PRECEREBRAL ARTERIES: ICD-10-CM

## 2022-05-20 DIAGNOSIS — Z86.73 PERSONAL HISTORY OF TRANSIENT ISCHEMIC ATTACK (TIA), AND CEREBRAL INFARCTION W/OUT RESIDUAL DEFICITS: ICD-10-CM

## 2022-05-20 PROCEDURE — 99213 OFFICE O/P EST LOW 20 MIN: CPT | Mod: 95

## 2022-05-21 PROBLEM — I72.5 BASILAR ARTERY ANEURYSM: Status: ACTIVE | Noted: 2022-05-19

## 2022-05-22 NOTE — ASSESSMENT
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 No surgical intervention recommended. Patient to continue Eliquis 5 mg BID.\par \par Patient to follow up in the office in 3 months (Aug 2022) for status update.\par \par \par I, Dr. Peterson, personally performed the evaluation and management (E/M) services for this established patient who presents today with (a) new problem(s)/exacerbation of (an) existing condition(s). That E/M includes conducting the examination, assessing all new/exacerbated conditions, and establishing a new plan of care. Today, my ACP, Hortencia Lind, was here to observe my evaluation and management services for this new problem/exacerbated condition to be followed going forward.\par \par  Low Suspicion of COVID-19

## 2022-05-22 NOTE — HISTORY OF PRESENT ILLNESS
[Home] : at home, [unfilled] , at the time of the visit. [Medical Office: ([facility])___] : at the medical office located in  [Verbal consent obtained from patient] : the patient, [unfilled] [FreeTextEntry1] : \par \par 75y/o M with PMHx sig for HTN, HLD, Afib (on Eliquis) CVA in 2020 (with minimal residual right-sided weakness per girlfriend,). He was found to have new onset dysarthria and left hemiparesis. Upon arrival to [facility], BP noted to be 171/101. . CTH without findings of hemorrhage. 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 MRI did not reveal an acute ischemic stroke.  Per patient's girlfriend, the basilar artery aneurysm is known and was diagnosed in 2020 at [facility] when he suffered from a CVA. At that time, he underwent a "treatment for the aneurysm," which the girlfriend reports was too risky and was aborted. Patient never attended his follow-up appointments due to fear per girlfriend. Cerebral angiogram from 3/11/ 2022 by Dr. Murillo confirmed the finding. \par \par The case was discussed on Cerebrovascular Conference with the recommendation to continue medical therapy with Eliquis and aspirin.\par \par Hospital course c/b: dysphagia (s/p PEG), UTI (completed course of abx), epistaxis (surgiflow in place in R nare) \par \par Patient evaluated by PT/OT who recommended: Subacute Rehab - [facility] \par \par Exam on day of discharge:\par General: NAD, pt is comfortably sitting up in bed, on room air\par HEENT: CN II-XII grossly intact, PERRL 3mm briskly reactive, EOMI b/l, face symmetric, tongue midline, neck FROM\par Neuro: A&Ox3 with choices, expressive aphasia, dysarthric but attempting to mouth words. Follows commands.\par Motor: RUE 5/5 throughout, RLE 2/5 throughout LUE wiggles fingers o/w 0/5 and withdrawing briskly, LLE WD. SILT throughout \par \par [facility] \par ENT consulted (DR. Marco A Bond): Nasal packing in R nare absorbable, follow up for possible vocal cord injection\par General Surgery consulted (Dr. Lida Knowles): PEG management \par \par Today, he presents via Telehealth with girlfriend for a follow up. No new neuro symptoms reported. He remains on Eliquis 5 mg BID.\par \par \par ,\par

## 2022-06-06 NOTE — PROGRESS NOTE ADULT - PROBLEM SELECTOR PROBLEM 5
HTN (hypertension)
Performing Laboratory: [id number]
Performing Laboratory: [id number]
HTN (hypertension)

## 2022-06-24 RX ORDER — AMLODIPINE BESYLATE 2.5 MG/1
1 TABLET ORAL
Qty: 0 | Refills: 0 | DISCHARGE

## 2022-06-24 RX ORDER — APIXABAN 2.5 MG/1
5 TABLET, FILM COATED ORAL
Qty: 0 | Refills: 0 | DISCHARGE

## 2022-06-24 RX ORDER — ATORVASTATIN CALCIUM 80 MG/1
1 TABLET, FILM COATED ORAL
Qty: 0 | Refills: 0 | DISCHARGE

## 2022-06-24 RX ORDER — CAPTOPRIL 12.5 MG/1
0 TABLET ORAL
Qty: 0 | Refills: 0 | DISCHARGE

## 2022-06-24 RX ORDER — APIXABAN 2.5 MG/1
0 TABLET, FILM COATED ORAL
Qty: 0 | Refills: 0 | DISCHARGE

## 2022-06-24 RX ORDER — CAPTOPRIL 12.5 MG/1
1 TABLET ORAL
Qty: 0 | Refills: 0 | DISCHARGE

## 2022-06-24 RX ORDER — CARVEDILOL PHOSPHATE 80 MG/1
0 CAPSULE, EXTENDED RELEASE ORAL
Qty: 0 | Refills: 0 | DISCHARGE

## 2023-01-17 NOTE — PROGRESS NOTE ADULT - SUBJECTIVE AND OBJECTIVE BOX
HPI:  75y/o M with PMHx sig for HTN, HLD, afib (on eliquis, unknown if last taken today,) CVA in 2020 (with minimal residual right-sided weakness per girlfriend,) last known normal at 3pm when girlfriend spoke to him. EMS found patient in his office at [employer] (he is a neuroscience professor,) with dysarthria, left facial droop, and left-sided weakness. Upon arrival to [facility], BP noted to be 171/101. Stroke code was called. CTH without findings of hemorrhage. 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 NIHSS 20. Per patient's girlfriend, the basilar artery aneurysm is known and was diagnosed in 2020 at [facility] when he suffered from a CVA. At that time, he underwent a "treatment for the aneurysm," which the girlfriend reports was too risky and was aborted. Patient never attended his follow-up appointments.     S/Overnight events: GM overnight, transferred to ICU in preparation for hep gtt to start tomorrow, plan for PEG on Wednesday.     Hospital Course:   3/10: Admitted for further workup of stroke symptoms and basilar artery aneurysm.  3/11: [place] placed overnight. 3% hypertonic saline started. Neuro exam stable. Started on vEEG for aphasia  3/12: GM overnight. Neuro exam stable. 3% d/c. home eliquis 5 bid. failed s/s. started on TF via NGT, spiked fever 101, f/u panculture   3/13: GM overnight, neuro stable, veeg negative. Vanc/zosyn started for empiric PNA.   3/14: GM o/n neuro stable. on HFNC. ENT plan for laryngoscope today with . failed s/s, pend repeat eval. dc'd captopril, started lisinopril. dc'd vanc. Laryngoscopy: No masses visualized, hypomobility of L sided vocal cords, dysarthric, hypophonic, risk for aspiration, good cough reflex.Dr. Onofre to discuss with Dr. Peterson regarding a possible vocal cord injection.   3/15: GM overnight, neuro stable, on HFNC 30/30 overnight, transitioned to 4LNC  3/16: GM overnight, neuro stable, tolerating NC and satting well    3/17: POD6 dx angio. GM o/n neuro stable. trickle feeds via NGT  3/18: POD7 GM o/n, saturating well in RA. tolerating TF   03/19: POD8 GM o/n. gen surg consulted for PEG placement.  03/20: POD9 GM o/n. Transferred back to ICU for heparin gtt in preparation for PEG placement Wed.   3/21: POD10 GM o/n, neuro exam stable. Transferred to ICU in preparation for hep gtt to start tomorrow, plan for PEG on Wednesday. F/u am coags.    Vital Signs Last 24 Hrs  T(C): 37.8 (20 Mar 2022 21:01), Max: 37.8 (20 Mar 2022 21:01)  T(F): 100.1 (20 Mar 2022 21:01), Max: 100.1 (20 Mar 2022 21:01)  HR: 66 (21 Mar 2022 01:00) (60 - 70)  BP: 118/61 (21 Mar 2022 01:00) (118/61 - 161/77)  BP(mean): 84 (21 Mar 2022 01:00) (84 - 111)  RR: 30 (21 Mar 2022 01:00) (14 - 37)  SpO2: 95% (21 Mar 2022 01:00) (93% - 99%)    I&O's Detail    19 Mar 2022 07:01  -  20 Mar 2022 07:00  --------------------------------------------------------  IN:    Jevity 1.2: 1680 mL  Total IN: 1680 mL    OUT:    Incontinent per Condom Catheter (mL): 1 mL    Voided (mL): 1325 mL  Total OUT: 1326 mL    Total NET: 354 mL      20 Mar 2022 07:01  -  21 Mar 2022 01:48  --------------------------------------------------------  IN:    Enteral Tube Flush: 130 mL    Jevity 1.2: 1260 mL  Total IN: 1390 mL    OUT:    Voided (mL): 2100 mL  Total OUT: 2100 mL    Total NET: -710 mL        I&O's Summary    19 Mar 2022 07:01  -  20 Mar 2022 07:00  --------------------------------------------------------  IN: 1680 mL / OUT: 1326 mL / NET: 354 mL    20 Mar 2022 07:01  -  21 Mar 2022 01:48  --------------------------------------------------------  IN: 1390 mL / OUT: 2100 mL / NET: -710 mL        PHYSICAL EXAM:  General: NAD, pt is comfortably sitting up in bed, A&O x3, on 2L NC   HEENT: CN II-XII grossly intact, PERRL 3mm, EOMI b/l, + L facial droop, tongue midline, neck FROM, +NGT   Cardiovascular: RRR, normal S1 and S2   Respiratory: lungs CTAB, no wheezing, rhonchi, or crackles   GI: normoactive BS to auscultation, abd soft, NTND   Neuro: hypophonic speech, following commands   RU/RL strength 5/5, ADRIAN/LL strength 0/5   Extremities: distal pulses 2+ x4     TUBES/LINES:  [] CVC  [] A-line  [] Lumbar Drain  [] Ventriculostomy  [] Other    DIET:  [] NPO  [] Mechanical  [X] Tube feeds    LABS:          CAPILLARY BLOOD GLUCOSE          Drug Levels: [] N/A    CSF Analysis: [] N/A      Allergies    No Known Allergies    Intolerances      MEDICATIONS:  Antibiotics:    Neuro:  acetaminophen    Suspension .. 650 milliGRAM(s) Oral every 6 hours PRN    Anticoagulation:    OTHER:  acetylcysteine 20%  Inhalation 4 milliLiter(s) Inhalation every 4 hours  albuterol/ipratropium for Nebulization 3 milliLiter(s) Nebulizer every 4 hours  amLODIPine   Tablet 10 milliGRAM(s) Oral daily  bisacodyl Suppository 10 milliGRAM(s) Rectal daily  carvedilol 6.25 milliGRAM(s) Oral every 12 hours  chlorhexidine 2% Cloths 1 Application(s) Topical <User Schedule>  glucagon  Injectable 1 milliGRAM(s) IntraMuscular once  hydrALAZINE Injectable 10 milliGRAM(s) IV Push every 4 hours PRN  lisinopril 20 milliGRAM(s) Oral every 24 hours  polyethylene glycol 3350 17 Gram(s) Oral every 12 hours  senna 2 Tablet(s) Oral at bedtime    IVF:    CULTURES:  Culture Results:   No growth at 5 days. (03-12 @ 18:54)    RADIOLOGY & ADDITIONAL TESTS:      ASSESSMENT:  75y/o M with PMHx sig for HTN, HLD, afib (on eliquis, unknown if last taken today,) CVA in 2020 (with minimal residual right-sided weakness per girlfriend) p/w dysarthria, left facial droop, and left-sided weakness. CTH without findings of hemorrhage. 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 NIHSS 20. Pt is not a tpa candidate as Pt is out of window. Patient now s/p diagnostic angio with findings of partially thrombosed basilar artery aneurysm 3/11/22. course c/b resp insufficiency weaned off HFNC.       BASILAR ARTERY ANEURYSM    Handoff    MEWS Score    Brain aneurysm    Brain aneurysm    Basilar artery aneurysm    Angiogram, carotid and cerebral, bilateral    STROKE    [email or web address]        PLAN:    Neuro:   - neuro checks/vital signs q1hrs  - MR head 3/11: no acute infarcts   - stroke core measures   - EEG negative 3/13, dc'd  - no plan for further neurosurgical intervention   - transfer to NSICU 3/20 for Heparin gtt/PEG placement (hep gtt to start 3/21)     Cardio:  - SBP goal 100-160   - cont home norvasc, carvedilol (home HCTZ held), Lisinopril substituted for Captopril   - echo 3/11: mild LVH, EF 65-70%   - home dose Eliquis for Afib --> transition from Eliquis to heparin gtt for PEG planning     Pulm:  - satting well on RA  - standing mucomyst and duonebs  - Chest PT     ENT:   - ENT consulted, s/p laryngoscopy 3/14: No masses, hypomobility of L sided vocal cord  - f/u with Dr. Onofre/Dr. Peterson for possible vocal cord injection.     Renal:  - Na goal 135-145  - IVL     GI:  - TF via NGT, at goal   - pend PEG with Eleni in NSICU 3/23  - bowel regimen, BM 3/19    Endo:  - ISS dc'd  - A1C 5.4    Heme;  - SCDs for DVT ppx  - cont home eliquis 5mg BID per neurology  - hold Eliquis 3/21, start Heparin gtt 3/21  - LE dopplers 3/11: neg for DVT   - pend LUE US for edema     ID:  - zosyn empirically for PNA completed 3/17  - f/u pan cx 3/12; NGTD    Dispo:  - ICU status, full code, pending AR  - Family updated with plan    Assessment and plan discussed with Dr. Peterson   [TextBox_4] : Mr. JOHNSON is a 16 year male with a history of allergies, asthma, and reflux who now comes in for an initial pulmonary evaluation. His chief complaint is throat clearing\par \par -he notes a constant feeling of a lump in his throat that he needs to clear\par -he notes asthma in childhood for which he has a nebulizer\par -he notes that he has had asthma for 8 years \par -his mother notes that he has had asthma since he was born\par -he notes SOB, wheezing, and coughing with asthma\par -he notes exercise and running aggravate asthma Sx\par -he notes his Sx are worst in the spring\par -he notes itchy eyes and ears at times\par -he notes PND \par -he notes sneezing at times\par -he notes heartburn every other day\par -he notes that he eats a lot\par -he notes reflux\par -he notes that his energy levels are  5/10\par -he denies snoring  \par -he notes that he is not sleeping well because of night-time phone use\par -he notes getting 8 hours of sleep \par -he notes his sleep is interrupted by nocturia \par -he notes exercising  (gym, pushups)\par -he notes his bowels are regular \par -s/p COVID-19 (1/2022), mild\par -he notes his memory is good but his concentration is poor\par -he notes taking Zyrtex, vitamin gummies, and Tylenol\par \par \par \par - -patient denies any headaches, nausea, vomiting, fever, chills, sweats, chest pain, chest pressure, palpitations, diarrhea, constipation, dysphagia, myalgias, dizziness, leg swelling, leg pain

## 2023-06-11 NOTE — PHYSICAL THERAPY INITIAL EVALUATION ADULT - ADL SKILLS, REHAB EVAL
Pt presents from work with c/o right knee pain after hearing a pop from it while walking. needs device

## 2023-07-19 NOTE — ED PROVIDER NOTE - CONSTITUTIONAL, MLM
INR at goal. Medications and chart reviewed. No changes noted to necessitate adjustment of warfarin or follow-up plan. See calendar.     - - -

## 2025-02-24 NOTE — OCCUPATIONAL THERAPY INITIAL EVALUATION ADULT - HEALTH SCREEN CRITERIA
Quality 226: Preventive Care And Screening: Tobacco Use: Screening And Cessation Intervention: Tobacco Screening not Performed Quality 130: Documentation Of Current Medications In The Medical Record: Current Medications with Name, Dosage, Frequency, or Route not Documented, Reason not Given Quality 431: Preventive Care And Screening: Unhealthy Alcohol Use - Screening: Patient not screened for unhealthy alcohol use using a systematic screening method Detail Level: Simple Quality 47: Advance Care Plan: Advance care planning not documented, reason not otherwise specified. yes